# Patient Record
Sex: MALE | Race: WHITE | NOT HISPANIC OR LATINO | Employment: OTHER | URBAN - METROPOLITAN AREA
[De-identification: names, ages, dates, MRNs, and addresses within clinical notes are randomized per-mention and may not be internally consistent; named-entity substitution may affect disease eponyms.]

---

## 2017-04-25 ENCOUNTER — OFFICE VISIT (OUTPATIENT)
Dept: OPHTHALMOLOGY | Facility: CLINIC | Age: 72
End: 2017-04-25
Payer: MEDICARE

## 2017-04-25 DIAGNOSIS — Z13.5 GLAUCOMA SCREENING: ICD-10-CM

## 2017-04-25 DIAGNOSIS — E11.9 DIABETES MELLITUS TYPE 2 WITHOUT RETINOPATHY: Primary | ICD-10-CM

## 2017-04-25 DIAGNOSIS — H52.7 REFRACTIVE ERROR: ICD-10-CM

## 2017-04-25 PROCEDURE — 92014 COMPRE OPH EXAM EST PT 1/>: CPT | Mod: S$GLB,,, | Performed by: OPTOMETRIST

## 2017-04-25 PROCEDURE — 92015 DETERMINE REFRACTIVE STATE: CPT | Mod: S$GLB,,, | Performed by: OPTOMETRIST

## 2017-04-25 PROCEDURE — 99999 PR PBB SHADOW E&M-EST. PATIENT-LVL I: CPT | Mod: PBBFAC,,, | Performed by: OPTOMETRIST

## 2017-04-25 NOTE — PROGRESS NOTES
HPI     Last [facility] exam 04/25/2016  Diabetic/ 04/25/2017  Cataract, nuclear, bilateral  Screening for glaucoma  RE       Last edited by Sven Walls MA on 4/25/2017  1:12 PM.         Assessment /Plan     For exam results, see Encounter Report.    Diabetes mellitus type 2 without retinopathy    Cataract, nuclear, bilateral    Glaucoma screening    Refractive error      No diabetic retinopathy OU.  Mild NS cataracts = will follow.  OH OK OU otherwise.  Spec Rx given.  RTC one year.

## 2018-03-28 ENCOUNTER — HOSPITAL ENCOUNTER (EMERGENCY)
Facility: HOSPITAL | Age: 73
End: 2018-03-29
Attending: EMERGENCY MEDICINE
Payer: MEDICARE

## 2018-03-28 DIAGNOSIS — I63.9 STROKE: Primary | ICD-10-CM

## 2018-03-28 DIAGNOSIS — I61.9 RIGHT-SIDED NONTRAUMATIC INTRACEREBRAL HEMORRHAGE, UNSPECIFIED CEREBRAL LOCATION: ICD-10-CM

## 2018-03-28 DIAGNOSIS — Z01.818 ENCOUNTER FOR INTUBATION: ICD-10-CM

## 2018-03-28 LAB
INR PPP: 1.1
PROTHROMBIN TIME: 12 SEC

## 2018-03-28 PROCEDURE — 85025 COMPLETE CBC W/AUTO DIFF WBC: CPT

## 2018-03-28 PROCEDURE — 84443 ASSAY THYROID STIM HORMONE: CPT

## 2018-03-28 PROCEDURE — 85610 PROTHROMBIN TIME: CPT

## 2018-03-28 PROCEDURE — 85730 THROMBOPLASTIN TIME PARTIAL: CPT

## 2018-03-28 PROCEDURE — 93010 ELECTROCARDIOGRAM REPORT: CPT | Mod: ,,, | Performed by: INTERNAL MEDICINE

## 2018-03-28 PROCEDURE — 93005 ELECTROCARDIOGRAM TRACING: CPT

## 2018-03-28 PROCEDURE — 99291 CRITICAL CARE FIRST HOUR: CPT | Mod: 25

## 2018-03-28 PROCEDURE — 80053 COMPREHEN METABOLIC PANEL: CPT

## 2018-03-28 PROCEDURE — 80061 LIPID PANEL: CPT

## 2018-03-28 PROCEDURE — A4216 STERILE WATER/SALINE, 10 ML: HCPCS

## 2018-03-28 PROCEDURE — 25000003 PHARM REV CODE 250

## 2018-03-28 RX ORDER — TRAMADOL HYDROCHLORIDE 100 MG/1
50 TABLET, EXTENDED RELEASE ORAL DAILY
COMMUNITY
End: 2019-03-15

## 2018-03-28 RX ORDER — RIVAROXABAN 15 MG/1
15 TABLET, FILM COATED ORAL
COMMUNITY
End: 2019-03-15

## 2018-03-28 RX ORDER — SIMVASTATIN 40 MG/1
20 TABLET, FILM COATED ORAL NIGHTLY
COMMUNITY
End: 2019-03-15

## 2018-03-28 RX ORDER — METOPROLOL SUCCINATE 50 MG/1
50 TABLET, EXTENDED RELEASE ORAL DAILY
COMMUNITY
End: 2024-01-23

## 2018-03-28 RX ORDER — GABAPENTIN 100 MG/1
100 CAPSULE ORAL 3 TIMES DAILY
COMMUNITY
End: 2019-03-15

## 2018-03-29 VITALS
BODY MASS INDEX: 36.44 KG/M2 | HEIGHT: 72 IN | HEART RATE: 75 BPM | RESPIRATION RATE: 16 BRPM | OXYGEN SATURATION: 98 % | WEIGHT: 269 LBS | DIASTOLIC BLOOD PRESSURE: 67 MMHG | TEMPERATURE: 98 F | SYSTOLIC BLOOD PRESSURE: 132 MMHG

## 2018-03-29 LAB
ALBUMIN SERPL BCP-MCNC: 2.7 G/DL
ALP SERPL-CCNC: 149 U/L
ALT SERPL W/O P-5'-P-CCNC: 49 U/L
ANION GAP SERPL CALC-SCNC: 8 MMOL/L
APTT BLDCRRT: 32.5 SEC
AST SERPL-CCNC: 45 U/L
BASOPHILS # BLD AUTO: 0.02 K/UL
BASOPHILS NFR BLD: 0.2 %
BILIRUB SERPL-MCNC: 1.2 MG/DL
BUN SERPL-MCNC: 13 MG/DL
CALCIUM SERPL-MCNC: 9.7 MG/DL
CHLORIDE SERPL-SCNC: 108 MMOL/L
CHOLEST SERPL-MCNC: 147 MG/DL
CHOLEST/HDLC SERPL: 3.1 {RATIO}
CO2 SERPL-SCNC: 26 MMOL/L
CREAT SERPL-MCNC: 1 MG/DL
DIFFERENTIAL METHOD: ABNORMAL
EOSINOPHIL # BLD AUTO: 0.2 K/UL
EOSINOPHIL NFR BLD: 2 %
ERYTHROCYTE [DISTWIDTH] IN BLOOD BY AUTOMATED COUNT: 13.2 %
EST. GFR  (AFRICAN AMERICAN): >60 ML/MIN/1.73 M^2
EST. GFR  (NON AFRICAN AMERICAN): >60 ML/MIN/1.73 M^2
GLUCOSE SERPL-MCNC: 199 MG/DL
HCT VFR BLD AUTO: 38.5 %
HDLC SERPL-MCNC: 48 MG/DL
HDLC SERPL: 32.7 %
HGB BLD-MCNC: 13.6 G/DL
LDLC SERPL CALC-MCNC: 87.6 MG/DL
LYMPHOCYTES # BLD AUTO: 2.2 K/UL
LYMPHOCYTES NFR BLD: 27.7 %
MCH RBC QN AUTO: 33.9 PG
MCHC RBC AUTO-ENTMCNC: 35.3 G/DL
MCV RBC AUTO: 96 FL
MONOCYTES # BLD AUTO: 0.9 K/UL
MONOCYTES NFR BLD: 11.5 %
NEUTROPHILS # BLD AUTO: 4.7 K/UL
NEUTROPHILS NFR BLD: 58.6 %
NONHDLC SERPL-MCNC: 99 MG/DL
PLATELET # BLD AUTO: 177 K/UL
PMV BLD AUTO: 9.7 FL
POTASSIUM SERPL-SCNC: 3.7 MMOL/L
PROT SERPL-MCNC: 5.9 G/DL
RBC # BLD AUTO: 4.01 M/UL
SODIUM SERPL-SCNC: 142 MMOL/L
TRIGL SERPL-MCNC: 57 MG/DL
TSH SERPL DL<=0.005 MIU/L-ACNC: 1.43 UIU/ML
WBC # BLD AUTO: 8.09 K/UL

## 2018-03-29 PROCEDURE — 25000003 PHARM REV CODE 250: Performed by: EMERGENCY MEDICINE

## 2018-03-29 PROCEDURE — 96368 THER/DIAG CONCURRENT INF: CPT | Mod: 59

## 2018-03-29 PROCEDURE — 96365 THER/PROPH/DIAG IV INF INIT: CPT

## 2018-03-29 PROCEDURE — 31500 INSERT EMERGENCY AIRWAY: CPT

## 2018-03-29 PROCEDURE — 63600175 PHARM REV CODE 636 W HCPCS: Performed by: EMERGENCY MEDICINE

## 2018-03-29 RX ORDER — NICARDIPINE HYDROCHLORIDE 0.2 MG/ML
5 INJECTION INTRAVENOUS CONTINUOUS
Status: DISCONTINUED | OUTPATIENT
Start: 2018-03-29 | End: 2018-03-29 | Stop reason: HOSPADM

## 2018-03-29 RX ORDER — ETOMIDATE 2 MG/ML
20 INJECTION INTRAVENOUS
Status: COMPLETED | OUTPATIENT
Start: 2018-03-29 | End: 2018-03-29

## 2018-03-29 RX ORDER — PROPOFOL 10 MG/ML
5 INJECTION, EMULSION INTRAVENOUS
Status: COMPLETED | OUTPATIENT
Start: 2018-03-29 | End: 2018-03-29

## 2018-03-29 RX ORDER — PROPOFOL 10 MG/ML
INJECTION, EMULSION INTRAVENOUS
Status: COMPLETED
Start: 2018-03-29 | End: 2018-03-29

## 2018-03-29 RX ORDER — VECURONIUM BROMIDE FOR INJECTION 1 MG/ML
10 INJECTION, POWDER, LYOPHILIZED, FOR SOLUTION INTRAVENOUS ONCE
Status: COMPLETED | OUTPATIENT
Start: 2018-03-29 | End: 2018-03-29

## 2018-03-29 RX ADMIN — PROPOFOL 5 MCG/KG/MIN: 10 INJECTION, EMULSION INTRAVENOUS at 12:03

## 2018-03-29 RX ADMIN — NICARDIPINE HYDROCHLORIDE 5 MG/HR: 0.2 INJECTION, SOLUTION INTRAVENOUS at 12:03

## 2018-03-29 RX ADMIN — ETOMIDATE 20 MG: 2 INJECTION, SOLUTION INTRAVENOUS at 12:03

## 2018-03-29 RX ADMIN — VECURONIUM BROMIDE FOR INJECTION 10 MG: 1 INJECTION, POWDER, LYOPHILIZED, FOR SOLUTION INTRAVENOUS at 12:03

## 2018-03-29 NOTE — ED PROVIDER NOTES
SCRIBE #1 NOTE: I, Corinne Mack, am scribing for, and in the presence of, Reba Layne Do, MD. I have scribed the entire note.      History      Chief Complaint   Patient presents with    Cerebrovascular Accident       Review of patient's allergies indicates:   Allergen Reactions    Sulfa (sulfonamide antibiotics) Rash        HPI   HPI    3/28/2018, 11:21 PM   History obtained from the patient      History of Present Illness: Ramy Romo is a 72 y.o. male patient with PMHx of Afib, CHF, DM, and HTN who presents to the Emergency Department for slurred speech which onset 2.5 hours ago.   Symptoms are constant and moderate in severity. No mitigating or exacerbating factors reported. Associated sxs include L sided facial droop. Patient denies any drooling, CP, SOB, weakness, numbness, HA, dizziness, and all other sxs at this time (however, pt a little confused at this time). No prior Tx reported. Pt is on Xarelto daily. No further complaints or concerns at this time.         Arrival mode: AASI    PCP: Craig Capellan MD       Past Medical History:  Past Medical History:   Diagnosis Date    Atrial fibrillation     CHF (congestive heart failure)     Diabetes mellitus     Hypertension        Past Surgical History:  Past Surgical History:   Procedure Laterality Date    CHOLECYSTECTOMY      NECK SURGERY           Family History:  Family History   Problem Relation Age of Onset    Cataracts Father     Glaucoma Father        Social History:  Social History     Social History Main Topics    Smoking status: Never Smoker    Smokeless tobacco: Not on file    Alcohol use No    Drug use: Unknown    Sexual activity: Not on file       ROS   Review of Systems   Constitutional: Negative for chills and fever.   HENT: Negative for drooling and facial swelling.    Respiratory: Negative for cough and shortness of breath.    Cardiovascular: Negative for chest pain and leg swelling.   Gastrointestinal: Negative for  abdominal pain, diarrhea, nausea and vomiting.   Musculoskeletal: Negative for back pain, neck pain and neck stiffness.   Skin: Negative for rash and wound.   Neurological: Positive for facial asymmetry (L sided) and speech difficulty. Negative for dizziness, weakness, light-headedness, numbness and headaches.   All other systems reviewed and are negative.    Physical Exam      Initial Vitals   BP Pulse Resp Temp SpO2   03/28/18 2324 03/28/18 2324 03/28/18 2324 03/28/18 2324 03/28/18 2343   (!) 181/76 (!) 57 19 98.2 °F (36.8 °C) 97 %      MAP       03/28/18 2324       111         Physical Exam  Nursing Notes and Vital Signs Reviewed.  Constitutional: Patient is in no apparent distress. Well-developed and well-nourished.  Head: Atraumatic. Normocephalic.  Eyes: PERRL. EOM intact. Conjunctivae are not pale. No scleral icterus.  ENT: Mucous membranes are moist. Oropharynx is clear and symmetric.    Neck: Supple. Full ROM. No lymphadenopathy.  Cardiovascular: Regular rate. Regular rhythm. No murmurs, rubs, or gallops. Distal pulses are 2+ and symmetric.  Pulmonary/Chest: No respiratory distress. Clear to auscultation bilaterally. No wheezing or rales.  Abdominal: Soft and non-distended.  There is no tenderness.  No rebound, guarding, or rigidity.   Musculoskeletal: Moves all extremities. No obvious deformities. No edema. No calf tenderness.  Skin: Warm and dry.  Neurological: Patient is lethargic and oriented to person,but very dysarthric. Pupils ERRL and EOM normal. Strength is full bilaterally; it is equal and 5/5 in bilateral upper and lower extremities. There is no pronator drift of outstretched arms. Light touch sense is intact. Speech is slurred. L-sided facial droop. L side of the tongue is depressed. Gait not tested  Psychiatric: Normal affect. Eyes closed. Appropriate in content.    ED Course    Critical Care  Date/Time: 3/29/2018 12:04 AM  Performed by: CHRISTOPHER GARCIA  Authorized by: CHRISTOPHER GARCIA    Direct patient critical care time: 13 minutes  Additional history critical care time: 5 minutes  Ordering / reviewing critical care time: 4 minutes  Documentation critical care time: 7 minutes  Consulting other physicians critical care time: 6 minutes  Consult with family critical care time: 5 minutes  Total critical care time (exclusive of procedural time) : 40 minutes  Critical care time was exclusive of separately billable procedures and treating other patients and teaching time.  Critical care was necessary to treat or prevent imminent or life-threatening deterioration of the following conditions: CNS failure or compromise (intracranial bleed).  Critical care was time spent personally by me on the following activities: blood draw for specimens, development of treatment plan with patient or surrogate, discussions with consultants, interpretation of cardiac output measurements, examination of patient, evaluation of patient's response to treatment, review of old charts, re-evaluation of patient's condition, pulse oximetry, ordering and review of radiographic studies, ordering and review of laboratory studies, ordering and performing treatments and interventions, obtaining history from patient or surrogate and ventilator management.    Intubation  Date/Time: 3/29/2018 12:21 AM  Performed by: CHRISTOPHER GARCIA  Authorized by: CHRISTOPHER GARCIA   Consent Done: Yes  Consent: Verbal consent obtained.  Risks and benefits: risks, benefits and alternatives were discussed  Consent given by: spouse  Patient understanding: patient states understanding of the procedure being performed  Patient consent: the patient's understanding of the procedure matches consent given  Patient identity confirmed: , name, provided demographic data and MRN  Indications: airway protection  Intubation method: direct  Patient status: sedated  Preoxygenation: bag valve mask  Sedatives: etomidate  Paralytic: vecuronium  Tube size: 7.0 mm  Tube  type: cuffed  Number of attempts: 1  Ventilation between attempts: BVM  Cricoid pressure: yes  Cords visualized: yes  Post-procedure assessment: chest rise  Breath sounds: clear  Cuff inflated: yes  ETT to lip: 24 cm  Tube secured with: bite block  Chest x-ray interpreted by radiologist.  Chest x-ray findings: endotracheal tube in appropriate position  Patient tolerance: Patient tolerated the procedure well with no immediate complications  Complications: No  Specimens: No  Implants: No        ED Vital Signs:  Vitals:    03/28/18 2324 03/28/18 2343 03/28/18 2348 03/29/18 0022   BP: (!) 181/76 (!) 151/90  (!) 178/94   Pulse: (!) 57 68 66 69   Resp: 19 19 18 18   Temp: 98.2 °F (36.8 °C)      TempSrc: Oral      SpO2:  97% 98% 100%   Weight: 122 kg (269 lb)      Height: 6' (1.829 m)       03/29/18 0029 03/29/18 0033 03/29/18 0035 03/29/18 0037   BP:  (!) 191/85  (!) 147/70   Pulse: 87 83 78 79   Resp: 19 18 16   Temp:       TempSrc:       SpO2: 100% 99%  100%   Weight:       Height:        03/29/18 0042 03/29/18 0045 03/29/18 0050   BP: 135/65  133/63   Pulse: 75 71 78   Resp: 16 16 16   Temp:      TempSrc:      SpO2: 99% 98% 98%   Weight:      Height:          Abnormal Lab Results:  Labs Reviewed   CBC W/ AUTO DIFFERENTIAL - Abnormal; Notable for the following:        Result Value    RBC 4.01 (*)     Hemoglobin 13.6 (*)     Hematocrit 38.5 (*)     MCH 33.9 (*)     All other components within normal limits   COMPREHENSIVE METABOLIC PANEL - Abnormal; Notable for the following:     Glucose 199 (*)     Total Protein 5.9 (*)     Albumin 2.7 (*)     Total Bilirubin 1.2 (*)     Alkaline Phosphatase 149 (*)     AST 45 (*)     ALT 49 (*)     All other components within normal limits   APTT - Abnormal; Notable for the following:     aPTT 32.5 (*)     All other components within normal limits   PROTIME-INR   TSH   APTT   LIPID PANEL   POCT GLUCOSE        All Lab Results:  Results for orders placed or performed during the hospital  encounter of 03/28/18   CBC W/ AUTO DIFFERENTIAL   Result Value Ref Range    WBC 8.09 3.90 - 12.70 K/uL    RBC 4.01 (L) 4.60 - 6.20 M/uL    Hemoglobin 13.6 (L) 14.0 - 18.0 g/dL    Hematocrit 38.5 (L) 40.0 - 54.0 %    MCV 96 82 - 98 fL    MCH 33.9 (H) 27.0 - 31.0 pg    MCHC 35.3 32.0 - 36.0 g/dL    RDW 13.2 11.5 - 14.5 %    Platelets 177 150 - 350 K/uL    MPV 9.7 9.2 - 12.9 fL    Gran # (ANC) 4.7 1.8 - 7.7 K/uL    Lymph # 2.2 1.0 - 4.8 K/uL    Mono # 0.9 0.3 - 1.0 K/uL    Eos # 0.2 0.0 - 0.5 K/uL    Baso # 0.02 0.00 - 0.20 K/uL    Gran% 58.6 38.0 - 73.0 %    Lymph% 27.7 18.0 - 48.0 %    Mono% 11.5 4.0 - 15.0 %    Eosinophil% 2.0 0.0 - 8.0 %    Basophil% 0.2 0.0 - 1.9 %    Differential Method Automated    Comprehensive metabolic panel   Result Value Ref Range    Sodium 142 136 - 145 mmol/L    Potassium 3.7 3.5 - 5.1 mmol/L    Chloride 108 95 - 110 mmol/L    CO2 26 23 - 29 mmol/L    Glucose 199 (H) 70 - 110 mg/dL    BUN, Bld 13 8 - 23 mg/dL    Creatinine 1.0 0.5 - 1.4 mg/dL    Calcium 9.7 8.7 - 10.5 mg/dL    Total Protein 5.9 (L) 6.0 - 8.4 g/dL    Albumin 2.7 (L) 3.5 - 5.2 g/dL    Total Bilirubin 1.2 (H) 0.1 - 1.0 mg/dL    Alkaline Phosphatase 149 (H) 55 - 135 U/L    AST 45 (H) 10 - 40 U/L    ALT 49 (H) 10 - 44 U/L    Anion Gap 8 8 - 16 mmol/L    eGFR if African American >60 >60 mL/min/1.73 m^2    eGFR if non African American >60 >60 mL/min/1.73 m^2   Protime-INR   Result Value Ref Range    Prothrombin Time 12.0 9.0 - 12.5 sec    INR 1.1 0.8 - 1.2   TSH   Result Value Ref Range    TSH 1.432 0.400 - 4.000 uIU/mL   APTT   Result Value Ref Range    aPTT 32.5 (H) 21.0 - 32.0 sec       Imaging Results:  Imaging Results          X-Ray Chest 1 View (In process)                X-Ray Chest AP Portable (Final result)  Result time 03/28/18 23:51:43    Final result by James Degroot MD (03/28/18 23:51:43)                 Impression:      Mild enlargement of the heart.      Electronically signed by: JAMES DEGROOT MD  Date:      03/28/18  Time:    23:51              Narrative:    Exam: XR CHEST AP PORTABLE,    Date:  03/28/18 23:40:00    History: Stroke     Comparison:  08/20/2016    Findings: There is mild enlargement of the heart.  Lungs are clear.                             CT Head Without Contrast (Final result)  Result time 03/28/18 23:37:57    Final result by James Degroot MD (03/28/18 23:37:57)                 Impression:         There is acute intra-cerebral hemorrhage in the right temporal lobe with extension of blood into the subarachnoid space filling the right sylvian fissure.    I have reported these findings to Dr. Xi Bedolla at 2330 hrs.  03/28/2018.      All CT scans at this facility use dose modulation, iterative reconstruction, and/or weight based dosing when appropriate to reduce radiation dose to as low as reasonable achievable.      Electronically signed by: JAMES DEGROOT MD  Date:     03/28/18  Time:    23:37              Narrative:    CT HEAD WITHOUT CONTRAST    Date: 03/28/18 23:25:00    Clinical indication:  Stroke      Technique:  Standard noncontrast CT scan of the brain. Comparison is made with the previous study of 08/20/2016.     Findings:  There is a new finding of acute cerebral hemorrhage.  A rounded collection of fresh blood attenuation is seen in the right temporal lobe measuring 1.8 x 2.5 cm.  There is extension of hemorrhage into the subarachnoid space with blood outlining the sylvian fissure extending into the right middle cranial fossa.  The ventricular system appears free of any significant midline shift with no hemorrhage extending into the ventricular system.  The left cerebral hemisphere appears intact.  Chronic changes of small vessel disease with patchy decreased attenuation in the periventricular white matter.  There is atherosclerosis of intracranial vessels.  Posterior fossa is normal in appearance.    There is opacification of the maxillary sinuses bilaterally with patchy mucosal  thickening in the ethmoid air cells as well.  The skull and skull base are unremarkable.                               The EKG was ordered, reviewed, and independently interpreted by the ED provider.  Interpretation time: 2344  Rate: 71 BPM  Rhythm: atrial fibrillation  Interpretation: No STEMI.           The Emergency Provider reviewed the vital signs and test results, which are outlined above.    ED Discussion     11:30 PM: Informed by Dr. Flores (Radiology) that the pt has an intracranial bleed and requires transfer to another facility with interventional neurology services.    11:37 PM: Re-evaluated pt. Informed pt and family that there are no neuro surgery services available at this time. I have discussed test results, shared treatment plan, and the need for transfer with patient and family at bedside. All historical, clinical, radiographic, and laboratory findings were reviewed with the patient/family in detail. Patient will be transferred by [facility] services with cardiac monitoring and ventilator management care required en route. Patient understands that there could be unforeseen motor vehicle accidents or loss of vital signs that could result in potential death or permanent disability. Pt and family express understanding at this time and agree with all information. All questions answered. Pt and family have no further questions or concerns at this time. Pt is ready for transfer.     11:54 PM: Consult with Dr. Nolan (Emergency Medicine) at [facility] concerning pt. There are no neuro surgery services, which the patient requires, offered at [facility] at this time. Dr. Nolan expresses understanding and will accept transfer.  Accepting Facility: [facility]  Accepting Physician: Dr. Nolan      ED Medication(s):  Medications   niCARdipine 40 mg/200 mL infusion (5 mg/hr Intravenous Rate/Dose Change 3/29/18 0042)   etomidate injection 20 mg (20 mg Intravenous Given by Other 3/29/18 0022)   vecuronium injection 10 mg  (10 mg Intravenous Given by Other 3/29/18 0022)   propofol (DIPRIVAN) 10 mg/mL infusion (10 mcg/kg/min × 122 kg Intravenous Rate/Dose Change 3/29/18 0035)   propofol (DIPRIVAN) 10 mg/mL infusion (  Override Pull 3/29/18 0045)       New Prescriptions    No medications on file             Medical Decision Making    Medical Decision Making:   Clinical Tests:   Lab Tests: Reviewed and Ordered  Radiological Study: Reviewed and Ordered  Medical Tests: Reviewed and Ordered           Scribe Attestation:   Scribe #1: I performed the above scribed service and the documentation accurately describes the services I performed. I attest to the accuracy of the note.    Attending:   Physician Attestation Statement for Scribe #1: I, Reba Layne Do, MD, personally performed the services described in this documentation, as scribed by Corinne Mack, in my presence, and it is both accurate and complete.          Clinical Impression       ICD-10-CM ICD-9-CM   1. Stroke I63.9 434.91   2. Encounter for intubation Z01.818 V72.83   3. Right-sided nontraumatic intracerebral hemorrhage, unspecified cerebral location I61.9 431       Disposition:   Disposition: Transferred  Condition: Serious           Reba Layne Do, MD  03/29/18 0128       Reba Layne Do, MD  03/29/18 0128

## 2018-03-29 NOTE — ED NOTES
Patient placed on continuous cardiac monitor, automatic blood pressure cuff and continuous pulse oximeter upon arrival.

## 2018-03-29 NOTE — ED NOTES
Before patient was sent to CT. + left sided facial droop noted with slurred speech, drooling noted to left sided. Unable to perform swallow study at this time.

## 2018-06-05 ENCOUNTER — OFFICE VISIT (OUTPATIENT)
Dept: OPHTHALMOLOGY | Facility: CLINIC | Age: 73
End: 2018-06-05
Payer: MEDICARE

## 2018-06-05 DIAGNOSIS — H25.13 CATARACT, NUCLEAR SCLEROTIC SENILE, BILATERAL: ICD-10-CM

## 2018-06-05 DIAGNOSIS — H52.7 REFRACTIVE ERROR: ICD-10-CM

## 2018-06-05 DIAGNOSIS — Z13.5 GLAUCOMA SCREENING: ICD-10-CM

## 2018-06-05 DIAGNOSIS — E11.9 DIABETES MELLITUS TYPE 2 WITHOUT RETINOPATHY: Primary | ICD-10-CM

## 2018-06-05 PROCEDURE — 99999 PR PBB SHADOW E&M-EST. PATIENT-LVL I: CPT | Mod: PBBFAC,,, | Performed by: OPTOMETRIST

## 2018-06-05 PROCEDURE — 92014 COMPRE OPH EXAM EST PT 1/>: CPT | Mod: S$GLB,,, | Performed by: OPTOMETRIST

## 2018-06-05 PROCEDURE — 92015 DETERMINE REFRACTIVE STATE: CPT | Mod: S$GLB,,, | Performed by: OPTOMETRIST

## 2018-06-05 RX ORDER — AMLODIPINE BESYLATE 10 MG/1
10 TABLET ORAL
COMMUNITY
Start: 2018-05-31 | End: 2019-05-31

## 2018-06-05 NOTE — PROGRESS NOTES
HPI     Diabetic Eye Exam    Additional comments: Yearly           Comments   Last seen by [facility] on 4/25/17 for yearly DM exam.  Patient states since last exam he had a stroke April 2018 and had been   hospitalized for bleeding on the brain. Wears PAL glasses full-time last   updated 1 year ago. No other complaints. No drops.  1. DM  2. Stroke 2018  3. NSC OU         Last edited by Barb Swanson, PCT on 6/5/2018  1:33 PM. (History)            Assessment /Plan     For exam results, see Encounter Report.    Diabetes mellitus type 2 without retinopathy    Cataract, nuclear sclerotic senile, bilateral    Glaucoma screening    Refractive error    Other orders  -     Cancel: Posterior Segment OCT Retina-Both eyes; Future      No diabetic retinopathy OU.  NS cataracts OU = will follow.  OH OK OU otherwise.  Spec Rx given.  RTC one year.

## 2019-02-19 ENCOUNTER — OFFICE VISIT (OUTPATIENT)
Dept: OPHTHALMOLOGY | Facility: CLINIC | Age: 74
End: 2019-02-19
Payer: MEDICARE

## 2019-02-19 DIAGNOSIS — Z13.5 GLAUCOMA SCREENING: ICD-10-CM

## 2019-02-19 DIAGNOSIS — H25.13 CATARACT, NUCLEAR SCLEROTIC SENILE, BILATERAL: ICD-10-CM

## 2019-02-19 DIAGNOSIS — E11.9 DIABETES MELLITUS TYPE 2 WITHOUT RETINOPATHY: Primary | ICD-10-CM

## 2019-02-19 DIAGNOSIS — H52.7 REFRACTIVE ERROR: ICD-10-CM

## 2019-02-19 PROCEDURE — 92014 PR EYE EXAM, EST PATIENT,COMPREHESV: ICD-10-PCS | Mod: S$GLB,,, | Performed by: OPTOMETRIST

## 2019-02-19 PROCEDURE — 99999 PR PBB SHADOW E&M-EST. PATIENT-LVL I: ICD-10-PCS | Mod: PBBFAC,,, | Performed by: OPTOMETRIST

## 2019-02-19 PROCEDURE — 99999 PR PBB SHADOW E&M-EST. PATIENT-LVL I: CPT | Mod: PBBFAC,,, | Performed by: OPTOMETRIST

## 2019-02-19 PROCEDURE — 92014 COMPRE OPH EXAM EST PT 1/>: CPT | Mod: S$GLB,,, | Performed by: OPTOMETRIST

## 2019-02-19 NOTE — PROGRESS NOTES
HPI     Last [facility] exam 06/05/2018  Diabetic/NIDDM  Stroke 2018  Cataract, nuclear sclerotic senile, bilateral      Last edited by Sven Walls MA on 2/19/2019  9:12 AM. (History)            Assessment /Plan     For exam results, see Encounter Report.    Diabetes mellitus type 2 without retinopathy    Cataract, nuclear sclerotic senile, bilateral    Glaucoma screening    Refractive error      No diabetic retinopathy OU.  Moderate NS cataracts OU = will follow.  OH OK OU otherwise.  Spec Rx given.  RTC one year or prn.

## 2019-03-14 NOTE — PROGRESS NOTES
Referring Provider:    Emilyreferral Self  No address on file  Subjective:   Patient: Ramy Romo [id number], :1945   Visit date:3/15/2019 12:28 PM    Chief Complaint:  No chief complaint on file.    HPI:  Ramy is a 73 y.o. male who I was asked to see in consultation for evaluation of the following issue(s):    Patient presented to clinic for the first time on 03/15/19 referred by his PCP.  He has history of CVA and CT demonstrated incidental findings of several bilateral maxillary sinusitis.  He has a history of endocopic sinus surgery on the left at [facility].  He has no smoking history.  Denies allergy symptoms.  Uses nasal saline rinses.     Review of Systems:  -     Allergic/Immunologic: is allergic to sulfa (sulfonamide antibiotics)..  -     Constitutional: Current temp: 97.7 °F (36.5 °C) (Tympanic)      His meds, allergies, medical, surgical, social & family histories were reviewed & updated:  -     He has a current medication list which includes the following prescription(s): amlodipine, aspirin, blood sugar diagnostic, blood-glucose meter, bumetanide, calcium-vitamin d, ferrous gluconate, gabapentin, hydrocodone-acetaminophen, lancets, levetiracetam, metoprolol succinate, potassium chloride sa, tamsulosin, glipizide, and pantoprazole.  -     He  has a past medical history of Atrial fibrillation, CHF (congestive heart failure), Diabetes mellitus, and Hypertension.   -     He does not have any pertinent problems on file.   -     He  has a past surgical history that includes Cholecystectomy and Neck surgery.  -     He  reports that  has never smoked. He does not have any smokeless tobacco history on file. He reports that he does not drink alcohol.  -     His family history includes Cataracts in his father; Glaucoma in his father.  -     He is allergic to sulfa (sulfonamide antibiotics).    Objective:     Physical Exam:  Vitals:  /66   Pulse (!) 48   Temp 97.7 °F (36.5 °C) (Tympanic)   Wt 134 kg  (295 lb 6.7 oz)   BMI 40.07 kg/m²   General appearance:  Well developed, well nourished    Eyes:  Extraocular motions intact, PERRL    Communication:  no hoarseness, no dysphonia    Ears:  Otoscopy of external auditory canals and tympanic membranes was normal, clinical speech reception thresholds grossly intact, no mass/lesion of auricle.  Nose:  No masses/lesions of external nose, nasal mucosa, septum, and turbinates were within normal limits.  Mouth:  No mass/lesion of lips, teeth, gums, hard/soft palate, tongue, tonsils, or oropharynx.    Cardiovascular:  No pedal edema; Radial Pulses +2     Neck & Lymphatics:  No cervical lymphadenopathy, no neck mass/crepitus/ asymmetry, trachea is midline, no thyroid enlargement/tenderness/mass.    Psych: Oriented x3,  Alert with normal mood and affect.     Respiration/Chest:  Symmetric expansion during respiration, normal respiratory effort.    Skin:  Warm and intact. No ulcerations of face, scalp, neck.        CT HEAD W WO CONTRAST Feb 2019:    COMPARISON:     Noncontrast CT scans of head April 8, 2018 and May 24, 2018. Pre and postcontrast CTA head June 18, 2018.    FINDINGS:     Automated exposure control was used for dose reduction.    No intracranial hemorrhage, mass or mass effect. Again, diffuse moderate white matter hypodensity in both cerebral hemispheres. Small to small moderate region of encephalomalacia along right sylvian fissure appears somewhat increased in overall volume. No acute loss of gray-white matter differentiation. The ventricles and cisterns appear within normal limits. Again, mild/moderate calcification of intracranial internal carotids. Persistent relative hyperdensity of middle cerebral arteries, more so on the right.     Again, diffuse mucosal thickening essentially completely opacifying right maxillary sinus. There is now diffuse mucosal thickening near completely opacifying left maxillary sinus. Mild mucosal thickening left mid and anterior  ethmoid air cells is now seen.    IMPRESSION:    1. No definite acute finding.    2. Slight interval apparent worsening of encephalomalacia in the right cerebral hemisphere at site of prior hemorrhage. Three. Again, diffuse small vessel ischemic disease change in white matter.    4. Severe right maxillary sinus disease is unimproved. New severe left maxillary sinus disease. New mild left ethmoid sinus disease.    Assessment & Plan:   Diagnoses and all orders for this visit:    Chronic maxillary sinusitis  -     Aerobic culture      SINUSITIS/RHINITIS  Ramy presents today for an evaluation.  They have multiple sinonasal complaints and determination of the underlying etiology is a problem of moderate to high complexity.  Patients may present with sinonasal symptoms such as nasal obstruction as a primary anatomic disorder.  Patients may also present with recurrent or chronic inflammatory sinonasal symptoms.  Generally, patients can be stratified into one of several groups.      The first group represents patients who have frequent or recurrent upper respiratory infections, most frequently viral.  These patients most frequently have normally functioning immune system's but have high levels of exposure.  This is commonly seen in nurses and school teachers as well as other groups who spend large amounts of time around sick patients and children.      Other patients may have isolated rhinitis without evidence of sinusitis.  The majority of these patients have ALLERGIC rhinitis however other groups may have more rare forms of rhinitis such as nonallergic rhinitis with eosinophilia syndrome.  As a screening evaluation, if the patient has had a normal endoscopy, from time to time a simple x-ray of the sinuses may be performed in combination with antigen specific ALLERGY testing.    The third group of patients present with significant evidence of chronic sinusitis.  Nasal endoscopy may reveal polyps or purulent drainage.  CT  scan is an important aspect to determining the extent of disease.  Some patients with chronic sinusitis have an underlying etiology of ALLERGY leading to obstruction of the ostiomeatal units with furthering of the infection.  Others may have an acute infection that leads to stenosis of the sinonasal openings followed by a long, progressive course of infection.  Patients with unilateral disease who do not have inverting papilloma typically fall into this latter group.    CT scan of the sinuses has been performed.      Antigen specific allergy testing  has not been performed.    Allergy treatment with topical steroids and/or antihistamines has been used with good compliance with symptoms unchanged.      By review of all data, history and exam, there does seem to be a clear distinction between allergic rhinitis versus rhinitis with a component of chronic sinusitis.   My impression is that Ramy presents with severe bilateral maxillary sinusitis.      My recommendation is:    Culture taken of left maxillary sinus today in clinic - will contact pt with results and abx tx    We discussed his medical conditions, treatments and plan.  Ramy should return to clinic if any issues arise (symptoms worsen or persist), otherwise we will see him back in the clinic In 3 weeks.    Thank you for allowing me to participate in the care of Ramy.      Patient: Ramy Romo [id number], :1945  Procedure date:3/15/2019  Patient's medications, allergies, past medical, surgical, social and family histories were reviewed and updated as appropriate.  Chief Complaint:  No chief complaint on file.    HPI:  Ramy is a 73 y.o. male with the history of present illness as discussed in the clinic note from today.  Anterior rhinoscopy was insufficient to explain symptoms and findings.     Procedure: Risks, benefits, and alternatives of the procedure were discussed with the patient, and the patient consented to the nasal endoscopy.  The  nasal cavity was sprayed with a topical decongestant and anesthetic (if needed). The endoscope was passed into each nostril and each nasal cavity was visualized.  On each side the nasal cavity, sinuses (if open), turbinates, middle and superior meatus, sphenoethmoidal recess and septum were examined with the findings described below. At the end of the examination, the scope was removed. The patient tolerated the procedure well with no complications.       Endoscopic Sinonasal Exam Findings:  -     The right side has mild edema  -     The left side has mild edema generally but significant edema around the maxillary ostium  -     Nasal secretions: purulent secretions in the left maxillary  -     Nasal septum: no significant deviation or perforation appreciated   -     Inferior turbinate: Normal mucosa without significant hypertrophy bilaterally  -     Middle turbinate: turbinate partially resected on the left  -     Other findings: No mass or obstructive lesion    Assessment & Plan:  - see today's clinic note

## 2019-03-15 ENCOUNTER — OFFICE VISIT (OUTPATIENT)
Dept: OTOLARYNGOLOGY | Facility: CLINIC | Age: 74
End: 2019-03-15
Payer: MEDICARE

## 2019-03-15 VITALS
DIASTOLIC BLOOD PRESSURE: 66 MMHG | SYSTOLIC BLOOD PRESSURE: 130 MMHG | WEIGHT: 295.44 LBS | TEMPERATURE: 98 F | HEART RATE: 48 BPM | BODY MASS INDEX: 40.07 KG/M2

## 2019-03-15 DIAGNOSIS — J32.0 CHRONIC MAXILLARY SINUSITIS: Primary | ICD-10-CM

## 2019-03-15 PROCEDURE — 31231 NASAL ENDOSCOPY DX: CPT | Mod: S$GLB,,, | Performed by: OTOLARYNGOLOGY

## 2019-03-15 PROCEDURE — 99999 PR PBB SHADOW E&M-EST. PATIENT-LVL III: CPT | Mod: PBBFAC,,, | Performed by: OTOLARYNGOLOGY

## 2019-03-15 PROCEDURE — 87186 SC STD MICRODIL/AGAR DIL: CPT

## 2019-03-15 PROCEDURE — 99999 PR PBB SHADOW E&M-EST. PATIENT-LVL III: ICD-10-PCS | Mod: PBBFAC,,, | Performed by: OTOLARYNGOLOGY

## 2019-03-15 PROCEDURE — 99203 OFFICE O/P NEW LOW 30 MIN: CPT | Mod: 25,S$GLB,, | Performed by: OTOLARYNGOLOGY

## 2019-03-15 PROCEDURE — 99203 PR OFFICE/OUTPT VISIT, NEW, LEVL III, 30-44 MIN: ICD-10-PCS | Mod: 25,S$GLB,, | Performed by: OTOLARYNGOLOGY

## 2019-03-15 PROCEDURE — 87070 CULTURE OTHR SPECIMN AEROBIC: CPT

## 2019-03-15 PROCEDURE — 31231 PR NASAL ENDOSCOPY, DX: ICD-10-PCS | Mod: S$GLB,,, | Performed by: OTOLARYNGOLOGY

## 2019-03-15 PROCEDURE — 87077 CULTURE AEROBIC IDENTIFY: CPT

## 2019-03-15 RX ORDER — BUMETANIDE 2 MG/1
2 TABLET ORAL DAILY
COMMUNITY
Start: 2019-03-13 | End: 2024-01-23 | Stop reason: ALTCHOICE

## 2019-03-15 RX ORDER — POTASSIUM CHLORIDE 20 MEQ/1
1 TABLET, EXTENDED RELEASE ORAL DAILY
COMMUNITY
End: 2024-01-23

## 2019-03-15 RX ORDER — TAMSULOSIN HYDROCHLORIDE 0.4 MG/1
0.4 CAPSULE ORAL DAILY
COMMUNITY
Start: 2019-03-05

## 2019-03-15 RX ORDER — LEVETIRACETAM 500 MG/1
500 TABLET ORAL 2 TIMES DAILY
COMMUNITY
Start: 2019-03-13 | End: 2024-01-23

## 2019-03-15 RX ORDER — ASPIRIN 81 MG/1
81 TABLET ORAL DAILY
COMMUNITY
End: 2024-01-23

## 2019-03-18 ENCOUNTER — TELEPHONE (OUTPATIENT)
Dept: OTOLARYNGOLOGY | Facility: CLINIC | Age: 74
End: 2019-03-18

## 2019-03-18 NOTE — TELEPHONE ENCOUNTER
Spoke with wife (Marilyn) informed her that culture wasn't back at this time. She voice understanding and will call back at a later time and date.       ----- Message from Julia Pack sent at 3/18/2019  9:17 AM CDT -----  Contact: Marilyn (pt's wife)   Type:  Test Results    Who Called: Marilyn   Name of Test (Lab/Mammo/Etc):  Lab/Culture   Date of Test: 3/15/19  Ordering Provider:  Jl   Where the test was performed: [facility]    Would the patient rather a call back or a response via [facility]? Call back   Best Call Back Number: [phone] (home)     Additional Information:

## 2019-03-19 ENCOUNTER — TELEPHONE (OUTPATIENT)
Dept: OTOLARYNGOLOGY | Facility: CLINIC | Age: 74
End: 2019-03-19

## 2019-03-19 LAB
BACTERIA SPEC AEROBE CULT: NORMAL
BACTERIA SPEC AEROBE CULT: NORMAL

## 2019-03-19 RX ORDER — LEVOFLOXACIN 500 MG/1
500 TABLET, FILM COATED ORAL DAILY
Qty: 21 TABLET | Refills: 0 | Status: SHIPPED | OUTPATIENT
Start: 2019-03-19 | End: 2019-04-09

## 2019-03-19 NOTE — TELEPHONE ENCOUNTER
Reviewed results with pts wife because he was not feeling well got his 3 week follow up scheduled and she will be picking up the medication today.        ----- Message from Cinthya Pina sent at 3/19/2019  2:16 PM CDT -----  Contact: Pat/wife  Type:  Patient Returning Call    Who Called: Pat  Who Left Message for Patient: the nurse  Does the patient know what this is regarding?: test results  Would the patient rather a call back or a response via [facility]?  Call back   Best Call Back Number: [phone] cell.  Additional Information: n/a    Thanks,  Cinthya

## 2019-03-19 NOTE — TELEPHONE ENCOUNTER
Called pts home phone number and cell phone number and left message to return call to discuss results and inform that there were medications called into the pharmacy that need to be started as soon as possible then come back in for a 3 week follow up after.      ----- Message from Francia Felipe PA-C sent at 3/19/2019  8:29 AM CDT -----  Please let pt know his culture grew two different types of bacteria that are both susceptible to Levaquin (antibiotic). I have sent an Rx for Levaquin x 21 days to his pharmacy, please start this and we should f/u in clinic in 3 weeks.

## 2019-03-20 NOTE — TELEPHONE ENCOUNTER
Returned call to patient, left a voicemail for a return call back with any additional questions and concerns regarding his culture results provided to the patient wife with Linda on yesterday.

## 2019-03-20 NOTE — TELEPHONE ENCOUNTER
Spoke with wife to provide her with the bacteria the culture results grew out with verbal understanding.

## 2019-04-09 ENCOUNTER — OFFICE VISIT (OUTPATIENT)
Dept: OTOLARYNGOLOGY | Facility: CLINIC | Age: 74
End: 2019-04-09
Payer: MEDICARE

## 2019-04-09 VITALS
DIASTOLIC BLOOD PRESSURE: 65 MMHG | SYSTOLIC BLOOD PRESSURE: 126 MMHG | HEART RATE: 69 BPM | TEMPERATURE: 98 F | WEIGHT: 283.06 LBS | HEIGHT: 72 IN | BODY MASS INDEX: 38.34 KG/M2

## 2019-04-09 DIAGNOSIS — J32.0 CHRONIC MAXILLARY SINUSITIS: Primary | ICD-10-CM

## 2019-04-09 PROCEDURE — 99999 PR PBB SHADOW E&M-EST. PATIENT-LVL III: CPT | Mod: PBBFAC,,, | Performed by: PHYSICIAN ASSISTANT

## 2019-04-09 PROCEDURE — 99999 PR PBB SHADOW E&M-EST. PATIENT-LVL III: ICD-10-PCS | Mod: PBBFAC,,, | Performed by: PHYSICIAN ASSISTANT

## 2019-04-09 PROCEDURE — 99213 OFFICE O/P EST LOW 20 MIN: CPT | Mod: S$GLB,,, | Performed by: PHYSICIAN ASSISTANT

## 2019-04-09 PROCEDURE — 1101F PT FALLS ASSESS-DOCD LE1/YR: CPT | Mod: CPTII,S$GLB,, | Performed by: PHYSICIAN ASSISTANT

## 2019-04-09 PROCEDURE — 99213 PR OFFICE/OUTPT VISIT, EST, LEVL III, 20-29 MIN: ICD-10-PCS | Mod: S$GLB,,, | Performed by: PHYSICIAN ASSISTANT

## 2019-04-09 PROCEDURE — 1101F PR PT FALLS ASSESS DOC 0-1 FALLS W/OUT INJ PAST YR: ICD-10-PCS | Mod: CPTII,S$GLB,, | Performed by: PHYSICIAN ASSISTANT

## 2019-04-09 RX ORDER — AZELASTINE 1 MG/ML
1 SPRAY, METERED NASAL 2 TIMES DAILY
Qty: 30 ML | Refills: 11 | Status: SHIPPED | OUTPATIENT
Start: 2019-04-09 | End: 2024-01-23

## 2019-04-09 NOTE — PROGRESS NOTES
Subjective:   Patient: Ramy Romo [id number], :1945   Visit date:2019 10:16 AM    Chief Complaint:  Follow-up    HPI:  Ramy is a 73 y.o. male who is here for follow-up. He was last here on 03/15/19 for chronic maxillary sinusitis. Culture was obtained in clinic from his L maxillary sinus and returned + staph. Patient was tx with Levaquin x 21 days and returned to clinic on 19. Patient and wife reported excellent relief in sinus pressure and complete resolution of drainage. He c/o intermittent post-nasal drip worse at night/in AM which resolves once he clears his throat, this is clear mucus. Denied any further discolored mucus or drainage. He has one capsule of Levaquin left to take. Reported recent R arm fx from tripping over concrete outside in a parking lot 2 weeks ago, pt is currently in splint. No other new ssx.     He has a history of endocopic sinus surgery on the left at [facility]      Review of Systems:  -     Allergic/Immunologic: is allergic to sulfa (sulfonamide antibiotics)..  -     Constitutional: Current temp: 97.7 °F (36.5 °C) (Tympanic)    His meds, allergies, medical, surgical, social & family histories were reviewed & updated:  -     He has a current medication list which includes the following prescription(s): amlodipine, aspirin, blood sugar diagnostic, blood-glucose meter, bumetanide, calcium-vitamin d, ferrous gluconate, gabapentin, hydrocodone-acetaminophen, lancets, levetiracetam, levofloxacin, metoprolol succinate, potassium chloride sa, tamsulosin, azelastine, glipizide, and pantoprazole.  -     He  has a past medical history of Atrial fibrillation, CHF (congestive heart failure), Diabetes mellitus, and Hypertension.   -     He does not have any pertinent problems on file.   -     He  has a past surgical history that includes Cholecystectomy; Neck surgery; and Total elbow arthroplasty (Right, 2019).  -     He  reports that he has never smoked. He does not have any  smokeless tobacco history on file. He reports that he does not drink alcohol.  -     His family history includes Cataracts in his father; Glaucoma in his father.  -     He is allergic to sulfa (sulfonamide antibiotics).    Objective:     Physical Exam:  Vitals:  /65   Pulse 69   Temp 97.7 °F (36.5 °C) (Tympanic)   Ht 6' (1.829 m)   Wt 128.4 kg (283 lb 1.1 oz)   BMI 38.39 kg/m²   Appearance:  Well-developed, well-nourished.  Communication:  Able to communicate, no hoarseness.  Head & Face:  Normocephalic, atraumatic, no sinus tenderness, normal facial strength.  Eyes:  Extraocular motions intact.  Ears:  Otoscopy of external auditory canals and tympanic membranes was normal, clinical speech reception thresholds grossly intact, no mass/lesion of auricle.  Nose:  No masses/lesions of external nose, nasal mucosa, septum, and turbinates were within normal limits. Clear mucus. No erythema, no edema.   Mouth:  No mass/lesion of lips, teeth, gums, hard/soft palate, tongue, tonsils, or oropharynx.  Neck & Lymphatics:  No cervical lymphadenopathy, no neck mass/crepitus/ asymmetry, trachea is midline, no thyroid enlargement/tenderness/mass.  Neuro/Psych: Alert with normal mood and affect.   Abdominal: Normal appearance.   Respiration/Chest:  Symmetric expansion during respiration, normal respiratory effort.  Skin:  Warm and intact  Cardiovascular:  No peripheral vascular edema or varicosities.    Assessment & Plan:   Ramy was seen today for follow-up.    Diagnoses and all orders for this visit:    Chronic maxillary sinusitis    Other orders  -     azelastine (ASTELIN) 137 mcg (0.1 %) nasal spray; 1 spray (137 mcg total) by Nasal route 2 (two) times daily.    Infection resolved.   Continue nasal saline rinses and Flonase, added Astelin.   RTC PRN      Francia Felipe PA-C

## 2020-02-25 ENCOUNTER — OFFICE VISIT (OUTPATIENT)
Dept: OPHTHALMOLOGY | Facility: CLINIC | Age: 75
End: 2020-02-25
Payer: MEDICARE

## 2020-02-25 DIAGNOSIS — Z13.5 GLAUCOMA SCREENING: ICD-10-CM

## 2020-02-25 DIAGNOSIS — H25.13 CATARACT, NUCLEAR SCLEROTIC SENILE, BILATERAL: ICD-10-CM

## 2020-02-25 DIAGNOSIS — E11.9 DIABETES MELLITUS TYPE 2 WITHOUT RETINOPATHY: Primary | ICD-10-CM

## 2020-02-25 DIAGNOSIS — H52.7 REFRACTIVE ERROR: ICD-10-CM

## 2020-02-25 PROCEDURE — 92015 PR REFRACTION: ICD-10-PCS | Mod: S$GLB,,, | Performed by: OPTOMETRIST

## 2020-02-25 PROCEDURE — 92015 DETERMINE REFRACTIVE STATE: CPT | Mod: S$GLB,,, | Performed by: OPTOMETRIST

## 2020-02-25 PROCEDURE — 99999 PR PBB SHADOW E&M-EST. PATIENT-LVL II: ICD-10-PCS | Mod: PBBFAC,,, | Performed by: OPTOMETRIST

## 2020-02-25 PROCEDURE — 92014 PR EYE EXAM, EST PATIENT,COMPREHESV: ICD-10-PCS | Mod: S$GLB,,, | Performed by: OPTOMETRIST

## 2020-02-25 PROCEDURE — 99999 PR PBB SHADOW E&M-EST. PATIENT-LVL II: CPT | Mod: PBBFAC,,, | Performed by: OPTOMETRIST

## 2020-02-25 PROCEDURE — 92014 COMPRE OPH EXAM EST PT 1/>: CPT | Mod: S$GLB,,, | Performed by: OPTOMETRIST

## 2020-02-25 NOTE — PROGRESS NOTES
HPI     Last [facility] exam 02/19/2020  Diabetic/NIDDM  Stroke 2018  Cataract, nuclear sclerotic senile, bilateral  Screening for glaucoma  RE    Last edited by Sven Walls MA on 2/25/2020 10:19 AM. (History)            Assessment /Plan     For exam results, see Encounter Report.    Diabetes mellitus type 2 without retinopathy    Cataract, nuclear sclerotic senile, bilateral    Glaucoma screening    Refractive error      No diabetic retinopathy OU.  Moderate NS cataracts OU = will follow.  OH OK OU otherwise.  Spec Rx given.  RTC one year or prn.

## 2021-04-20 ENCOUNTER — OFFICE VISIT (OUTPATIENT)
Dept: OPHTHALMOLOGY | Facility: CLINIC | Age: 76
End: 2021-04-20
Payer: MEDICARE

## 2021-04-20 DIAGNOSIS — H25.013 CORTICAL AGE-RELATED CATARACT OF BOTH EYES: ICD-10-CM

## 2021-04-20 DIAGNOSIS — H52.203 ASTIGMATISM WITH PRESBYOPIA, BILATERAL: ICD-10-CM

## 2021-04-20 DIAGNOSIS — H52.4 ASTIGMATISM WITH PRESBYOPIA, BILATERAL: ICD-10-CM

## 2021-04-20 DIAGNOSIS — E11.36 DIABETIC CATARACT OF BOTH EYES: ICD-10-CM

## 2021-04-20 DIAGNOSIS — E11.9 TYPE 2 DIABETES MELLITUS WITHOUT RETINOPATHY: Primary | ICD-10-CM

## 2021-04-20 DIAGNOSIS — H25.13 NUCLEAR SCLEROSIS, BILATERAL: ICD-10-CM

## 2021-04-20 PROCEDURE — 92014 COMPRE OPH EXAM EST PT 1/>: CPT | Mod: S$GLB,,, | Performed by: OPTOMETRIST

## 2021-04-20 PROCEDURE — 92014 PR EYE EXAM, EST PATIENT,COMPREHESV: ICD-10-PCS | Mod: S$GLB,,, | Performed by: OPTOMETRIST

## 2021-04-20 PROCEDURE — 99999 PR PBB SHADOW E&M-EST. PATIENT-LVL III: CPT | Mod: PBBFAC,,, | Performed by: OPTOMETRIST

## 2021-04-20 PROCEDURE — 92015 PR REFRACTION: ICD-10-PCS | Mod: S$GLB,,, | Performed by: OPTOMETRIST

## 2021-04-20 PROCEDURE — 92015 DETERMINE REFRACTIVE STATE: CPT | Mod: S$GLB,,, | Performed by: OPTOMETRIST

## 2021-04-20 PROCEDURE — 99999 PR PBB SHADOW E&M-EST. PATIENT-LVL III: ICD-10-PCS | Mod: PBBFAC,,, | Performed by: OPTOMETRIST

## 2021-04-20 RX ORDER — SPIRONOLACTONE 25 MG/1
25 TABLET ORAL DAILY
COMMUNITY
Start: 2021-03-03

## 2022-04-26 ENCOUNTER — OFFICE VISIT (OUTPATIENT)
Dept: OPHTHALMOLOGY | Facility: CLINIC | Age: 77
End: 2022-04-26
Payer: COMMERCIAL

## 2022-04-26 DIAGNOSIS — H25.13 NUCLEAR SCLEROSIS, BILATERAL: ICD-10-CM

## 2022-04-26 DIAGNOSIS — H25.041 POSTERIOR SUBCAPSULAR POLAR AGE-RELATED CATARACT OF RIGHT EYE: ICD-10-CM

## 2022-04-26 DIAGNOSIS — H52.203 ASTIGMATISM WITH PRESBYOPIA, BILATERAL: ICD-10-CM

## 2022-04-26 DIAGNOSIS — E11.9 TYPE 2 DIABETES MELLITUS WITHOUT RETINOPATHY: Primary | ICD-10-CM

## 2022-04-26 DIAGNOSIS — H25.013 CORTICAL AGE-RELATED CATARACT OF BOTH EYES: ICD-10-CM

## 2022-04-26 DIAGNOSIS — E11.36 DIABETIC CATARACT OF BOTH EYES: ICD-10-CM

## 2022-04-26 DIAGNOSIS — H52.4 ASTIGMATISM WITH PRESBYOPIA, BILATERAL: ICD-10-CM

## 2022-04-26 PROCEDURE — 1160F PR REVIEW ALL MEDS BY PRESCRIBER/CLIN PHARMACIST DOCUMENTED: ICD-10-PCS | Mod: CPTII,S$GLB,, | Performed by: OPTOMETRIST

## 2022-04-26 PROCEDURE — 1160F RVW MEDS BY RX/DR IN RCRD: CPT | Mod: CPTII,S$GLB,, | Performed by: OPTOMETRIST

## 2022-04-26 PROCEDURE — 99999 PR PBB SHADOW E&M-EST. PATIENT-LVL I: CPT | Mod: PBBFAC,,, | Performed by: OPTOMETRIST

## 2022-04-26 PROCEDURE — 92014 PR EYE EXAM, EST PATIENT,COMPREHESV: ICD-10-PCS | Mod: S$GLB,,, | Performed by: OPTOMETRIST

## 2022-04-26 PROCEDURE — 92015 PR REFRACTION: ICD-10-PCS | Mod: S$GLB,,, | Performed by: OPTOMETRIST

## 2022-04-26 PROCEDURE — 92014 COMPRE OPH EXAM EST PT 1/>: CPT | Mod: S$GLB,,, | Performed by: OPTOMETRIST

## 2022-04-26 PROCEDURE — 2023F DILAT RTA XM W/O RTNOPTHY: CPT | Mod: CPTII,S$GLB,, | Performed by: OPTOMETRIST

## 2022-04-26 PROCEDURE — 92015 DETERMINE REFRACTIVE STATE: CPT | Mod: S$GLB,,, | Performed by: OPTOMETRIST

## 2022-04-26 PROCEDURE — 1159F MED LIST DOCD IN RCRD: CPT | Mod: CPTII,S$GLB,, | Performed by: OPTOMETRIST

## 2022-04-26 PROCEDURE — 2023F PR DILATED RETINAL EXAM W/O EVID OF RETINOPATHY: ICD-10-PCS | Mod: CPTII,S$GLB,, | Performed by: OPTOMETRIST

## 2022-04-26 PROCEDURE — 99999 PR PBB SHADOW E&M-EST. PATIENT-LVL I: ICD-10-PCS | Mod: PBBFAC,,, | Performed by: OPTOMETRIST

## 2022-04-26 PROCEDURE — 1159F PR MEDICATION LIST DOCUMENTED IN MEDICAL RECORD: ICD-10-PCS | Mod: CPTII,S$GLB,, | Performed by: OPTOMETRIST

## 2022-04-26 NOTE — PROGRESS NOTES
"HPI     Diabetic Eye Exam     Comments: Diagnosed with diabetes in 1980s  Lab Results       Component                Value               Date                       HGBA1C                   7.9 (H)             11/19/2021                  Pt co diminished near va. Pt curious about a drop "spelled V U N I T" for   his vision to improve reading .    No pain  No contacts.   No ocular disturbances,             Last edited by Phan Pratt on 4/26/2022 10:12 AM. (History)            Assessment /Plan     For exam results, see Encounter Report.    Type 2 diabetes mellitus without retinopathy  There was no diabetic retinopathy present in either eye today.   Recommended that pt continue care with PCP and/or specialists regarding diabetes.  Follow-up dilated eye exam recommended in 12 months, sooner with any vision changes or new concerns.    Nuclear sclerosis, bilateral  Cortical age-related cataract of both eyes  Diabetic cataract of both eyes  Cataract accounts for vision change. New Rx for glasses/contacts will not improve vision.   Refer to ophthalmologist for cataract evaluation.     Astigmatism with presbyopia, bilateral  Posterior subcapsular polar age-related cataract of right eye  Hold spec Rx      RTC next available with [facility] for cataract evaluation or PRN if any problems.   Discussed above and answered questions.                   "

## 2022-06-14 ENCOUNTER — OFFICE VISIT (OUTPATIENT)
Dept: OPHTHALMOLOGY | Facility: CLINIC | Age: 77
End: 2022-06-14
Payer: MEDICARE

## 2022-06-14 DIAGNOSIS — H25.11 NUCLEAR SENILE CATARACT OF RIGHT EYE: ICD-10-CM

## 2022-06-14 DIAGNOSIS — E11.36 DIABETIC CATARACT OF RIGHT EYE: Primary | ICD-10-CM

## 2022-06-14 DIAGNOSIS — H25.12 NUCLEAR SENILE CATARACT OF LEFT EYE: ICD-10-CM

## 2022-06-14 DIAGNOSIS — E11.36 DIABETIC CATARACT OF LEFT EYE: ICD-10-CM

## 2022-06-14 DIAGNOSIS — E11.9 TYPE 2 DIABETES MELLITUS WITHOUT RETINOPATHY: ICD-10-CM

## 2022-06-14 PROCEDURE — 99999 PR PBB SHADOW E&M-EST. PATIENT-LVL III: ICD-10-PCS | Mod: PBBFAC,,, | Performed by: OPHTHALMOLOGY

## 2022-06-14 PROCEDURE — 99999 PR PBB SHADOW E&M-EST. PATIENT-LVL III: CPT | Mod: PBBFAC,,, | Performed by: OPHTHALMOLOGY

## 2022-06-14 PROCEDURE — 1159F MED LIST DOCD IN RCRD: CPT | Mod: CPTII,S$GLB,, | Performed by: OPHTHALMOLOGY

## 2022-06-14 PROCEDURE — 1159F PR MEDICATION LIST DOCUMENTED IN MEDICAL RECORD: ICD-10-PCS | Mod: CPTII,S$GLB,, | Performed by: OPHTHALMOLOGY

## 2022-06-14 PROCEDURE — 3051F PR MOST RECENT HEMOGLOBIN A1C LEVEL 7.0 - < 8.0%: ICD-10-PCS | Mod: CPTII,S$GLB,, | Performed by: OPHTHALMOLOGY

## 2022-06-14 PROCEDURE — 92136 IOL MASTER - OD - RIGHT EYE: ICD-10-PCS | Mod: RT,S$GLB,, | Performed by: OPHTHALMOLOGY

## 2022-06-14 PROCEDURE — 3051F HG A1C>EQUAL 7.0%<8.0%: CPT | Mod: CPTII,S$GLB,, | Performed by: OPHTHALMOLOGY

## 2022-06-14 PROCEDURE — 99203 OFFICE O/P NEW LOW 30 MIN: CPT | Mod: S$GLB,,, | Performed by: OPHTHALMOLOGY

## 2022-06-14 PROCEDURE — 92136 OPHTHALMIC BIOMETRY: CPT | Mod: RT,S$GLB,, | Performed by: OPHTHALMOLOGY

## 2022-06-14 PROCEDURE — 99203 PR OFFICE/OUTPT VISIT, NEW, LEVL III, 30-44 MIN: ICD-10-PCS | Mod: S$GLB,,, | Performed by: OPHTHALMOLOGY

## 2022-06-14 RX ORDER — PREDNISOLONE ACETATE 10 MG/ML
1 SUSPENSION/ DROPS OPHTHALMIC 4 TIMES DAILY
Qty: 5 ML | Refills: 1 | Status: SHIPPED | OUTPATIENT
Start: 2022-06-14 | End: 2024-01-23

## 2022-06-14 RX ORDER — KETOROLAC TROMETHAMINE 5 MG/ML
1 SOLUTION OPHTHALMIC 4 TIMES DAILY
Qty: 5 ML | Refills: 0 | Status: SHIPPED | OUTPATIENT
Start: 2022-06-14 | End: 2023-06-14

## 2022-06-14 NOTE — PROGRESS NOTES
HPI     Cataract     Comments: Referred by [facility] for a cataract eval    Patient states VA has decreased at all ranges OU over the last several   months, images appear dull and cloudy along with glare sensitivity when   out in the bright sunglare.              Comments     [facility] REFERRAL      1. DM since the late 1980's  2. Stroke 2018  3. NSC OU          Last edited by Christina Torre, Patient Care Assistant on 6/14/2022  1:32   PM. (History)            Assessment /Plan     For exam results, see Encounter Report.      ICD-10-CM ICD-9-CM    1. Diabetic cataract of right eye  E11.36 250.50 Visually Significant Cataract OD > OS  Patient reports decreased vision consistent with the clinical amount of lenticular opacity, which reaches the level of visual significance and affects activities of daily living including reading and glare. Risks, benefits, and alternatives to cataract surgery were discussed.   The pt expressed a desire to proceed with surgery with the potential for some reasonable degree of visual improvement.    Discussed IOL options and refractive outcomes for this patient.    Topography reviewed - unable due to positioning  History of Refractive surgery negative    Phaco right eye, +17.5 WF  Topical  monofocal IOL.  Will aim for distance  Prescriptions sent for preoperative medications  Pred Forte and ketorolac QID OD  Anticoagulant status: on eliquis due to CVA and AFIB    Explained that patient may need glasses after surgery.  Discussed that vision may be limited by astig - declines toric IOL.    Referral to [facility] for Ophthalmic [facility]    Schedule post op visit #2 with [facility]     366.41    2. Nuclear senile cataract of right eye  H25.11 366.16 As above.   3. Diabetic cataract of left eye  E11.36 250.50 Will need surgery also     366.41    4. Nuclear senile cataract of left eye  H25.12 366.16 As above   5. Type 2 diabetes mellitus without retinopathy  E11.9 250.00 Diabetes with no diabetic  retinopathy on dilated exam.   Reviewed diabetic eye precautions including excellent blood sugar control, and importance of regular follow up.

## 2022-06-27 ENCOUNTER — DOCUMENTATION ONLY (OUTPATIENT)
Dept: OPHTHALMOLOGY | Facility: CLINIC | Age: 77
End: 2022-06-27
Payer: MEDICARE

## 2022-06-27 NOTE — PROGRESS NOTES
Short Stay Record    CC: Blurry Vision     Impression: Visually significant cataract with reasonable expectation for visual improvement Right Eye    Visual Acuity (Snellen - Linear)     Right Left   Dist cc 20/40 +1 20/30 -1   Correction: Glasses     Tonometry (icare, 1:53 PM)     Right Left   Pressure 12 11      Pupils     Pupils Dark Light Shape React APD   Right PERRL 3.5 3 Round Minimal None   Left PERRL 3.5 3 Round Minimal None     Visual Fields     Right Left    Full Full     Extraocular Movement     Right Left    Full Full             Glare    Medium   Right 20/200   Left 20/200        Right Left   External Normal Normal      Right Left   Lids/Lashes Dermatochalasis - upper lid Dermatochalasis - upper lid   Conjunctiva/Sclera White and quiet White and quiet   Cornea 1+ pigment on endo Clear   Anterior Chamber Deep and quiet Deep and quiet   Iris Round and reactive Round and reactive   Lens 2+ Nuclear sclerosis, 1+ Cortical spokes, Vacuoles, central Posterior subcapsular cataract 2+ Nuclear sclerosis, Vacuoles        Right Left   Posterior Vitreous Normal PVD   Disc Normal Normal   C/D Ratio 0.3 0.3   Macula No Diabetic Retinopathy No Diabetic Retinopathy   Vessels Normal Normal   Periphery Normal Normal      Sphere Cylinder Axis Dist VA   Right -0.75 +2.50 178 20/40   Left +0.50 +1.00 175 20/40           Planned Procedure:     Topography reviewed - unable due to positioning  History of Refractive surgery negative     Phaco right eye, +17.5 WF  Topical  monofocal IOL.  Will aim for distance  Prescriptions sent for preoperative medications  Pred Forte and ketorolac QID OD  Anticoagulant status: on eliquis due to CVA and AFIB        Lens Selection OD verified _____           Previous IOL OS _____    Patient cleared for ophthalmic surgery.     Discharge Summary:    Admitting Diagnosis: Nuclear sclerotic cataract /  diabetic / OD    Discharge Diagnosis: Pseudophakia OD    Procedure: Phacoemulsification of cataract  with intraocular lens implant OD    Complications: None  Discharge Condition: Stable    Discharge instructions: Follow post-operative discharge instruction sheet given by provider.    Follow-up: Return to clinic next day or as scheduled.       HPI:  Ramy Romo is a 76 y.o. male who presents for evaluation prior to ophthalmic surgery, left eye. Patient reports of blurred vision at distance and near with and without correction. Patient reports of glare at night reducing function and safety, and complains of needed additional light to read.     Past Medical History:   Diagnosis Date    Atrial fibrillation     CHF (congestive heart failure)     Diabetes mellitus     Hypertension      Past Surgical History:   Procedure Laterality Date    CHOLECYSTECTOMY      NECK SURGERY      TOTAL ELBOW ARTHROPLASTY Right 03/2019     Social History     Tobacco Use    Smoking status: Never Smoker    Smokeless tobacco: Never Used   Substance Use Topics    Alcohol use: No     Family History   Problem Relation Age of Onset    Cataracts Father     Glaucoma Father      Review of patient's allergies indicates:   Allergen Reactions    Sulfa (sulfonamide antibiotics) Rash         Current Outpatient Medications:     apixaban (ELIQUIS) 5 mg Tab, Take 5 mg by mouth 2 (two) times daily., Disp: , Rfl:     aspirin (ECOTRIN) 81 MG EC tablet, Take 81 mg by mouth once daily., Disp: , Rfl:     azelastine (ASTELIN) 137 mcg (0.1 %) nasal spray, 1 spray (137 mcg total) by Nasal route 2 (two) times daily., Disp: 30 mL, Rfl: 11    blood sugar diagnostic Strp, 1 boxes by Misc.(Non-Drug; Combo Route) route 2 (two) times daily., Disp: , Rfl:     blood-glucose meter Misc, Use to check blood glucose, Disp: , Rfl:     bumetanide (BUMEX) 2 MG tablet, Take 2 mg by mouth once daily., Disp: , Rfl:     calcium-vitamin D 250-100 mg-unit per tablet, Take 1 tablet by mouth once daily., Disp: , Rfl:     ferrous gluconate (FERGON) 324 MG tablet, Take  324 mg by mouth., Disp: , Rfl:     gabapentin (NEURONTIN) 300 MG capsule, Take 1 capsule (300 mg total) by mouth every evening., Disp: 7 capsule, Rfl: 0    glipiZIDE (GLUCOTROL) 5 MG tablet, Take 1 tablet (5 mg total) by mouth 2 (two) times daily before meals., Disp: 60 tablet, Rfl: 1    hydrocodone-acetaminophen 7.5-325mg (NORCO) 7.5-325 mg per tablet, Take 1 tablet by mouth., Disp: , Rfl:     ketorolac 0.5% (ACULAR) 0.5 % Drop, Place 1 drop into the right eye 4 (four) times daily., Disp: 5 mL, Rfl: 0    lancets Misc, bib, Disp: , Rfl:     levETIRAcetam (KEPPRA) 500 MG Tab, Take 500 mg by mouth 2 (two) times daily., Disp: , Rfl:     metoprolol succinate (TOPROL-XL) 50 MG 24 hr tablet, Take 50 mg by mouth once daily., Disp: , Rfl:     pantoprazole (PROTONIX) 40 MG tablet, Take 1 tablet (40 mg total) by mouth once daily., Disp: 30 tablet, Rfl: 1    potassium chloride SA (K-DUR,KLOR-CON) 20 MEQ tablet, Take 1 tablet by mouth once daily., Disp: , Rfl:     prednisoLONE acetate (PRED FORTE) 1 % DrpS, Place 1 drop into the right eye 4 (four) times daily., Disp: 5 mL, Rfl: 1    spironolactone (ALDACTONE) 25 MG tablet, Take 25 mg by mouth., Disp: , Rfl:     tamsulosin (FLOMAX) 0.4 mg Cap, Take 0.4 mg by mouth once daily., Disp: , Rfl:     Review of Systems:  A comprehensive review of systems was negative.    Physical Exam:  General Appearance:    A&Ox3, no distress, appears stated age   Head:    Normocephalic, without obvious abnormality, atraumatic   Eyes:    PERRL, EOM's intact   Back:     Symmetric, no curvature   Lungs:     Respirations unlabored   Chest Wall:    No tenderness or deformity    Heart:  Abdomen:  Extremities:  Skin:    S1 and S2 present    Soft, non-tender    Extremities normal, atraumatic    Skin color, texture, turgor normal

## 2022-07-07 ENCOUNTER — OUTSIDE PLACE OF SERVICE (OUTPATIENT)
Dept: OPHTHALMOLOGY | Facility: CLINIC | Age: 77
End: 2022-07-07

## 2022-07-07 ENCOUNTER — OUTSIDE PLACE OF SERVICE (OUTPATIENT)
Dept: OPHTHALMOLOGY | Facility: CLINIC | Age: 77
End: 2022-07-07
Payer: MEDICARE

## 2022-07-07 PROCEDURE — 66984 XCAPSL CTRC RMVL W/O ECP: CPT | Mod: RT,,, | Performed by: OPHTHALMOLOGY

## 2022-07-07 PROCEDURE — 66984 PR REMOVAL, CATARACT, W/INSRT INTRAOC LENS, W/O ENDO CYCLO: ICD-10-PCS | Mod: RT,,, | Performed by: OPHTHALMOLOGY

## 2022-07-08 ENCOUNTER — OFFICE VISIT (OUTPATIENT)
Dept: OPHTHALMOLOGY | Facility: CLINIC | Age: 77
End: 2022-07-08
Payer: MEDICARE

## 2022-07-08 DIAGNOSIS — H25.12 SENILE NUCLEAR CATARACT, LEFT: ICD-10-CM

## 2022-07-08 DIAGNOSIS — Z98.41 CATARACT EXTRACTION STATUS OF EYE, RIGHT: ICD-10-CM

## 2022-07-08 DIAGNOSIS — Z98.890 POST-OPERATIVE STATE: Primary | ICD-10-CM

## 2022-07-08 PROCEDURE — 99024 POSTOP FOLLOW-UP VISIT: CPT | Mod: S$GLB,,, | Performed by: OPHTHALMOLOGY

## 2022-07-08 PROCEDURE — 1159F MED LIST DOCD IN RCRD: CPT | Mod: CPTII,S$GLB,, | Performed by: OPHTHALMOLOGY

## 2022-07-08 PROCEDURE — 99024 PR POST-OP FOLLOW-UP VISIT: ICD-10-PCS | Mod: S$GLB,,, | Performed by: OPHTHALMOLOGY

## 2022-07-08 PROCEDURE — 99999 PR PBB SHADOW E&M-EST. PATIENT-LVL III: ICD-10-PCS | Mod: PBBFAC,,, | Performed by: OPHTHALMOLOGY

## 2022-07-08 PROCEDURE — 99999 PR PBB SHADOW E&M-EST. PATIENT-LVL III: CPT | Mod: PBBFAC,,, | Performed by: OPHTHALMOLOGY

## 2022-07-08 PROCEDURE — 1160F RVW MEDS BY RX/DR IN RCRD: CPT | Mod: CPTII,S$GLB,, | Performed by: OPHTHALMOLOGY

## 2022-07-08 PROCEDURE — 1159F PR MEDICATION LIST DOCUMENTED IN MEDICAL RECORD: ICD-10-PCS | Mod: CPTII,S$GLB,, | Performed by: OPHTHALMOLOGY

## 2022-07-08 PROCEDURE — 92136 IOL MASTER - OS - LEFT EYE: ICD-10-PCS | Mod: 26,LT,S$GLB, | Performed by: OPHTHALMOLOGY

## 2022-07-08 PROCEDURE — 1160F PR REVIEW ALL MEDS BY PRESCRIBER/CLIN PHARMACIST DOCUMENTED: ICD-10-PCS | Mod: CPTII,S$GLB,, | Performed by: OPHTHALMOLOGY

## 2022-07-08 PROCEDURE — 92136 OPHTHALMIC BIOMETRY: CPT | Mod: 26,LT,S$GLB, | Performed by: OPHTHALMOLOGY

## 2022-07-08 RX ORDER — KETOROLAC TROMETHAMINE 5 MG/ML
1 SOLUTION OPHTHALMIC 4 TIMES DAILY
Qty: 5 ML | Refills: 0 | Status: SHIPPED | OUTPATIENT
Start: 2022-07-08 | End: 2023-07-08

## 2022-07-08 RX ORDER — PREDNISOLONE ACETATE 10 MG/ML
1 SUSPENSION/ DROPS OPHTHALMIC 4 TIMES DAILY
Qty: 5 ML | Refills: 1 | Status: SHIPPED | OUTPATIENT
Start: 2022-07-08 | End: 2024-01-23

## 2022-07-08 NOTE — PROGRESS NOTES
HPI     Post-op Evaluation     Comments: Pt reports for 1 day post op PCIOL OD. Denies any pain or   irritation. Va stable. 100% compliant with gtts.               Comments     1. DM since the late 1980's  2. Stroke 2018  3. PCIOL OD 7/7/22  NS OS            Last edited by Harsha Toney on 7/8/2022  9:25 AM. (History)            Assessment /Plan     For exam results, see Encounter Report.      ICD-10-CM ICD-9-CM    1. Post-operative state  Z98.890 V45.89 PO Day 1 S/P Phaco/IOL  right eye  Doing well.    Use Prednisolone Acetate & Keto QID    Reinstructed in importance of absolute compliance with Post-OP instructions including medications, shield at bedtime, and limitation of activities. Follow up appointments in approximately one and six weeks or call immediately for increased pain, redness or vision loss.    2. Cataract extraction status of eye, right  Z98.41 V45.61 As above.   3. Senile nuclear cataract, left  H25.12 366.16 Patient reports decreased vision in the fellow eye consistent with the clinical amount of lenticular opacity, which reaches the level of visual significance and affects activities of daily living including reading and glare. Risks, benefits, and alternatives to cataract surgery were discussed and pt desired to schedule cataract surgery. Pt was consented and the biometry and lens options were reviewed.    Topography reviewed - unable due to positioning  History of Refractive surgery negative    Phaco left   [facility] TO PICK LENS BASED ON DNL 1 WK REFRACTION   Topical   Requests a monofocal  IOL.  Will aim for distance  Patient given prescriptions for Pred and Keto QID  Anticoagulant status on eliquis due to CVA and AFIB    Explained that patient may need glasses after surgery.  Discussed that vision may be limited by astig - declines toric IOL.      Schedule post op visit #2 with Dr Levi         Return to clinic 1 week with DNL  Can d/c Keto at next visit  Begin taper on Pred if no inflammation  present

## 2022-07-15 ENCOUNTER — OFFICE VISIT (OUTPATIENT)
Dept: OPHTHALMOLOGY | Facility: CLINIC | Age: 77
End: 2022-07-15
Payer: MEDICARE

## 2022-07-15 DIAGNOSIS — Z98.890 POST-OPERATIVE STATE: Primary | ICD-10-CM

## 2022-07-15 DIAGNOSIS — Z96.1 PSEUDOPHAKIA OF RIGHT EYE: ICD-10-CM

## 2022-07-15 PROCEDURE — 99999 PR PBB SHADOW E&M-EST. PATIENT-LVL I: ICD-10-PCS | Mod: PBBFAC,,, | Performed by: OPTOMETRIST

## 2022-07-15 PROCEDURE — 1159F MED LIST DOCD IN RCRD: CPT | Mod: CPTII,S$GLB,, | Performed by: OPTOMETRIST

## 2022-07-15 PROCEDURE — 99024 PR POST-OP FOLLOW-UP VISIT: ICD-10-PCS | Mod: S$GLB,,, | Performed by: OPTOMETRIST

## 2022-07-15 PROCEDURE — 99999 PR PBB SHADOW E&M-EST. PATIENT-LVL I: CPT | Mod: PBBFAC,,, | Performed by: OPTOMETRIST

## 2022-07-15 PROCEDURE — 99024 POSTOP FOLLOW-UP VISIT: CPT | Mod: S$GLB,,, | Performed by: OPTOMETRIST

## 2022-07-15 PROCEDURE — 1160F PR REVIEW ALL MEDS BY PRESCRIBER/CLIN PHARMACIST DOCUMENTED: ICD-10-PCS | Mod: CPTII,S$GLB,, | Performed by: OPTOMETRIST

## 2022-07-15 PROCEDURE — 1160F RVW MEDS BY RX/DR IN RCRD: CPT | Mod: CPTII,S$GLB,, | Performed by: OPTOMETRIST

## 2022-07-15 PROCEDURE — 1159F PR MEDICATION LIST DOCUMENTED IN MEDICAL RECORD: ICD-10-PCS | Mod: CPTII,S$GLB,, | Performed by: OPTOMETRIST

## 2022-07-15 NOTE — PROGRESS NOTES
HPI     Pt using Pred QID OD and Ket QID OD  Pt denies pain  Pt states va OD seems unexceptional     Last edited by Phan Pratt on 7/15/2022  9:32 AM. (History)            Assessment /Plan     For exam results, see Encounter Report.    Post-operative state    Pseudophakia of right eye      Impression: 1 week post-op phaco with PCIOL OD : Doing well    Begin to taper Pred to tid OD x 1 week, then bid OD x 1 week, then qd OD x 1 week then stop  Ok to resume normal activities and discontinue eye shield   RTC as scheduled for next post-op visit or PRN if any pain, redness, vision changes or any other concerns.  Discussed above and answered questions.

## 2022-07-27 ENCOUNTER — DOCUMENTATION ONLY (OUTPATIENT)
Dept: OPHTHALMOLOGY | Facility: CLINIC | Age: 77
End: 2022-07-27
Payer: MEDICARE

## 2022-07-27 NOTE — PROGRESS NOTES
Short Stay Record    CC: Blurry Vision     Impression: Visually significant cataract with reasonable expectation for visual improvement Left Eye    Right Left    External Normal Normal      Right Left   Lids/Lashes Dermatochalasis - upper lid Dermatochalasis - upper lid   Conjunctiva/Sclera White and quiet White and quiet   Cornea Trace Striae Clear   Anterior Chamber 2+ Cell, 2+ Flare Deep and quiet   Iris Round and reactive Round and reactive   Lens Posterior chamber intraocular lens 2+ Nuclear sclerosis, Vacuoles     Normal PVD    Disc Normal Normal   C/D Ratio 0.3 0.3   Macula No Diabetic Retinopathy No Diabetic Retinopathy   Vessels Normal Normal   Periphery Normal Normal     MANIFEST REFRACTION    Sphere Cylinder Axis Dist VA   Right -0.75 +0.75 150 20/20-2   Left +0.50 +1.00 175 20/40         Planned Procedure:     Topography reviewed - unable due to positioning  History of Refractive surgery negative     Phaco left   [facility] TO PICK LENS BASED ON DNL 1 WK REFRACTION   Topical   Requests a monofocal  IOL.  Will aim for distance  Patient given prescriptions for Pred and Keto QID  Anticoagulant status on eliquis due to CVA and AFIB        Lens Selection OS verified _____             Previous IOL OD +17.5 SN60WF     Patient cleared for ophthalmic surgery.     Discharge Summary:    Admitting Diagnosis: DIABETIC   /  OS    Discharge Diagnosis: Pseudophakia OS    Procedure: Phacoemulsification of cataract with intraocular lens implant OS    Complications: None  Discharge Condition: Stable    Discharge instructions: Follow post-operative discharge instruction sheet given by provider.    Follow-up: Return to clinic next day or as scheduled.       HPI:  Ramy Romo is a 77 y.o. male who presents for evaluation prior to ophthalmic surgery, left eye. Patient reports of blurred vision at distance and near with and without correction. Patient reports of glare at night reducing function and safety, and complains of needed  additional light to read.     Past Medical History:   Diagnosis Date    Atrial fibrillation     CHF (congestive heart failure)     Diabetes mellitus     Hypertension      Past Surgical History:   Procedure Laterality Date    CHOLECYSTECTOMY      NECK SURGERY      TOTAL ELBOW ARTHROPLASTY Right 03/2019   -   CATARACT SX OD  7/7/2022 AT [facility]       Review of patient's allergies indicates:   Allergen Reactions    Sulfa (sulfonamide antibiotics) Rash         Current Outpatient Medications:     apixaban (ELIQUIS) 5 mg Tab, Take 5 mg by mouth 2 (two) times daily., Disp: , Rfl:     aspirin (ECOTRIN) 81 MG EC tablet, Take 81 mg by mouth once daily., Disp: , Rfl:     azelastine (ASTELIN) 137 mcg (0.1 %) nasal spray, 1 spray (137 mcg total) by Nasal route 2 (two) times daily., Disp: 30 mL, Rfl: 11    blood sugar diagnostic Strp, 1 boxes by Misc.(Non-Drug; Combo Route) route 2 (two) times daily., Disp: , Rfl:     blood-glucose meter Misc, Use to check blood glucose, Disp: , Rfl:     bumetanide (BUMEX) 2 MG tablet, Take 2 mg by mouth once daily., Disp: , Rfl:     calcium-vitamin D 250-100 mg-unit per tablet, Take 1 tablet by mouth once daily., Disp: , Rfl:     ferrous gluconate (FERGON) 324 MG tablet, Take 324 mg by mouth., Disp: , Rfl:     gabapentin (NEURONTIN) 300 MG capsule, Take 1 capsule (300 mg total) by mouth every evening., Disp: 7 capsule, Rfl: 0    glipiZIDE (GLUCOTROL) 5 MG tablet, Take 1 tablet (5 mg total) by mouth 2 (two) times daily before meals., Disp: 60 tablet, Rfl: 1    hydrocodone-acetaminophen 7.5-325mg (NORCO) 7.5-325 mg per tablet, Take 1 tablet by mouth., Disp: , Rfl:     ketorolac 0.5% (ACULAR) 0.5 % Drop, Place 1 drop into the right eye 4 (four) times daily., Disp: 5 mL, Rfl: 0    ketorolac 0.5% (ACULAR) 0.5 % Drop, Place 1 drop into the left eye 4 (four) times daily., Disp: 5 mL, Rfl: 0    lancets Misc, bib, Disp: , Rfl:     levETIRAcetam (KEPPRA) 500 MG Tab, Take 500 mg by  mouth 2 (two) times daily., Disp: , Rfl:     metoprolol succinate (TOPROL-XL) 50 MG 24 hr tablet, Take 50 mg by mouth once daily., Disp: , Rfl:     pantoprazole (PROTONIX) 40 MG tablet, Take 1 tablet (40 mg total) by mouth once daily., Disp: 30 tablet, Rfl: 1    potassium chloride SA (K-DUR,KLOR-CON) 20 MEQ tablet, Take 1 tablet by mouth once daily., Disp: , Rfl:     prednisoLONE acetate (PRED FORTE) 1 % DrpS, Place 1 drop into the right eye 4 (four) times daily., Disp: 5 mL, Rfl: 1    prednisoLONE acetate (PRED FORTE) 1 % DrpS, Place 1 drop into the left eye 4 (four) times daily., Disp: 5 mL, Rfl: 1    spironolactone (ALDACTONE) 25 MG tablet, Take 25 mg by mouth., Disp: , Rfl:     tamsulosin (FLOMAX) 0.4 mg Cap, Take 0.4 mg by mouth once daily., Disp: , Rfl:     Review of Systems:  A comprehensive review of systems was negative.    Physical Exam:  General Appearance:    A&Ox3, no distress, appears stated age   Head:    Normocephalic, without obvious abnormality, atraumatic   Eyes:    PERRL, EOM's intact   Back:     Symmetric, no curvature   Lungs:     Respirations unlabored   Chest Wall:    No tenderness or deformity    Heart:  Abdomen:  Extremities:  Skin:    S1 and S2 present    Soft, non-tender    Extremities normal, atraumatic    Skin color, texture, turgor normal

## 2022-08-04 ENCOUNTER — OUTSIDE PLACE OF SERVICE (OUTPATIENT)
Dept: OPHTHALMOLOGY | Facility: CLINIC | Age: 77
End: 2022-08-04
Payer: MEDICARE

## 2022-08-04 PROCEDURE — 66984 XCAPSL CTRC RMVL W/O ECP: CPT | Mod: 79,LT,, | Performed by: OPHTHALMOLOGY

## 2022-08-04 PROCEDURE — 66984 PR REMOVAL, CATARACT, W/INSRT INTRAOC LENS, W/O ENDO CYCLO: ICD-10-PCS | Mod: 79,LT,, | Performed by: OPHTHALMOLOGY

## 2022-08-05 ENCOUNTER — OFFICE VISIT (OUTPATIENT)
Dept: OPHTHALMOLOGY | Facility: CLINIC | Age: 77
End: 2022-08-05
Payer: MEDICARE

## 2022-08-05 DIAGNOSIS — Z98.42 CATARACT EXTRACTION STATUS, LEFT: Primary | ICD-10-CM

## 2022-08-05 PROCEDURE — 99024 POSTOP FOLLOW-UP VISIT: CPT | Mod: S$GLB,,, | Performed by: OPHTHALMOLOGY

## 2022-08-05 PROCEDURE — 99024 PR POST-OP FOLLOW-UP VISIT: ICD-10-PCS | Mod: S$GLB,,, | Performed by: OPHTHALMOLOGY

## 2022-08-05 NOTE — PROGRESS NOTES
HPI     DNL REFERRAL      1. DM since the late 1980's  2. Stroke 2018  3. PCIOL OD 7/7/22/ CDE: / SN60WF 17.5  Phaco with IOL OS 08/04/22    OD Pred QID   OD Keto QID    Allergies/Adverse       Last edited by Christina Torre, Patient Care Assistant on 8/5/2022  8:03   AM. (History)            Assessment /Plan     For exam results, see Encounter Report.      ICD-10-CM ICD-9-CM    1. Cataract extraction status, left  Z98.42 V45.61      PO Day 1 S/P Phaco/IOL  left eye  Doing well.    Use Prednisolone Acetate & Keto QID    Reinstructed in importance of absolute compliance with Post-OP instructions including medications, shield at bedtime, and limitation of activities. Follow up appointments in approximately one and six weeks or call immediately for increased pain, redness or vision loss.

## 2022-08-12 ENCOUNTER — OFFICE VISIT (OUTPATIENT)
Dept: OPHTHALMOLOGY | Facility: CLINIC | Age: 77
End: 2022-08-12
Payer: MEDICARE

## 2022-08-12 DIAGNOSIS — Z98.890 POST-OPERATIVE STATE: Primary | ICD-10-CM

## 2022-08-12 DIAGNOSIS — Z98.41 CATARACT EXTRACTION STATUS, RIGHT: ICD-10-CM

## 2022-08-12 DIAGNOSIS — Z98.42 CATARACT EXTRACTION STATUS, LEFT: ICD-10-CM

## 2022-08-12 PROCEDURE — 99024 POSTOP FOLLOW-UP VISIT: CPT | Mod: S$GLB,,, | Performed by: OPHTHALMOLOGY

## 2022-08-12 PROCEDURE — 99999 PR PBB SHADOW E&M-EST. PATIENT-LVL I: CPT | Mod: PBBFAC,,, | Performed by: OPHTHALMOLOGY

## 2022-08-12 PROCEDURE — 99999 PR PBB SHADOW E&M-EST. PATIENT-LVL I: ICD-10-PCS | Mod: PBBFAC,,, | Performed by: OPHTHALMOLOGY

## 2022-08-12 PROCEDURE — 1160F PR REVIEW ALL MEDS BY PRESCRIBER/CLIN PHARMACIST DOCUMENTED: ICD-10-PCS | Mod: CPTII,S$GLB,, | Performed by: OPHTHALMOLOGY

## 2022-08-12 PROCEDURE — 1160F RVW MEDS BY RX/DR IN RCRD: CPT | Mod: CPTII,S$GLB,, | Performed by: OPHTHALMOLOGY

## 2022-08-12 PROCEDURE — 1159F PR MEDICATION LIST DOCUMENTED IN MEDICAL RECORD: ICD-10-PCS | Mod: CPTII,S$GLB,, | Performed by: OPHTHALMOLOGY

## 2022-08-12 PROCEDURE — 1159F MED LIST DOCD IN RCRD: CPT | Mod: CPTII,S$GLB,, | Performed by: OPHTHALMOLOGY

## 2022-08-12 PROCEDURE — 99024 PR POST-OP FOLLOW-UP VISIT: ICD-10-PCS | Mod: S$GLB,,, | Performed by: OPHTHALMOLOGY

## 2022-08-12 NOTE — PROGRESS NOTES
HPI     Post-op Evaluation     Comments: Phaco with IOL OS 08/04/22/ CDE: 12.49/ SY60WF 17.0    Patient states OU               Comments       1. DM since the late 1980's  2. Stroke 2018  3. PCIOL OD 7/7/22/ CDE: / SN60WF 17.5  Phaco with IOL OS 08/04/22/ CDE: 12.49/ SY60WF 17.0    OU: Pred & Keto QID          Last edited by Christina Torre Patient Care Assistant on 8/12/2022 11:42   AM. (History)            Assessment /Plan     For exam results, see Encounter Report.      ICD-10-CM ICD-9-CM    1. Post-operative state  Z98.890 V45.89    2. Cataract extraction status, left  Z98.42 V45.61    3. Cataract extraction status, right  Z98.41 V45.61        PO Week 1 S/P Phaco/ IOL left eye:   Doing well with no evidence of infection or abnormal inflammation.       Taper Prednisolone Acetate weekly per instruction sheet.  Resume normal activitites and d/c eye shield.  OTC reading glasses can be used until evaluated for final MR.  D/c Shield at night    RTC 4-5  Weeks With Dr Erickson at  or [place]   or PRN pain, redness, vision loss, or other concerns.  Pt unable to book appt now as he is having knee surgery

## 2022-10-03 ENCOUNTER — OFFICE VISIT (OUTPATIENT)
Dept: OPHTHALMOLOGY | Facility: CLINIC | Age: 77
End: 2022-10-03
Payer: MEDICARE

## 2022-10-03 DIAGNOSIS — Z98.890 POST-OPERATIVE STATE: Primary | ICD-10-CM

## 2022-10-03 DIAGNOSIS — Z96.1 PSEUDOPHAKIA OF BOTH EYES: ICD-10-CM

## 2022-10-03 PROCEDURE — 99999 PR PBB SHADOW E&M-EST. PATIENT-LVL III: ICD-10-PCS | Mod: PBBFAC,,, | Performed by: OPTOMETRIST

## 2022-10-03 PROCEDURE — 1159F MED LIST DOCD IN RCRD: CPT | Mod: CPTII,S$GLB,, | Performed by: OPTOMETRIST

## 2022-10-03 PROCEDURE — 99999 PR PBB SHADOW E&M-EST. PATIENT-LVL III: CPT | Mod: PBBFAC,,, | Performed by: OPTOMETRIST

## 2022-10-03 PROCEDURE — 92015 DETERMINE REFRACTIVE STATE: CPT | Mod: S$GLB,,, | Performed by: OPTOMETRIST

## 2022-10-03 PROCEDURE — 1159F PR MEDICATION LIST DOCUMENTED IN MEDICAL RECORD: ICD-10-PCS | Mod: CPTII,S$GLB,, | Performed by: OPTOMETRIST

## 2022-10-03 PROCEDURE — 99024 PR POST-OP FOLLOW-UP VISIT: ICD-10-PCS | Mod: S$GLB,,, | Performed by: OPTOMETRIST

## 2022-10-03 PROCEDURE — 92015 PR REFRACTION: ICD-10-PCS | Mod: S$GLB,,, | Performed by: OPTOMETRIST

## 2022-10-03 PROCEDURE — 99024 POSTOP FOLLOW-UP VISIT: CPT | Mod: S$GLB,,, | Performed by: OPTOMETRIST

## 2022-10-03 NOTE — PROGRESS NOTES
HPI    Decrease near visual acuity since SX  1 month P/O  OS per [facility]  Last eye visit 08/12/2022 [facility]  New patient to [facility]  Patient using Pred & Keto QID  Last edited by Taylor Murray MA on 10/3/2022 10:01 AM.            Assessment /Plan     For exam results, see Encounter Report.    Post-operative state    Pseudophakia of both eyes      No cell or flare OU    Dispense Final Rx for glasses or may use OTC glasses.  RTC 1 year  Discussed above and answered questions.

## 2023-03-01 ENCOUNTER — OFFICE VISIT (OUTPATIENT)
Dept: PODIATRY | Facility: CLINIC | Age: 78
End: 2023-03-01
Payer: MEDICARE

## 2023-03-01 DIAGNOSIS — E11.8 DIABETES MELLITUS WITH COMPLICATION, WITH LONG-TERM CURRENT USE OF INSULIN: Primary | ICD-10-CM

## 2023-03-01 DIAGNOSIS — K74.60 CIRRHOSIS OF LIVER WITHOUT ASCITES, UNSPECIFIED HEPATIC CIRRHOSIS TYPE: ICD-10-CM

## 2023-03-01 DIAGNOSIS — Z79.4 DIABETES MELLITUS WITH COMPLICATION, WITH LONG-TERM CURRENT USE OF INSULIN: Primary | ICD-10-CM

## 2023-03-01 DIAGNOSIS — Z79.01 CURRENT USE OF LONG TERM ANTICOAGULATION: ICD-10-CM

## 2023-03-01 DIAGNOSIS — L60.3 ONYCHODYSTROPHY: ICD-10-CM

## 2023-03-01 DIAGNOSIS — I50.22 CHRONIC SYSTOLIC HEART FAILURE: Chronic | ICD-10-CM

## 2023-03-01 DIAGNOSIS — I48.20 CHRONIC ATRIAL FIBRILLATION: Chronic | ICD-10-CM

## 2023-03-01 PROCEDURE — 99203 OFFICE O/P NEW LOW 30 MIN: CPT | Mod: 25,S$GLB,, | Performed by: PODIATRIST

## 2023-03-01 PROCEDURE — 99999 PR PBB SHADOW E&M-EST. PATIENT-LVL II: ICD-10-PCS | Mod: PBBFAC,,, | Performed by: PODIATRIST

## 2023-03-01 PROCEDURE — 1159F PR MEDICATION LIST DOCUMENTED IN MEDICAL RECORD: ICD-10-PCS | Mod: CPTII,S$GLB,, | Performed by: PODIATRIST

## 2023-03-01 PROCEDURE — 1126F AMNT PAIN NOTED NONE PRSNT: CPT | Mod: CPTII,S$GLB,, | Performed by: PODIATRIST

## 2023-03-01 PROCEDURE — 99999 PR PBB SHADOW E&M-EST. PATIENT-LVL II: CPT | Mod: PBBFAC,,, | Performed by: PODIATRIST

## 2023-03-01 PROCEDURE — 3288F FALL RISK ASSESSMENT DOCD: CPT | Mod: CPTII,S$GLB,, | Performed by: PODIATRIST

## 2023-03-01 PROCEDURE — 3288F PR FALLS RISK ASSESSMENT DOCUMENTED: ICD-10-PCS | Mod: CPTII,S$GLB,, | Performed by: PODIATRIST

## 2023-03-01 PROCEDURE — 1159F MED LIST DOCD IN RCRD: CPT | Mod: CPTII,S$GLB,, | Performed by: PODIATRIST

## 2023-03-01 PROCEDURE — 11721 PR DEBRIDEMENT OF NAILS, 6 OR MORE: ICD-10-PCS | Mod: Q9,S$GLB,, | Performed by: PODIATRIST

## 2023-03-01 PROCEDURE — 1160F PR REVIEW ALL MEDS BY PRESCRIBER/CLIN PHARMACIST DOCUMENTED: ICD-10-PCS | Mod: CPTII,S$GLB,, | Performed by: PODIATRIST

## 2023-03-01 PROCEDURE — 1160F RVW MEDS BY RX/DR IN RCRD: CPT | Mod: CPTII,S$GLB,, | Performed by: PODIATRIST

## 2023-03-01 PROCEDURE — 1126F PR PAIN SEVERITY QUANTIFIED, NO PAIN PRESENT: ICD-10-PCS | Mod: CPTII,S$GLB,, | Performed by: PODIATRIST

## 2023-03-01 PROCEDURE — 11721 DEBRIDE NAIL 6 OR MORE: CPT | Mod: Q9,S$GLB,, | Performed by: PODIATRIST

## 2023-03-01 PROCEDURE — 1101F PT FALLS ASSESS-DOCD LE1/YR: CPT | Mod: CPTII,S$GLB,, | Performed by: PODIATRIST

## 2023-03-01 PROCEDURE — 1101F PR PT FALLS ASSESS DOC 0-1 FALLS W/OUT INJ PAST YR: ICD-10-PCS | Mod: CPTII,S$GLB,, | Performed by: PODIATRIST

## 2023-03-01 PROCEDURE — 99203 PR OFFICE/OUTPT VISIT, NEW, LEVL III, 30-44 MIN: ICD-10-PCS | Mod: 25,S$GLB,, | Performed by: PODIATRIST

## 2023-03-01 PROCEDURE — 3072F PR LOW RISK FOR RETINOPATHY: ICD-10-PCS | Mod: CPTII,S$GLB,, | Performed by: PODIATRIST

## 2023-03-01 PROCEDURE — 3072F LOW RISK FOR RETINOPATHY: CPT | Mod: CPTII,S$GLB,, | Performed by: PODIATRIST

## 2023-03-01 NOTE — PROGRESS NOTES
Subjective:       Patient ID: Ramy Romo is a 77 y.o. male.    Chief Complaint: Nail Care (Here today for toenail care. PCP Dr. Rollins 2/27/2023/)      HPI: Patient presents to the office today with the chief complaint of elongated, thickened and dystrophic nail plates to the B/L foot.  This patient is a Diabetic Type II, complicated with Peripheral Angiopathy and Peripheral Neuropathy. Patient does follow with Primary Care and/or Endocrinology for management of Diabetes Mellitus. This patient's PMD is Félix Rollins MD. This patient last saw his/her primary care provider on 2/27/2023.     Hemoglobin A1C   Date Value Ref Range Status   12/07/2022 5.8 (H) 4.8 - 5.6 % Final     Comment:              Prediabetes: 5.7 - 6.4           Diabetes: >6.4           Glycemic control for adults with diabetes: <7.0   09/01/2022 6.3 <=6.5 % Final   11/19/2021 7.9 (H) 4.8 - 5.6 % Final     Comment:              Prediabetes: 5.7 - 6.4           Diabetes: >6.4           Glycemic control for adults with diabetes: <7.0   08/20/2016 9.7 (H) 4.5 - 6.2 % Final     Comment:     According to ADA guidelines, hemoglobin A1C <7.0% represents  optimal control in non-pregnant diabetic patients.  Different  metrics may apply to specific populations.   Standards of Medical Care in Diabetes - 2016.  For the purpose of screening for the presence of diabetes:  <5.7%     Consistent with the absence of diabetes  5.7-6.4%  Consistent with increasing risk for diabetes   (prediabetes)  >or=6.5%  Consistent with diabetes  Currently no consensus exists for use of hemoglobin A1C  for diagnosis of diabetes for children.     08/12/2016 9.9 (H) 4.5 - 6.2 % Final     Comment:     According to ADA guidelines, hemoglobin A1C <7.0% represents  optimal control in non-pregnant diabetic patients.  Different  metrics may apply to specific populations.   Standards of Medical Care in Diabetes - 2016.  For the purpose of screening for the presence of  diabetes:  <5.7%     Consistent with the absence of diabetes  5.7-6.4%  Consistent with increasing risk for diabetes   (prediabetes)  >or=6.5%  Consistent with diabetes  Currently no consensus exists for use of hemoglobin A1C  for diagnosis of diabetes for children.     08/22/2006 7.3 (H) 4.5 - 6.2 % Final   .     Review of patient's allergies indicates:   Allergen Reactions    Sulfa (sulfonamide antibiotics) Rash       Past Medical History:   Diagnosis Date    Atrial fibrillation     CHF (congestive heart failure)     Diabetes mellitus     Hypertension        Family History   Problem Relation Age of Onset    Cataracts Father     Glaucoma Father        Social History     Socioeconomic History    Marital status:    Tobacco Use    Smoking status: Never    Smokeless tobacco: Never   Substance and Sexual Activity    Alcohol use: No       Past Surgical History:   Procedure Laterality Date    CHOLECYSTECTOMY      NECK SURGERY      TOTAL ELBOW ARTHROPLASTY Right 03/2019       Review of Systems       Objective:   There were no vitals taken for this visit.    Physical Exam  LOWER EXTREMITY PHYSICAL EXAMINATION    VASCULAR:  The right dorsalis pedis pulse 2/4 and the right posterior tibial pulse 2/4.  The left dorsalis pedis pulse 2/4 and posterior tibial pulse on the left is 2/4.  Capillary refill is intact.  Pedal hair growth intact     NEUROLOGY:  Protective sensation is intact with [place] monofilament. Proprioception is intact. Intact sensation to light touch.  Vibratory sensation is diminished to the 1st metatarsal phalangeal joint.     DERMATOLOGY:  Skin is supple, moist, intact.  There is no signs of callusing, ulcerations, other lesions identified to the dorsal or plantar aspect of the right or left foot.  The right hallux and left hallux nails are thickened, discolored dystrophic.  There is subungual debris.  Nail plates have area of dark discoloration.  The remaining nails 2-5 on the right foot and  the left foot are elongated but of normal color, thickness, and texture.   There is no signs of ingrowing into the medial or lateral borders.  There is no evidence of wounds or skin breakdown.  No edema or erythema.  No obvious lacerations or fissuring.  Interdigital spaces are clean, dry, intact.  No rashes or scars appreciated.    ORTHOPEDIC: Manual Muscle Testing is 5/5 in all planes on the left and right, without pains, with and without resistance. Gait pattern is non-antalgic.    Assessment:     1. Diabetes mellitus with complication, with long-term current use of insulin    2. Chronic atrial fibrillation    3. Current use of long term anticoagulation    4. Chronic systolic heart failure    5. Cirrhosis of liver without ascites, unspecified hepatic cirrhosis type    6. Onychodystrophy        Plan:     Diabetes mellitus with complication, with long-term current use of insulin    Chronic atrial fibrillation    Current use of long term anticoagulation    Chronic systolic heart failure    Cirrhosis of liver without ascites, unspecified hepatic cirrhosis type    Onychodystrophy        Thorough discussion is had with the patient this afternoon, concerning the diagnosis, its etiology, and the treatment algorithm at present.  Greater than 50% of this visit spent on counseling and coordination of care. Greater than 15 minutes of a 20 minute appointment spent on education about the diabetic foot, neuropathy, and prevention of limb loss.  Shoe inspection. Diabetic Foot Education. Patient reminded of the importance of good nutrition and blood sugar control to help prevent podiatric complications of diabetes. Patient instructed on proper foot hygeine. We discussed wearing proper and supportive shoe gear, daily foot inspections, never walking barefooted or sock footed, never putting sharp instruments to feet which can cause major complications associated with infection, ulcers, lacerations.      Dystrophic nail plates, as  outlined above (R#1  ; L#1 ), are sharply debrided with double action nail nipper, and/or with the assistance of a mechanical rotary silvino, with removal of all offending nail and nail border(s), for reduction of pains. Nails are reduced in terms of length, width and girth with removal of subungual debris to facilitate pain free weight bearing and ambulation. The elongated nails as outlined in the objective portion of this note, were trimmed to appropriate length, with a double action nail nipper, for alleviation/reduction of pains as well. Follow up in approx. 3-4 months.    Future Appointments   Date Time Provider Department Center   5/19/2023 10:10 AM César Levi OD [facility]   6/14/2023 10:15 AM Lianet Yu DPM [facility]

## 2023-05-19 ENCOUNTER — OFFICE VISIT (OUTPATIENT)
Dept: OPHTHALMOLOGY | Facility: CLINIC | Age: 78
End: 2023-05-19
Payer: MEDICARE

## 2023-05-19 DIAGNOSIS — Z96.1 PSEUDOPHAKIA OF BOTH EYES: ICD-10-CM

## 2023-05-19 DIAGNOSIS — H04.129 DRY EYE: ICD-10-CM

## 2023-05-19 DIAGNOSIS — E11.9 TYPE 2 DIABETES MELLITUS WITHOUT RETINOPATHY: Primary | ICD-10-CM

## 2023-05-19 DIAGNOSIS — H26.492 PCO (POSTERIOR CAPSULAR OPACIFICATION), LEFT: ICD-10-CM

## 2023-05-19 PROCEDURE — 2023F PR DILATED RETINAL EXAM W/O EVID OF RETINOPATHY: ICD-10-PCS | Mod: CPTII,,, | Performed by: OPTOMETRIST

## 2023-05-19 PROCEDURE — 1160F PR REVIEW ALL MEDS BY PRESCRIBER/CLIN PHARMACIST DOCUMENTED: ICD-10-PCS | Mod: CPTII,,, | Performed by: OPTOMETRIST

## 2023-05-19 PROCEDURE — 1159F PR MEDICATION LIST DOCUMENTED IN MEDICAL RECORD: ICD-10-PCS | Mod: CPTII,,, | Performed by: OPTOMETRIST

## 2023-05-19 PROCEDURE — 1160F RVW MEDS BY RX/DR IN RCRD: CPT | Mod: CPTII,,, | Performed by: OPTOMETRIST

## 2023-05-19 PROCEDURE — 1159F MED LIST DOCD IN RCRD: CPT | Mod: CPTII,,, | Performed by: OPTOMETRIST

## 2023-05-19 PROCEDURE — 99999 PR PBB SHADOW E&M-EST. PATIENT-LVL III: CPT | Mod: PBBFAC,,, | Performed by: OPTOMETRIST

## 2023-05-19 PROCEDURE — 92014 PR EYE EXAM, EST PATIENT,COMPREHESV: ICD-10-PCS | Mod: ,,, | Performed by: OPTOMETRIST

## 2023-05-19 PROCEDURE — 99213 OFFICE O/P EST LOW 20 MIN: CPT | Performed by: OPTOMETRIST

## 2023-05-19 PROCEDURE — 99999 PR PBB SHADOW E&M-EST. PATIENT-LVL III: ICD-10-PCS | Mod: PBBFAC,,, | Performed by: OPTOMETRIST

## 2023-05-19 PROCEDURE — 2023F DILAT RTA XM W/O RTNOPTHY: CPT | Mod: CPTII,,, | Performed by: OPTOMETRIST

## 2023-05-19 PROCEDURE — 92014 COMPRE OPH EXAM EST PT 1/>: CPT | Mod: ,,, | Performed by: OPTOMETRIST

## 2023-05-19 NOTE — PROGRESS NOTES
HPI     Annual Exam            Comments:   Vision changes since last eye exam?: no  Any eye pain today: no  Other ocular symptoms: no  Interested in contact lens fitting today? no  Pt would like recommendation on gtts allergy/dry         Last edited by Lily Davalos MA on 5/19/2023 10:11 AM.            Assessment /Plan     For exam results, see Encounter Report.    Type 2 diabetes mellitus without retinopathy  There was no diabetic retinopathy present in either eye today.   Recommended that pt continue care with PCP and/or specialists regarding diabetes.  Follow-up dilated eye exam recommended in 12 months, sooner with any vision changes or new concerns.    Pseudophakia of both eyes  PCO (posterior capsular opacification), left  Stable OU  Mild PCO OS, YAG not yet indicated  Monitor 12 months    Dry eye  Recommended iVizia bid OU      RTC 1 yr for dilated eye exam or PRN if any problems.   Discussed above and answered questions.

## 2023-06-14 ENCOUNTER — OFFICE VISIT (OUTPATIENT)
Dept: PODIATRY | Facility: CLINIC | Age: 78
End: 2023-06-14
Payer: MEDICARE

## 2023-06-14 VITALS — BODY MASS INDEX: 38.34 KG/M2 | WEIGHT: 283.06 LBS | HEIGHT: 72 IN

## 2023-06-14 DIAGNOSIS — Z79.01 CURRENT USE OF LONG TERM ANTICOAGULATION: ICD-10-CM

## 2023-06-14 DIAGNOSIS — Z79.4 DIABETES MELLITUS WITH COMPLICATION, WITH LONG-TERM CURRENT USE OF INSULIN: Primary | ICD-10-CM

## 2023-06-14 DIAGNOSIS — E11.8 DIABETES MELLITUS WITH COMPLICATION, WITH LONG-TERM CURRENT USE OF INSULIN: Primary | ICD-10-CM

## 2023-06-14 DIAGNOSIS — I50.22 CHRONIC SYSTOLIC HEART FAILURE: ICD-10-CM

## 2023-06-14 DIAGNOSIS — I48.20 CHRONIC ATRIAL FIBRILLATION: ICD-10-CM

## 2023-06-14 DIAGNOSIS — R60.0 BILATERAL LOWER EXTREMITY EDEMA: ICD-10-CM

## 2023-06-14 DIAGNOSIS — L60.3 ONYCHODYSTROPHY: ICD-10-CM

## 2023-06-14 PROCEDURE — 1101F PR PT FALLS ASSESS DOC 0-1 FALLS W/OUT INJ PAST YR: ICD-10-PCS | Mod: CPTII,S$GLB,, | Performed by: PODIATRIST

## 2023-06-14 PROCEDURE — 1101F PT FALLS ASSESS-DOCD LE1/YR: CPT | Mod: CPTII,S$GLB,, | Performed by: PODIATRIST

## 2023-06-14 PROCEDURE — 99999 PR PBB SHADOW E&M-EST. PATIENT-LVL IV: ICD-10-PCS | Mod: PBBFAC,,, | Performed by: PODIATRIST

## 2023-06-14 PROCEDURE — 1160F RVW MEDS BY RX/DR IN RCRD: CPT | Mod: CPTII,S$GLB,, | Performed by: PODIATRIST

## 2023-06-14 PROCEDURE — 11720 PR DEBRIDEMENT OF NAIL(S), 1-5: ICD-10-PCS | Mod: Q9,S$GLB,, | Performed by: PODIATRIST

## 2023-06-14 PROCEDURE — 99213 OFFICE O/P EST LOW 20 MIN: CPT | Mod: 25,S$GLB,, | Performed by: PODIATRIST

## 2023-06-14 PROCEDURE — 3288F FALL RISK ASSESSMENT DOCD: CPT | Mod: CPTII,S$GLB,, | Performed by: PODIATRIST

## 2023-06-14 PROCEDURE — 1159F PR MEDICATION LIST DOCUMENTED IN MEDICAL RECORD: ICD-10-PCS | Mod: CPTII,S$GLB,, | Performed by: PODIATRIST

## 2023-06-14 PROCEDURE — 3288F PR FALLS RISK ASSESSMENT DOCUMENTED: ICD-10-PCS | Mod: CPTII,S$GLB,, | Performed by: PODIATRIST

## 2023-06-14 PROCEDURE — 11720 DEBRIDE NAIL 1-5: CPT | Mod: Q9,S$GLB,, | Performed by: PODIATRIST

## 2023-06-14 PROCEDURE — 1159F MED LIST DOCD IN RCRD: CPT | Mod: CPTII,S$GLB,, | Performed by: PODIATRIST

## 2023-06-14 PROCEDURE — 3072F LOW RISK FOR RETINOPATHY: CPT | Mod: CPTII,S$GLB,, | Performed by: PODIATRIST

## 2023-06-14 PROCEDURE — 11719 PR TRIM NAIL(S): ICD-10-PCS | Mod: 59,Q9,S$GLB, | Performed by: PODIATRIST

## 2023-06-14 PROCEDURE — 3072F PR LOW RISK FOR RETINOPATHY: ICD-10-PCS | Mod: CPTII,S$GLB,, | Performed by: PODIATRIST

## 2023-06-14 PROCEDURE — 99999 PR PBB SHADOW E&M-EST. PATIENT-LVL IV: CPT | Mod: PBBFAC,,, | Performed by: PODIATRIST

## 2023-06-14 PROCEDURE — 99213 PR OFFICE/OUTPT VISIT, EST, LEVL III, 20-29 MIN: ICD-10-PCS | Mod: 25,S$GLB,, | Performed by: PODIATRIST

## 2023-06-14 PROCEDURE — 11719 TRIM NAIL(S) ANY NUMBER: CPT | Mod: 59,Q9,S$GLB, | Performed by: PODIATRIST

## 2023-06-14 PROCEDURE — 1160F PR REVIEW ALL MEDS BY PRESCRIBER/CLIN PHARMACIST DOCUMENTED: ICD-10-PCS | Mod: CPTII,S$GLB,, | Performed by: PODIATRIST

## 2023-06-14 NOTE — PROGRESS NOTES
Patient, Ramy Romo (MRN #[id number]), presented with a recorded BMI of 38.39 kg/m^2 and a documented comorbidity(s):  - Atrial Fibrillation  - Atrial Fibrillation  to which the severe obesity is a contributing factor. This is consistent with the definition of severe obesity (BMI 35.0-39.9) with comorbidity (ICD-10 E66.01, Z68.35). The patient's severe obesity was monitored, evaluated, addressed and/or treated. This addendum to the medical record is made on 06/14/2023.

## 2023-06-14 NOTE — PROGRESS NOTES
Subjective:       Patient ID: Ramy Romo is a 77 y.o. male.    Chief Complaint: Nail Care (Pt c/o BL ankle / foot swelling, Diabetic pt wears casual shoes, PCP Dr. Rollins last seen 6-1-23) and Foot Problem    HPI: Patient presents to the office today with the chief complaint of elongated, thickened and dystrophic nail plates to the B/L foot.  This patient is a Diabetic Type II, complicated with Peripheral Angiopathy and Peripheral Neuropathy. Patient does follow with Primary Care and/or Endocrinology for management of Diabetes Mellitus. This patient's PMD is Félix Rollins MD. This patient last saw his/her primary care provider on 6/1/2023.     Hemoglobin A1C   Date Value Ref Range Status   06/01/2023 6.8 (H) 4.8 - 5.6 % Final     Comment:              Prediabetes: 5.7 - 6.4           Diabetes: >6.4           Glycemic control for adults with diabetes: <7.0   12/07/2022 5.8 (H) 4.8 - 5.6 % Final     Comment:              Prediabetes: 5.7 - 6.4           Diabetes: >6.4           Glycemic control for adults with diabetes: <7.0   09/01/2022 6.3 <=6.5 % Final   08/20/2016 9.7 (H) 4.5 - 6.2 % Final     Comment:     According to ADA guidelines, hemoglobin A1C <7.0% represents  optimal control in non-pregnant diabetic patients.  Different  metrics may apply to specific populations.   Standards of Medical Care in Diabetes - 2016.  For the purpose of screening for the presence of diabetes:  <5.7%     Consistent with the absence of diabetes  5.7-6.4%  Consistent with increasing risk for diabetes   (prediabetes)  >or=6.5%  Consistent with diabetes  Currently no consensus exists for use of hemoglobin A1C  for diagnosis of diabetes for children.     08/12/2016 9.9 (H) 4.5 - 6.2 % Final     Comment:     According to ADA guidelines, hemoglobin A1C <7.0% represents  optimal control in non-pregnant diabetic patients.  Different  metrics may apply to specific populations.   Standards of Medical Care in Diabetes - 2016.  For  the purpose of screening for the presence of diabetes:  <5.7%     Consistent with the absence of diabetes  5.7-6.4%  Consistent with increasing risk for diabetes   (prediabetes)  >or=6.5%  Consistent with diabetes  Currently no consensus exists for use of hemoglobin A1C  for diagnosis of diabetes for children.     08/22/2006 7.3 (H) 4.5 - 6.2 % Final   .     Review of patient's allergies indicates:   Allergen Reactions    Sulfa (sulfonamide antibiotics) Rash       Past Medical History:   Diagnosis Date    Atrial fibrillation     CHF (congestive heart failure)     Diabetes mellitus     Hypertension        Family History   Problem Relation Age of Onset    Cataracts Father     Glaucoma Father        Social History     Socioeconomic History    Marital status:    Tobacco Use    Smoking status: Never    Smokeless tobacco: Never   Substance and Sexual Activity    Alcohol use: No       Past Surgical History:   Procedure Laterality Date    CHOLECYSTECTOMY      NECK SURGERY      TOTAL ELBOW ARTHROPLASTY Right 03/2019       Review of Systems       Objective:   Ht 6' (1.829 m)   Wt 128.4 kg (283 lb 1.1 oz)   BMI 38.39 kg/m²     Physical Exam  LOWER EXTREMITY PHYSICAL EXAMINATION    VASCULAR:  The right dorsalis pedis pulse 2/4 and the right posterior tibial pulse 2/4.  The left dorsalis pedis pulse 2/4 and posterior tibial pulse on the left is 2/4.  Capillary refill is intact.  Pedal hair growth intact     NEUROLOGY:  Protective sensation is intact with [place] Shawn monofilament. Proprioception is intact. Intact sensation to light touch.  Vibratory sensation is diminished to the 1st metatarsal phalangeal joint.     DERMATOLOGY:  Skin is supple, moist, intact.  There is no signs of callusing, ulcerations, other lesions identified to the dorsal or plantar aspect of the right or left foot.  The right hallux and left hallux nails are thickened, discolored dystrophic.  There is subungual debris.  Nail plates have area  of dark discoloration.  The remaining nails 2-5 on the right foot and the left foot are elongated but of normal color, thickness, and texture.   There is no signs of ingrowing into the medial or lateral borders.  There is no evidence of wounds or skin breakdown.  No edema or erythema.  No obvious lacerations or fissuring.  Interdigital spaces are clean, dry, intact.  No rashes or scars appreciated.    ORTHOPEDIC: Manual Muscle Testing is 5/5 in all planes on the left and right, without pains, with and without resistance. Gait pattern is non-antalgic.    Assessment:     1. Diabetes mellitus with complication, with long-term current use of insulin    2. Chronic atrial fibrillation    3. Current use of long term anticoagulation    4. Chronic systolic heart failure    5. Bilateral lower extremity edema    6. Onychodystrophy        Plan:     Diabetes mellitus with complication, with long-term current use of insulin    Chronic atrial fibrillation    Current use of long term anticoagulation    Chronic systolic heart failure    Bilateral lower extremity edema    Onychodystrophy      Thorough discussion is had with the patient this afternoon, concerning the diagnosis, its etiology, and the treatment algorithm at present.  Greater than 50% of this visit spent on counseling and coordination of care. Greater than 15 minutes of a 20 minute appointment spent on education about the diabetic foot, neuropathy, and prevention of limb loss.  Shoe inspection. Diabetic Foot Education. Patient reminded of the importance of good nutrition and blood sugar control to help prevent podiatric complications of diabetes. Patient instructed on proper foot hygeine. We discussed wearing proper and supportive shoe gear, daily foot inspections, never walking barefooted or sock footed, never putting sharp instruments to feet which can cause major complications associated with infection, ulcers, lacerations.      Dystrophic nail plates, as outlined  above (R#1  ; L#1 ), are sharply debrided with double action nail nipper, and/or with the assistance of a mechanical rotary silvino, with removal of all offending nail and nail border(s), for reduction of pains. Nails are reduced in terms of length, width and girth with removal of subungual debris to facilitate pain free weight bearing and ambulation. The elongated nails as outlined in the objective portion of this note, were trimmed to appropriate length, with a double action nail nipper, for alleviation/reduction of pains as well. Follow up in approx. 3-4 months.    Recommend bilateral lower extremity compression pumps and be consistent with usage. Did discuss possible initiation and/or continuation of lower extremity compression therapy (stockings).  Recommend continue w/ limb elevation.  Consider initiation and/or increase in current dosage of oral diuretic if no overt contraindication.  I encouraged the patient to follow-up and discuss with his/her PCP.      Future Appointments   Date Time Provider Department Center   10/16/2023 10:15 AM Lianet Yu DPM [facility]

## 2023-10-16 ENCOUNTER — OFFICE VISIT (OUTPATIENT)
Dept: PODIATRY | Facility: CLINIC | Age: 78
End: 2023-10-16
Payer: MEDICARE

## 2023-10-16 VITALS — HEIGHT: 72 IN | WEIGHT: 283 LBS | BODY MASS INDEX: 38.33 KG/M2

## 2023-10-16 DIAGNOSIS — Z79.01 CURRENT USE OF LONG TERM ANTICOAGULATION: ICD-10-CM

## 2023-10-16 DIAGNOSIS — E66.01 SEVERE OBESITY: ICD-10-CM

## 2023-10-16 DIAGNOSIS — L60.3 ONYCHODYSTROPHY: ICD-10-CM

## 2023-10-16 DIAGNOSIS — I48.20 CHRONIC ATRIAL FIBRILLATION: ICD-10-CM

## 2023-10-16 DIAGNOSIS — E11.8 DIABETES MELLITUS WITH COMPLICATION, WITH LONG-TERM CURRENT USE OF INSULIN: Primary | ICD-10-CM

## 2023-10-16 DIAGNOSIS — R60.0 BILATERAL LOWER EXTREMITY EDEMA: ICD-10-CM

## 2023-10-16 DIAGNOSIS — I50.22 CHRONIC SYSTOLIC HEART FAILURE: ICD-10-CM

## 2023-10-16 DIAGNOSIS — Z79.4 DIABETES MELLITUS WITH COMPLICATION, WITH LONG-TERM CURRENT USE OF INSULIN: Primary | ICD-10-CM

## 2023-10-16 PROCEDURE — 99999 PR PBB SHADOW E&M-EST. PATIENT-LVL III: CPT | Mod: PBBFAC,,, | Performed by: PODIATRIST

## 2023-10-16 PROCEDURE — 11721 PR DEBRIDEMENT OF NAILS, 6 OR MORE: ICD-10-PCS | Mod: Q9,S$GLB,, | Performed by: PODIATRIST

## 2023-10-16 PROCEDURE — 99499 UNLISTED E&M SERVICE: CPT | Mod: S$GLB,,, | Performed by: PODIATRIST

## 2023-10-16 PROCEDURE — 11721 DEBRIDE NAIL 6 OR MORE: CPT | Mod: Q9,S$GLB,, | Performed by: PODIATRIST

## 2023-10-16 PROCEDURE — 99499 NO LOS: ICD-10-PCS | Mod: S$GLB,,, | Performed by: PODIATRIST

## 2023-10-16 PROCEDURE — 99999 PR PBB SHADOW E&M-EST. PATIENT-LVL III: ICD-10-PCS | Mod: PBBFAC,,, | Performed by: PODIATRIST

## 2023-10-16 NOTE — PROGRESS NOTES
Subjective:       Patient ID: Ramy Romo is a 78 y.o. male.    Chief Complaint: Nail Care (Pt presents for nail care. Pain 0/10, Diabetic wearing closed toe shoes with socks. Félix Rollins MD  saw PCP 10/03/23)    HPI: Patient presents to the office today with the chief complaint of elongated, thickened and dystrophic nail plates to the B/L foot.  This patient is a Diabetic Type II, complicated with Peripheral Angiopathy and Peripheral Neuropathy. Patient does follow with Primary Care and/or Endocrinology for management of Diabetes Mellitus. This patient's PMD is Félix Rollins MD. This patient last saw his/her primary care provider on 10/03/2023     Hemoglobin A1C   Date Value Ref Range Status   10/03/2023 6.5 (H) 4.8 - 5.6 % Final     Comment:              Prediabetes: 5.7 - 6.4           Diabetes: >6.4           Glycemic control for adults with diabetes: <7.0   06/01/2023 6.8 (H) 4.8 - 5.6 % Final     Comment:              Prediabetes: 5.7 - 6.4           Diabetes: >6.4           Glycemic control for adults with diabetes: <7.0   12/07/2022 5.8 (H) 4.8 - 5.6 % Final     Comment:              Prediabetes: 5.7 - 6.4           Diabetes: >6.4           Glycemic control for adults with diabetes: <7.0   08/20/2016 9.7 (H) 4.5 - 6.2 % Final     Comment:     According to ADA guidelines, hemoglobin A1C <7.0% represents  optimal control in non-pregnant diabetic patients.  Different  metrics may apply to specific populations.   Standards of Medical Care in Diabetes - 2016.  For the purpose of screening for the presence of diabetes:  <5.7%     Consistent with the absence of diabetes  5.7-6.4%  Consistent with increasing risk for diabetes   (prediabetes)  >or=6.5%  Consistent with diabetes  Currently no consensus exists for use of hemoglobin A1C  for diagnosis of diabetes for children.     08/12/2016 9.9 (H) 4.5 - 6.2 % Final     Comment:     According to ADA guidelines, hemoglobin A1C <7.0% represents  optimal  control in non-pregnant diabetic patients.  Different  metrics may apply to specific populations.   Standards of Medical Care in Diabetes - 2016.  For the purpose of screening for the presence of diabetes:  <5.7%     Consistent with the absence of diabetes  5.7-6.4%  Consistent with increasing risk for diabetes   (prediabetes)  >or=6.5%  Consistent with diabetes  Currently no consensus exists for use of hemoglobin A1C  for diagnosis of diabetes for children.     08/22/2006 7.3 (H) 4.5 - 6.2 % Final   .     Review of patient's allergies indicates:   Allergen Reactions    Sulfa (sulfonamide antibiotics) Rash       Past Medical History:   Diagnosis Date    Atrial fibrillation     CHF (congestive heart failure)     Diabetes mellitus     Hypertension        Family History   Problem Relation Age of Onset    Cataracts Father     Glaucoma Father        Social History     Socioeconomic History    Marital status:    Tobacco Use    Smoking status: Never    Smokeless tobacco: Never   Substance and Sexual Activity    Alcohol use: No       Past Surgical History:   Procedure Laterality Date    CHOLECYSTECTOMY      NECK SURGERY      TOTAL ELBOW ARTHROPLASTY Right 03/2019       Review of Systems       Objective:   Ht 6' (1.829 m)   Wt 128.4 kg (283 lb)   BMI 38.38 kg/m²     Physical Exam  LOWER EXTREMITY PHYSICAL EXAMINATION    VASCULAR:  The right dorsalis pedis pulse 2/4 and the right posterior tibial pulse 2/4.  The left dorsalis pedis pulse 2/4 and posterior tibial pulse on the left is 2/4.  Capillary refill is intact.  Pedal hair growth intact     NEUROLOGY:  Protective sensation is intact with [place] Shawn monofilament. Proprioception is intact. Intact sensation to light touch.  Vibratory sensation is diminished to the 1st metatarsal phalangeal joint.     DERMATOLOGY:  Skin is supple, moist, intact.  There is no signs of callusing, ulcerations, other lesions identified to the dorsal or plantar aspect of the right  or left foot.  The right hallux and left hallux nails are thickened, discolored dystrophic.  There is subungual debris.  Nail plates have area of dark discoloration.  The remaining nails 2-5 on the right foot and the left foot are elongated but of normal color, thickness, and texture.   There is no signs of ingrowing into the medial or lateral borders.  There is no evidence of wounds or skin breakdown.  No edema or erythema.  No obvious lacerations or fissuring.  Interdigital spaces are clean, dry, intact.  No rashes or scars appreciated.    ORTHOPEDIC: Manual Muscle Testing is 5/5 in all planes on the left and right, without pains, with and without resistance. Gait pattern is non-antalgic.    Assessment:     1. Diabetes mellitus with complication, with long-term current use of insulin    2. Chronic atrial fibrillation    3. Current use of long term anticoagulation    4. Chronic systolic heart failure    5. Bilateral lower extremity edema    6. Onychodystrophy    7. Severe obesity        Plan:     Diabetes mellitus with complication, with long-term current use of insulin    Chronic atrial fibrillation    Current use of long term anticoagulation    Chronic systolic heart failure    Bilateral lower extremity edema    Onychodystrophy    Severe obesity      Thorough discussion is had with the patient this afternoon, concerning the diagnosis, its etiology, and the treatment algorithm at present.  Greater than 50% of this visit spent on counseling and coordination of care. Greater than 15 minutes of a 20 minute appointment spent on education about the diabetic foot, neuropathy, and prevention of limb loss.  Shoe inspection. Diabetic Foot Education. Patient reminded of the importance of good nutrition and blood sugar control to help prevent podiatric complications of diabetes. Patient instructed on proper foot hygeine. We discussed wearing proper and supportive shoe gear, daily foot inspections, never walking barefooted or  sock footed, never putting sharp instruments to feet which can cause major complications associated with infection, ulcers, lacerations.      Dystrophic nail plates, as outlined above (R#1  ; L#1 ), are sharply debrided with double action nail nipper, and/or with the assistance of a mechanical rotary silvino, with removal of all offending nail and nail border(s), for reduction of pains. Nails are reduced in terms of length, width and girth with removal of subungual debris to facilitate pain free weight bearing and ambulation. The elongated nails as outlined in the objective portion of this note, were trimmed to appropriate length, with a double action nail nipper, for alleviation/reduction of pains as well. Follow up in approx. 3-4 months.    Continue with bilateral lower leg compression therapy (stockings).  Recommend continue w/ limb elevation.  Consider initiation and/or increase in current dosage of oral diuretic if no overt contraindication.  I encouraged the patient to follow-up and discuss with his/her PCP.      No future appointments.

## 2024-01-23 ENCOUNTER — HOSPITAL ENCOUNTER (INPATIENT)
Facility: HOSPITAL | Age: 79
LOS: 6 days | Discharge: HOME HEALTH | DRG: 242 | End: 2024-01-30
Attending: EMERGENCY MEDICINE | Admitting: FAMILY MEDICINE
Payer: MEDICARE

## 2024-01-23 DIAGNOSIS — I50.23 ACUTE ON CHRONIC SYSTOLIC CONGESTIVE HEART FAILURE: ICD-10-CM

## 2024-01-23 DIAGNOSIS — R07.9 CHEST PAIN: ICD-10-CM

## 2024-01-23 DIAGNOSIS — I49.9 ARRHYTHMIA: ICD-10-CM

## 2024-01-23 DIAGNOSIS — R00.1 SYMPTOMATIC BRADYCARDIA: ICD-10-CM

## 2024-01-23 DIAGNOSIS — R00.1 BRADYCARDIA: ICD-10-CM

## 2024-01-23 DIAGNOSIS — I50.43 ACUTE ON CHRONIC COMBINED SYSTOLIC (CONGESTIVE) AND DIASTOLIC (CONGESTIVE) HEART FAILURE: ICD-10-CM

## 2024-01-23 DIAGNOSIS — T17.908D CHRONIC PULMONARY ASPIRATION, SUBSEQUENT ENCOUNTER: Primary | ICD-10-CM

## 2024-01-23 DIAGNOSIS — I50.32 CHRONIC DIASTOLIC HEART FAILURE: ICD-10-CM

## 2024-01-23 DIAGNOSIS — I49.5 SINOATRIAL NODE DYSFUNCTION: ICD-10-CM

## 2024-01-23 DIAGNOSIS — R94.31 QT PROLONGATION: ICD-10-CM

## 2024-01-23 DIAGNOSIS — I48.20 CHRONIC ATRIAL FIBRILLATION: ICD-10-CM

## 2024-01-23 PROBLEM — N17.9 AKI (ACUTE KIDNEY INJURY): Status: ACTIVE | Noted: 2024-01-23

## 2024-01-23 LAB
ALBUMIN SERPL BCP-MCNC: 2.7 G/DL (ref 3.5–5.2)
ALP SERPL-CCNC: 105 U/L (ref 55–135)
ALT SERPL W/O P-5'-P-CCNC: 18 U/L (ref 10–44)
ANION GAP SERPL CALC-SCNC: 10 MMOL/L (ref 8–16)
AST SERPL-CCNC: 24 U/L (ref 10–40)
BASOPHILS # BLD AUTO: 0.03 K/UL (ref 0–0.2)
BASOPHILS NFR BLD: 0.5 % (ref 0–1.9)
BILIRUB SERPL-MCNC: 0.6 MG/DL (ref 0.1–1)
BNP SERPL-MCNC: 297 PG/ML (ref 0–99)
BUN SERPL-MCNC: 23 MG/DL (ref 8–23)
CALCIUM SERPL-MCNC: 10.1 MG/DL (ref 8.7–10.5)
CHLORIDE SERPL-SCNC: 110 MMOL/L (ref 95–110)
CK SERPL-CCNC: 34 U/L (ref 20–200)
CO2 SERPL-SCNC: 21 MMOL/L (ref 23–29)
CREAT SERPL-MCNC: 1.5 MG/DL (ref 0.5–1.4)
DIFFERENTIAL METHOD BLD: ABNORMAL
EOSINOPHIL # BLD AUTO: 0.3 K/UL (ref 0–0.5)
EOSINOPHIL NFR BLD: 4.4 % (ref 0–8)
ERYTHROCYTE [DISTWIDTH] IN BLOOD BY AUTOMATED COUNT: 14.2 % (ref 11.5–14.5)
EST. GFR  (NO RACE VARIABLE): 48 ML/MIN/1.73 M^2
GLUCOSE SERPL-MCNC: 90 MG/DL (ref 70–110)
HCT VFR BLD AUTO: 34.4 % (ref 40–54)
HGB BLD-MCNC: 11.5 G/DL (ref 14–18)
IMM GRANULOCYTES # BLD AUTO: 0.02 K/UL (ref 0–0.04)
IMM GRANULOCYTES NFR BLD AUTO: 0.3 % (ref 0–0.5)
INFLUENZA A, MOLECULAR: NEGATIVE
INFLUENZA B, MOLECULAR: NEGATIVE
LYMPHOCYTES # BLD AUTO: 1.5 K/UL (ref 1–4.8)
LYMPHOCYTES NFR BLD: 25.1 % (ref 18–48)
MCH RBC QN AUTO: 34.7 PG (ref 27–31)
MCHC RBC AUTO-ENTMCNC: 33.4 G/DL (ref 32–36)
MCV RBC AUTO: 104 FL (ref 82–98)
MONOCYTES # BLD AUTO: 0.6 K/UL (ref 0.3–1)
MONOCYTES NFR BLD: 10.4 % (ref 4–15)
NEUTROPHILS # BLD AUTO: 3.5 K/UL (ref 1.8–7.7)
NEUTROPHILS NFR BLD: 59.3 % (ref 38–73)
NRBC BLD-RTO: 0 /100 WBC
PLATELET # BLD AUTO: 156 K/UL (ref 150–450)
PMV BLD AUTO: 9.8 FL (ref 9.2–12.9)
POCT GLUCOSE: 101 MG/DL (ref 70–110)
POCT GLUCOSE: 135 MG/DL (ref 70–110)
POTASSIUM SERPL-SCNC: 4 MMOL/L (ref 3.5–5.1)
PROT SERPL-MCNC: 5.7 G/DL (ref 6–8.4)
RBC # BLD AUTO: 3.31 M/UL (ref 4.6–6.2)
SARS-COV-2 RDRP RESP QL NAA+PROBE: NEGATIVE
SODIUM SERPL-SCNC: 141 MMOL/L (ref 136–145)
SPECIMEN SOURCE: NORMAL
TROPONIN I SERPL DL<=0.01 NG/ML-MCNC: 0.01 NG/ML (ref 0–0.03)
WBC # BLD AUTO: 5.86 K/UL (ref 3.9–12.7)

## 2024-01-23 PROCEDURE — 82550 ASSAY OF CK (CPK): CPT | Performed by: EMERGENCY MEDICINE

## 2024-01-23 PROCEDURE — 85025 COMPLETE CBC W/AUTO DIFF WBC: CPT | Performed by: EMERGENCY MEDICINE

## 2024-01-23 PROCEDURE — G0378 HOSPITAL OBSERVATION PER HR: HCPCS

## 2024-01-23 PROCEDURE — 63600175 PHARM REV CODE 636 W HCPCS

## 2024-01-23 PROCEDURE — 96375 TX/PRO/DX INJ NEW DRUG ADDON: CPT

## 2024-01-23 PROCEDURE — 83880 ASSAY OF NATRIURETIC PEPTIDE: CPT | Performed by: EMERGENCY MEDICINE

## 2024-01-23 PROCEDURE — 63600175 PHARM REV CODE 636 W HCPCS: Mod: JZ,JG | Performed by: EMERGENCY MEDICINE

## 2024-01-23 PROCEDURE — 96374 THER/PROPH/DIAG INJ IV PUSH: CPT

## 2024-01-23 PROCEDURE — 93005 ELECTROCARDIOGRAM TRACING: CPT

## 2024-01-23 PROCEDURE — 82962 GLUCOSE BLOOD TEST: CPT

## 2024-01-23 PROCEDURE — 87502 INFLUENZA DNA AMP PROBE: CPT | Performed by: STUDENT IN AN ORGANIZED HEALTH CARE EDUCATION/TRAINING PROGRAM

## 2024-01-23 PROCEDURE — U0002 COVID-19 LAB TEST NON-CDC: HCPCS | Performed by: STUDENT IN AN ORGANIZED HEALTH CARE EDUCATION/TRAINING PROGRAM

## 2024-01-23 PROCEDURE — 84484 ASSAY OF TROPONIN QUANT: CPT | Performed by: EMERGENCY MEDICINE

## 2024-01-23 PROCEDURE — 99291 CRITICAL CARE FIRST HOUR: CPT

## 2024-01-23 PROCEDURE — 25000003 PHARM REV CODE 250: Performed by: STUDENT IN AN ORGANIZED HEALTH CARE EDUCATION/TRAINING PROGRAM

## 2024-01-23 PROCEDURE — 93010 ELECTROCARDIOGRAM REPORT: CPT | Mod: ,,, | Performed by: INTERNAL MEDICINE

## 2024-01-23 PROCEDURE — 80053 COMPREHEN METABOLIC PANEL: CPT | Performed by: EMERGENCY MEDICINE

## 2024-01-23 RX ORDER — FERROUS GLUCONATE 324(37.5)
324 TABLET ORAL
Status: DISCONTINUED | OUTPATIENT
Start: 2024-01-24 | End: 2024-01-26

## 2024-01-23 RX ORDER — GLUCAGON 1 MG
5 KIT INJECTION
Status: COMPLETED | OUTPATIENT
Start: 2024-01-23 | End: 2024-01-23

## 2024-01-23 RX ORDER — TAMSULOSIN HYDROCHLORIDE 0.4 MG/1
0.4 CAPSULE ORAL DAILY
Status: DISCONTINUED | OUTPATIENT
Start: 2024-01-24 | End: 2024-01-26

## 2024-01-23 RX ORDER — METOPROLOL SUCCINATE 25 MG/1
25 TABLET, EXTENDED RELEASE ORAL DAILY
Status: ON HOLD | COMMUNITY
End: 2024-01-29

## 2024-01-23 RX ORDER — SODIUM CHLORIDE 0.9 % (FLUSH) 0.9 %
10 SYRINGE (ML) INJECTION EVERY 12 HOURS PRN
Status: DISCONTINUED | OUTPATIENT
Start: 2024-01-23 | End: 2024-01-30 | Stop reason: HOSPADM

## 2024-01-23 RX ORDER — IBUPROFEN 200 MG
24 TABLET ORAL
Status: DISCONTINUED | OUTPATIENT
Start: 2024-01-23 | End: 2024-01-26

## 2024-01-23 RX ORDER — GLUCAGON 1 MG
1 KIT INJECTION
Status: DISCONTINUED | OUTPATIENT
Start: 2024-01-23 | End: 2024-01-30 | Stop reason: HOSPADM

## 2024-01-23 RX ORDER — FUROSEMIDE 20 MG/1
20 TABLET ORAL DAILY
COMMUNITY
Start: 2023-07-03

## 2024-01-23 RX ORDER — INSULIN ASPART 100 [IU]/ML
INJECTION, SOLUTION INTRAVENOUS; SUBCUTANEOUS
COMMUNITY
Start: 2023-12-07

## 2024-01-23 RX ORDER — ONDANSETRON HYDROCHLORIDE 2 MG/ML
4 INJECTION, SOLUTION INTRAVENOUS EVERY 8 HOURS PRN
Status: DISCONTINUED | OUTPATIENT
Start: 2024-01-23 | End: 2024-01-30 | Stop reason: HOSPADM

## 2024-01-23 RX ORDER — IBUPROFEN 200 MG
16 TABLET ORAL
Status: DISCONTINUED | OUTPATIENT
Start: 2024-01-23 | End: 2024-01-26

## 2024-01-23 RX ORDER — ATROPINE SULFATE 0.1 MG/ML
1 INJECTION INTRAVENOUS
Status: COMPLETED | OUTPATIENT
Start: 2024-01-23 | End: 2024-01-23

## 2024-01-23 RX ORDER — LACTULOSE 10 G/15ML
20 SOLUTION ORAL 2 TIMES DAILY
Status: DISCONTINUED | OUTPATIENT
Start: 2024-01-24 | End: 2024-01-24

## 2024-01-23 RX ORDER — ACETAMINOPHEN 500 MG
500 TABLET ORAL EVERY 6 HOURS PRN
Status: DISCONTINUED | OUTPATIENT
Start: 2024-01-23 | End: 2024-01-26

## 2024-01-23 RX ORDER — INSULIN ASPART 100 [IU]/ML
0-5 INJECTION, SOLUTION INTRAVENOUS; SUBCUTANEOUS
Status: DISCONTINUED | OUTPATIENT
Start: 2024-01-23 | End: 2024-01-26

## 2024-01-23 RX ORDER — AMOXICILLIN 250 MG
1 CAPSULE ORAL DAILY PRN
Status: DISCONTINUED | OUTPATIENT
Start: 2024-01-23 | End: 2024-01-26

## 2024-01-23 RX ORDER — ATROPINE SULFATE 0.1 MG/ML
INJECTION INTRAVENOUS
Status: COMPLETED
Start: 2024-01-23 | End: 2024-01-23

## 2024-01-23 RX ORDER — LACTULOSE 10 G/15ML
30 SOLUTION ORAL; RECTAL 2 TIMES DAILY
COMMUNITY
Start: 2023-12-18

## 2024-01-23 RX ORDER — SODIUM CHLORIDE 9 MG/ML
INJECTION, SOLUTION INTRAVENOUS CONTINUOUS
Status: DISCONTINUED | OUTPATIENT
Start: 2024-01-23 | End: 2024-01-25

## 2024-01-23 RX ORDER — NALOXONE HCL 0.4 MG/ML
0.02 VIAL (ML) INJECTION
Status: DISCONTINUED | OUTPATIENT
Start: 2024-01-23 | End: 2024-01-30 | Stop reason: HOSPADM

## 2024-01-23 RX ORDER — FLUTICASONE PROPIONATE 50 MCG
1 SPRAY, SUSPENSION (ML) NASAL EVERY MORNING
COMMUNITY

## 2024-01-23 RX ADMIN — APIXABAN 5 MG: 2.5 TABLET, FILM COATED ORAL at 09:01

## 2024-01-23 RX ADMIN — ATROPINE SULFATE 1 MG: 0.1 INJECTION INTRAVENOUS at 02:01

## 2024-01-23 RX ADMIN — SODIUM CHLORIDE: 9 INJECTION, SOLUTION INTRAVENOUS at 09:01

## 2024-01-23 RX ADMIN — GLUCAGON 5 MG: KIT at 02:01

## 2024-01-23 NOTE — Clinical Note
2 cm skin tear noted on Right wrist upon entering the cath lab. Site clean with NS, 4x4, and gauze placed. Also redness noted on the Left Groin site; no odor noted. Cleaned with Saline

## 2024-01-23 NOTE — Clinical Note
The DP pulses were 2+ bilaterally. The PT pulses were 2+ bilaterally. The brachial pulses were 2+ bilaterally.

## 2024-01-23 NOTE — ED PROVIDER NOTES
SCRIBE #1 NOTE: I, Coty Zazueta, am scribing for, and in the presence of, Riaz Ferrell Jr., MD. I have scribed the entire note.       History     Chief Complaint   Patient presents with    Bradycardia     Pt has had diarrhea and weakness x 2 days, had an episode of dizziness, HR in 30's, denies any sx now      Review of patient's allergies indicates:   Allergen Reactions    Sulfa (sulfonamide antibiotics) Rash         History of Present Illness     HPI    1/23/2024, 2:46 PM  History obtained from the patient and caregiver      History of Present Illness: Ramy Romo is a 78 y.o. male patient with a PMHx of CHF, HTN, A-fib, DM who presents to the Emergency Department for evaluation of a bradycardic status which onset today. The pt cargeiver states that the pt went to  this morning and was told to come to the ED because his HR was very low. She states that the pt was very dizzy when he was getting out of the car this morning, and that the neighbor had to help them, so that he would not fall. She also states that the pt hs been experiencing diarrhea and generalized weakness for the past 2 days. Symptoms are constant and moderate in severity. No mitigating or exacerbating factors reported. Patient denies any fever, chills, CP, SOB, headaches, n/v/d, numbness , and all other sxs at this time. Pt states that he is compliant with his home medication regiment. No further complaints or concerns at this time.       Arrival mode: EMS    PCP: Félix Rollins MD        Past Medical History:  Past Medical History:   Diagnosis Date    Atrial fibrillation     CHF (congestive heart failure)     Diabetes mellitus     Hypertension        Past Surgical History:  Past Surgical History:   Procedure Laterality Date    CHOLECYSTECTOMY      NECK SURGERY      TOTAL ELBOW ARTHROPLASTY Right 03/2019         Family History:  Family History   Problem Relation Age of Onset    Cataracts Father     Glaucoma Father        Social  History:  Social History     Tobacco Use    Smoking status: Never    Smokeless tobacco: Never   Substance and Sexual Activity    Alcohol use: No    Drug use: Not on file    Sexual activity: Not on file        Review of Systems     Review of Systems   Constitutional:  Negative for chills and fever.   HENT:  Negative for sore throat.    Respiratory:  Negative for cough and shortness of breath.    Cardiovascular:  Negative for chest pain.   Gastrointestinal:  Positive for diarrhea. Negative for abdominal pain, nausea and vomiting.   Genitourinary:  Negative for dysuria.   Musculoskeletal:  Negative for back pain.   Skin:  Negative for rash.   Neurological:  Positive for dizziness and weakness (generalized). Negative for numbness and headaches.   Hematological:  Does not bruise/bleed easily.   All other systems reviewed and are negative.     Physical Exam     Initial Vitals [01/23/24 1424]   BP Pulse Resp Temp SpO2   (!) 143/60 (!) 38 18 98.3 °F (36.8 °C) 98 %      MAP       --          Physical Exam  Nursing Notes and Vital Signs Reviewed.  Constitutional: Patient is in no acute distress. Well-developed and well-nourished.  Head: Atraumatic. Normocephalic.  Eyes: PERRL. EOM intact. Conjunctivae are not pale. No scleral icterus.  ENT: Mucous membranes are moist. Oropharynx is clear and symmetric.    Neck: Supple. Full ROM. No lymphadenopathy.  Cardiovascular: Bradycardic. Regular rhythm. No murmurs, rubs, or gallops. Distal pulses are 2+ and symmetric.  Pulmonary/Chest: No respiratory distress. Clear to auscultation bilaterally. No wheezing or rales.  Abdominal: Soft and non-distended.  There is no tenderness.  No rebound, guarding, or rigidity. Good bowel sounds.  Genitourinary: No CVA tenderness  Musculoskeletal: Moves all extremities. No obvious deformities. No edema. No calf tenderness.  Skin: Warm and dry.  Neurological:  Alert, awake, and appropriate.  Normal speech.  No acute focal neurological deficits are  appreciated.  Psychiatric: Normal affect. Good eye contact. Appropriate in content.     ED Course   Critical Care    Date/Time: 1/23/2024 4:23 PM    Performed by: Riaz Ferrell Jr., MD  Authorized by: Riaz Ferrell Jr., MD  Direct patient critical care time: 20 minutes  Additional history critical care time: 15 minutes  Ordering / reviewing critical care time: 15 minutes  Documentation critical care time: 15 minutes  Consulting other physicians critical care time: 10 minutes  Total critical care time (exclusive of procedural time) : 75 minutes  Critical care time was exclusive of separately billable procedures and treating other patients and teaching time.  Critical care was necessary to treat or prevent imminent or life-threatening deterioration of the following conditions: symptomatic bradycardia.  Critical care was time spent personally by me on the following activities: blood draw for specimens, development of treatment plan with patient or surrogate, discussions with consultants, interpretation of cardiac output measurements, evaluation of patient's response to treatment, examination of patient, obtaining history from patient or surrogate, ordering and performing treatments and interventions, ordering and review of laboratory studies, ordering and review of radiographic studies, pulse oximetry, re-evaluation of patient's condition and review of old charts.        ED Vital Signs:  Vitals:    01/23/24 1424   BP: (!) 143/60   Pulse: (!) 38   Resp: 18   Temp: 98.3 °F (36.8 °C)   TempSrc: Oral   SpO2: 98%       Abnormal Lab Results:  Labs Reviewed   CBC W/ AUTO DIFFERENTIAL - Abnormal; Notable for the following components:       Result Value    RBC 3.31 (*)     Hemoglobin 11.5 (*)     Hematocrit 34.4 (*)      (*)     MCH 34.7 (*)     All other components within normal limits   COMPREHENSIVE METABOLIC PANEL - Abnormal; Notable for the following components:    CO2 21 (*)     Creatinine 1.5 (*)     Total  Protein 5.7 (*)     Albumin 2.7 (*)     eGFR 48 (*)     All other components within normal limits   B-TYPE NATRIURETIC PEPTIDE - Abnormal; Notable for the following components:     (*)     All other components within normal limits   CK   TROPONIN I   URINALYSIS, REFLEX TO URINE CULTURE        All Lab Results:  Results for orders placed or performed during the hospital encounter of 01/23/24   CBC Auto Differential   Result Value Ref Range    WBC 5.86 3.90 - 12.70 K/uL    RBC 3.31 (L) 4.60 - 6.20 M/uL    Hemoglobin 11.5 (L) 14.0 - 18.0 g/dL    Hematocrit 34.4 (L) 40.0 - 54.0 %     (H) 82 - 98 fL    MCH 34.7 (H) 27.0 - 31.0 pg    MCHC 33.4 32.0 - 36.0 g/dL    RDW 14.2 11.5 - 14.5 %    Platelets 156 150 - 450 K/uL    MPV 9.8 9.2 - 12.9 fL    Immature Granulocytes 0.3 0.0 - 0.5 %    Gran # (ANC) 3.5 1.8 - 7.7 K/uL    Immature Grans (Abs) 0.02 0.00 - 0.04 K/uL    Lymph # 1.5 1.0 - 4.8 K/uL    Mono # 0.6 0.3 - 1.0 K/uL    Eos # 0.3 0.0 - 0.5 K/uL    Baso # 0.03 0.00 - 0.20 K/uL    nRBC 0 0 /100 WBC    Gran % 59.3 38.0 - 73.0 %    Lymph % 25.1 18.0 - 48.0 %    Mono % 10.4 4.0 - 15.0 %    Eosinophil % 4.4 0.0 - 8.0 %    Basophil % 0.5 0.0 - 1.9 %    Differential Method Automated    Comprehensive Metabolic Panel   Result Value Ref Range    Sodium 141 136 - 145 mmol/L    Potassium 4.0 3.5 - 5.1 mmol/L    Chloride 110 95 - 110 mmol/L    CO2 21 (L) 23 - 29 mmol/L    Glucose 90 70 - 110 mg/dL    BUN 23 8 - 23 mg/dL    Creatinine 1.5 (H) 0.5 - 1.4 mg/dL    Calcium 10.1 8.7 - 10.5 mg/dL    Total Protein 5.7 (L) 6.0 - 8.4 g/dL    Albumin 2.7 (L) 3.5 - 5.2 g/dL    Total Bilirubin 0.6 0.1 - 1.0 mg/dL    Alkaline Phosphatase 105 55 - 135 U/L    AST 24 10 - 40 U/L    ALT 18 10 - 44 U/L    eGFR 48 (A) >60 mL/min/1.73 m^2    Anion Gap 10 8 - 16 mmol/L   BNP   Result Value Ref Range     (H) 0 - 99 pg/mL   CK   Result Value Ref Range    CPK 34 20 - 200 U/L   Troponin I   Result Value Ref Range    Troponin I 0.010 0.000  - 0.026 ng/mL        Imaging Results:  Imaging Results              X-Ray Chest AP Portable (Final result)  Result time 01/23/24 16:15:15      Final result by Raina Green MD (01/23/24 16:15:15)                   Impression:      No acute cardiopulmonary abnormality.    Sclerotic focus within the proximal right humerus, partially visualized.      Electronically signed by: Raina Green  Date:    01/23/2024  Time:    16:15               Narrative:    EXAMINATION:  XR CHEST AP PORTABLE    CLINICAL HISTORY:  bradycardia;    TECHNIQUE:  Single frontal view of the chest was performed.    COMPARISON:  Chest radiograph 03/29/2018    FINDINGS:  ECG monitoring leads overlie the chest.  A loop recorder is present over the lower medial left hemithorax.  Surgical clips overlie the right axilla and upper right lung.There is bilateral interstitial coarsening, suspected chronic.  Degenerative change at the 1st costochondral junction somewhat obscures evaluation at the lung apices, particularly on the left.  Underlying pulmonary nodule in this region is difficult to exclude.  No significant pleural fluid.  No pneumothorax.  No large consolidation.    The cardiac silhouette is normal in size. There is aortic calcification.    There is degenerative change of the spine and right shoulder.  No acute osseous abnormality.  Sclerotic focus is partially visualized over the proximal right humerus.  Cholecystectomy clips are noted.                                       The EKG was ordered, reviewed, and independently interpreted by the ED provider.  Interpretation time: 14:53  Rate: 36 BPM  Rhythm:  Marked sinus bradycardia with marked sinus arrhythmia  Interpretation: RBBB. No STEMI.             The Emergency Provider reviewed the vital signs and test results, which are outlined above.     ED Discussion     3:40 PM: Discussed pt's case with Dr. Santacruz (Cardiology) who recommends stopping Metoprolol and placing patient in  observation.     4:36 PM: Discussed case with Dr. Devine ([facility]). Dr. Valladares agrees with current care and management of pt and accepts admission.   Admitting Service: [facility]  Admitting Physician: Dr. Valladarse  Admit to: Obs     4:43 PM: Re-evaluated pt. I have discussed test results, shared treatment plan, and the need for admission with patient and family at bedside. Pt and family express understanding at this time and agree with all information. All questions answered. Pt and family have no further questions or concerns at this time. Pt is ready for admit.     ED Course as of 01/23/24 1645   Tue Jan 23, 2024   1449 Rhythm strip reviewed. Sinus bradycardia, no stemi. 36bpm [LV]      ED Course User Index  [LV] Riaz Ferrell Jr., MD     Medical Decision Making  Amount and/or Complexity of Data Reviewed  Labs: ordered. Decision-making details documented in ED Course.  Radiology: ordered. Decision-making details documented in ED Course.  ECG/medicine tests: ordered and independent interpretation performed. Decision-making details documented in ED Course.    Risk  Prescription drug management.                ED Medication(s):  Medications   glucagon (human recombinant) injection 5 mg (5 mg Intravenous Given 1/23/24 1454)   atropine injection 1 mg (1 mg Intravenous Given 1/23/24 1454)       New Prescriptions    No medications on file               Scribe Attestation:   Scribe #1: I performed the above scribed service and the documentation accurately describes the services I performed. I attest to the accuracy of the note.     Attending:   Physician Attestation Statement for Scribe #1: I, Riaz Ferrell Jr., MD, personally performed the services described in this documentation, as scribed by Coty Zazueta, in my presence, and it is both accurate and complete.           Clinical Impression       ICD-10-CM ICD-9-CM   1. Bradycardia  R00.1 427.89       Disposition:   Disposition: Placed in  Observation  Condition: Fair

## 2024-01-23 NOTE — SUBJECTIVE & OBJECTIVE
Past Medical History:   Diagnosis Date    Atrial fibrillation     CHF (congestive heart failure)     Diabetes mellitus     Hypertension        Past Surgical History:   Procedure Laterality Date    CHOLECYSTECTOMY      NECK SURGERY      TOTAL ELBOW ARTHROPLASTY Right 03/2019       Review of patient's allergies indicates:   Allergen Reactions    Sulfa (sulfonamide antibiotics) Rash       No current facility-administered medications on file prior to encounter.     Current Outpatient Medications on File Prior to Encounter   Medication Sig    apixaban (ELIQUIS) 5 mg Tab Take 5 mg by mouth 2 (two) times daily.    ferrous gluconate (FERGON) 324 MG tablet Take 324 mg by mouth daily with breakfast.    fluticasone propionate (FLONASE) 50 mcg/actuation nasal spray 1 spray by Each Nostril route every morning.    furosemide (LASIX) 20 MG tablet Take 20 mg by mouth once daily.    lactulose (CHRONULAC) 10 gram/15 mL solution Take 30 g by mouth 2 (two) times daily.    metoprolol succinate (TOPROL-XL) 25 MG 24 hr tablet Take 25 mg by mouth once daily.    NOVOLOG FLEXPEN U-100 INSULIN 100 unit/mL (3 mL) InPn pen Inject into the skin as needed.    spironolactone (ALDACTONE) 25 MG tablet Take 25 mg by mouth once daily.    tamsulosin (FLOMAX) 0.4 mg Cap Take 0.4 mg by mouth once daily.    calcium-vitamin D 250-100 mg-unit per tablet Take 1 tablet by mouth once daily.    [DISCONTINUED] aspirin (ECOTRIN) 81 MG EC tablet Take 81 mg by mouth once daily.    [DISCONTINUED] azelastine (ASTELIN) 137 mcg (0.1 %) nasal spray 1 spray (137 mcg total) by Nasal route 2 (two) times daily.    [DISCONTINUED] blood sugar diagnostic Strp 1 boxes by Misc.(Non-Drug; Combo Route) route 2 (two) times daily.    [DISCONTINUED] blood-glucose meter Misc Use to check blood glucose    [DISCONTINUED] bumetanide (BUMEX) 2 MG tablet Take 2 mg by mouth once daily.    [DISCONTINUED] gabapentin (NEURONTIN) 300 MG capsule Take 1 capsule (300 mg total) by mouth every  evening.    [DISCONTINUED] glipiZIDE (GLUCOTROL) 5 MG tablet Take 1 tablet (5 mg total) by mouth 2 (two) times daily before meals.    [DISCONTINUED] hydrocodone-acetaminophen 7.5-325mg (NORCO) 7.5-325 mg per tablet Take 1 tablet by mouth.    [DISCONTINUED] lancets Misc bib    [DISCONTINUED] levETIRAcetam (KEPPRA) 500 MG Tab Take 500 mg by mouth 2 (two) times daily.    [DISCONTINUED] metoprolol succinate (TOPROL-XL) 50 MG 24 hr tablet Take 50 mg by mouth once daily.    [DISCONTINUED] pantoprazole (PROTONIX) 40 MG tablet Take 1 tablet (40 mg total) by mouth once daily.    [DISCONTINUED] potassium chloride SA (K-DUR,KLOR-CON) 20 MEQ tablet Take 1 tablet by mouth once daily.    [DISCONTINUED] prednisoLONE acetate (PRED FORTE) 1 % DrpS Place 1 drop into the right eye 4 (four) times daily.    [DISCONTINUED] prednisoLONE acetate (PRED FORTE) 1 % DrpS Place 1 drop into the left eye 4 (four) times daily.     Family History       Problem Relation (Age of Onset)    Cataracts Father    Glaucoma Father          Tobacco Use    Smoking status: Never    Smokeless tobacco: Never   Substance and Sexual Activity    Alcohol use: No    Drug use: Not on file    Sexual activity: Not on file     Review of Systems      Constitutional:  Negative for chills and fever.   HENT:  Negative for sore throat.    Respiratory:  Negative for cough and shortness of breath.    Cardiovascular:  Negative for chest pain.   Gastrointestinal:  Positive for diarrhea. Negative for abdominal pain, nausea and vomiting.   Genitourinary:  Negative for dysuria.   Musculoskeletal:  Negative for back pain.   Skin:  Negative for rash.   Neurological:  Positive for dizziness and weakness (generalized). Negative for numbness and headaches.   Hematological:  Does not bruise/bleed easily.     Objective:     Vital Signs (Most Recent):  Temp: 98.3 °F (36.8 °C) (01/23/24 1424)  Pulse: (!) 39 (01/23/24 1700)  Resp: (!) 27 (01/23/24 1700)  BP: (!) 168/72 (01/23/24 1700)  SpO2:  100 % (01/23/24 1700) Vital Signs (24h Range):  Temp:  [98.3 °F (36.8 °C)] 98.3 °F (36.8 °C)  Pulse:  [36-39] 39  Resp:  [18-27] 27  SpO2:  [98 %-100 %] 100 %  BP: (138-168)/(60-72) 168/72        There is no height or weight on file to calculate BMI.     Physical Exam      Constitutional: Patient is in no acute distress. Well-developed and well-nourished.  Head: Atraumatic. Normocephalic.  Eyes: PERRL. EOM intact. Conjunctivae are not pale. No scleral icterus.  ENT: Mucous membranes are moist. Oropharynx is clear and symmetric.    Neck: Supple. Full ROM. No lymphadenopathy.  Cardiovascular: Bradycardic. Regular rhythm. No murmurs, rubs, or gallops. Distal pulses are 2+ and symmetric.  Pulmonary/Chest: No respiratory distress. Clear to auscultation bilaterally. No wheezing or rales.  Abdominal: Soft and non-distended.  There is no tenderness.  No rebound, guarding, or rigidity. Good bowel sounds.  Genitourinary: No CVA tenderness  Musculoskeletal: Moves all extremities. No obvious deformities. No edema. No calf tenderness.  Skin: Warm and dry.  Neurological:  Alert, awake, and appropriate.  Normal speech.  No acute focal neurological deficits are appreciated.  Psychiatric: Normal affect. Good eye contact. Appropriate in content.     Significant Labs: All pertinent labs within the past 24 hours have been reviewed.  CBC:   Recent Labs   Lab 01/23/24  1452   WBC 5.86   HGB 11.5*   HCT 34.4*        CMP:   Recent Labs   Lab 01/23/24  1452      K 4.0      CO2 21*   GLU 90   BUN 23   CREATININE 1.5*   CALCIUM 10.1   PROT 5.7*   ALBUMIN 2.7*   BILITOT 0.6   ALKPHOS 105   AST 24   ALT 18   ANIONGAP 10       Significant Imaging:     Imaging Results              X-Ray Chest AP Portable (Final result)  Result time 01/23/24 16:15:15      Final result by Raina Green MD (01/23/24 16:15:15)                   Impression:      No acute cardiopulmonary abnormality.    Sclerotic focus within the proximal  right humerus, partially visualized.      Electronically signed by: Raina Green  Date:    01/23/2024  Time:    16:15               Narrative:    EXAMINATION:  XR CHEST AP PORTABLE    CLINICAL HISTORY:  bradycardia;    TECHNIQUE:  Single frontal view of the chest was performed.    COMPARISON:  Chest radiograph 03/29/2018    FINDINGS:  ECG monitoring leads overlie the chest.  A loop recorder is present over the lower medial left hemithorax.  Surgical clips overlie the right axilla and upper right lung.There is bilateral interstitial coarsening, suspected chronic.  Degenerative change at the 1st costochondral junction somewhat obscures evaluation at the lung apices, particularly on the left.  Underlying pulmonary nodule in this region is difficult to exclude.  No significant pleural fluid.  No pneumothorax.  No large consolidation.    The cardiac silhouette is normal in size. There is aortic calcification.    There is degenerative change of the spine and right shoulder.  No acute osseous abnormality.  Sclerotic focus is partially visualized over the proximal right humerus.  Cholecystectomy clips are noted.

## 2024-01-23 NOTE — Clinical Note
The lead was inserted under fluorscopic guidance and thresholds were tested. The lead was inserted in the other location.  left bundle

## 2024-01-23 NOTE — Clinical Note
The ECG showed paced rhythm. The patient has a temporary pacemaker. The pacemaker rate was 60 bpm. The pacemaker output was 0 mA. The pacemaker output was 5 mV.LBBB

## 2024-01-23 NOTE — Clinical Note
25 ml of contrast were injected throughout the case. 5 mL of contrast was the total wasted during the case. 30 mL was the total amount used during the case.

## 2024-01-23 NOTE — Clinical Note
The transvenous pacing lead was secured in place in the RV and was inserted into the RV. The pacer was paced at 60 BPM 5 mA 0.1 mV.

## 2024-01-23 NOTE — HPI
Ramy Romo is a 78 y.o. male patient with a PMHx of CHF, HTN, A-fib, DM, cirrhosis, history of stroke who presents to the Emergency Department for evaluation of a bradycardic status which onset today. The pt ashwini states that the pt went to  this morning and was told to come to the ED because his HR was very low. She states that the pt was very dizzy when he was getting out of the car this morning, and that the neighbor had to help them, so that he would not fall. She also states that the pt hs been experiencing diarrhea and generalized weakness for the past 2 days. Symptoms are constant and moderate in severity. No mitigating or exacerbating factors reported. Patient denies any fever, chills, CP, SOB, headaches, n/v/d, numbness , and all other sxs at this time. Pt states that he is compliant with his home medication regiment. No further complaints or concerns at this time.   Stated having diarrhea for past 2 days, stated improvement in diarrhea today.      In ED, patient glucagon 5mg and atropine 1mg x 2. hr still in the 30's. pt is aao x 3 answering questions appropriately. trop: 0.01, wbc: 5.9, hb: 11.5, cr: 1.5, cxr: naf.  ED provided discussed case with dr agudelo and he recommends hold metoprolol and close monitoring.  Hospital medicine consulted for admission for symptomatic bradycardia.    At baseline, patient ambulates with cane/ wheelchair in home   Code status- alert and oriented x3 during the discussion, opted for full code

## 2024-01-23 NOTE — Clinical Note
The ECG showed paced rhythm. The patient has a temporary pacemaker. The pacemaker rate was 60 bpm. The pacemaker output was 0 mA. The pacemaker output was 5 mV.VVI

## 2024-01-23 NOTE — ED PROVIDER NOTES
SCRIBE #1 NOTE: IShaun am scribing for, and in the presence of, Scott Ramirez MD. I have scribed the HPI, ROS, and PEx.     SCRIBE #2 NOTE: I, Coty Zazueta, nataliia scribing for, and in the presence of,  Riaz Ferrell Jr., MD. I have scribed the remaining portions of the note not scribed by Scribe #1.     History      Chief Complaint   Patient presents with    Bradycardia     Pt has had diarrhea and weakness x 2 days, had an episode of dizziness, HR in 30's, denies any sx now        Review of patient's allergies indicates:   Allergen Reactions    Seroquel [quetiapine] Hallucinations     Per son patient had adverse reaction, became agitated and combating     Sulfa (sulfonamide antibiotics) Rash        HPI   HPI    1/23/2024, 2:26 PM   History obtained from the patient and EMS      History of Present Illness: Ramy Romo is a 78 y.o. male patient who presents to the Emergency Department for generalized weakness, onset 2 days PTA. EMS reports that the pt was bradycardic at the scene, with a HR in the 30s. Symptoms are constant and moderate in severity. No mitigating or exacerbating factors reported. Associated sxs include diarrhea and dizziness. Patient denies any fever, chills, n/v, SOB, CP, numbness, and all other sxs at this time. No prior Tx reported. No further complaints or concerns at this time.     Arrival mode: EMS     PCP: Félix Rollins MD       Past Medical History:  Past Medical History:   Diagnosis Date    Atrial fibrillation     CHF (congestive heart failure)     Diabetes mellitus     Hypertension        Past Surgical History:  Past Surgical History:   Procedure Laterality Date    A-V CARDIAC PACEMAKER INSERTION Left 1/25/2024    Procedure: INSERTION, CARDIAC PACEMAKER, DUAL CHAMBER/poss single lead vs His lead;  Surgeon: Anthony Coronado MD;  Location: [facility] CATH LAB;  Service: Cardiology;  Laterality: Left;  Bio/LBBB pacing    CHOLECYSTECTOMY      INSERTION, PACEMAKER,  TEMPORARY TRANSVENOUS N/A 1/24/2024    Procedure: Insertion, Pacemaker, Temporary Transvenous;  Surgeon: Renaldo Santacruz MD;  Location: [facility] CATH LAB;  Service: Cardiology;  Laterality: N/A;    NECK SURGERY      TOTAL ELBOW ARTHROPLASTY Right 03/2019         Family History:  Family History   Problem Relation Age of Onset    Cataracts Father     Glaucoma Father        Social History:  Social History     Tobacco Use    Smoking status: Never    Smokeless tobacco: Never   Substance and Sexual Activity    Alcohol use: No    Drug use: Not on file    Sexual activity: Not on file       ROS   Review of Systems   Constitutional:  Negative for chills and fever.   HENT:  Negative for sore throat.    Respiratory:  Negative for shortness of breath.    Cardiovascular:  Negative for chest pain.   Gastrointestinal:  Positive for diarrhea. Negative for nausea and vomiting.   Genitourinary:  Negative for dysuria.   Musculoskeletal:  Negative for back pain.   Skin:  Negative for rash.   Neurological:  Positive for dizziness and weakness (generalized). Negative for numbness and headaches.   Hematological:  Does not bruise/bleed easily.   All other systems reviewed and are negative.    Physical Exam      Initial Vitals [01/23/24 1424]   BP Pulse Resp Temp SpO2   (!) 143/60 (!) 38 18 98.3 °F (36.8 °C) 98 %      MAP       --          Physical Exam  Nursing Notes and Vital Signs Reviewed.  Constitutional: Patient is in no acute distress. Well-developed and well-nourished.  Head: Atraumatic. Normocephalic.  Eyes: PERRL. EOM intact. Conjunctivae are not pale. No scleral icterus.  ENT: Mucous membranes are moist. Oropharynx is clear and symmetric.    Neck: Supple. Full ROM.   Cardiovascular: Bradycardic. Regular rhythm. No murmurs, rubs, or gallops. Distal pulses are 2+ and symmetric.  Pulmonary/Chest: No respiratory distress. Clear to auscultation bilaterally. No wheezing or rales.  Abdominal: Soft and non-distended.  There is no  tenderness.  No rebound, guarding, or rigidity.   Musculoskeletal: Moves all extremities. No obvious deformities. No edema.  Skin: Warm and dry.  Neurological:  Alert, awake, and appropriate.  Normal speech.  No acute focal neurological deficits are appreciated.  Psychiatric: Normal affect. Good eye contact. Appropriate in content.    ED Course    Procedures  ED Vital Signs:  Vitals:    01/28/24 2005 01/28/24 2334 01/28/24 2338 01/29/24 0400   BP: (!) 180/74 (!) 167/72     Pulse: 60 60  (!) 58   Resp: 18 18 18    Temp: 98.5 °F (36.9 °C) 98.2 °F (36.8 °C)     TempSrc: Oral Oral     SpO2: 98% 97%     Weight:       Height:        01/29/24 0448 01/29/24 0716 01/29/24 0800 01/29/24 0903   BP: (!) 179/66 (!) 170/73     Pulse: 60 60 (!) 58 60   Resp: 19 18     Temp: 98 °F (36.7 °C) 97.7 °F (36.5 °C)     TempSrc: Oral Oral     SpO2: 97% 97%     Weight:       Height:        01/29/24 1119 01/29/24 1334 01/29/24 1500 01/29/24 1526   BP: (!) 145/65   (!) 195/83   Pulse: 60 62 (!) 58 61   Resp: 17   17   Temp: 98 °F (36.7 °C)   98.2 °F (36.8 °C)   TempSrc:    Oral   SpO2: 97%   98%   Weight:       Height:        01/29/24 1722 01/29/24 1948 01/29/24 2024   BP:  (!) 179/79    Pulse: (!) 59 60 (!) 59   Resp:  18    Temp:  98.5 °F (36.9 °C)    TempSrc:  Oral    SpO2:  98%    Weight:      Height:          Abnormal Lab Results:  Labs Reviewed   CBC W/ AUTO DIFFERENTIAL - Abnormal; Notable for the following components:       Result Value    RBC 3.31 (*)     Hemoglobin 11.5 (*)     Hematocrit 34.4 (*)      (*)     MCH 34.7 (*)     All other components within normal limits   COMPREHENSIVE METABOLIC PANEL - Abnormal; Notable for the following components:    CO2 21 (*)     Creatinine 1.5 (*)     Total Protein 5.7 (*)     Albumin 2.7 (*)     eGFR 48 (*)     All other components within normal limits   B-TYPE NATRIURETIC PEPTIDE - Abnormal; Notable for the following components:     (*)     All other components within normal  limits   CK   TROPONIN I   POCT GLUCOSE   POCT GLUCOSE MONITORING CONTINUOUS        All Lab Results:  Results for orders placed or performed during the hospital encounter of 01/23/24   Influenza A & B by Molecular    Specimen: Nasopharyngeal Swab   Result Value Ref Range    Influenza A, Molecular Negative Negative    Influenza B, Molecular Negative Negative    Flu A & B Source Nasal swab    Urine culture    Specimen: Clean Catch; Urine   Result Value Ref Range    Urine Culture, Routine KLEBSIELLA AEROGENES  >100,000 cfu/ml   (A)        Susceptibility    KLEBSIELLA AEROGENES - CULTURE, URINE     Amikacin <=16 Sensitive mcg/mL     Ceftriaxone 32 Resistant mcg/mL     Ciprofloxacin 2 Intermediate mcg/mL     Cefepime 4 Sensitive mcg/mL     Ertapenem* 1 Intermediate mcg/mL      * Not Detected, IMP, KPC,VIM,NDM or OXA-48 genes     Nitrofurantoin >64 Resistant mcg/mL     Gentamicin >8 Resistant mcg/mL     Levofloxacin <=2 Sensitive mcg/mL     Piperacillin/Tazo 64 Intermediate mcg/mL     Trimeth/Sulfa <=2/38 Sensitive mcg/mL     Tobramycin >8 Resistant mcg/mL   CBC Auto Differential   Result Value Ref Range    WBC 5.86 3.90 - 12.70 K/uL    RBC 3.31 (L) 4.60 - 6.20 M/uL    Hemoglobin 11.5 (L) 14.0 - 18.0 g/dL    Hematocrit 34.4 (L) 40.0 - 54.0 %     (H) 82 - 98 fL    MCH 34.7 (H) 27.0 - 31.0 pg    MCHC 33.4 32.0 - 36.0 g/dL    RDW 14.2 11.5 - 14.5 %    Platelets 156 150 - 450 K/uL    MPV 9.8 9.2 - 12.9 fL    Immature Granulocytes 0.3 0.0 - 0.5 %    Gran # (ANC) 3.5 1.8 - 7.7 K/uL    Immature Grans (Abs) 0.02 0.00 - 0.04 K/uL    Lymph # 1.5 1.0 - 4.8 K/uL    Mono # 0.6 0.3 - 1.0 K/uL    Eos # 0.3 0.0 - 0.5 K/uL    Baso # 0.03 0.00 - 0.20 K/uL    nRBC 0 0 /100 WBC    Gran % 59.3 38.0 - 73.0 %    Lymph % 25.1 18.0 - 48.0 %    Mono % 10.4 4.0 - 15.0 %    Eosinophil % 4.4 0.0 - 8.0 %    Basophil % 0.5 0.0 - 1.9 %    Differential Method Automated    Comprehensive Metabolic Panel   Result Value Ref Range    Sodium 141 136 -  145 mmol/L    Potassium 4.0 3.5 - 5.1 mmol/L    Chloride 110 95 - 110 mmol/L    CO2 21 (L) 23 - 29 mmol/L    Glucose 90 70 - 110 mg/dL    BUN 23 8 - 23 mg/dL    Creatinine 1.5 (H) 0.5 - 1.4 mg/dL    Calcium 10.1 8.7 - 10.5 mg/dL    Total Protein 5.7 (L) 6.0 - 8.4 g/dL    Albumin 2.7 (L) 3.5 - 5.2 g/dL    Total Bilirubin 0.6 0.1 - 1.0 mg/dL    Alkaline Phosphatase 105 55 - 135 U/L    AST 24 10 - 40 U/L    ALT 18 10 - 44 U/L    eGFR 48 (A) >60 mL/min/1.73 m^2    Anion Gap 10 8 - 16 mmol/L   BNP   Result Value Ref Range     (H) 0 - 99 pg/mL   CK   Result Value Ref Range    CPK 34 20 - 200 U/L   Troponin I   Result Value Ref Range    Troponin I 0.010 0.000 - 0.026 ng/mL   Urinalysis, Reflex to Urine Culture Urine, Clean Catch    Specimen: Urine   Result Value Ref Range    Specimen UA Urine, Clean Catch     Color, UA Yellow Yellow, Straw, Naila    Appearance, UA Hazy (A) Clear    pH, UA 6.0 5.0 - 8.0    Specific Gravity, UA 1.015 1.005 - 1.030    Protein, UA Negative Negative    Glucose, UA Negative Negative    Ketones, UA 1+ (A) Negative    Bilirubin (UA) Negative Negative    Occult Blood UA Negative Negative    Nitrite, UA Negative Negative    Urobilinogen, UA 2.0-3.0 (A) <2.0 EU/dL    Leukocytes, UA 3+ (A) Negative   COVID-19 Rapid Screening   Result Value Ref Range    SARS-CoV-2 RNA, Amplification, Qual Negative Negative   Urinalysis Microscopic   Result Value Ref Range    RBC, UA 6 (H) 0 - 4 /hpf    WBC, UA 83 (H) 0 - 5 /hpf    WBC Clumps, UA Many (A) None-Rare    Bacteria Occasional None-Occ /hpf    Hyaline Casts, UA 24 (A) 0-1/lpf /lpf    Microscopic Comment SEE COMMENT    CBC Auto Differential   Result Value Ref Range    WBC 8.16 3.90 - 12.70 K/uL    RBC 3.32 (L) 4.60 - 6.20 M/uL    Hemoglobin 11.3 (L) 14.0 - 18.0 g/dL    Hematocrit 34.2 (L) 40.0 - 54.0 %     (H) 82 - 98 fL    MCH 34.0 (H) 27.0 - 31.0 pg    MCHC 33.0 32.0 - 36.0 g/dL    RDW 14.1 11.5 - 14.5 %    Platelets 173 150 - 450 K/uL    MPV  9.8 9.2 - 12.9 fL    Immature Granulocytes 0.2 0.0 - 0.5 %    Gran # (ANC) 5.1 1.8 - 7.7 K/uL    Immature Grans (Abs) 0.02 0.00 - 0.04 K/uL    Lymph # 2.0 1.0 - 4.8 K/uL    Mono # 0.8 0.3 - 1.0 K/uL    Eos # 0.3 0.0 - 0.5 K/uL    Baso # 0.03 0.00 - 0.20 K/uL    nRBC 0 0 /100 WBC    Gran % 62.5 38.0 - 73.0 %    Lymph % 24.5 18.0 - 48.0 %    Mono % 9.2 4.0 - 15.0 %    Eosinophil % 3.2 0.0 - 8.0 %    Basophil % 0.4 0.0 - 1.9 %    Differential Method Automated    Basic Metabolic Panel   Result Value Ref Range    Sodium 140 136 - 145 mmol/L    Potassium 4.1 3.5 - 5.1 mmol/L    Chloride 110 95 - 110 mmol/L    CO2 23 23 - 29 mmol/L    Glucose 129 (H) 70 - 110 mg/dL    BUN 27 (H) 8 - 23 mg/dL    Creatinine 1.4 0.5 - 1.4 mg/dL    Calcium 10.1 8.7 - 10.5 mg/dL    Anion Gap 7 (L) 8 - 16 mmol/L    eGFR 52 (A) >60 mL/min/1.73 m^2   Magnesium   Result Value Ref Range    Magnesium 1.5 (L) 1.6 - 2.6 mg/dL   Phosphorus   Result Value Ref Range    Phosphorus 3.3 2.7 - 4.5 mg/dL   TSH   Result Value Ref Range    TSH 1.888 0.400 - 4.000 uIU/mL   T4, Free   Result Value Ref Range    Free T4 1.07 0.71 - 1.51 ng/dL   Lactic Acid, Plasma   Result Value Ref Range    Lactate (Lactic Acid) 2.0 0.5 - 2.2 mmol/L   Troponin I   Result Value Ref Range    Troponin I 0.030 (H) 0.000 - 0.026 ng/mL   Ammonia   Result Value Ref Range    Ammonia 50 10 - 50 umol/L   Protime-INR   Result Value Ref Range    Prothrombin Time 13.8 (H) 9.0 - 12.5 sec    INR 1.3 (H) 0.8 - 1.2   Folate   Result Value Ref Range    Folate 12.2 4.0 - 24.0 ng/mL   Vitamin B12   Result Value Ref Range    Vitamin B-12 >2000 (H) 210 - 950 pg/mL   CBC Auto Differential   Result Value Ref Range    WBC 6.45 3.90 - 12.70 K/uL    RBC 3.15 (L) 4.60 - 6.20 M/uL    Hemoglobin 11.1 (L) 14.0 - 18.0 g/dL    Hematocrit 32.2 (L) 40.0 - 54.0 %     (H) 82 - 98 fL    MCH 35.2 (H) 27.0 - 31.0 pg    MCHC 34.5 32.0 - 36.0 g/dL    RDW 14.2 11.5 - 14.5 %    Platelets 138 (L) 150 - 450 K/uL    MPV  9.6 9.2 - 12.9 fL    Immature Granulocytes 0.3 0.0 - 0.5 %    Gran # (ANC) 4.3 1.8 - 7.7 K/uL    Immature Grans (Abs) 0.02 0.00 - 0.04 K/uL    Lymph # 1.2 1.0 - 4.8 K/uL    Mono # 0.6 0.3 - 1.0 K/uL    Eos # 0.3 0.0 - 0.5 K/uL    Baso # 0.02 0.00 - 0.20 K/uL    nRBC 0 0 /100 WBC    Gran % 67.0 38.0 - 73.0 %    Lymph % 18.9 18.0 - 48.0 %    Mono % 9.6 4.0 - 15.0 %    Eosinophil % 3.9 0.0 - 8.0 %    Basophil % 0.3 0.0 - 1.9 %    Differential Method Automated    Basic Metabolic Panel   Result Value Ref Range    Sodium 139 136 - 145 mmol/L    Potassium 5.0 3.5 - 5.1 mmol/L    Chloride 112 (H) 95 - 110 mmol/L    CO2 20 (L) 23 - 29 mmol/L    Glucose 144 (H) 70 - 110 mg/dL    BUN 32 (H) 8 - 23 mg/dL    Creatinine 1.5 (H) 0.5 - 1.4 mg/dL    Calcium 9.0 8.7 - 10.5 mg/dL    Anion Gap 7 (L) 8 - 16 mmol/L    eGFR 48 (A) >60 mL/min/1.73 m^2   Magnesium   Result Value Ref Range    Magnesium 1.7 1.6 - 2.6 mg/dL   Phosphorus   Result Value Ref Range    Phosphorus 3.1 2.7 - 4.5 mg/dL   CBC Auto Differential   Result Value Ref Range    WBC 6.45 3.90 - 12.70 K/uL    RBC 3.15 (L) 4.60 - 6.20 M/uL    Hemoglobin 11.1 (L) 14.0 - 18.0 g/dL    Hematocrit 32.2 (L) 40.0 - 54.0 %     (H) 82 - 98 fL    MCH 35.2 (H) 27.0 - 31.0 pg    MCHC 34.5 32.0 - 36.0 g/dL    RDW 14.2 11.5 - 14.5 %    Platelets 138 (L) 150 - 450 K/uL    MPV 9.6 9.2 - 12.9 fL    Immature Granulocytes 0.3 0.0 - 0.5 %    Gran # (ANC) 4.3 1.8 - 7.7 K/uL    Immature Grans (Abs) 0.02 0.00 - 0.04 K/uL    Lymph # 1.2 1.0 - 4.8 K/uL    Mono # 0.6 0.3 - 1.0 K/uL    Eos # 0.3 0.0 - 0.5 K/uL    Baso # 0.02 0.00 - 0.20 K/uL    nRBC 0 0 /100 WBC    Gran % 67.0 38.0 - 73.0 %    Lymph % 18.9 18.0 - 48.0 %    Mono % 9.6 4.0 - 15.0 %    Eosinophil % 3.9 0.0 - 8.0 %    Basophil % 0.3 0.0 - 1.9 %    Differential Method Automated    Comprehensive metabolic panel   Result Value Ref Range    Sodium 139 136 - 145 mmol/L    Potassium 5.0 3.5 - 5.1 mmol/L    Chloride 112 (H) 95 - 110 mmol/L     CO2 20 (L) 23 - 29 mmol/L    Glucose 144 (H) 70 - 110 mg/dL    BUN 32 (H) 8 - 23 mg/dL    Creatinine 1.5 (H) 0.5 - 1.4 mg/dL    Calcium 9.0 8.7 - 10.5 mg/dL    Total Protein 5.3 (L) 6.0 - 8.4 g/dL    Albumin 2.5 (L) 3.5 - 5.2 g/dL    Total Bilirubin 0.8 0.1 - 1.0 mg/dL    Alkaline Phosphatase 92 55 - 135 U/L    AST 20 10 - 40 U/L    ALT 13 10 - 44 U/L    eGFR 48 (A) >60 mL/min/1.73 m^2    Anion Gap 7 (L) 8 - 16 mmol/L   Magnesium   Result Value Ref Range    Magnesium 1.7 1.6 - 2.6 mg/dL   Phosphorus   Result Value Ref Range    Phosphorus 3.1 2.7 - 4.5 mg/dL   Ammonia   Result Value Ref Range    Ammonia 101 (H) 10 - 50 umol/L   Protime-INR   Result Value Ref Range    Prothrombin Time 14.2 (H) 9.0 - 12.5 sec    INR 1.4 (H) 0.8 - 1.2   CBC Auto Differential   Result Value Ref Range    WBC 7.07 3.90 - 12.70 K/uL    RBC 3.42 (L) 4.60 - 6.20 M/uL    Hemoglobin 11.7 (L) 14.0 - 18.0 g/dL    Hematocrit 35.0 (L) 40.0 - 54.0 %     (H) 82 - 98 fL    MCH 34.2 (H) 27.0 - 31.0 pg    MCHC 33.4 32.0 - 36.0 g/dL    RDW 13.8 11.5 - 14.5 %    Platelets 148 (L) 150 - 450 K/uL    MPV 9.7 9.2 - 12.9 fL    Immature Granulocytes 0.4 0.0 - 0.5 %    Gran # (ANC) 6.0 1.8 - 7.7 K/uL    Immature Grans (Abs) 0.03 0.00 - 0.04 K/uL    Lymph # 0.6 (L) 1.0 - 4.8 K/uL    Mono # 0.5 0.3 - 1.0 K/uL    Eos # 0.0 0.0 - 0.5 K/uL    Baso # 0.01 0.00 - 0.20 K/uL    nRBC 0 0 /100 WBC    Gran % 84.6 (H) 38.0 - 73.0 %    Lymph % 7.9 (L) 18.0 - 48.0 %    Mono % 6.4 4.0 - 15.0 %    Eosinophil % 0.6 0.0 - 8.0 %    Basophil % 0.1 0.0 - 1.9 %    Differential Method Automated    Basic Metabolic Panel   Result Value Ref Range    Sodium 138 136 - 145 mmol/L    Potassium 4.6 3.5 - 5.1 mmol/L    Chloride 112 (H) 95 - 110 mmol/L    CO2 20 (L) 23 - 29 mmol/L    Glucose 245 (H) 70 - 110 mg/dL    BUN 35 (H) 8 - 23 mg/dL    Creatinine 1.3 0.5 - 1.4 mg/dL    Calcium 9.0 8.7 - 10.5 mg/dL    Anion Gap 6 (L) 8 - 16 mmol/L    eGFR 57 (A) >60 mL/min/1.73 m^2   Magnesium    Result Value Ref Range    Magnesium 1.7 1.6 - 2.6 mg/dL   Phosphorus   Result Value Ref Range    Phosphorus 3.7 2.7 - 4.5 mg/dL   Ammonia   Result Value Ref Range    Ammonia 65 (H) 10 - 50 umol/L   Hemoglobin A1c if not done in past 3 months   Result Value Ref Range    Hemoglobin A1C 5.2 4.0 - 5.6 %    Estimated Avg Glucose 103 68 - 131 mg/dL   CBC Auto Differential   Result Value Ref Range    WBC 7.04 3.90 - 12.70 K/uL    RBC 3.37 (L) 4.60 - 6.20 M/uL    Hemoglobin 11.5 (L) 14.0 - 18.0 g/dL    Hematocrit 34.0 (L) 40.0 - 54.0 %     (H) 82 - 98 fL    MCH 34.1 (H) 27.0 - 31.0 pg    MCHC 33.8 32.0 - 36.0 g/dL    RDW 13.7 11.5 - 14.5 %    Platelets 143 (L) 150 - 450 K/uL    MPV 10.0 9.2 - 12.9 fL    Immature Granulocytes 0.3 0.0 - 0.5 %    Gran # (ANC) 5.0 1.8 - 7.7 K/uL    Immature Grans (Abs) 0.02 0.00 - 0.04 K/uL    Lymph # 0.9 (L) 1.0 - 4.8 K/uL    Mono # 0.7 0.3 - 1.0 K/uL    Eos # 0.3 0.0 - 0.5 K/uL    Baso # 0.02 0.00 - 0.20 K/uL    nRBC 0 0 /100 WBC    Gran % 71.3 38.0 - 73.0 %    Lymph % 13.4 (L) 18.0 - 48.0 %    Mono % 10.4 4.0 - 15.0 %    Eosinophil % 4.3 0.0 - 8.0 %    Basophil % 0.3 0.0 - 1.9 %    Differential Method Automated    Basic Metabolic Panel   Result Value Ref Range    Sodium 141 136 - 145 mmol/L    Potassium 4.1 3.5 - 5.1 mmol/L    Chloride 114 (H) 95 - 110 mmol/L    CO2 16 (L) 23 - 29 mmol/L    Glucose 161 (H) 70 - 110 mg/dL    BUN 30 (H) 8 - 23 mg/dL    Creatinine 1.3 0.5 - 1.4 mg/dL    Calcium 8.9 8.7 - 10.5 mg/dL    Anion Gap 11 8 - 16 mmol/L    eGFR 57 (A) >60 mL/min/1.73 m^2   Magnesium   Result Value Ref Range    Magnesium 1.6 1.6 - 2.6 mg/dL   Phosphorus   Result Value Ref Range    Phosphorus 2.8 2.7 - 4.5 mg/dL   CBC Auto Differential   Result Value Ref Range    WBC 8.79 3.90 - 12.70 K/uL    RBC 3.27 (L) 4.60 - 6.20 M/uL    Hemoglobin 11.2 (L) 14.0 - 18.0 g/dL    Hematocrit 33.7 (L) 40.0 - 54.0 %     (H) 82 - 98 fL    MCH 34.3 (H) 27.0 - 31.0 pg    MCHC 33.2 32.0 - 36.0  g/dL    RDW 14.3 11.5 - 14.5 %    Platelets 161 150 - 450 K/uL    MPV 9.3 9.2 - 12.9 fL    Immature Granulocytes 0.5 0.0 - 0.5 %    Gran # (ANC) 5.8 1.8 - 7.7 K/uL    Immature Grans (Abs) 0.04 0.00 - 0.04 K/uL    Lymph # 1.6 1.0 - 4.8 K/uL    Mono # 1.1 (H) 0.3 - 1.0 K/uL    Eos # 0.2 0.0 - 0.5 K/uL    Baso # 0.02 0.00 - 0.20 K/uL    nRBC 0 0 /100 WBC    Gran % 66.1 38.0 - 73.0 %    Lymph % 18.4 18.0 - 48.0 %    Mono % 12.1 4.0 - 15.0 %    Eosinophil % 2.7 0.0 - 8.0 %    Basophil % 0.2 0.0 - 1.9 %    Differential Method Automated    Basic Metabolic Panel   Result Value Ref Range    Sodium 141 136 - 145 mmol/L    Potassium 3.6 3.5 - 5.1 mmol/L    Chloride 113 (H) 95 - 110 mmol/L    CO2 15 (L) 23 - 29 mmol/L    Glucose 135 (H) 70 - 110 mg/dL    BUN 28 (H) 8 - 23 mg/dL    Creatinine 1.3 0.5 - 1.4 mg/dL    Calcium 8.8 8.7 - 10.5 mg/dL    Anion Gap 13 8 - 16 mmol/L    eGFR 57 (A) >60 mL/min/1.73 m^2   Magnesium   Result Value Ref Range    Magnesium 1.6 1.6 - 2.6 mg/dL   Phosphorus   Result Value Ref Range    Phosphorus 2.6 (L) 2.7 - 4.5 mg/dL   CBC auto differential   Result Value Ref Range    WBC 7.51 3.90 - 12.70 K/uL    RBC 3.38 (L) 4.60 - 6.20 M/uL    Hemoglobin 11.6 (L) 14.0 - 18.0 g/dL    Hematocrit 35.0 (L) 40.0 - 54.0 %     (H) 82 - 98 fL    MCH 34.3 (H) 27.0 - 31.0 pg    MCHC 33.1 32.0 - 36.0 g/dL    RDW 14.3 11.5 - 14.5 %    Platelets 116 (L) 150 - 450 K/uL    MPV 10.3 9.2 - 12.9 fL    Immature Granulocytes 0.5 0.0 - 0.5 %    Gran # (ANC) 4.8 1.8 - 7.7 K/uL    Immature Grans (Abs) 0.04 0.00 - 0.04 K/uL    Lymph # 1.3 1.0 - 4.8 K/uL    Mono # 1.1 (H) 0.3 - 1.0 K/uL    Eos # 0.2 0.0 - 0.5 K/uL    Baso # 0.03 0.00 - 0.20 K/uL    nRBC 0 0 /100 WBC    Gran % 63.6 38.0 - 73.0 %    Lymph % 17.3 (L) 18.0 - 48.0 %    Mono % 15.0 4.0 - 15.0 %    Eosinophil % 3.2 0.0 - 8.0 %    Basophil % 0.4 0.0 - 1.9 %    Differential Method Automated    Basic metabolic panel   Result Value Ref Range    Sodium 139 136 - 145  mmol/L    Potassium 4.3 3.5 - 5.1 mmol/L    Chloride 114 (H) 95 - 110 mmol/L    CO2 16 (L) 23 - 29 mmol/L    Glucose 161 (H) 70 - 110 mg/dL    BUN 31 (H) 8 - 23 mg/dL    Creatinine 1.3 0.5 - 1.4 mg/dL    Calcium 9.0 8.7 - 10.5 mg/dL    Anion Gap 9 8 - 16 mmol/L    eGFR 57 (A) >60 mL/min/1.73 m^2   Echo   Result Value Ref Range    BSA 2.22 m2    LVOT stroke volume 122.33 cm3    LVIDd 4.90 3.5 - 6.0 cm    LV Systolic Volume 37.73 mL    LV Systolic Volume Index 17.2 mL/m2    LVIDs 3.09 2.1 - 4.0 cm    LV Diastolic Volume 112.62 mL    LV Diastolic Volume Index 51.42 mL/m2    IVS 1.29 (A) 0.6 - 1.1 cm    LVOT diameter 2.16 cm    LVOT area 3.7 cm2    FS 37 28 - 44 %    Left Ventricle Relative Wall Thickness 0.54 cm    Posterior Wall 1.33 (A) 0.6 - 1.1 cm    LV mass 256.53 g    LV Mass Index 117 g/m2    MV Peak E Ken 1.19 m/s    TDI LATERAL 0.08 m/s    TDI SEPTAL 0.10 m/s    E/E' ratio 13.22 m/s    MV Peak A Ken 0.52 m/s    TR Max Ken 2.30 m/s    E/A ratio 2.29     IVRT 51.38 msec    E wave deceleration time 210.51 msec    LV SEPTAL E/E' RATIO 11.90 m/s    LV LATERAL E/E' RATIO 14.88 m/s    LVOT peak ken 1.55 m/s    Left Ventricular Outflow Tract Mean Velocity 1.25 cm/s    Left Ventricular Outflow Tract Mean Gradient 6.71 mmHg    RVOT peak VTI 43.8 cm    TAPSE 1.90 cm    LA size 4.04 cm    Left Atrium Minor Axis 6.47 cm    Left Atrium Major Axis 6.42 cm    RA Major Axis 4.80 cm    AV mean gradient 14 mmHg    AV peak gradient 21 mmHg    Ao peak ken 2.30 m/s    Ao VTI 34.90 cm    LVOT peak VTI 33.40 cm    AV valve area 3.51 cm²    AV Velocity Ratio 0.67     AV index (prosthetic) 0.96     THONG by Velocity Ratio 2.47 cm²    Mr max ken 2.91 m/s    MV stenosis pressure 1/2 time 61.05 ms    MV valve area p 1/2 method 3.60 cm2    TV mean gradient 22 mmHg    Triscuspid Valve Regurgitation Peak Gradient 21 mmHg    PV mean gradient 9 mmHg    RVOT peak ken 1.72 m/s    Ao root annulus 3.51 cm    STJ 3.16 cm    Ascending aorta 3.01 cm     Mean e' 0.09 m/s    ZLVIDS -2.97     ZLVIDD -4.12     LA Volume Index 42.4 mL/m2    LA volume 92.95 cm3    LA WIDTH 4.2 cm    RA Width 3.8 cm    TV resting pulmonary artery pressure 24 mmHg    RV TB RVSP 5 mmHg    Est. RA pres 3 mmHg   POCT glucose   Result Value Ref Range    POCT Glucose 101 70 - 110 mg/dL   POCT glucose   Result Value Ref Range    POCT Glucose 135 (H) 70 - 110 mg/dL   POCT glucose   Result Value Ref Range    POCT Glucose 129 (H) 70 - 110 mg/dL   POCT glucose   Result Value Ref Range    POCT Glucose 111 (H) 70 - 110 mg/dL   POCT glucose   Result Value Ref Range    POCT Glucose 162 (H) 70 - 110 mg/dL   POCT glucose   Result Value Ref Range    POCT Glucose 190 (H) 70 - 110 mg/dL   POCT glucose   Result Value Ref Range    POCT Glucose 182 (H) 70 - 110 mg/dL   POCT glucose   Result Value Ref Range    POCT Glucose 192 (H) 70 - 110 mg/dL   POCT glucose   Result Value Ref Range    POCT Glucose 204 (H) 70 - 110 mg/dL   POCT glucose   Result Value Ref Range    POCT Glucose 196 (H) 70 - 110 mg/dL   POCT glucose   Result Value Ref Range    POCT Glucose 175 (H) 70 - 110 mg/dL   POCT glucose   Result Value Ref Range    POCT Glucose 160 (H) 70 - 110 mg/dL   POCT glucose   Result Value Ref Range    POCT Glucose 169 (H) 70 - 110 mg/dL   POCT glucose   Result Value Ref Range    POCT Glucose 128 (H) 70 - 110 mg/dL   POCT glucose   Result Value Ref Range    POCT Glucose 111 (H) 70 - 110 mg/dL   POCT glucose   Result Value Ref Range    POCT Glucose 133 (H) 70 - 110 mg/dL   POCT glucose   Result Value Ref Range    POCT Glucose 148 (H) 70 - 110 mg/dL   POCT glucose   Result Value Ref Range    POCT Glucose 168 (H) 70 - 110 mg/dL   POCT glucose   Result Value Ref Range    POCT Glucose 147 (H) 70 - 110 mg/dL   POCT glucose   Result Value Ref Range    POCT Glucose 153 (H) 70 - 110 mg/dL   POCT glucose   Result Value Ref Range    POCT Glucose 129 (H) 70 - 110 mg/dL   POCT glucose   Result Value Ref Range    POCT Glucose 155  (H) 70 - 110 mg/dL         Imaging Results:  Imaging Results              X-Ray Chest AP Portable (Final result)  Result time 01/23/24 16:15:15      Final result by Raina Green MD (01/23/24 16:15:15)                   Impression:      No acute cardiopulmonary abnormality.    Sclerotic focus within the proximal right humerus, partially visualized.      Electronically signed by: Raina Green  Date:    01/23/2024  Time:    16:15               Narrative:    EXAMINATION:  XR CHEST AP PORTABLE    CLINICAL HISTORY:  bradycardia;    TECHNIQUE:  Single frontal view of the chest was performed.    COMPARISON:  Chest radiograph 03/29/2018    FINDINGS:  ECG monitoring leads overlie the chest.  A loop recorder is present over the lower medial left hemithorax.  Surgical clips overlie the right axilla and upper right lung.There is bilateral interstitial coarsening, suspected chronic.  Degenerative change at the 1st costochondral junction somewhat obscures evaluation at the lung apices, particularly on the left.  Underlying pulmonary nodule in this region is difficult to exclude.  No significant pleural fluid.  No pneumothorax.  No large consolidation.    The cardiac silhouette is normal in size. There is aortic calcification.    There is degenerative change of the spine and right shoulder.  No acute osseous abnormality.  Sclerotic focus is partially visualized over the proximal right humerus.  Cholecystectomy clips are noted.                                     The EKG was ordered, reviewed, and independently interpreted by the ED provider.  Interpretation time: 1600  Rate: 36 BPM  Rhythm: sinus bradycardia  Interpretation: No STEMI.  When compared to EKG performed on 3/28/18, afib no longer present.           The Emergency Provider reviewed the vital signs and test results, which are outlined above.    ED Discussion     2:40 PM: Dr. Ramirez transfers care of patient to Dr. Ferrell pending lab and imaging  results.    6:50 PM Consult: Dr. Ferrell discussed the case with Dr. Santacruz (cardiology). Agrees with current management. Recommends hold metoprolol and will see pt in consult.    6:52 PM - CONSULT: Dr. Ferrell discussed the case with . Agrees with current management. Recommends admission and will see pt in hospital room.  Admitting Service:    Admitting Physician: Dr. Brown   Admit to obs tele    6:54 PM - Re-evaluation:  Discussed test results, shared treatment plan, and the need for admission with patient and family. They understand and agree to the plan as discussed. Answered questions at this time.         ED Course as of 01/29/24 2058 Tue Jan 23, 2024   1449 Rhythm strip reviewed. Sinus bradycardia, no stemi. 36bpm [LV]      ED Course User Index  [LV] Riaz Ferrell Jr., MD       ED Medication(s):  Medications   sodium chloride 0.9% flush 10 mL (has no administration in time range)   naloxone 0.4 mg/mL injection 0.02 mg (has no administration in time range)   glucagon (human recombinant) injection 1 mg (has no administration in time range)   ondansetron injection 4 mg (has no administration in time range)   dextrose 10% bolus 125 mL 125 mL (has no administration in time range)   dextrose 10% bolus 250 mL 250 mL (has no administration in time range)   miconazole NITRATE 2 % top powder ( Topical (Top) Given 1/29/24 2041)   mupirocin 2 % ointment ( Nasal Given 1/28/24 2346)   pneumoc 20-daxa conj-dip cr(PF) (PREVNAR-20 (PF)) injection Syrg 0.5 mL (has no administration in time range)   hydrALAZINE injection 10 mg (10 mg Intravenous Given 1/29/24 2014)   insulin aspart U-100 pen 0-10 Units (1 Units Subcutaneous Given 1/29/24 0010)   haloperidol lactate injection 5 mg (5 mg Intravenous Given 1/26/24 2058)   bisacodyL suppository 10 mg (has no administration in time range)   ceFEPIme (MAXIPIME) 1 g in dextrose 5 % in water (D5W) 100 mL IVPB (MB+) ( Intravenous Verify Only 1/29/24 1844)   fluticasone  propionate 50 mcg/actuation nasal spray 100 mcg (100 mcg Each Nostril Not Given 1/29/24 0900)   famotidine tablet 20 mg (20 mg Oral Given 1/29/24 2041)   tamsulosin 24 hr capsule 0.4 mg (0.4 mg Oral Given 1/29/24 2041)   apixaban tablet 5 mg (5 mg Oral Given 1/29/24 2040)   ALPRAZolam tablet 0.25 mg (has no administration in time range)   senna-docusate 8.6-50 mg per tablet 1 tablet (has no administration in time range)   glucose chewable tablet 24 g (has no administration in time range)   glucose chewable tablet 16 g (has no administration in time range)   ferrous gluconate tablet 324 mg (has no administration in time range)   acetaminophen tablet 500 mg (has no administration in time range)   rifAXIMin tablet 550 mg (550 mg Oral Given 1/29/24 2040)   doxazosin tablet 1 mg (has no administration in time range)   lactulose 20 gram/30 mL solution Soln 30 g (30 g Oral Given 1/29/24 2041)   metoprolol tartrate (LOPRESSOR) split tablet 12.5 mg (12.5 mg Oral Given 1/29/24 2040)   glucagon (human recombinant) injection 5 mg (5 mg Intravenous Given 1/23/24 1454)   atropine injection 1 mg (1 mg Intravenous Given 1/23/24 1454)   atropine injection 0.5 mg (0.5 mg Intravenous Given 1/24/24 0044)   glucagon (human recombinant) injection 5 mg (5 mg Intravenous Given 1/24/24 0045)   magnesium sulfate 2g in water 50mL IVPB (premix) (0 g Intravenous Stopped 1/24/24 0258)   atropine injection 0.5 mg (0.5 mg Intravenous Given 1/24/24 0120)   magnesium sulfate 2g in water 50mL IVPB (premix) (0 g Intravenous Paused 1/24/24 0749)   cefazolin (ANCEF) 2 gram in dextrose 5% 50 mL IVPB (premix) (2 g Intravenous New Bag 1/25/24 1405)   magnesium sulfate 2g in water 50mL IVPB (premix) (0 g Intravenous Stopped 1/25/24 0733)   magnesium sulfate 2g in water 50mL IVPB (premix) (0 g Intravenous Stopped 1/26/24 1205)     Current Discharge Medication List        START taking these medications    Details   levoFLOXacin (LEVAQUIN) 750 MG tablet Take 1  tablet (750 mg total) by mouth once daily. for 3 days  Qty: 3 tablet, Refills: 0      rifAXIMin (XIFAXAN) 550 mg Tab Take 1 tablet (550 mg total) by mouth every evening.  Qty: 30 tablet, Refills: 0                 Medical Decision Making    Medical Decision Making  Amount and/or Complexity of Data Reviewed  Independent Historian: EMS  Labs: ordered. Decision-making details documented in ED Course.  Radiology: ordered and independent interpretation performed. Decision-making details documented in ED Course.  ECG/medicine tests: ordered and independent interpretation performed. Decision-making details documented in ED Course.    Risk  OTC drugs.  Prescription drug management.  Parenteral controlled substances.  Decision regarding hospitalization.  Risk Details: OTC drugs, prescription drugs and controlled substances considered.  Due to patient's symptoms improving and pain controlled pain medications ordered appropriately.  Ddx: medication reaction, infection, heart arrhythmia, MI, infection                Scribe Attestation:   Scribe #1: I performed the above scribed service and the documentation accurately describes the services I performed. I attest to the accuracy of the note.    Attending:   Physician Attestation Statement for Scribe #1: I, Scott Ramirez MD, personally performed the services described in this documentation, as scribed by Shaun Fraire, in my presence, and it is both accurate and complete.       Scribe Attestation:   Scribe #2: I performed the above scribed service and the documentation accurately describes the services I performed. I attest to the accuracy of the note.    Attending Attestation:           Physician Attestation for Scribe:    Physician Attestation Statement for Scribe #2: I, Riaz Ferrell Jr., MD, reviewed documentation, as scribed by Coty Zazueta in my presence, and it is both accurate and complete. I also acknowledge and confirm the content of the note done by Dakotah  #1.          Clinical Impression       ICD-10-CM ICD-9-CM   1. Chronic pulmonary aspiration, subsequent encounter  T17.908D V58.89     934.9   2. Bradycardia  R00.1 427.89   3. Chest pain  R07.9 786.50   4. Symptomatic bradycardia  R00.1 427.89   5. Arrhythmia  I49.9 427.9   6. Sinoatrial node dysfunction  I49.5 427.81   7. Chronic atrial fibrillation  I48.20 427.31   8. Acute on chronic combined systolic (congestive) and diastolic (congestive) heart failure  I50.43 428.43     428.0   9. Chronic diastolic heart failure  I50.32 428.32   10. Acute on chronic systolic congestive heart failure  I50.23 428.23     428.0   11. QT prolongation  R94.31 794.31               Riaz Ferrell Jr., MD  01/29/24 2050

## 2024-01-23 NOTE — PHARMACY MED REC
"Admission Medication History     The home medication history was taken by Gabriel Murray.    You may go to "Admission" then "Reconcile Home Medications" tabs to review and/or act upon these items.     The home medication list has been updated by the Pharmacy department.   Please read ALL comments highlighted in yellow.   Please address this information as you see fit.    Feel free to contact us if you have any questions or require assistance.      The medications listed below were removed from the home medication list. Please reorder if appropriate:  Patient reports no longer taking the following medication(s):  ASPIRIN 81MG  ASTELIN 137MCG  GABAPENTIN 300MG  GLIPIZIDE 5MG  KEPPRA 500MG  PROTONIX 40MG  POTASSIUM 20MEQ  BUMEX 2MG      Medications listed below were obtained from: Patient/family and Analytic software- [employer]  (Not in a hospital admission)      Gabriel Murray  [id number]    Current Outpatient Medications on File Prior to Encounter   Medication Sig Dispense Refill Last Dose    apixaban (ELIQUIS) 5 mg Tab Take 5 mg by mouth 2 (two) times daily.   1/23/2024    ferrous gluconate (FERGON) 324 MG tablet Take 324 mg by mouth daily with breakfast.   1/23/2024    fluticasone propionate (FLONASE) 50 mcg/actuation nasal spray 1 spray by Each Nostril route every morning.   1/23/2024    furosemide (LASIX) 20 MG tablet Take 20 mg by mouth once daily.   1/23/2024    lactulose (CHRONULAC) 10 gram/15 mL solution Take 30 g by mouth 2 (two) times daily.   1/23/2024    metoprolol succinate (TOPROL-XL) 25 MG 24 hr tablet Take 25 mg by mouth once daily.   1/23/2024    NOVOLOG FLEXPEN U-100 INSULIN 100 unit/mL (3 mL) InPn pen Inject into the skin as needed.   1/22/2024    spironolactone (ALDACTONE) 25 MG tablet Take 25 mg by mouth once daily.   1/23/2024    tamsulosin (FLOMAX) 0.4 mg Cap Take 0.4 mg by mouth once daily.   1/23/2024    calcium-vitamin D 250-100 mg-unit per tablet Take 1 tablet by mouth once daily.    "                       .

## 2024-01-23 NOTE — ED NOTES
Patient roomed and placed on cardiac monitor. MD called to bedside. HR 30-40, afib. Administered 1mg atropine and 5mg Glucagon. HR still in 30's, patient asymptomatic. All other VSS.

## 2024-01-24 DIAGNOSIS — I50.23 ACUTE ON CHRONIC SYSTOLIC CONGESTIVE HEART FAILURE: ICD-10-CM

## 2024-01-24 DIAGNOSIS — I50.32 CHRONIC DIASTOLIC HEART FAILURE: ICD-10-CM

## 2024-01-24 DIAGNOSIS — I48.20 CHRONIC ATRIAL FIBRILLATION: Chronic | ICD-10-CM

## 2024-01-24 DIAGNOSIS — R00.1 SYMPTOMATIC BRADYCARDIA: Primary | ICD-10-CM

## 2024-01-24 PROBLEM — N18.31 ACUTE RENAL FAILURE SUPERIMPOSED ON STAGE 3A CHRONIC KIDNEY DISEASE: Status: ACTIVE | Noted: 2024-01-23

## 2024-01-24 LAB
AMMONIA PLAS-SCNC: 50 UMOL/L (ref 10–50)
ANION GAP SERPL CALC-SCNC: 7 MMOL/L (ref 8–16)
AORTIC ROOT ANNULUS: 3.51 CM
ASCENDING AORTA: 3.01 CM
AV INDEX (PROSTH): 0.96
AV MEAN GRADIENT: 14 MMHG
AV PEAK GRADIENT: 21 MMHG
AV VALVE AREA BY VELOCITY RATIO: 2.47 CM²
AV VALVE AREA: 3.51 CM²
AV VELOCITY RATIO: 0.67
BACTERIA #/AREA URNS HPF: ABNORMAL /HPF
BASOPHILS # BLD AUTO: 0.03 K/UL (ref 0–0.2)
BASOPHILS NFR BLD: 0.4 % (ref 0–1.9)
BILIRUB UR QL STRIP: NEGATIVE
BSA FOR ECHO PROCEDURE: 2.22 M2
BUN SERPL-MCNC: 27 MG/DL (ref 8–23)
CALCIUM SERPL-MCNC: 10.1 MG/DL (ref 8.7–10.5)
CHLORIDE SERPL-SCNC: 110 MMOL/L (ref 95–110)
CLARITY UR: ABNORMAL
CO2 SERPL-SCNC: 23 MMOL/L (ref 23–29)
COLOR UR: YELLOW
CREAT SERPL-MCNC: 1.4 MG/DL (ref 0.5–1.4)
CV ECHO LV RWT: 0.54 CM
DIFFERENTIAL METHOD BLD: ABNORMAL
DOP CALC AO PEAK VEL: 2.3 M/S
DOP CALC AO VTI: 34.9 CM
DOP CALC LVOT AREA: 3.7 CM2
DOP CALC LVOT DIAMETER: 2.16 CM
DOP CALC LVOT PEAK VEL: 1.55 M/S
DOP CALC LVOT STROKE VOLUME: 122.33 CM3
DOP CALC RVOT PEAK VEL: 1.72 M/S
DOP CALC RVOT VTI: 43.8 CM
DOP CALCLVOT PEAK VEL VTI: 33.4 CM
E WAVE DECELERATION TIME: 210.51 MSEC
E/A RATIO: 2.29
E/E' RATIO: 13.22 M/S
ECHO LV POSTERIOR WALL: 1.33 CM (ref 0.6–1.1)
EOSINOPHIL # BLD AUTO: 0.3 K/UL (ref 0–0.5)
EOSINOPHIL NFR BLD: 3.2 % (ref 0–8)
ERYTHROCYTE [DISTWIDTH] IN BLOOD BY AUTOMATED COUNT: 14.1 % (ref 11.5–14.5)
EST. GFR  (NO RACE VARIABLE): 52 ML/MIN/1.73 M^2
FRACTIONAL SHORTENING: 37 % (ref 28–44)
GLUCOSE SERPL-MCNC: 129 MG/DL (ref 70–110)
GLUCOSE UR QL STRIP: NEGATIVE
HCT VFR BLD AUTO: 34.2 % (ref 40–54)
HGB BLD-MCNC: 11.3 G/DL (ref 14–18)
HGB UR QL STRIP: NEGATIVE
HYALINE CASTS #/AREA URNS LPF: 24 /LPF
IMM GRANULOCYTES # BLD AUTO: 0.02 K/UL (ref 0–0.04)
IMM GRANULOCYTES NFR BLD AUTO: 0.2 % (ref 0–0.5)
INR PPP: 1.3 (ref 0.8–1.2)
INTERVENTRICULAR SEPTUM: 1.29 CM (ref 0.6–1.1)
IVRT: 51.38 MSEC
KETONES UR QL STRIP: ABNORMAL
LA MAJOR: 6.42 CM
LA MINOR: 6.47 CM
LA WIDTH: 4.2 CM
LACTATE SERPL-SCNC: 2 MMOL/L (ref 0.5–2.2)
LEFT ATRIUM SIZE: 4.04 CM
LEFT ATRIUM VOLUME INDEX: 42.4 ML/M2
LEFT ATRIUM VOLUME: 92.95 CM3
LEFT INTERNAL DIMENSION IN SYSTOLE: 3.09 CM (ref 2.1–4)
LEFT VENTRICLE DIASTOLIC VOLUME INDEX: 51.42 ML/M2
LEFT VENTRICLE DIASTOLIC VOLUME: 112.62 ML
LEFT VENTRICLE MASS INDEX: 117 G/M2
LEFT VENTRICLE SYSTOLIC VOLUME INDEX: 17.2 ML/M2
LEFT VENTRICLE SYSTOLIC VOLUME: 37.73 ML
LEFT VENTRICULAR INTERNAL DIMENSION IN DIASTOLE: 4.9 CM (ref 3.5–6)
LEFT VENTRICULAR MASS: 256.53 G
LEUKOCYTE ESTERASE UR QL STRIP: ABNORMAL
LV LATERAL E/E' RATIO: 14.88 M/S
LV SEPTAL E/E' RATIO: 11.9 M/S
LVOT MG: 6.71 MMHG
LVOT MV: 1.25 CM/S
LYMPHOCYTES # BLD AUTO: 2 K/UL (ref 1–4.8)
LYMPHOCYTES NFR BLD: 24.5 % (ref 18–48)
MAGNESIUM SERPL-MCNC: 1.5 MG/DL (ref 1.6–2.6)
MCH RBC QN AUTO: 34 PG (ref 27–31)
MCHC RBC AUTO-ENTMCNC: 33 G/DL (ref 32–36)
MCV RBC AUTO: 103 FL (ref 82–98)
MICROSCOPIC COMMENT: ABNORMAL
MONOCYTES # BLD AUTO: 0.8 K/UL (ref 0.3–1)
MONOCYTES NFR BLD: 9.2 % (ref 4–15)
MV PEAK A VEL: 0.52 M/S
MV PEAK E VEL: 1.19 M/S
MV STENOSIS PRESSURE HALF TIME: 61.05 MS
MV VALVE AREA P 1/2 METHOD: 3.6 CM2
NEUTROPHILS # BLD AUTO: 5.1 K/UL (ref 1.8–7.7)
NEUTROPHILS NFR BLD: 62.5 % (ref 38–73)
NITRITE UR QL STRIP: NEGATIVE
NRBC BLD-RTO: 0 /100 WBC
PH UR STRIP: 6 [PH] (ref 5–8)
PHOSPHATE SERPL-MCNC: 3.3 MG/DL (ref 2.7–4.5)
PISA MRMAX VEL: 2.91 M/S
PISA TR MAX VEL: 2.3 M/S
PLATELET # BLD AUTO: 173 K/UL (ref 150–450)
PMV BLD AUTO: 9.8 FL (ref 9.2–12.9)
POCT GLUCOSE: 129 MG/DL (ref 70–110)
POTASSIUM SERPL-SCNC: 4.1 MMOL/L (ref 3.5–5.1)
PROT UR QL STRIP: NEGATIVE
PROTHROMBIN TIME: 13.8 SEC (ref 9–12.5)
PV MEAN GRADIENT: 9 MMHG
RA MAJOR: 4.8 CM
RA PRESSURE ESTIMATED: 3 MMHG
RA WIDTH: 3.8 CM
RBC # BLD AUTO: 3.32 M/UL (ref 4.6–6.2)
RBC #/AREA URNS HPF: 6 /HPF (ref 0–4)
RV TB RVSP: 5 MMHG
SODIUM SERPL-SCNC: 140 MMOL/L (ref 136–145)
SP GR UR STRIP: 1.01 (ref 1–1.03)
STJ: 3.16 CM
T4 FREE SERPL-MCNC: 1.07 NG/DL (ref 0.71–1.51)
TDI LATERAL: 0.08 M/S
TDI SEPTAL: 0.1 M/S
TDI: 0.09 M/S
TR MAX PG: 21 MMHG
TR MEAN GRADIENT: 22 MMHG
TRICUSPID ANNULAR PLANE SYSTOLIC EXCURSION: 1.9 CM
TROPONIN I SERPL DL<=0.01 NG/ML-MCNC: 0.03 NG/ML (ref 0–0.03)
TSH SERPL DL<=0.005 MIU/L-ACNC: 1.89 UIU/ML (ref 0.4–4)
TV REST PULMONARY ARTERY PRESSURE: 24 MMHG
URN SPEC COLLECT METH UR: ABNORMAL
UROBILINOGEN UR STRIP-ACNC: ABNORMAL EU/DL
WBC # BLD AUTO: 8.16 K/UL (ref 3.9–12.7)
WBC #/AREA URNS HPF: 83 /HPF (ref 0–5)
WBC CLUMPS URNS QL MICRO: ABNORMAL
Z-SCORE OF LEFT VENTRICULAR DIMENSION IN END DIASTOLE: -4.12
Z-SCORE OF LEFT VENTRICULAR DIMENSION IN END SYSTOLE: -2.97

## 2024-01-24 PROCEDURE — 25000003 PHARM REV CODE 250: Performed by: NURSE PRACTITIONER

## 2024-01-24 PROCEDURE — 93005 ELECTROCARDIOGRAM TRACING: CPT

## 2024-01-24 PROCEDURE — 82746 ASSAY OF FOLIC ACID SERUM: CPT | Performed by: NURSE PRACTITIONER

## 2024-01-24 PROCEDURE — 82140 ASSAY OF AMMONIA: CPT | Performed by: NURSE PRACTITIONER

## 2024-01-24 PROCEDURE — 83735 ASSAY OF MAGNESIUM: CPT | Performed by: STUDENT IN AN ORGANIZED HEALTH CARE EDUCATION/TRAINING PROGRAM

## 2024-01-24 PROCEDURE — 5A1223Z PERFORMANCE OF CARDIAC PACING, CONTINUOUS: ICD-10-PCS | Performed by: INTERNAL MEDICINE

## 2024-01-24 PROCEDURE — 85610 PROTHROMBIN TIME: CPT | Performed by: NURSE PRACTITIONER

## 2024-01-24 PROCEDURE — 84439 ASSAY OF FREE THYROXINE: CPT | Performed by: NURSE PRACTITIONER

## 2024-01-24 PROCEDURE — 63600175 PHARM REV CODE 636 W HCPCS: Performed by: NURSE PRACTITIONER

## 2024-01-24 PROCEDURE — 36415 COLL VENOUS BLD VENIPUNCTURE: CPT | Performed by: NURSE PRACTITIONER

## 2024-01-24 PROCEDURE — 81000 URINALYSIS NONAUTO W/SCOPE: CPT | Performed by: STUDENT IN AN ORGANIZED HEALTH CARE EDUCATION/TRAINING PROGRAM

## 2024-01-24 PROCEDURE — 87077 CULTURE AEROBIC IDENTIFY: CPT | Performed by: STUDENT IN AN ORGANIZED HEALTH CARE EDUCATION/TRAINING PROGRAM

## 2024-01-24 PROCEDURE — 87088 URINE BACTERIA CULTURE: CPT | Performed by: STUDENT IN AN ORGANIZED HEALTH CARE EDUCATION/TRAINING PROGRAM

## 2024-01-24 PROCEDURE — C1894 INTRO/SHEATH, NON-LASER: HCPCS | Performed by: INTERNAL MEDICINE

## 2024-01-24 PROCEDURE — 25000003 PHARM REV CODE 250: Performed by: INTERNAL MEDICINE

## 2024-01-24 PROCEDURE — 25000003 PHARM REV CODE 250: Performed by: STUDENT IN AN ORGANIZED HEALTH CARE EDUCATION/TRAINING PROGRAM

## 2024-01-24 PROCEDURE — 84484 ASSAY OF TROPONIN QUANT: CPT | Performed by: NURSE PRACTITIONER

## 2024-01-24 PROCEDURE — C9399 UNCLASSIFIED DRUGS OR BIOLOG: HCPCS | Performed by: NURSE PRACTITIONER

## 2024-01-24 PROCEDURE — 99232 SBSQ HOSP IP/OBS MODERATE 35: CPT | Mod: FS,25,, | Performed by: INTERNAL MEDICINE

## 2024-01-24 PROCEDURE — 63600175 PHARM REV CODE 636 W HCPCS: Performed by: INTERNAL MEDICINE

## 2024-01-24 PROCEDURE — 87150 DNA/RNA AMPLIFIED PROBE: CPT | Mod: 59 | Performed by: STUDENT IN AN ORGANIZED HEALTH CARE EDUCATION/TRAINING PROGRAM

## 2024-01-24 PROCEDURE — 87086 URINE CULTURE/COLONY COUNT: CPT | Performed by: STUDENT IN AN ORGANIZED HEALTH CARE EDUCATION/TRAINING PROGRAM

## 2024-01-24 PROCEDURE — 84443 ASSAY THYROID STIM HORMONE: CPT | Performed by: NURSE PRACTITIONER

## 2024-01-24 PROCEDURE — 85025 COMPLETE CBC W/AUTO DIFF WBC: CPT | Performed by: STUDENT IN AN ORGANIZED HEALTH CARE EDUCATION/TRAINING PROGRAM

## 2024-01-24 PROCEDURE — 20000000 HC ICU ROOM

## 2024-01-24 PROCEDURE — 87186 SC STD MICRODIL/AGAR DIL: CPT | Performed by: STUDENT IN AN ORGANIZED HEALTH CARE EDUCATION/TRAINING PROGRAM

## 2024-01-24 PROCEDURE — 80048 BASIC METABOLIC PNL TOTAL CA: CPT | Performed by: STUDENT IN AN ORGANIZED HEALTH CARE EDUCATION/TRAINING PROGRAM

## 2024-01-24 PROCEDURE — 83605 ASSAY OF LACTIC ACID: CPT | Performed by: NURSE PRACTITIONER

## 2024-01-24 PROCEDURE — 33210 INSERT ELECTRD/PM CATH SNGL: CPT | Mod: ,,, | Performed by: INTERNAL MEDICINE

## 2024-01-24 PROCEDURE — C1779 LEAD, PMKR, TRANSVENOUS VDD: HCPCS | Performed by: INTERNAL MEDICINE

## 2024-01-24 PROCEDURE — 93010 ELECTROCARDIOGRAM REPORT: CPT | Mod: ,,, | Performed by: INTERNAL MEDICINE

## 2024-01-24 PROCEDURE — 82607 VITAMIN B-12: CPT | Performed by: NURSE PRACTITIONER

## 2024-01-24 PROCEDURE — 99223 1ST HOSP IP/OBS HIGH 75: CPT | Mod: FS,,, | Performed by: NURSE PRACTITIONER

## 2024-01-24 PROCEDURE — 93010 ELECTROCARDIOGRAM REPORT: CPT | Mod: 76,,, | Performed by: INTERNAL MEDICINE

## 2024-01-24 PROCEDURE — 84100 ASSAY OF PHOSPHORUS: CPT | Performed by: STUDENT IN AN ORGANIZED HEALTH CARE EDUCATION/TRAINING PROGRAM

## 2024-01-24 PROCEDURE — 33210 INSERT ELECTRD/PM CATH SNGL: CPT | Performed by: INTERNAL MEDICINE

## 2024-01-24 RX ORDER — ATROPINE SULFATE 0.1 MG/ML
0.5 INJECTION INTRAVENOUS ONCE
Status: COMPLETED | OUTPATIENT
Start: 2024-01-24 | End: 2024-01-24

## 2024-01-24 RX ORDER — DEXMEDETOMIDINE HYDROCHLORIDE 4 UG/ML
0-1 INJECTION INTRAVENOUS CONTINUOUS
Status: DISCONTINUED | OUTPATIENT
Start: 2024-01-24 | End: 2024-01-25

## 2024-01-24 RX ORDER — MAGNESIUM SULFATE HEPTAHYDRATE 40 MG/ML
2 INJECTION, SOLUTION INTRAVENOUS ONCE
Status: COMPLETED | OUTPATIENT
Start: 2024-01-24 | End: 2024-01-24

## 2024-01-24 RX ORDER — LIDOCAINE HYDROCHLORIDE 20 MG/ML
INJECTION, SOLUTION INFILTRATION; PERINEURAL
Status: DISCONTINUED | OUTPATIENT
Start: 2024-01-24 | End: 2024-01-24 | Stop reason: HOSPADM

## 2024-01-24 RX ORDER — DOPAMINE HYDROCHLORIDE 160 MG/100ML
0-10 INJECTION, SOLUTION INTRAVENOUS CONTINUOUS
Status: DISCONTINUED | OUTPATIENT
Start: 2024-01-24 | End: 2024-01-24

## 2024-01-24 RX ORDER — MICONAZOLE NITRATE 2 %
POWDER (GRAM) TOPICAL 2 TIMES DAILY
Status: DISCONTINUED | OUTPATIENT
Start: 2024-01-24 | End: 2024-01-30 | Stop reason: HOSPADM

## 2024-01-24 RX ORDER — CEFAZOLIN SODIUM 2 G/50ML
2 SOLUTION INTRAVENOUS
Status: CANCELLED | OUTPATIENT
Start: 2024-01-25

## 2024-01-24 RX ORDER — MUPIROCIN 20 MG/G
OINTMENT TOPICAL 2 TIMES DAILY
Status: DISPENSED | OUTPATIENT
Start: 2024-01-24 | End: 2024-01-29

## 2024-01-24 RX ORDER — FAMOTIDINE 20 MG/1
20 TABLET, FILM COATED ORAL 2 TIMES DAILY
Status: DISCONTINUED | OUTPATIENT
Start: 2024-01-24 | End: 2024-01-25

## 2024-01-24 RX ORDER — GLUCAGON 1 MG
5 KIT INJECTION ONCE
Status: COMPLETED | OUTPATIENT
Start: 2024-01-24 | End: 2024-01-24

## 2024-01-24 RX ORDER — CEFAZOLIN SODIUM 2 G/50ML
2 SOLUTION INTRAVENOUS
Status: COMPLETED | OUTPATIENT
Start: 2024-01-25 | End: 2024-01-25

## 2024-01-24 RX ORDER — FAMOTIDINE 20 MG/1
20 TABLET, FILM COATED ORAL DAILY
Status: DISCONTINUED | OUTPATIENT
Start: 2024-01-24 | End: 2024-01-24

## 2024-01-24 RX ADMIN — ISOPROTERENOL HYDROCHLORIDE 5 MCG/MIN: 0.2 INJECTION, SOLUTION INTRACARDIAC; INTRAMUSCULAR; INTRAVENOUS; SUBCUTANEOUS at 07:01

## 2024-01-24 RX ADMIN — ATROPINE SULFATE 0.5 MG: 0.1 INJECTION INTRAVENOUS at 01:01

## 2024-01-24 RX ADMIN — MUPIROCIN: 20 OINTMENT TOPICAL at 09:01

## 2024-01-24 RX ADMIN — MAGNESIUM SULFATE HEPTAHYDRATE 2 G: 40 INJECTION, SOLUTION INTRAVENOUS at 05:01

## 2024-01-24 RX ADMIN — SODIUM CHLORIDE: 9 INJECTION, SOLUTION INTRAVENOUS at 03:01

## 2024-01-24 RX ADMIN — DEXMEDETOMIDINE HYDROCHLORIDE 0.2 MCG/KG/HR: 4 INJECTION INTRAVENOUS at 09:01

## 2024-01-24 RX ADMIN — GLUCAGON 5 MG: 1 INJECTION, POWDER, LYOPHILIZED, FOR SOLUTION INTRAMUSCULAR; INTRAVENOUS at 12:01

## 2024-01-24 RX ADMIN — ATROPINE SULFATE 0.5 MG: 0.1 INJECTION INTRAVENOUS at 12:01

## 2024-01-24 RX ADMIN — MICONAZOLE NITRATE: 20 POWDER TOPICAL at 06:01

## 2024-01-24 RX ADMIN — DOPAMINE HYDROCHLORIDE 4 MCG/KG/MIN: 160 INJECTION, SOLUTION INTRAVENOUS at 03:01

## 2024-01-24 RX ADMIN — MICONAZOLE NITRATE: 20 POWDER TOPICAL at 09:01

## 2024-01-24 RX ADMIN — MAGNESIUM SULFATE 2 G: 2 INJECTION INTRAVENOUS at 12:01

## 2024-01-24 RX ADMIN — CEFTRIAXONE 1 G: 1 INJECTION, POWDER, FOR SOLUTION INTRAMUSCULAR; INTRAVENOUS at 09:01

## 2024-01-24 NOTE — NURSING TRANSFER
Nursing Transfer Note      1/23/2024   6:41 PM    Nurse giving handoff:MANDEEP Gallagher  Nurse receiving handoff:MANDEEP Helm    Reason patient is being transferred: Bradycardia    Transfer To: Telemetry    Transfer via stretcher    Transfer with cardiac monitoring    Transported by MANDEEP Gallagher    Transfer Vital Signs:  Blood Pressure:153/64  Heart Rate:39  O2:RA  Temperature:97.7  Respirations:19    Telemetry: Box Number [id number]  Order for Tele Monitor? Yes    Additional Lines: N/A    4eyes on Skin: yes    Medicines sent: N/A    Any special needs or follow-up needed: None    Patient belongings transferred with patient: Yes    Chart send with patient: Yes    Notified: Pt notified family    Patient reassessed at: 01/23/24   Upon arrival to floor: cardiac monitor applied, patient oriented to room, call bell in reach, and bed in lowest position.

## 2024-01-24 NOTE — CONSULTS
[facility] - Intensive Care (Hospital)  Critical Care Medicine  Consult Note    Patient Name: Ramy Romo  MRN: [id number]  Admission Date: 1/23/2024  Hospital Length of Stay: 0 days  Code Status: Full Code  Attending Physician: Digna Gonzales MD   Primary Care Provider: Félix Rollins MD   Principal Problem: Symptomatic bradycardia    Inpatient consult to Critical Care Medicine  Consult performed by: Ashley Roman ACNP-BC  Consult ordered by: Angelina Gordillo NP        Subjective:     HPI:  78 year old male with known medical issues including atrial fib on eliquis and metoprolol; HTN; HFpEF; DM; and cirrhosis on daily lactulose    Presented to ED on 1/23 from  where eval for weakness/dizziness/diarrhea x 2 days noted bradycardia in 30s  Remained with bradycardia in 30s despite glucagon and atropine trials  Lab eval showed wbc 5.8; creatinine 1.5 (baseline 1.2)  VSS and AAOx3 despite bradycardia  HM admitted to telemetry for close monitoring per cardiology recs    Hospital/ICU Course:  Around midnight 1/24 bradycardia worsening to mid 20s. BP remained stable.  Failed to improve with additional atropine and glucagon  Care team noted increased confusion   Transferred to ICU for more aggressive management of bradycardia    Past Medical History:   Diagnosis Date    Atrial fibrillation     CHF (congestive heart failure)     Diabetes mellitus     Hypertension        Past Surgical History:   Procedure Laterality Date    CHOLECYSTECTOMY      NECK SURGERY      TOTAL ELBOW ARTHROPLASTY Right 03/2019       Review of patient's allergies indicates:   Allergen Reactions    Sulfa (sulfonamide antibiotics) Rash       Family History       Problem Relation (Age of Onset)    Cataracts Father    Glaucoma Father          Tobacco Use    Smoking status: Never    Smokeless tobacco: Never   Substance and Sexual Activity    Alcohol use: No    Drug use: Not on file    Sexual activity: Not on file         Review of Systems    Constitutional:  Positive for fatigue.   Endocrine: Positive for cold intolerance.   Neurological:  Positive for weakness.     Objective:     Vital Signs (Most Recent):  Temp: 97.7 °F (36.5 °C) (24 2316)  Pulse: (!) 31 (24 0300)  Resp: 20 (24)  BP: 135/64 (24 0300)  SpO2: 98 % (24) Vital Signs (24h Range):  Temp:  [97.4 °F (36.3 °C)-98.3 °F (36.8 °C)] 97.7 °F (36.5 °C)  Pulse:  [28-42] 31  Resp:  [16-27] 20  SpO2:  [95 %-100 %] 98 %  BP: (130-168)/(58-77) 135/64     Weight: 97.5 kg (214 lb 15.2 oz)  Body mass index is 29.15 kg/m².    No intake or output data in the 24 hours ending 24 0347      sodium chloride 0.9% 50 mL/hr at 24 0355    DOPamine 4 mcg/kg/min (24 0354)       Physical Exam  Vitals and nursing note reviewed.   Constitutional:       General: He is awake. He is not in acute distress.     Appearance: He is overweight. He is ill-appearing.      Interventions: Nasal cannula in place.   HENT:      Head: Atraumatic.   Eyes:      Conjunctiva/sclera: Conjunctivae normal.      Pupils: Pupils are equal, round, and reactive to light.   Neck:      Vascular: No JVD.   Cardiovascular:      Rate and Rhythm: Bradycardia present. Rhythm irregular.      Pulses:           Radial pulses are 1+ on the right side and 1+ on the left side.        Dorsalis pedis pulses are 1+ on the right side and 1+ on the left side.   Pulmonary:      Effort: Pulmonary effort is normal.      Breath sounds: Normal breath sounds. No wheezing or rales.   Abdominal:      General: There is no distension.      Palpations: Abdomen is soft.      Tenderness: There is no abdominal tenderness.   Musculoskeletal:      Right lower le+ Edema present.      Left lower le+ Edema present.   Skin:     General: Skin is cool and dry.      Capillary Refill: Capillary refill takes 2 to 3 seconds.      Findings: Bruising and ecchymosis present.             Comments: Thin frail appearing skin  "  Neurological:      GCS: GCS eye subscore is 3. GCS verbal subscore is 4. GCS motor subscore is 6.      Comments: Oriented to person and time  Confused to situation and appears to visually hallucinating   Psychiatric:         Attention and Perception: He is inattentive. He perceives visual hallucinations.         Speech: Speech is delayed.         Behavior: Behavior is cooperative.         Judgment: Judgment is impulsive.          Vents:       Lines/Drains/Airways       Peripheral Intravenous Line  Duration                  Peripheral IV - Single Lumen 01/23/24 2100 20 G Anterior;Distal;Left Forearm <1 day         Peripheral IV - Single Lumen 01/24/24 0321 20 G Posterior;Right Hand <1 day         Peripheral IV - Single Lumen 01/24/24 0321 20 G Posterior;Right Wrist <1 day                    Significant Labs:    CBC/Anemia Profile:  Recent Labs   Lab 01/23/24  1452   WBC 5.86   HGB 11.5*   HCT 34.4*      *   RDW 14.2        Chemistries:  Recent Labs   Lab 01/23/24  1452      K 4.0      CO2 21*   BUN 23   CREATININE 1.5*   CALCIUM 10.1   ALBUMIN 2.7*   PROT 5.7*   BILITOT 0.6   ALKPHOS 105   ALT 18   AST 24       Troponin:   Recent Labs   Lab 01/23/24  1452   TROPONINI 0.010     All pertinent labs within the past 24 hours have been reviewed.    Significant Imaging:   I have reviewed all pertinent imaging results/findings within the past 24 hours.    ABG  No results for input(s): "PH", "PO2", "PCO2", "HCO3", "BE" in the last 168 hours.  Assessment/Plan:     Neuro  Encephalopathy  Etiology unclear - severe bradycardia vs hepatic encephalopathy vs combination  Check ammonia  Optimize HR  Monitor     Cardiac/Vascular  * Symptomatic bradycardia  Suspect s/t IVVD and acute on chronic renal failure with beta blocker use  Hold BB  Gentle hydration  Continuous cardiac monitoring  Add dopamine for goal HR 45 or greater  Cardiology is consulted  Will need transvenous temporary pacemaker if no response " to dopamine infusion    Chronic atrial fibrillation  Continue eliquis anticoagulation  Hold BB/all av shivani blockers  Continuous cardiac monitoring    Renal/  Acute renal failure superimposed on stage 3a chronic kidney disease  Suspect related to IVVD  Gentle IV hydration  Strict I/O  Renal dose medications  Monitor creatinine trend    Endocrine  Diabetes  Not on long term insulin  SSI prn with monitoring for glucose control and prevention of insulin toxicity    GI  Cirrhosis of liver without ascites  Check INR  Check ammonia  Resume home aldactone and lactulose once HR and hydration improved        Critical Care Daily Checklist:    A: Awake: RASS Goal/Actual Goal:    Actual:     B: Spontaneous Breathing Trial Performed?     C: SAT & SBT Coordinated?  N/a                      D: Delirium: CAM-ICU     E: Early Mobility Performed? Yes   F: Feeding Goal:    Status:     Current Diet Order   Procedures    Diet NPO Except for: Sips with Medication     Order Specific Question:   Except for     Answer:   Sips with Medication      AS: Analgesia/Sedation prn   T: Thromboembolic Prophylaxis eliquis   H: HOB > 300 Yes   U: Stress Ulcer Prophylaxis (if needed) pepcid   G: Glucose Control SSI   B: Bowel Function     I: Indwelling Catheter (Lines & Lopez) Necessity reviewed   D: De-escalation of Antimicrobials/Pharmacotherapies reviewed    Plan for the day/ETD As above    Code Status:  Family/Goals of Care: Full Code  Goal home on discharge     Critical Care Time: 50 minutes  Critical secondary to severe symptomatic bradycardia   Critical care was time spent personally by me on the following activities: development of treatment plan with patient or surrogate and bedside caregivers, discussions with consultants, evaluation of patient's response to treatment, examination of patient, ordering and performing treatments and interventions, ordering and review of laboratory studies, ordering and review of radiographic studies, pulse  oximetry, re-evaluation of patient's condition. This critical care time did not overlap with that of any other provider or involve time for any procedures.    Thank you for your consult. Critical Care team will assume attending responsibilities while requiring ICU level care.     CARLYLE Woodard-BC  Critical Care Medicine  [facility] - Intensive Care ([facility])

## 2024-01-24 NOTE — ASSESSMENT & PLAN NOTE
Likely medication induced   In ED, patient glucagon 5mg and atropine 1mg x 2. hr still in the 30's. pt is aao x 3 answering questions appropriately. trop: 0.01, wbc: 5.9, hb: 11.5, cr: 1.5, cxr: naf.  ED provided discussed case with dr agudelo and he recommends hold metoprolol and close monitoring.   Hold AV blocking agents, closely monitor, at this time patient denied dizziness, chest pain, shortness OO breath,; heart rate 39 at the time of examination  NPO after midnight for any possible cardiac interventions in a.m.   Echo   Cardiology follow up

## 2024-01-24 NOTE — BRIEF OP NOTE
<[facility] - Cath Lab ([facility])  Cardiology Department  Operative Note    SUMMARY     Date of Procedure: 1/24/2024     Procedure: Procedure(s) (LRB):  Insertion, Pacemaker, Temporary Transvenous (N/A)     Surgeon(s) and Role:     * Renaldo Santacruz MD - Primary    Assisting Surgeon: None    Pre-Operative Diagnosis: * No pre-op diagnosis entered *    Post-Operative Diagnosis: Post-Op Diagnosis Codes:     * Symptomatic bradycardia [R00.1]    Anesthesia: Local    Technical Procedures Used: Temporary PPM, slow afib    Description of the Findings of the Procedure: temoparray PPm via R femoral V    Significant Surgical Tasks Conducted by the Assistant(s), if Applicable: none    Complications: No    Estimated Blood Loss (EBL): < 50 cc           Implants: * No implants in log *    Specimens:   Specimen (24h ago, onward)      None                    Condition: Good    Disposition: PACU - hemodynamically stable.    Attestation: I was present and scrubbed for the entire procedure.

## 2024-01-24 NOTE — ASSESSMENT & PLAN NOTE
78 y.o. male patient with a PMHx of CHF, HTN, A-fib, DM, cirrhosis, history of stroke  presents with slow afib. Initially pt was hemodynamically stable in telemetery HR 30s , asymptomatic,  Pt with HR 20s then and symptomatic and transferred to ICU. Pt cardiologist is Dr. La /ANITA.Patient denies CP, angina or anginal equivalent.Pt started on dopamine but HR still 27-30s.      Isopril started with improvement, temporary PPM this morning. If still bradycardic tomorrow, permanent PPM  Not pt took eliquis yesterday, needs to be > 48 hrs for procedure

## 2024-01-24 NOTE — ASSESSMENT & PLAN NOTE
Likely secondary to intravascular volume depletion   Avoid nephrotoxins, renal dose medications   Follow up on urinalysis

## 2024-01-24 NOTE — PLAN OF CARE
Received PT from tele for bradycardia.  Dopamine gtt infusing, goal HR >45.  Afib/bradycadria on monitor.  Normotensive and afebrile.  PT oriented to self only.     Pt son updated on POC by charge nurse.  All alarms audible and appropriate

## 2024-01-24 NOTE — ASSESSMENT & PLAN NOTE
Etiology unclear - severe bradycardia vs hepatic encephalopathy vs combination  Check ammonia  Optimize HR  Monitor

## 2024-01-24 NOTE — CONSULTS
Subjective:   Patient ID:  Ramy Romo is a 78 y.o. male who presents for evaluation of Bradycardia (Pt has had diarrhea and weakness x 2 days, had an episode of dizziness, HR in 30's, denies any sx now )      HPI    Ramy Romo is a 78 year old male who presented to [facility] due to 48 hour history of diarrhea and bradycardia from urgent care office.     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 Patient son at bedside to assist with HPI in ICU this AM. Patient has 24 hour caregivers at home which started about a few weeks ago. Patient has limited mobility- uses wheelchair and walker at home. He is able to complete most ADLs with limited assistance.     He follows with Dr La at [facility] regularly. Previous ECHO in October 2023 with normal LVEF. Repeat TTE pending today.     Upon ED evaluation- patient was very dizzy with HR in 30s. Received Glucagon 5 mg and Atropine without any significant improvement noted. Overnight, patient was transferred from Telemetry to ICU due to continued bradycardia with mental status changes. Treated with Isuprel and Dopamine infusion with limited improvement. Temp PPM placed this AM per Dr Santacruz. Post Temp pacer insertion- intermittent capture issues, increased mAs to 10 with capture rate of near 100%, VVI 60 bpm.     Patient seen and examined with son at bedside. Patient awake and alert, oriented to self, pleasant at time of exam today.         Past Medical History:   Diagnosis Date    Atrial fibrillation     CHF (congestive heart failure)     Diabetes mellitus     Hypertension        Past Surgical History:   Procedure Laterality Date    CHOLECYSTECTOMY      NECK SURGERY      TOTAL ELBOW ARTHROPLASTY Right 03/2019       Social History     Tobacco Use    Smoking status: Never    Smokeless tobacco: Never   Substance Use Topics    Alcohol use: No        Family History   Problem Relation Age of Onset    Cataracts Father     Glaucoma Father        Wt Readings from Last 3 Encounters:   01/24/24 97.1 kg (214 lb)   10/16/23 128.4 kg (283 lb)   06/14/23 128.4 kg (283 lb 1.1 oz)     Temp Readings from Last 3 Encounters:   01/24/24 97.9 °F (36.6 °C) (Oral)   04/09/19 97.7 °F (36.5 °C) (Tympanic)   03/15/19 97.7 °F (36.5 °C) (Tympanic)     BP Readings from Last 3 Encounters:   01/24/24 (!) 114/54   04/09/19 126/65   03/15/19 130/66     Pulse Readings from Last 3 Encounters:   01/24/24 (!) 43   04/09/19 69   03/15/19 (!) 48       No current facility-administered medications on file prior to encounter.     Current Outpatient Medications on File Prior to Encounter   Medication Sig Dispense Refill    apixaban (ELIQUIS) 5 mg Tab Take 5 mg by mouth 2 (two) times daily.      ferrous gluconate (FERGON) 324 MG tablet Take 324 mg by mouth daily with breakfast.      fluticasone propionate (FLONASE) 50 mcg/actuation nasal spray 1 spray by Each Nostril route every morning.      furosemide (LASIX) 20 MG tablet Take 20 mg by mouth once daily.      lactulose (CHRONULAC) 10 gram/15 mL solution Take 30 g by mouth 2 (two) times daily.      metoprolol succinate (TOPROL-XL) 25 MG 24 hr tablet Take 25 mg by mouth once daily.      NOVOLOG FLEXPEN U-100 INSULIN 100 unit/mL (3 mL) InPn pen Inject into the skin as needed.      spironolactone (ALDACTONE) 25 MG tablet Take 25 mg by mouth once daily.      tamsulosin (FLOMAX) 0.4 mg Cap Take 0.4 mg by mouth once daily.      calcium-vitamin D 250-100 mg-unit per tablet Take 1 tablet by mouth once daily.         No cardiac monitor results found for the past 12 months    No results found for this or any previous visit.    No results found for this or any previous visit.        Results for orders placed or performed during the hospital encounter of 01/23/24   EKG 12-lead    Collection Time: 01/24/24  6:12 AM    Narrative    Test Reason :  I49.9,    Vent. Rate : 027 BPM     Atrial Rate : 394 BPM     P-R Int : 000 ms          QRS Dur : 150 ms      QT Int : 488 ms       P-R-T Axes : 000 110 069 degrees     QTc Int : 326 ms    Atrial fibrillation with slow ventricular response with ventricular escape  complexes  Right bundle branch block  Left posterior fascicular block   Bifascicular block   Abnormal ECG  When compared with ECG of 24-JAN-2024 06:12,  Previous ECG has undetermined rhythm, needs review  (RBBB and left posterior fascicular block) has replaced Left bundle branch  block    Referred By: [email or web address]   SELF           Confirmed By:          Review of Systems   Constitutional: Positive for malaise/fatigue.   HENT:  Negative for hearing loss and hoarse voice.    Eyes:  Negative for blurred vision and visual disturbance.   Cardiovascular:  Negative for chest pain, claudication, dyspnea on exertion, irregular heartbeat, leg swelling, near-syncope, orthopnea, palpitations, paroxysmal nocturnal dyspnea and syncope.   Respiratory:  Negative for cough, hemoptysis, shortness of breath, sleep disturbances due to breathing, snoring and wheezing.    Endocrine: Negative for cold intolerance and heat intolerance.   Hematologic/Lymphatic: Does not bruise/bleed easily.   Skin:  Negative for color change, dry skin and nail changes.   Musculoskeletal:  Positive for arthritis, back pain and joint pain. Negative for myalgias.   Gastrointestinal:  Negative for bloating, abdominal pain, constipation, nausea and vomiting.   Genitourinary:  Negative for dysuria, flank pain, hematuria and hesitancy.   Neurological:  Positive for dizziness. Negative for headaches, light-headedness, loss of balance, numbness, paresthesias and weakness.   Psychiatric/Behavioral:  Negative for altered mental status.    Allergic/Immunologic: Negative for environmental allergies.         Objective:BP (!) 114/54   Pulse (!) 43   Temp 97.9 °F (36.6 °C) (Oral)   Resp (!) 30   Ht 6' (1.829 m)    Wt 97.1 kg (214 lb)   SpO2 99%   BMI 29.02 kg/m²      Physical Exam  Vitals and nursing note reviewed.   Constitutional:       General: He is not in acute distress.     Appearance: Normal appearance. He is well-developed. He is not ill-appearing.   HENT:      Head: Normocephalic and atraumatic.      Nose: Nose normal.      Mouth/Throat:      Mouth: Mucous membranes are moist.   Eyes:      Pupils: Pupils are equal, round, and reactive to light.   Neck:      Thyroid: No thyromegaly.      Vascular: No JVD.      Trachea: No tracheal deviation.   Cardiovascular:      Rate and Rhythm: Regular rhythm. Bradycardia present.      Chest Wall: PMI is not displaced.      Pulses: Intact distal pulses.           Radial pulses are 2+ on the right side and 2+ on the left side.        Dorsalis pedis pulses are 2+ on the right side and 2+ on the left side.      Heart sounds: S1 normal and S2 normal. Heart sounds not distant. No murmur heard.     Comments: S/P Right femoral Temp Pacer   VVI 60 bpm  10 mA  Pulmonary:      Effort: Pulmonary effort is normal. No respiratory distress.      Breath sounds: Normal breath sounds. No decreased breath sounds, wheezing or rhonchi.   Abdominal:      General: Bowel sounds are normal. There is no distension.      Palpations: Abdomen is soft.      Tenderness: There is no abdominal tenderness.   Musculoskeletal:         General: No swelling. Normal range of motion.      Cervical back: Full passive range of motion without pain, normal range of motion and neck supple.      Right lower leg: No edema.      Left lower leg: No edema.      Right ankle: No swelling.      Left ankle: No swelling.   Skin:     General: Skin is warm and dry.      Capillary Refill: Capillary refill takes less than 2 seconds.      Nails: There is no clubbing.   Neurological:      General: No focal deficit present.      Mental Status: He is alert and oriented to person, place, and time.      Motor: Weakness present.    Psychiatric:         Speech: Speech normal.         Behavior: Behavior normal.         Thought Content: Thought content normal.         Judgment: Judgment normal.         Lab Results   Component Value Date    CHOL 141 06/01/2023    CHOL 141 12/07/2022    CHOL 151 11/19/2021     Lab Results   Component Value Date    HDL 58 06/01/2023    HDL 41 12/07/2022    HDL 45 11/19/2021     Lab Results   Component Value Date    LDLCALC 74 06/01/2023    LDLCALC 85 12/07/2022    LDLCALC 91 11/19/2021     Lab Results   Component Value Date    TRIG 35 06/01/2023    TRIG 76 12/07/2022    TRIG 75 11/19/2021     Lab Results   Component Value Date    CHOLHDL 32.7 03/28/2018    CHOLHDL 25.4 08/22/2006    CHOLHDL 24.6 05/01/2006     [unfilled]  Lab Results   Component Value Date    TSH 1.888 01/24/2024     Lab Results   Component Value Date    INR 1.3 (H) 01/24/2024    INR 1.3 (H) 11/20/2023    INR 1.6 10/13/2022     Lab Results   Component Value Date    WBC 8.16 01/24/2024    HGB 11.3 (L) 01/24/2024    HCT 34.2 (L) 01/24/2024     (H) 01/24/2024     01/24/2024     BNP  Recent Labs   Lab 01/23/24  1452   *     Estimated Creatinine Clearance: 53.4 mL/min (based on SCr of 1.4 mg/dL).     Assessment:      1. Symptomatic bradycardia    2. Bradycardia    3. Chest pain    4. Arrhythmia        Plan:     Symptomatic Bradycardia/Intermittent high grade block  -BB on hold  -s/p Temp PPM implant this AM  -Dopamine and Isuprel infusions stopped post Temp PPM insertion  -Hold Eliquis for planned PPM implant.  -Keep NPO after mN  -Plan for single V lead PPM tomorrow with Dr Brown-Samra Nicole May, FNP-C [facility]      Asked to see patient for bradycardia   I reviewed the chart in detail including all relevant clinical notes, cardiac study reports, lab data and Xrays.  I have reviewed the actual images of all relevant ECG, rhythm, and monitoring tracings obtained during current hospitalization and in past records.   I  have reviewed the actual images of all relevant CXRays.     In short, permanent AF with high grade AV filtering and a pre-existing RBBB with resultant highly Sxc bradycardia in a pat with HTN, DMII, past CVAs and sig neuro and cognitive impairment but still living at home with round the clock assistance availability    Post TPM and with PPM being turned off, conduction was worse and transient CHB with a LAFasc escape bradycardic rhythm noted.     Pat seen by Ms Wilson and recs made by her above. I agree with her  summary as listed. I have also made edits where appropriate.   He will need a Single chamber PPM for V rate support.

## 2024-01-24 NOTE — SUBJECTIVE & OBJECTIVE
Past Medical History:   Diagnosis Date    Atrial fibrillation     CHF (congestive heart failure)     Diabetes mellitus     Hypertension        Past Surgical History:   Procedure Laterality Date    CHOLECYSTECTOMY      NECK SURGERY      TOTAL ELBOW ARTHROPLASTY Right 03/2019       Review of patient's allergies indicates:   Allergen Reactions    Sulfa (sulfonamide antibiotics) Rash       Family History       Problem Relation (Age of Onset)    Cataracts Father    Glaucoma Father          Tobacco Use    Smoking status: Never    Smokeless tobacco: Never   Substance and Sexual Activity    Alcohol use: No    Drug use: Not on file    Sexual activity: Not on file         Review of Systems   Constitutional:  Positive for fatigue.   Endocrine: Positive for cold intolerance.   Neurological:  Positive for weakness.     Objective:     Vital Signs (Most Recent):  Temp: 97.7 °F (36.5 °C) (01/23/24 2316)  Pulse: (!) 31 (01/24/24 0300)  Resp: 20 (01/24/24 0300)  BP: 135/64 (01/24/24 0300)  SpO2: 98 % (01/24/24 0300) Vital Signs (24h Range):  Temp:  [97.4 °F (36.3 °C)-98.3 °F (36.8 °C)] 97.7 °F (36.5 °C)  Pulse:  [28-42] 31  Resp:  [16-27] 20  SpO2:  [95 %-100 %] 98 %  BP: (130-168)/(58-77) 135/64     Weight: 97.5 kg (214 lb 15.2 oz)  Body mass index is 29.15 kg/m².    No intake or output data in the 24 hours ending 01/24/24 0347      sodium chloride 0.9% 50 mL/hr at 01/24/24 0355    DOPamine 4 mcg/kg/min (01/24/24 0354)       Physical Exam  Vitals and nursing note reviewed.   Constitutional:       General: He is awake. He is not in acute distress.     Appearance: He is overweight. He is ill-appearing.      Interventions: Nasal cannula in place.   HENT:      Head: Atraumatic.   Eyes:      Conjunctiva/sclera: Conjunctivae normal.      Pupils: Pupils are equal, round, and reactive to light.   Neck:      Vascular: No JVD.   Cardiovascular:      Rate and Rhythm: Bradycardia present. Rhythm irregular.      Pulses:           Radial pulses  are 1+ on the right side and 1+ on the left side.        Dorsalis pedis pulses are 1+ on the right side and 1+ on the left side.   Pulmonary:      Effort: Pulmonary effort is normal.      Breath sounds: Normal breath sounds. No wheezing or rales.   Abdominal:      General: There is no distension.      Palpations: Abdomen is soft.      Tenderness: There is no abdominal tenderness.   Musculoskeletal:      Right lower le+ Edema present.      Left lower le+ Edema present.   Skin:     General: Skin is cool and dry.      Capillary Refill: Capillary refill takes 2 to 3 seconds.      Findings: Bruising and ecchymosis present.             Comments: Thin frail appearing skin   Neurological:      GCS: GCS eye subscore is 3. GCS verbal subscore is 4. GCS motor subscore is 6.      Comments: Oriented to person and time  Confused to situation and appears to visually hallucinating   Psychiatric:         Attention and Perception: He is inattentive. He perceives visual hallucinations.         Speech: Speech is delayed.         Behavior: Behavior is cooperative.         Judgment: Judgment is impulsive.          Vents:       Lines/Drains/Airways       Peripheral Intravenous Line  Duration                  Peripheral IV - Single Lumen 24 2100 20 G Anterior;Distal;Left Forearm <1 day         Peripheral IV - Single Lumen 24 0321 20 G Posterior;Right Hand <1 day         Peripheral IV - Single Lumen 24 0321 20 G Posterior;Right Wrist <1 day                    Significant Labs:    CBC/Anemia Profile:  Recent Labs   Lab 24  1452   WBC 5.86   HGB 11.5*   HCT 34.4*      *   RDW 14.2        Chemistries:  Recent Labs   Lab 24  1452      K 4.0      CO2 21*   BUN 23   CREATININE 1.5*   CALCIUM 10.1   ALBUMIN 2.7*   PROT 5.7*   BILITOT 0.6   ALKPHOS 105   ALT 18   AST 24       Troponin:   Recent Labs   Lab 24  1452   TROPONINI 0.010     All pertinent labs within the past 24 hours  have been reviewed.    Significant Imaging:   I have reviewed all pertinent imaging results/findings within the past 24 hours.

## 2024-01-24 NOTE — NURSING
Entered room, pt agitated in bed, swinging arms around, approached and asked if he needed assistance. Pt states he needs help up out of his chair. Pt believes he is at home in his chair, gives me the street address for his home. Attempts at reorientation failed, continues to believe he is at home. Doesn't remember coming to the hospital. Breathing seems more labored. 2L O2 per nasal cannula applied. Notified on call provider of mental status change.

## 2024-01-24 NOTE — NURSING
Pt transferred to ICU at this time, bed A16. Report given to MANDEEP Osborn, Belongings taken with patient. Called son, Sven, and left message at this time to return call for update.

## 2024-01-24 NOTE — EICU
EICU BRIEF INITIAL EVALUATION:       HISTORY:      78-year-old gentleman admitted to ICU on dopamine drip for severe bradycardia which did not respond to atropine and glucagon.    History of hypertension, combined CHF, atrial fibrillation, CKD 3, type 2 diabetes, cirrhosis    DVT prophylaxis on apixaban  GI prophylaxis famotidine    /65, P 46, R 27, O2 sat 99 ,   Resting comfortably on nasal cannula    EKG rate 34-atrial flutter with variable AV block, right bundle-branch block, flattened T-waves inferiorly and laterally,  echocardiogram done in October 2020    Echocardiogram done October 2023 with normal LV function, EF 55-65%.  Diastolic function could not be assessed due to atrial fibrillation.  No significant valvular abnormalities      CAMERA ASSESSMENT: Yes      TELEMETRY: Reviewed      NOTES: Reviewed     LABS: Reviewed      IMAGING: Personally reviewed.      DISCUSSED with bedside nurse        ASSESSMENT AND PLAN:       Symptomatic bradycardia improved with dopamine-continue dopamine-titrate down as tolerated, ICU monitoring.  May need home medications adjusted     Acute on chronic kidney disease - avoid nephrotoxins, renally dose medications.  Monitor renal function and urine output.  Gentle hydration      I have reviewed the plan of care for the patient and agree with the plan of care unless noted otherwise above.      LESLEY Henderson MD  eICU Attending  [phone]    This report has been created through the use of M-Modal dictation software. Typographical and content errors may occur with this process. While efforts are made to detect and correct such errors, in some cases errors will persist. For this reason, wording in this document should be considered in the proper context and not strictly verbatim              Start

## 2024-01-24 NOTE — HPI
78 year old male with known medical issues including atrial fib on eliquis and metoprolol; HTN; HFpEF; DM; and cirrhosis on daily lactulose    Presented to ED on 1/23 from UC where eval for weakness/dizziness/diarrhea x 2 days noted bradycardia in 30s  Remained with bradycardia in 30s despite glucagon and atropine trials  Lab eval showed wbc 5.8; creatinine 1.5 (baseline 1.2)  VSS and AAOx3 despite bradycardia  HM admitted to telemetry for close monitoring per cardiology recs

## 2024-01-24 NOTE — H&P
Formerly [facility] - Emergency Dept.  [facility] Medicine  History & Physical    Patient Name: Ramy Romo  MRN: [id number]  Patient Class: OP- Observation  Admission Date: 1/23/2024  Attending Physician: Rebecca Valladares,*   Primary Care Provider: Félix Rollins MD         Patient information was obtained from patient and ER records.     Subjective:     Principal Problem:Symptomatic bradycardia    Chief Complaint:   Chief Complaint   Patient presents with    Bradycardia     Pt has had diarrhea and weakness x 2 days, had an episode of dizziness, HR in 30's, denies any sx now         HPI: Ramy Romo is a 78 y.o. male patient with a PMHx of CHF, HTN, A-fib, DM, cirrhosis, history of stroke who presents to the Emergency Department for evaluation of a bradycardic status which onset today. The pt cargeiver states that the pt went to  this morning and was told to come to the ED because his HR was very low. She states that the pt was very dizzy when he was getting out of the car this morning, and that the neighbor had to help them, so that he would not fall. She also states that the pt hs been experiencing diarrhea and generalized weakness for the past 2 days. Symptoms are constant and moderate in severity. No mitigating or exacerbating factors reported. Patient denies any fever, chills, CP, SOB, headaches, n/v/d, numbness , and all other sxs at this time. Pt states that he is compliant with his home medication regiment. No further complaints or concerns at this time.   Stated having diarrhea for past 2 days, stated improvement in diarrhea today.      In ED, patient glucagon 5mg and atropine 1mg x 2. hr still in the 30's. pt is aao x 3 answering questions appropriately. trop: 0.01, wbc: 5.9, hb: 11.5, cr: 1.5, cxr: naf.  ED provided discussed case with dr agudelo and he recommends hold metoprolol and close monitoring.  Hospital medicine consulted for admission for symptomatic bradycardia.    At baseline, patient  ambulates with cane/ wheelchair in home   Code status- alert and oriented x3 during the discussion, opted for full code         Past Medical History:   Diagnosis Date    Atrial fibrillation     CHF (congestive heart failure)     Diabetes mellitus     Hypertension        Past Surgical History:   Procedure Laterality Date    CHOLECYSTECTOMY      NECK SURGERY      TOTAL ELBOW ARTHROPLASTY Right 03/2019       Review of patient's allergies indicates:   Allergen Reactions    Sulfa (sulfonamide antibiotics) Rash       No current facility-administered medications on file prior to encounter.     Current Outpatient Medications on File Prior to Encounter   Medication Sig    apixaban (ELIQUIS) 5 mg Tab Take 5 mg by mouth 2 (two) times daily.    ferrous gluconate (FERGON) 324 MG tablet Take 324 mg by mouth daily with breakfast.    fluticasone propionate (FLONASE) 50 mcg/actuation nasal spray 1 spray by Each Nostril route every morning.    furosemide (LASIX) 20 MG tablet Take 20 mg by mouth once daily.    lactulose (CHRONULAC) 10 gram/15 mL solution Take 30 g by mouth 2 (two) times daily.    metoprolol succinate (TOPROL-XL) 25 MG 24 hr tablet Take 25 mg by mouth once daily.    NOVOLOG FLEXPEN U-100 INSULIN 100 unit/mL (3 mL) InPn pen Inject into the skin as needed.    spironolactone (ALDACTONE) 25 MG tablet Take 25 mg by mouth once daily.    tamsulosin (FLOMAX) 0.4 mg Cap Take 0.4 mg by mouth once daily.    calcium-vitamin D 250-100 mg-unit per tablet Take 1 tablet by mouth once daily.    [DISCONTINUED] aspirin (ECOTRIN) 81 MG EC tablet Take 81 mg by mouth once daily.    [DISCONTINUED] azelastine (ASTELIN) 137 mcg (0.1 %) nasal spray 1 spray (137 mcg total) by Nasal route 2 (two) times daily.    [DISCONTINUED] blood sugar diagnostic Strp 1 boxes by Misc.(Non-Drug; Combo Route) route 2 (two) times daily.    [DISCONTINUED] blood-glucose meter Misc Use to check blood glucose    [DISCONTINUED] bumetanide (BUMEX) 2 MG tablet Take 2 mg  by mouth once daily.    [DISCONTINUED] gabapentin (NEURONTIN) 300 MG capsule Take 1 capsule (300 mg total) by mouth every evening.    [DISCONTINUED] glipiZIDE (GLUCOTROL) 5 MG tablet Take 1 tablet (5 mg total) by mouth 2 (two) times daily before meals.    [DISCONTINUED] hydrocodone-acetaminophen 7.5-325mg (NORCO) 7.5-325 mg per tablet Take 1 tablet by mouth.    [DISCONTINUED] lancets Misc bib    [DISCONTINUED] levETIRAcetam (KEPPRA) 500 MG Tab Take 500 mg by mouth 2 (two) times daily.    [DISCONTINUED] metoprolol succinate (TOPROL-XL) 50 MG 24 hr tablet Take 50 mg by mouth once daily.    [DISCONTINUED] pantoprazole (PROTONIX) 40 MG tablet Take 1 tablet (40 mg total) by mouth once daily.    [DISCONTINUED] potassium chloride SA (K-DUR,KLOR-CON) 20 MEQ tablet Take 1 tablet by mouth once daily.    [DISCONTINUED] prednisoLONE acetate (PRED FORTE) 1 % DrpS Place 1 drop into the right eye 4 (four) times daily.    [DISCONTINUED] prednisoLONE acetate (PRED FORTE) 1 % DrpS Place 1 drop into the left eye 4 (four) times daily.     Family History       Problem Relation (Age of Onset)    Cataracts Father    Glaucoma Father          Tobacco Use    Smoking status: Never    Smokeless tobacco: Never   Substance and Sexual Activity    Alcohol use: No    Drug use: Not on file    Sexual activity: Not on file     Review of Systems      Constitutional:  Negative for chills and fever.   HENT:  Negative for sore throat.    Respiratory:  Negative for cough and shortness of breath.    Cardiovascular:  Negative for chest pain.   Gastrointestinal:  Positive for diarrhea. Negative for abdominal pain, nausea and vomiting.   Genitourinary:  Negative for dysuria.   Musculoskeletal:  Negative for back pain.   Skin:  Negative for rash.   Neurological:  Positive for dizziness and weakness (generalized). Negative for numbness and headaches.   Hematological:  Does not bruise/bleed easily.     Objective:     Vital Signs (Most Recent):  Temp: 98.3 °F  (36.8 °C) (01/23/24 1424)  Pulse: (!) 39 (01/23/24 1700)  Resp: (!) 27 (01/23/24 1700)  BP: (!) 168/72 (01/23/24 1700)  SpO2: 100 % (01/23/24 1700) Vital Signs (24h Range):  Temp:  [98.3 °F (36.8 °C)] 98.3 °F (36.8 °C)  Pulse:  [36-39] 39  Resp:  [18-27] 27  SpO2:  [98 %-100 %] 100 %  BP: (138-168)/(60-72) 168/72        There is no height or weight on file to calculate BMI.     Physical Exam      Constitutional: Patient is in no acute distress. Well-developed and well-nourished.  Head: Atraumatic. Normocephalic.  Eyes: PERRL. EOM intact. Conjunctivae are not pale. No scleral icterus.  ENT: Mucous membranes are moist. Oropharynx is clear and symmetric.    Neck: Supple. Full ROM. No lymphadenopathy.  Cardiovascular: Bradycardic. Regular rhythm. No murmurs, rubs, or gallops. Distal pulses are 2+ and symmetric.  Pulmonary/Chest: No respiratory distress. Clear to auscultation bilaterally. No wheezing or rales.  Abdominal: Soft and non-distended.  There is no tenderness.  No rebound, guarding, or rigidity. Good bowel sounds.  Genitourinary: No CVA tenderness  Musculoskeletal: Moves all extremities. No obvious deformities. No edema. No calf tenderness.  Skin: Warm and dry.  Neurological:  Alert, awake, and appropriate.  Normal speech.  No acute focal neurological deficits are appreciated.  Psychiatric: Normal affect. Good eye contact. Appropriate in content.     Significant Labs: All pertinent labs within the past 24 hours have been reviewed.  CBC:   Recent Labs   Lab 01/23/24  1452   WBC 5.86   HGB 11.5*   HCT 34.4*        CMP:   Recent Labs   Lab 01/23/24  1452      K 4.0      CO2 21*   GLU 90   BUN 23   CREATININE 1.5*   CALCIUM 10.1   PROT 5.7*   ALBUMIN 2.7*   BILITOT 0.6   ALKPHOS 105   AST 24   ALT 18   ANIONGAP 10       Significant Imaging:     Imaging Results              X-Ray Chest AP Portable (Final result)  Result time 01/23/24 16:15:15      Final result by Raina Green MD  (01/23/24 16:15:15)                   Impression:      No acute cardiopulmonary abnormality.    Sclerotic focus within the proximal right humerus, partially visualized.      Electronically signed by: Raina Green  Date:    01/23/2024  Time:    16:15               Narrative:    EXAMINATION:  XR CHEST AP PORTABLE    CLINICAL HISTORY:  bradycardia;    TECHNIQUE:  Single frontal view of the chest was performed.    COMPARISON:  Chest radiograph 03/29/2018    FINDINGS:  ECG monitoring leads overlie the chest.  A loop recorder is present over the lower medial left hemithorax.  Surgical clips overlie the right axilla and upper right lung.There is bilateral interstitial coarsening, suspected chronic.  Degenerative change at the 1st costochondral junction somewhat obscures evaluation at the lung apices, particularly on the left.  Underlying pulmonary nodule in this region is difficult to exclude.  No significant pleural fluid.  No pneumothorax.  No large consolidation.    The cardiac silhouette is normal in size. There is aortic calcification.    There is degenerative change of the spine and right shoulder.  No acute osseous abnormality.  Sclerotic focus is partially visualized over the proximal right humerus.  Cholecystectomy clips are noted.                                     Assessment/Plan:     * Symptomatic bradycardia  Likely medication induced   In ED, patient glucagon 5mg and atropine 1mg x 2. hr still in the 30's. pt is aao x 3 answering questions appropriately. trop: 0.01, wbc: 5.9, hb: 11.5, cr: 1.5, cxr: naf.  ED provided discussed case with dr agudelo and he recommends hold metoprolol and close monitoring.   Hold AV blocking agents, closely monitor, at this time patient denied dizziness, chest pain, shortness OO breath,; heart rate 39 at the time of examination  NPO after midnight for any possible cardiac interventions in a.m.   Echo   Cardiology follow up             AMEENA (acute kidney injury)  Likely  secondary to intravascular volume depletion   Avoid nephrotoxins, renal dose medications   Follow up on urinalysis    Cirrhosis of liver without ascites  Resume home lactulose   Follow up with /family to obtain complete medication list       Chronic atrial fibrillation  Hold AV blocking drugs given symptomatic bradycardia   Continue Eliquis       Diabetes    Last A1c reviewed-   Lab Results   Component Value Date    HGBA1C 6.5 (H) 10/03/2023     Most recent fingerstick glucose reviewed-   Recent Labs   Lab 01/23/24  1709   POCTGLUCOSE 101     Antihyperglycemics (From admission, onward)      Start     Stop Route Frequency Ordered    01/23/24 1758  insulin aspart U-100 pen 0-5 Units         -- SubQ Before meals & nightly PRN 01/23/24 1659          Hold Oral hypoglycemics while patient is in the hospital.  Hold hypoglycemic agents, sliding scale insulin, glucose POC,      VTE Risk Mitigation (From admission, onward)           Ordered     apixaban tablet 5 mg  2 times daily         01/23/24 1758     IP VTE HIGH RISK PATIENT  Once         01/23/24 1659     Place sequential compression device  Until discontinued         01/23/24 1659                       On 01/23/2024, patient should be placed in hospital observation services under my care.             Rebecca Valladares MD  Department of Hospital Medicine  [facility] - Emergency Dept.

## 2024-01-24 NOTE — NURSING
Notified per monitor room of 10 beat run of Vtach. At pt's bedside at that time, pt denies any chest pain, shortness of breath, or other symptoms. HR remains in the 30s, BP stable at 130/58. On call provider notified at that time.

## 2024-01-24 NOTE — ASSESSMENT & PLAN NOTE
Not on long term insulin  SSI prn with monitoring for glucose control and prevention of insulin toxicity

## 2024-01-24 NOTE — NURSING
Notified per monitor room of patient sustaining HR 28. Pt denies any feelings of dizziness, shortness of breath, or pains. A&O x3, however stated that he is starting to see birds in the room along the wall. Notified on call provider at this time.

## 2024-01-24 NOTE — NURSING
No changes with HR. Pt resting quietly at this time. On call provider notified, will continue to monitor.

## 2024-01-24 NOTE — ASSESSMENT & PLAN NOTE
Last A1c reviewed-   Lab Results   Component Value Date    HGBA1C 6.5 (H) 10/03/2023     Most recent fingerstick glucose reviewed-   Recent Labs   Lab 01/23/24  1709   POCTGLUCOSE 101     Antihyperglycemics (From admission, onward)      Start     Stop Route Frequency Ordered    01/23/24 1758  insulin aspart U-100 pen 0-5 Units         -- SubQ Before meals & nightly PRN 01/23/24 1659          Hold Oral hypoglycemics while patient is in the hospital.  Hold hypoglycemic agents, sliding scale insulin, glucose POC,

## 2024-01-24 NOTE — PROGRESS NOTES
Pharmacist Renal Dose Adjustment Note    Ramy Romo is a 78 y.o. male being treated with the medication famotidine.     Patient Data:    Vital Signs (Most Recent):  Temp: 97.9 °F (36.6 °C) (01/24/24 0700)  Pulse: (!) 43 (01/24/24 0800)  Resp: (!) 30 (01/24/24 0800)  BP: (!) 114/54 (01/24/24 0800)  SpO2: 99 % (01/24/24 0800) Vital Signs (72h Range):  Temp:  [97.4 °F (36.3 °C)-98.8 °F (37.1 °C)]   Pulse:  [28-75]   Resp:  [16-39]   BP: (111-168)/(54-77)   SpO2:  [95 %-100 %]      Recent Labs   Lab 01/23/24  1452 01/24/24  0439   CREATININE 1.5* 1.4     Serum creatinine: 1.4 mg/dL 01/24/24 0439  Estimated creatinine clearance: 53.4 mL/min    Per protocol for CrCl > 50 ml/min, dose will be increased from 20 mg PO once daily to 20 mg PO twice daily.     Pharmacist's Name: Katherine E Mcardle  Pharmacist's Extension: [phone]

## 2024-01-24 NOTE — NURSING
Patient received from cath lab. Dopamine stopped in procedure. Pacer set at VVI 60, ourput of 5 .Pacemaker only capturing intermittently, Cath lab team called Dr. Santacruz and advised to turn off Isoproterenol. KVO set to venous sheath in R groin at 10cc/hr. Site soft, no discoloration or signs of hematoma. Pt resting comfortably.    Pacemaker still not capturing at 100%, Dr. Santacruz at bedside. Verbal order to turn pacemaker to 10mA. Capturing now at 90% with <6 PVC/ min.

## 2024-01-24 NOTE — ASSESSMENT & PLAN NOTE
Suspect related to IVVD  Gentle IV hydration  Strict I/O  Renal dose medications  Monitor creatinine trend

## 2024-01-24 NOTE — H&P (VIEW-ONLY)
Subjective:   Patient ID:  Ramy Romo is a 78 y.o. male who presents for evaluation of Bradycardia (Pt has had diarrhea and weakness x 2 days, had an episode of dizziness, HR in 30's, denies any sx now )      HPI    Ramy Romo is a 78 year old male who presented to [facility] due to 48 hour history of diarrhea and bradycardia from urgent care office.     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 Patient son at bedside to assist with HPI in ICU this AM. Patient has 24 hour caregivers at home which started about a few weeks ago. Patient has limited mobility- uses wheelchair and walker at home. He is able to complete most ADLs with limited assistance.     He follows with Dr La at [facility] regularly. Previous ECHO in October 2023 with normal LVEF. Repeat TTE pending today.     Upon ED evaluation- patient was very dizzy with HR in 30s. Received Glucagon 5 mg and Atropine without any significant improvement noted. Overnight, patient was transferred from Telemetry to ICU due to continued bradycardia with mental status changes. Treated with Isuprel and Dopamine infusion with limited improvement. Temp PPM placed this AM per Dr Santacruz. Post Temp pacer insertion- intermittent capture issues, increased mAs to 10 with capture rate of near 100%, VVI 60 bpm.     Patient seen and examined with son at bedside. Patient awake and alert, oriented to self, pleasant at time of exam today.         Past Medical History:   Diagnosis Date    Atrial fibrillation     CHF (congestive heart failure)     Diabetes mellitus     Hypertension        Past Surgical History:   Procedure Laterality Date    CHOLECYSTECTOMY      NECK SURGERY      TOTAL ELBOW ARTHROPLASTY Right 03/2019       Social History     Tobacco Use    Smoking status: Never    Smokeless tobacco: Never   Substance Use Topics    Alcohol use: No        Family History   Problem Relation Age of Onset    Cataracts Father     Glaucoma Father        Wt Readings from Last 3 Encounters:   01/24/24 97.1 kg (214 lb)   10/16/23 128.4 kg (283 lb)   06/14/23 128.4 kg (283 lb 1.1 oz)     Temp Readings from Last 3 Encounters:   01/24/24 97.9 °F (36.6 °C) (Oral)   04/09/19 97.7 °F (36.5 °C) (Tympanic)   03/15/19 97.7 °F (36.5 °C) (Tympanic)     BP Readings from Last 3 Encounters:   01/24/24 (!) 114/54   04/09/19 126/65   03/15/19 130/66     Pulse Readings from Last 3 Encounters:   01/24/24 (!) 43   04/09/19 69   03/15/19 (!) 48       No current facility-administered medications on file prior to encounter.     Current Outpatient Medications on File Prior to Encounter   Medication Sig Dispense Refill    apixaban (ELIQUIS) 5 mg Tab Take 5 mg by mouth 2 (two) times daily.      ferrous gluconate (FERGON) 324 MG tablet Take 324 mg by mouth daily with breakfast.      fluticasone propionate (FLONASE) 50 mcg/actuation nasal spray 1 spray by Each Nostril route every morning.      furosemide (LASIX) 20 MG tablet Take 20 mg by mouth once daily.      lactulose (CHRONULAC) 10 gram/15 mL solution Take 30 g by mouth 2 (two) times daily.      metoprolol succinate (TOPROL-XL) 25 MG 24 hr tablet Take 25 mg by mouth once daily.      NOVOLOG FLEXPEN U-100 INSULIN 100 unit/mL (3 mL) InPn pen Inject into the skin as needed.      spironolactone (ALDACTONE) 25 MG tablet Take 25 mg by mouth once daily.      tamsulosin (FLOMAX) 0.4 mg Cap Take 0.4 mg by mouth once daily.      calcium-vitamin D 250-100 mg-unit per tablet Take 1 tablet by mouth once daily.         No cardiac monitor results found for the past 12 months    No results found for this or any previous visit.    No results found for this or any previous visit.        Results for orders placed or performed during the hospital encounter of 01/23/24   EKG 12-lead    Collection Time: 01/24/24  6:12 AM    Narrative    Test Reason :  I49.9,    Vent. Rate : 027 BPM     Atrial Rate : 394 BPM     P-R Int : 000 ms          QRS Dur : 150 ms      QT Int : 488 ms       P-R-T Axes : 000 110 069 degrees     QTc Int : 326 ms    Atrial fibrillation with slow ventricular response with ventricular escape  complexes  Right bundle branch block  Left posterior fascicular block   Bifascicular block   Abnormal ECG  When compared with ECG of 24-JAN-2024 06:12,  Previous ECG has undetermined rhythm, needs review  (RBBB and left posterior fascicular block) has replaced Left bundle branch  block    Referred By: [email or web address]   SELF           Confirmed By:          Review of Systems   Constitutional: Positive for malaise/fatigue.   HENT:  Negative for hearing loss and hoarse voice.    Eyes:  Negative for blurred vision and visual disturbance.   Cardiovascular:  Negative for chest pain, claudication, dyspnea on exertion, irregular heartbeat, leg swelling, near-syncope, orthopnea, palpitations, paroxysmal nocturnal dyspnea and syncope.   Respiratory:  Negative for cough, hemoptysis, shortness of breath, sleep disturbances due to breathing, snoring and wheezing.    Endocrine: Negative for cold intolerance and heat intolerance.   Hematologic/Lymphatic: Does not bruise/bleed easily.   Skin:  Negative for color change, dry skin and nail changes.   Musculoskeletal:  Positive for arthritis, back pain and joint pain. Negative for myalgias.   Gastrointestinal:  Negative for bloating, abdominal pain, constipation, nausea and vomiting.   Genitourinary:  Negative for dysuria, flank pain, hematuria and hesitancy.   Neurological:  Positive for dizziness. Negative for headaches, light-headedness, loss of balance, numbness, paresthesias and weakness.   Psychiatric/Behavioral:  Negative for altered mental status.    Allergic/Immunologic: Negative for environmental allergies.         Objective:BP (!) 114/54   Pulse (!) 43   Temp 97.9 °F (36.6 °C) (Oral)   Resp (!) 30   Ht 6' (1.829 m)    Wt 97.1 kg (214 lb)   SpO2 99%   BMI 29.02 kg/m²      Physical Exam  Vitals and nursing note reviewed.   Constitutional:       General: He is not in acute distress.     Appearance: Normal appearance. He is well-developed. He is not ill-appearing.   HENT:      Head: Normocephalic and atraumatic.      Nose: Nose normal.      Mouth/Throat:      Mouth: Mucous membranes are moist.   Eyes:      Pupils: Pupils are equal, round, and reactive to light.   Neck:      Thyroid: No thyromegaly.      Vascular: No JVD.      Trachea: No tracheal deviation.   Cardiovascular:      Rate and Rhythm: Regular rhythm. Bradycardia present.      Chest Wall: PMI is not displaced.      Pulses: Intact distal pulses.           Radial pulses are 2+ on the right side and 2+ on the left side.        Dorsalis pedis pulses are 2+ on the right side and 2+ on the left side.      Heart sounds: S1 normal and S2 normal. Heart sounds not distant. No murmur heard.     Comments: S/P Right femoral Temp Pacer   VVI 60 bpm  10 mA  Pulmonary:      Effort: Pulmonary effort is normal. No respiratory distress.      Breath sounds: Normal breath sounds. No decreased breath sounds, wheezing or rhonchi.   Abdominal:      General: Bowel sounds are normal. There is no distension.      Palpations: Abdomen is soft.      Tenderness: There is no abdominal tenderness.   Musculoskeletal:         General: No swelling. Normal range of motion.      Cervical back: Full passive range of motion without pain, normal range of motion and neck supple.      Right lower leg: No edema.      Left lower leg: No edema.      Right ankle: No swelling.      Left ankle: No swelling.   Skin:     General: Skin is warm and dry.      Capillary Refill: Capillary refill takes less than 2 seconds.      Nails: There is no clubbing.   Neurological:      General: No focal deficit present.      Mental Status: He is alert and oriented to person, place, and time.      Motor: Weakness present.    Psychiatric:         Speech: Speech normal.         Behavior: Behavior normal.         Thought Content: Thought content normal.         Judgment: Judgment normal.         Lab Results   Component Value Date    CHOL 141 06/01/2023    CHOL 141 12/07/2022    CHOL 151 11/19/2021     Lab Results   Component Value Date    HDL 58 06/01/2023    HDL 41 12/07/2022    HDL 45 11/19/2021     Lab Results   Component Value Date    LDLCALC 74 06/01/2023    LDLCALC 85 12/07/2022    LDLCALC 91 11/19/2021     Lab Results   Component Value Date    TRIG 35 06/01/2023    TRIG 76 12/07/2022    TRIG 75 11/19/2021     Lab Results   Component Value Date    CHOLHDL 32.7 03/28/2018    CHOLHDL 25.4 08/22/2006    CHOLHDL 24.6 05/01/2006     [unfilled]  Lab Results   Component Value Date    TSH 1.888 01/24/2024     Lab Results   Component Value Date    INR 1.3 (H) 01/24/2024    INR 1.3 (H) 11/20/2023    INR 1.6 10/13/2022     Lab Results   Component Value Date    WBC 8.16 01/24/2024    HGB 11.3 (L) 01/24/2024    HCT 34.2 (L) 01/24/2024     (H) 01/24/2024     01/24/2024     BNP  Recent Labs   Lab 01/23/24  1452   *     Estimated Creatinine Clearance: 53.4 mL/min (based on SCr of 1.4 mg/dL).     Assessment:      1. Symptomatic bradycardia    2. Bradycardia    3. Chest pain    4. Arrhythmia        Plan:     Symptomatic Bradycardia/Intermittent high grade block  -BB on hold  -s/p Temp PPM implant this AM  -Dopamine and Isuprel infusions stopped post Temp PPM insertion  -Hold Eliquis for planned PPM implant.  -Keep NPO after mN  -Plan for single V lead PPM tomorrow with Dr Brown-Samra Nicole May, FNP-C [facility]      Asked to see patient for bradycardia   I reviewed the chart in detail including all relevant clinical notes, cardiac study reports, lab data and Xrays.  I have reviewed the actual images of all relevant ECG, rhythm, and monitoring tracings obtained during current hospitalization and in past records.   I  have reviewed the actual images of all relevant CXRays.     In short, permanent AF with high grade AV filtering and a pre-existing RBBB with resultant highly Sxc bradycardia in a pat with HTN, DMII, past CVAs and sig neuro and cognitive impairment but still living at home with round the clock assistance availability    Post TPM and with PPM being turned off, conduction was worse and transient CHB with a LAFasc escape bradycardic rhythm noted.     Pat seen by Ms Wilson and recs made by her above. I agree with her  summary as listed. I have also made edits where appropriate.   He will need a Single chamber PPM for V rate support.

## 2024-01-24 NOTE — ASSESSMENT & PLAN NOTE
Suspect s/t IVVD and acute on chronic renal failure with beta blocker use  Hold BB  Gentle hydration  Continuous cardiac monitoring  Add dopamine for goal HR 45 or greater  Cardiology is consulted  Will need transvenous temporary pacemaker if no response to dopamine infusion

## 2024-01-24 NOTE — HOSPITAL COURSE
1/24: bradycardia worsening to mid 20s. BP remained stable. Failed to improve with additional atropine and glucagon. Also has increased confusion . Transferred to ICU for more aggressive management of bradycardia  1/25: PPM placed; needed precedex started last night with increased confusion; not redirectable. Ammonia elevated. May need NGT for lactulose and other meds  1/26: PPM placed yesterday. Ongoing non-redirectable confusion and agitation. Oriented to self and year- difficult to understand. Did not sleep at all last night per nurse report. NGT placed yesterday and lactulose started- ammonia on labs down trending but has not had BM  1/27: precedex restarted last night and given dose  of  haldol. Actually slept a few hours. Much more coherent today and meaningfully interactive with nursing and visitors. Large BM this afternoon

## 2024-01-24 NOTE — H&P
H & P / Consult/ progress note reviewed , pt examined and no changes noted    Renaldo Santacruz MD

## 2024-01-24 NOTE — SUBJECTIVE & OBJECTIVE
Past Medical History:   Diagnosis Date    Atrial fibrillation     CHF (congestive heart failure)     Diabetes mellitus     Hypertension        Past Surgical History:   Procedure Laterality Date    CHOLECYSTECTOMY      NECK SURGERY      TOTAL ELBOW ARTHROPLASTY Right 03/2019       Review of patient's allergies indicates:   Allergen Reactions    Sulfa (sulfonamide antibiotics) Rash       No current facility-administered medications on file prior to encounter.     Current Outpatient Medications on File Prior to Encounter   Medication Sig    apixaban (ELIQUIS) 5 mg Tab Take 5 mg by mouth 2 (two) times daily.    ferrous gluconate (FERGON) 324 MG tablet Take 324 mg by mouth daily with breakfast.    fluticasone propionate (FLONASE) 50 mcg/actuation nasal spray 1 spray by Each Nostril route every morning.    furosemide (LASIX) 20 MG tablet Take 20 mg by mouth once daily.    lactulose (CHRONULAC) 10 gram/15 mL solution Take 30 g by mouth 2 (two) times daily.    metoprolol succinate (TOPROL-XL) 25 MG 24 hr tablet Take 25 mg by mouth once daily.    NOVOLOG FLEXPEN U-100 INSULIN 100 unit/mL (3 mL) InPn pen Inject into the skin as needed.    spironolactone (ALDACTONE) 25 MG tablet Take 25 mg by mouth once daily.    tamsulosin (FLOMAX) 0.4 mg Cap Take 0.4 mg by mouth once daily.    calcium-vitamin D 250-100 mg-unit per tablet Take 1 tablet by mouth once daily.     Family History       Problem Relation (Age of Onset)    Cataracts Father    Glaucoma Father          Tobacco Use    Smoking status: Never    Smokeless tobacco: Never   Substance and Sexual Activity    Alcohol use: No    Drug use: Not on file    Sexual activity: Not on file     Review of Systems   Constitutional: Negative. Negative for weight gain.   HENT: Negative.     Eyes: Negative.    Cardiovascular: Negative.  Negative for chest pain, leg swelling and palpitations.   Respiratory: Negative.  Negative for shortness of breath.    Endocrine: Negative.     Hematologic/Lymphatic: Negative.    Skin: Negative.    Musculoskeletal:  Negative for muscle weakness.   Gastrointestinal: Negative.    Genitourinary: Negative.    Neurological: Negative.  Negative for dizziness.   Psychiatric/Behavioral: Negative.     Allergic/Immunologic: Negative.    All other systems reviewed and are negative.    Objective:     Vital Signs (Most Recent):  Temp: 98.8 °F (37.1 °C) (01/24/24 0345)  Pulse: (Abnormal) 43 (01/24/24 0800)  Resp: (Abnormal) 30 (01/24/24 0800)  BP: (Abnormal) 114/54 (01/24/24 0800)  SpO2: 99 % (01/24/24 0800) Vital Signs (24h Range):  Temp:  [97.4 °F (36.3 °C)-98.8 °F (37.1 °C)] 98.8 °F (37.1 °C)  Pulse:  [28-75] 43  Resp:  [16-39] 30  SpO2:  [95 %-100 %] 99 %  BP: (111-168)/(54-77) 114/54     Weight: 97.1 kg (214 lb)  Body mass index is 29.02 kg/m².    SpO2: 99 %         Intake/Output Summary (Last 24 hours) at 1/24/2024 0837  Last data filed at 1/24/2024 0600  Gross per 24 hour   Intake 500.17 ml   Output no documentation   Net 500.17 ml       Lines/Drains/Airways       Peripheral Intravenous Line       Name Duration         Peripheral IV - Single Lumen 01/24/24 0321 20 G Posterior;Right Hand <1 day         Peripheral IV - Single Lumen 01/24/24 0321 20 G Posterior;Right Wrist <1 day                     Physical Exam  Vitals and nursing note reviewed.   Constitutional:       Appearance: He is well-developed.   HENT:      Head: Normocephalic and atraumatic.   Eyes:      Conjunctiva/sclera: Conjunctivae normal.      Pupils: Pupils are equal, round, and reactive to light.   Cardiovascular:      Rate and Rhythm: Normal rate and regular rhythm.      Pulses: Intact distal pulses.      Heart sounds: Normal heart sounds.   Pulmonary:      Effort: Pulmonary effort is normal.      Breath sounds: Normal breath sounds.   Abdominal:      General: Bowel sounds are normal.      Palpations: Abdomen is soft.   Musculoskeletal:      Cervical back: Normal range of motion and neck  supple.   Skin:     General: Skin is warm and dry.   Neurological:      Mental Status: He is alert and oriented to person, place, and time.          Significant Labs: All pertinent lab results from the last 24 hours have been reviewed.    Significant Imaging: X-Ray: CXR: X-Ray Chest 1 View (CXR): No results found for this visit on 01/23/24.

## 2024-01-24 NOTE — SIGNIFICANT EVENT
No eason in bradycardia- HR 25-28-afib/flutter. No response to a total of Atropine 1 mg. Pt more drowsy and confused on exam. BP stable. Discussed with Critical care- will transfer to ICU.   Critical care time spent on the evaluation and treatment of severe organ dysfunction, review of pertinent labs and imaging studies, discussions with consulting providers and discussions with patient/family: 60 minutes.

## 2024-01-24 NOTE — SIGNIFICANT EVENT
Notified of bradycardia 25-28 b/min with Afib/flutter underlying rhythm. Pt AAOx3, BP stable. Pt having intermittent visual hallucinations. Atropine 0.5mg IV and Glucagon 5mg IV ordered. STAT EKG to check for heart block

## 2024-01-24 NOTE — HPI
78 y.o. male patient with a PMHx of CHF, HTN, A-fib, DM, cirrhosis, history of stroke  presents with slow afib. Initially pt was hemodynamically stable in telemetery HR 30s , asymptomatic,  Pt with HR 20s then and symptomatic and transferred to ICU. Pt cardiologist is Dr. La /ANITA.Patient denies CP, angina or anginal equivalent.Pt started on dopamine but HR still 27-30s.

## 2024-01-24 NOTE — NURSING
Pt intermittently confused, responsive to voice and oriented to self only. Dr. Santacruz notified that pt having intermittent pauses and switching in and out of a wide qrs bradycardia, sustaining 20's. Dopamine infusing at max dose of 10 mg/kg/min. Called pharmacy for Isoproterenol, syringe ordered and sent. Syringe returned to pharmacy and Infusion started at 745.

## 2024-01-24 NOTE — CONSULTS
[facility] - Intensive Care ([facility])  Wound Care    Patient Name:  Ramy Romo   MRN:  [id number]  Date: 1/24/2024  Diagnosis: Symptomatic bradycardia    History:     Past Medical History:   Diagnosis Date    Atrial fibrillation     CHF (congestive heart failure)     Diabetes mellitus     Hypertension        Social History     Socioeconomic History    Marital status:    Tobacco Use    Smoking status: Never    Smokeless tobacco: Never   Substance and Sexual Activity    Alcohol use: No       Precautions:     Allergies as of 01/23/2024 - Reviewed 01/23/2024   Allergen Reaction Noted    Sulfa (sulfonamide antibiotics) Rash 05/05/2012       WOC Assessment Details/Treatment     Consulted on Mr. Romo for multiple present on admission wounds. He is seen in ICU.   Intertrigo noted to left lower abdomen/scrotum with moist redness. Cleansed with bath wipes. Antifungal powder in use per MAR, interdry cut to size and applied to left lower abdominal site, taped at corners to secure.  Skin tear to left wrist with Steri strips in place, scant serous drainage. Cleansed with saline and patted dry, foam dressing applied.   Multiple small Skin tears and ulcerations noted to left arm/elbow region. Intact well healed scar from elbow surgery also noted, right arm/elbow edema. Cleansed all with saline and patted dry. Painted intact dell wound skin with cavilon, foam dressings applied.  Heels intact with no redness, floated with pillow.  Sacrum stage 2 pressure injury noted with nonblanchable redness noted in linear shape in center of sacrum. Painted with cavilon and foam dressing maintained.  Recommend turn q2 hours.       01/24/24 0945   WOCN Assessment   WOCN Total Time (mins) 60   Visit Date 01/24/24   Visit Time 0945   Consult Type New   WOCN Speciality Wound   Wound pressure;skin tear;moisture;At risk for pressure Injury;other        Altered Skin Integrity 01/23/24 1900 Right posterior Elbow Skin Tear   Date First  Assessed/Time First Assessed: 01/23/24 1900   Altered Skin Integrity Present on Admission - Did Patient arrive to the hospital with altered skin?: yes  Side: Right  Orientation: posterior  Location: Elbow  Primary Wound Type: (c) Skin Tear   Wound Image     Dressing Appearance Intact   Drainage Amount Small   Drainage Characteristics/Odor Serosanguineous   Appearance Pink;Yellow;Slough;Moist   Tissue loss description Full thickness   Periwound Area Swelling   Wound Edges Open   Care Cleansed with:;Wound cleanser;Applied:;Skin Barrier   Dressing Foam;Changed   Dressing Change Due 01/31/24        Altered Skin Integrity 01/24/24 medial Sacral spine Intact skin with non-blanchable redness of localized area   Date First Assessed: 01/24/24   Altered Skin Integrity Present on Admission - Did Patient arrive to the hospital with altered skin?: yes  Orientation: medial  Location: Sacral spine  Description of Altered Skin Integrity: Intact skin with non-blanchab...   Wound Image    Description of Altered Skin Integrity Intact skin with non-blanchable redness of localized area   Dressing Appearance Intact   Drainage Amount None   Appearance Red   Tissue loss description Not applicable   Wound Edges Defined   Wound Length (cm) 5 cm   Wound Width (cm) 0.2 cm   Wound Surface Area (cm^2) 1 cm^2   Care Cleansed with:;Wound cleanser;Applied:;Skin Barrier   Dressing Foam   Dressing Change Due 01/26/24        Altered Skin Integrity 01/24/24 Left posterior Wrist Skin Tear   Date First Assessed: 01/24/24   Altered Skin Integrity Present on Admission - Did Patient arrive to the hospital with altered skin?: yes  Side: Left  Orientation: posterior  Location: Wrist  Primary Wound Type: Skin Tear   Wound Image    Drainage Amount Small   Drainage Characteristics/Odor Serosanguineous   Appearance Red;Moist;Steri-strips intact   Tissue loss description Partial thickness   Periwound Area Ecchymotic   Wound Edges Approximated   Care Cleansed  with:;Sterile normal saline;Applied:;Skin Barrier   Dressing Foam;Changed   Dressing Change Due 01/31/24        Altered Skin Integrity 01/24/24 Left anterior Pelvis Intertrigo   Date First Assessed: 01/24/24   Altered Skin Integrity Present on Admission - Did Patient arrive to the hospital with altered skin?: yes  Side: Left  Orientation: anterior  Location: Pelvis  Primary Wound Type: Intertrigo   Wound Image    Dressing Appearance Open to air   Drainage Amount None   Appearance Red   Care Cleansed with:;Soap and water;Applied:  (antifungal powder in use per MAR)   Dressing Applied  (INTERDRY)       Recommendations made to primary team for wound care, pressure injury prevention interventions. Orders placed.     01/24/2024

## 2024-01-24 NOTE — CONSULTS
[facility] ([facility])  Cardiology  Consult Note    Patient Name: Ramy Romo  MRN: [id number]  Admission Date: 1/23/2024  Hospital Length of Stay: 0 days  Code Status: Full Code   Attending Provider: Digna Gonzales MD   Consulting Provider: Renaldo Santacruz MD  Primary Care Physician: Félix Rollins MD  Principal Problem:Symptomatic bradycardia    Patient information was obtained from patient and ER records.     Inpatient consult to Cardiology  Consult performed by: Renaldo Santacruz MD  Consult ordered by: Rebecca Valladares MD        Subjective:     Chief Complaint:  bradycardia     HPI:   78 y.o. male patient with a PMHx of CHF, HTN, A-fib, DM, cirrhosis, history of stroke  presents with slow afib. Initially pt was hemodynamically stable in telemetery HR 30s , asymptomatic,  Pt with HR 20s then and symptomatic and transferred to ICU. Pt cardiologist is Dr. La /ANITA.Patient denies CP, angina or anginal equivalent.Pt started on dopamine but HR still 27-30s.    Past Medical History:   Diagnosis Date    Atrial fibrillation     CHF (congestive heart failure)     Diabetes mellitus     Hypertension        Past Surgical History:   Procedure Laterality Date    CHOLECYSTECTOMY      NECK SURGERY      TOTAL ELBOW ARTHROPLASTY Right 03/2019       Review of patient's allergies indicates:   Allergen Reactions    Sulfa (sulfonamide antibiotics) Rash       No current facility-administered medications on file prior to encounter.     Current Outpatient Medications on File Prior to Encounter   Medication Sig    apixaban (ELIQUIS) 5 mg Tab Take 5 mg by mouth 2 (two) times daily.    ferrous gluconate (FERGON) 324 MG tablet Take 324 mg by mouth daily with breakfast.    fluticasone propionate (FLONASE) 50 mcg/actuation nasal spray 1 spray by Each Nostril route every morning.    furosemide (LASIX) 20 MG tablet Take 20 mg by mouth once daily.    lactulose (CHRONULAC) 10 gram/15 mL solution Take 30 g by  mouth 2 (two) times daily.    metoprolol succinate (TOPROL-XL) 25 MG 24 hr tablet Take 25 mg by mouth once daily.    NOVOLOG FLEXPEN U-100 INSULIN 100 unit/mL (3 mL) InPn pen Inject into the skin as needed.    spironolactone (ALDACTONE) 25 MG tablet Take 25 mg by mouth once daily.    tamsulosin (FLOMAX) 0.4 mg Cap Take 0.4 mg by mouth once daily.    calcium-vitamin D 250-100 mg-unit per tablet Take 1 tablet by mouth once daily.     Family History       Problem Relation (Age of Onset)    Cataracts Father    Glaucoma Father          Tobacco Use    Smoking status: Never    Smokeless tobacco: Never   Substance and Sexual Activity    Alcohol use: No    Drug use: Not on file    Sexual activity: Not on file     Review of Systems   Constitutional: Negative. Negative for weight gain.   HENT: Negative.     Eyes: Negative.    Cardiovascular: Negative.  Negative for chest pain, leg swelling and palpitations.   Respiratory: Negative.  Negative for shortness of breath.    Endocrine: Negative.    Hematologic/Lymphatic: Negative.    Skin: Negative.    Musculoskeletal:  Negative for muscle weakness.   Gastrointestinal: Negative.    Genitourinary: Negative.    Neurological: Negative.  Negative for dizziness.   Psychiatric/Behavioral: Negative.     Allergic/Immunologic: Negative.    All other systems reviewed and are negative.    Objective:     Vital Signs (Most Recent):  Temp: 98.8 °F (37.1 °C) (01/24/24 0345)  Pulse: (Abnormal) 43 (01/24/24 0800)  Resp: (Abnormal) 30 (01/24/24 0800)  BP: (Abnormal) 114/54 (01/24/24 0800)  SpO2: 99 % (01/24/24 0800) Vital Signs (24h Range):  Temp:  [97.4 °F (36.3 °C)-98.8 °F (37.1 °C)] 98.8 °F (37.1 °C)  Pulse:  [28-75] 43  Resp:  [16-39] 30  SpO2:  [95 %-100 %] 99 %  BP: (111-168)/(54-77) 114/54     Weight: 97.1 kg (214 lb)  Body mass index is 29.02 kg/m².    SpO2: 99 %         Intake/Output Summary (Last 24 hours) at 1/24/2024 0837  Last data filed at 1/24/2024 0600  Gross per 24 hour   Intake  500.17 ml   Output no documentation   Net 500.17 ml       Lines/Drains/Airways       Peripheral Intravenous Line       Name Duration         Peripheral IV - Single Lumen 01/24/24 0321 20 G Posterior;Right Hand <1 day         Peripheral IV - Single Lumen 01/24/24 0321 20 G Posterior;Right Wrist <1 day                     Physical Exam  Vitals and nursing note reviewed.   Constitutional:       Appearance: He is well-developed.   HENT:      Head: Normocephalic and atraumatic.   Eyes:      Conjunctiva/sclera: Conjunctivae normal.      Pupils: Pupils are equal, round, and reactive to light.   Cardiovascular:      Rate and Rhythm: Normal rate and regular rhythm.      Pulses: Intact distal pulses.      Heart sounds: Normal heart sounds.   Pulmonary:      Effort: Pulmonary effort is normal.      Breath sounds: Normal breath sounds.   Abdominal:      General: Bowel sounds are normal.      Palpations: Abdomen is soft.   Musculoskeletal:      Cervical back: Normal range of motion and neck supple.   Skin:     General: Skin is warm and dry.   Neurological:      Mental Status: He is alert and oriented to person, place, and time.          Significant Labs: All pertinent lab results from the last 24 hours have been reviewed.    Significant Imaging: X-Ray: CXR: X-Ray Chest 1 View (CXR): No results found for this visit on 01/23/24.  Assessment and Plan:     * Symptomatic bradycardia  78 y.o. male patient with a PMHx of CHF, HTN, A-fib, DM, cirrhosis, history of stroke  presents with slow afib. Initially pt was hemodynamically stable in telemetery HR 30s , asymptomatic,  Pt with HR 20s then and symptomatic and transferred to ICU. Pt cardiologist is Dr. La /ANITA.Patient denies CP, angina or anginal equivalent.Pt started on dopamine but HR still 27-30s.      Isopril started with improvement, temporary PPM this morning. If still bradycardic tomorrow, permanent PPM  Not pt took eliquis yesterday, needs to be > 48 hrs for  procedure        VTE Risk Mitigation (From admission, onward)           Ordered     apixaban tablet 5 mg  2 times daily         01/23/24 1758     IP VTE HIGH RISK PATIENT  Once         01/23/24 1659     Place sequential compression device  Until discontinued         01/23/24 1659                    Thank you for your consult. I will follow-up with patient. Please contact us if you have any additional questions.    Renaldo Santacruz MD  Cardiology   [facility] ([facility])

## 2024-01-25 ENCOUNTER — ANESTHESIA EVENT (OUTPATIENT)
Dept: CARDIOLOGY | Facility: HOSPITAL | Age: 79
DRG: 242 | End: 2024-01-25
Payer: MEDICARE

## 2024-01-25 ENCOUNTER — ANESTHESIA (OUTPATIENT)
Dept: CARDIOLOGY | Facility: HOSPITAL | Age: 79
DRG: 242 | End: 2024-01-25
Payer: MEDICARE

## 2024-01-25 DIAGNOSIS — R00.1 SYMPTOMATIC BRADYCARDIA: ICD-10-CM

## 2024-01-25 DIAGNOSIS — Z95.0 CARDIAC PACEMAKER IN SITU: Primary | ICD-10-CM

## 2024-01-25 LAB
ALBUMIN SERPL BCP-MCNC: 2.5 G/DL (ref 3.5–5.2)
ALP SERPL-CCNC: 92 U/L (ref 55–135)
ALT SERPL W/O P-5'-P-CCNC: 13 U/L (ref 10–44)
AMMONIA PLAS-SCNC: 101 UMOL/L (ref 10–50)
ANION GAP SERPL CALC-SCNC: 7 MMOL/L (ref 8–16)
ANION GAP SERPL CALC-SCNC: 7 MMOL/L (ref 8–16)
AST SERPL-CCNC: 20 U/L (ref 10–40)
BASOPHILS # BLD AUTO: 0.02 K/UL (ref 0–0.2)
BASOPHILS # BLD AUTO: 0.02 K/UL (ref 0–0.2)
BASOPHILS NFR BLD: 0.3 % (ref 0–1.9)
BASOPHILS NFR BLD: 0.3 % (ref 0–1.9)
BILIRUB SERPL-MCNC: 0.8 MG/DL (ref 0.1–1)
BUN SERPL-MCNC: 32 MG/DL (ref 8–23)
BUN SERPL-MCNC: 32 MG/DL (ref 8–23)
CALCIUM SERPL-MCNC: 9 MG/DL (ref 8.7–10.5)
CALCIUM SERPL-MCNC: 9 MG/DL (ref 8.7–10.5)
CHLORIDE SERPL-SCNC: 112 MMOL/L (ref 95–110)
CHLORIDE SERPL-SCNC: 112 MMOL/L (ref 95–110)
CO2 SERPL-SCNC: 20 MMOL/L (ref 23–29)
CO2 SERPL-SCNC: 20 MMOL/L (ref 23–29)
CREAT SERPL-MCNC: 1.5 MG/DL (ref 0.5–1.4)
CREAT SERPL-MCNC: 1.5 MG/DL (ref 0.5–1.4)
DIFFERENTIAL METHOD BLD: ABNORMAL
DIFFERENTIAL METHOD BLD: ABNORMAL
EOSINOPHIL # BLD AUTO: 0.3 K/UL (ref 0–0.5)
EOSINOPHIL # BLD AUTO: 0.3 K/UL (ref 0–0.5)
EOSINOPHIL NFR BLD: 3.9 % (ref 0–8)
EOSINOPHIL NFR BLD: 3.9 % (ref 0–8)
ERYTHROCYTE [DISTWIDTH] IN BLOOD BY AUTOMATED COUNT: 14.2 % (ref 11.5–14.5)
ERYTHROCYTE [DISTWIDTH] IN BLOOD BY AUTOMATED COUNT: 14.2 % (ref 11.5–14.5)
EST. GFR  (NO RACE VARIABLE): 48 ML/MIN/1.73 M^2
EST. GFR  (NO RACE VARIABLE): 48 ML/MIN/1.73 M^2
FOLATE SERPL-MCNC: 12.2 NG/ML (ref 4–24)
GLUCOSE SERPL-MCNC: 144 MG/DL (ref 70–110)
GLUCOSE SERPL-MCNC: 144 MG/DL (ref 70–110)
HCT VFR BLD AUTO: 32.2 % (ref 40–54)
HCT VFR BLD AUTO: 32.2 % (ref 40–54)
HGB BLD-MCNC: 11.1 G/DL (ref 14–18)
HGB BLD-MCNC: 11.1 G/DL (ref 14–18)
IMM GRANULOCYTES # BLD AUTO: 0.02 K/UL (ref 0–0.04)
IMM GRANULOCYTES # BLD AUTO: 0.02 K/UL (ref 0–0.04)
IMM GRANULOCYTES NFR BLD AUTO: 0.3 % (ref 0–0.5)
IMM GRANULOCYTES NFR BLD AUTO: 0.3 % (ref 0–0.5)
INR PPP: 1.4 (ref 0.8–1.2)
LYMPHOCYTES # BLD AUTO: 1.2 K/UL (ref 1–4.8)
LYMPHOCYTES # BLD AUTO: 1.2 K/UL (ref 1–4.8)
LYMPHOCYTES NFR BLD: 18.9 % (ref 18–48)
LYMPHOCYTES NFR BLD: 18.9 % (ref 18–48)
MAGNESIUM SERPL-MCNC: 1.7 MG/DL (ref 1.6–2.6)
MAGNESIUM SERPL-MCNC: 1.7 MG/DL (ref 1.6–2.6)
MCH RBC QN AUTO: 35.2 PG (ref 27–31)
MCH RBC QN AUTO: 35.2 PG (ref 27–31)
MCHC RBC AUTO-ENTMCNC: 34.5 G/DL (ref 32–36)
MCHC RBC AUTO-ENTMCNC: 34.5 G/DL (ref 32–36)
MCV RBC AUTO: 102 FL (ref 82–98)
MCV RBC AUTO: 102 FL (ref 82–98)
MONOCYTES # BLD AUTO: 0.6 K/UL (ref 0.3–1)
MONOCYTES # BLD AUTO: 0.6 K/UL (ref 0.3–1)
MONOCYTES NFR BLD: 9.6 % (ref 4–15)
MONOCYTES NFR BLD: 9.6 % (ref 4–15)
NEUTROPHILS # BLD AUTO: 4.3 K/UL (ref 1.8–7.7)
NEUTROPHILS # BLD AUTO: 4.3 K/UL (ref 1.8–7.7)
NEUTROPHILS NFR BLD: 67 % (ref 38–73)
NEUTROPHILS NFR BLD: 67 % (ref 38–73)
NRBC BLD-RTO: 0 /100 WBC
NRBC BLD-RTO: 0 /100 WBC
PHOSPHATE SERPL-MCNC: 3.1 MG/DL (ref 2.7–4.5)
PHOSPHATE SERPL-MCNC: 3.1 MG/DL (ref 2.7–4.5)
PLATELET # BLD AUTO: 138 K/UL (ref 150–450)
PLATELET # BLD AUTO: 138 K/UL (ref 150–450)
PMV BLD AUTO: 9.6 FL (ref 9.2–12.9)
PMV BLD AUTO: 9.6 FL (ref 9.2–12.9)
POCT GLUCOSE: 111 MG/DL (ref 70–110)
POCT GLUCOSE: 162 MG/DL (ref 70–110)
POCT GLUCOSE: 182 MG/DL (ref 70–110)
POCT GLUCOSE: 190 MG/DL (ref 70–110)
POTASSIUM SERPL-SCNC: 5 MMOL/L (ref 3.5–5.1)
POTASSIUM SERPL-SCNC: 5 MMOL/L (ref 3.5–5.1)
PROT SERPL-MCNC: 5.3 G/DL (ref 6–8.4)
PROTHROMBIN TIME: 14.2 SEC (ref 9–12.5)
RBC # BLD AUTO: 3.15 M/UL (ref 4.6–6.2)
RBC # BLD AUTO: 3.15 M/UL (ref 4.6–6.2)
SODIUM SERPL-SCNC: 139 MMOL/L (ref 136–145)
SODIUM SERPL-SCNC: 139 MMOL/L (ref 136–145)
VIT B12 SERPL-MCNC: >2000 PG/ML (ref 210–950)
WBC # BLD AUTO: 6.45 K/UL (ref 3.9–12.7)
WBC # BLD AUTO: 6.45 K/UL (ref 3.9–12.7)

## 2024-01-25 PROCEDURE — 36415 COLL VENOUS BLD VENIPUNCTURE: CPT | Performed by: INTERNAL MEDICINE

## 2024-01-25 PROCEDURE — C1785 PMKR, DUAL, RATE-RESP: HCPCS | Performed by: INTERNAL MEDICINE

## 2024-01-25 PROCEDURE — 25000003 PHARM REV CODE 250: Performed by: INTERNAL MEDICINE

## 2024-01-25 PROCEDURE — C9399 UNCLASSIFIED DRUGS OR BIOLOG: HCPCS | Performed by: INTERNAL MEDICINE

## 2024-01-25 PROCEDURE — 84100 ASSAY OF PHOSPHORUS: CPT | Performed by: NURSE PRACTITIONER

## 2024-01-25 PROCEDURE — 63600175 PHARM REV CODE 636 W HCPCS: Performed by: INTERNAL MEDICINE

## 2024-01-25 PROCEDURE — 82140 ASSAY OF AMMONIA: CPT | Performed by: INTERNAL MEDICINE

## 2024-01-25 PROCEDURE — 63600175 PHARM REV CODE 636 W HCPCS: Performed by: NURSE ANESTHETIST, CERTIFIED REGISTERED

## 2024-01-25 PROCEDURE — 99499 UNLISTED E&M SERVICE: CPT | Mod: ,,, | Performed by: INTERNAL MEDICINE

## 2024-01-25 PROCEDURE — 25500020 PHARM REV CODE 255: Performed by: INTERNAL MEDICINE

## 2024-01-25 PROCEDURE — C1769 GUIDE WIRE: HCPCS | Performed by: INTERNAL MEDICINE

## 2024-01-25 PROCEDURE — 99900035 HC TECH TIME PER 15 MIN (STAT)

## 2024-01-25 PROCEDURE — 0JH604Z INSERTION OF PACEMAKER, SINGLE CHAMBER INTO CHEST SUBCUTANEOUS TISSUE AND FASCIA, OPEN APPROACH: ICD-10-PCS | Performed by: INTERNAL MEDICINE

## 2024-01-25 PROCEDURE — 93010 ELECTROCARDIOGRAM REPORT: CPT | Mod: ,,, | Performed by: INTERNAL MEDICINE

## 2024-01-25 PROCEDURE — 83735 ASSAY OF MAGNESIUM: CPT | Performed by: NURSE PRACTITIONER

## 2024-01-25 PROCEDURE — 80053 COMPREHEN METABOLIC PANEL: CPT | Performed by: INTERNAL MEDICINE

## 2024-01-25 PROCEDURE — 63600175 PHARM REV CODE 636 W HCPCS: Performed by: NURSE PRACTITIONER

## 2024-01-25 PROCEDURE — 85610 PROTHROMBIN TIME: CPT | Performed by: INTERNAL MEDICINE

## 2024-01-25 PROCEDURE — C1898 LEAD, PMKR, OTHER THAN TRANS: HCPCS | Performed by: INTERNAL MEDICINE

## 2024-01-25 PROCEDURE — 25000003 PHARM REV CODE 250: Performed by: NURSE PRACTITIONER

## 2024-01-25 PROCEDURE — 33207 INSERT HEART PM VENTRICULAR: CPT | Mod: KX,,, | Performed by: INTERNAL MEDICINE

## 2024-01-25 PROCEDURE — 37000008 HC ANESTHESIA 1ST 15 MINUTES: Performed by: INTERNAL MEDICINE

## 2024-01-25 PROCEDURE — 33207 INSERT HEART PM VENTRICULAR: CPT | Mod: KX | Performed by: INTERNAL MEDICINE

## 2024-01-25 PROCEDURE — 27000221 HC OXYGEN, UP TO 24 HOURS

## 2024-01-25 PROCEDURE — C1894 INTRO/SHEATH, NON-LASER: HCPCS | Performed by: INTERNAL MEDICINE

## 2024-01-25 PROCEDURE — 99232 SBSQ HOSP IP/OBS MODERATE 35: CPT | Mod: ,,, | Performed by: INTERNAL MEDICINE

## 2024-01-25 PROCEDURE — 37000009 HC ANESTHESIA EA ADD 15 MINS: Performed by: INTERNAL MEDICINE

## 2024-01-25 PROCEDURE — 20000000 HC ICU ROOM

## 2024-01-25 PROCEDURE — C9399 UNCLASSIFIED DRUGS OR BIOLOG: HCPCS | Performed by: NURSE PRACTITIONER

## 2024-01-25 PROCEDURE — 93005 ELECTROCARDIOGRAM TRACING: CPT

## 2024-01-25 PROCEDURE — 94761 N-INVAS EAR/PLS OXIMETRY MLT: CPT

## 2024-01-25 PROCEDURE — 93010 ELECTROCARDIOGRAM REPORT: CPT | Mod: 76,,, | Performed by: INTERNAL MEDICINE

## 2024-01-25 PROCEDURE — 02HK3JZ INSERTION OF PACEMAKER LEAD INTO RIGHT VENTRICLE, PERCUTANEOUS APPROACH: ICD-10-PCS | Performed by: INTERNAL MEDICINE

## 2024-01-25 PROCEDURE — 85025 COMPLETE CBC W/AUTO DIFF WBC: CPT | Performed by: NURSE PRACTITIONER

## 2024-01-25 DEVICE — IMPLANTABLE DEVICE
Type: IMPLANTABLE DEVICE | Site: HEART | Status: FUNCTIONAL
Brand: SOLIA

## 2024-01-25 DEVICE — IMPLANTABLE DEVICE
Type: IMPLANTABLE DEVICE | Site: CHEST | Status: FUNCTIONAL
Brand: EDORA 8 SR-T

## 2024-01-25 RX ORDER — LACTULOSE 10 G/15ML
30 SOLUTION ORAL 3 TIMES DAILY
Status: DISCONTINUED | OUTPATIENT
Start: 2024-01-25 | End: 2024-01-26

## 2024-01-25 RX ORDER — LIDOCAINE HYDROCHLORIDE 20 MG/ML
INJECTION, SOLUTION EPIDURAL; INFILTRATION; INTRACAUDAL; PERINEURAL
Status: DISCONTINUED | OUTPATIENT
Start: 2024-01-25 | End: 2024-01-25

## 2024-01-25 RX ORDER — MAGNESIUM SULFATE HEPTAHYDRATE 40 MG/ML
2 INJECTION, SOLUTION INTRAVENOUS ONCE
Status: COMPLETED | OUTPATIENT
Start: 2024-01-25 | End: 2024-01-25

## 2024-01-25 RX ORDER — ONDANSETRON HYDROCHLORIDE 2 MG/ML
INJECTION, SOLUTION INTRAVENOUS
Status: DISCONTINUED | OUTPATIENT
Start: 2024-01-25 | End: 2024-01-25

## 2024-01-25 RX ORDER — HYDRALAZINE HYDROCHLORIDE 20 MG/ML
10 INJECTION INTRAMUSCULAR; INTRAVENOUS EVERY 6 HOURS PRN
Status: DISCONTINUED | OUTPATIENT
Start: 2024-01-25 | End: 2024-01-30 | Stop reason: HOSPADM

## 2024-01-25 RX ORDER — DEXMEDETOMIDINE HYDROCHLORIDE 4 UG/ML
0-1.4 INJECTION INTRAVENOUS CONTINUOUS
Status: DISCONTINUED | OUTPATIENT
Start: 2024-01-25 | End: 2024-01-27

## 2024-01-25 RX ORDER — FAMOTIDINE 10 MG/ML
20 INJECTION INTRAVENOUS DAILY
Status: DISCONTINUED | OUTPATIENT
Start: 2024-01-25 | End: 2024-01-26

## 2024-01-25 RX ORDER — PHENYLEPHRINE HYDROCHLORIDE 10 MG/ML
INJECTION INTRAVENOUS
Status: DISCONTINUED | OUTPATIENT
Start: 2024-01-25 | End: 2024-01-25

## 2024-01-25 RX ORDER — VANCOMYCIN HYDROCHLORIDE 1 G/20ML
INJECTION, POWDER, LYOPHILIZED, FOR SOLUTION INTRAVENOUS
Status: DISCONTINUED | OUTPATIENT
Start: 2024-01-25 | End: 2024-01-25

## 2024-01-25 RX ORDER — PROPOFOL 10 MG/ML
VIAL (ML) INTRAVENOUS CONTINUOUS PRN
Status: DISCONTINUED | OUTPATIENT
Start: 2024-01-25 | End: 2024-01-25

## 2024-01-25 RX ADMIN — MICONAZOLE NITRATE: 20 POWDER TOPICAL at 09:01

## 2024-01-25 RX ADMIN — DEXMEDETOMIDINE HYDROCHLORIDE 1 MCG/KG/HR: 4 INJECTION INTRAVENOUS at 10:01

## 2024-01-25 RX ADMIN — DEXMEDETOMIDINE HYDROCHLORIDE 1 MCG/KG/HR: 4 INJECTION INTRAVENOUS at 06:01

## 2024-01-25 RX ADMIN — MUPIROCIN: 20 OINTMENT TOPICAL at 09:01

## 2024-01-25 RX ADMIN — HYDRALAZINE HYDROCHLORIDE 10 MG: 20 INJECTION, SOLUTION INTRAMUSCULAR; INTRAVENOUS at 10:01

## 2024-01-25 RX ADMIN — DEXMEDETOMIDINE HYDROCHLORIDE 0.7 MCG/KG/HR: 4 INJECTION INTRAVENOUS at 01:01

## 2024-01-25 RX ADMIN — PROPOFOL 50 MCG/KG/MIN: 10 INJECTION, EMULSION INTRAVENOUS at 01:01

## 2024-01-25 RX ADMIN — MAGNESIUM SULFATE HEPTAHYDRATE 2 G: 40 INJECTION, SOLUTION INTRAVENOUS at 05:01

## 2024-01-25 RX ADMIN — PHENYLEPHRINE HYDROCHLORIDE 200 MCG: 10 INJECTION INTRAVENOUS at 03:01

## 2024-01-25 RX ADMIN — LACTULOSE 30 G: 20 SOLUTION ORAL at 09:01

## 2024-01-25 RX ADMIN — DEXMEDETOMIDINE HYDROCHLORIDE 1 MCG/KG/HR: 4 INJECTION INTRAVENOUS at 05:01

## 2024-01-25 RX ADMIN — MICONAZOLE NITRATE: 20 POWDER TOPICAL at 08:01

## 2024-01-25 RX ADMIN — SODIUM CHLORIDE, POTASSIUM CHLORIDE, SODIUM LACTATE AND CALCIUM CHLORIDE: 600; 310; 30; 20 INJECTION, SOLUTION INTRAVENOUS at 01:01

## 2024-01-25 RX ADMIN — MUPIROCIN: 20 OINTMENT TOPICAL at 08:01

## 2024-01-25 RX ADMIN — DEXMEDETOMIDINE HYDROCHLORIDE 1.1 MCG/KG/HR: 4 INJECTION INTRAVENOUS at 09:01

## 2024-01-25 RX ADMIN — CEFTRIAXONE 1 G: 1 INJECTION, POWDER, FOR SOLUTION INTRAMUSCULAR; INTRAVENOUS at 09:01

## 2024-01-25 RX ADMIN — LACTULOSE 30 G: 20 SOLUTION ORAL at 06:01

## 2024-01-25 RX ADMIN — CEFAZOLIN SODIUM 2 G: 2 SOLUTION INTRAVENOUS at 02:01

## 2024-01-25 RX ADMIN — FAMOTIDINE 20 MG: 10 INJECTION, SOLUTION INTRAVENOUS at 10:01

## 2024-01-25 NOTE — HOSPITAL COURSE
1-25-24 Permanent PPM today, ammonia elevated, no lactulose for 2 days, on precedex    1-26-24 s/p PPM- single lead, site looks good , tele shows V paving at 60 bpm    1-27-24 PPM site looks good, telemetry shows V pacing at 60 bpm, Pt can restart home beta blockers and eliquis, if no contradicting indications. Labs seen and reviewed.     1/29/24-Patient seen and examined today, sitting up in bed. Removed NGT overnight. Seems to be mentating at baseline. No CV complaints. Labs pending.

## 2024-01-25 NOTE — TRANSFER OF CARE
Anesthesia Transfer of Care Note    Patient: Ramy Romo    Procedure(s) Performed: Procedure(s) (LRB):  INSERTION, CARDIAC PACEMAKER, DUAL CHAMBER/poss single lead vs His lead (Left)    Patient location: Cath Lab    Anesthesia Type: MAC    Transport from OR: Transported from OR on room air with adequate spontaneous ventilation    Post pain: adequate analgesia    Post assessment: no apparent anesthetic complications and tolerated procedure well    Post vital signs: stable    Level of consciousness: responds to stimulation    Nausea/Vomiting: no nausea/vomiting    Complications: none    Transfer of care protocol was followedComments: Transported to ICU with transport monitors and O2      Last vitals: Visit Vitals  BP (!) 161/85   Pulse 60   Temp 36.2 °C (97.2 °F) (Temporal)   Resp 15   Ht 6' (1.829 m)   Wt 97.1 kg (214 lb)   SpO2 100%   BMI 29.02 kg/m²

## 2024-01-25 NOTE — ASSESSMENT & PLAN NOTE
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

## 2024-01-25 NOTE — ASSESSMENT & PLAN NOTE
Likely multifactorial- cystitis, elevated ammonia and ?delirium  Will resume lactulose following PPM placement- currently has to lie flat with temp pacer in groin  Rocephin in place with urine culture pending  Reorient as able  Currently requiring precedex to prevent removal of pacer and other medical devices/treatments  Evaluate and treat additional reversible causes as needed

## 2024-01-25 NOTE — SUBJECTIVE & OBJECTIVE
Interval History:     Review of Systems   Constitutional: Negative. Negative for weight gain.   HENT: Negative.     Eyes: Negative.    Cardiovascular: Negative.  Negative for chest pain, leg swelling and palpitations.   Respiratory: Negative.  Negative for shortness of breath.    Endocrine: Negative.    Hematologic/Lymphatic: Negative.    Skin: Negative.    Musculoskeletal:  Negative for muscle weakness.   Gastrointestinal: Negative.    Genitourinary: Negative.    Neurological: Negative.  Negative for dizziness.   Psychiatric/Behavioral: Negative.     Allergic/Immunologic: Negative.    All other systems reviewed and are negative.    Objective:     Vital Signs (Most Recent):  Temp: 97.6 °F (36.4 °C) (01/25/24 0800)  Pulse: 60 (01/25/24 0900)  Resp: (Abnormal) 25 (01/25/24 0900)  BP: (Abnormal) 155/82 (01/25/24 0900)  SpO2: 100 % (01/25/24 0900) Vital Signs (24h Range):  Temp:  [97.6 °F (36.4 °C)-98.8 °F (37.1 °C)] 97.6 °F (36.4 °C)  Pulse:  [59-63] 60  Resp:  [15-42] 25  SpO2:  [90 %-100 %] 100 %  BP: (130-178)/(54-94) 155/82     Weight: 97.1 kg (214 lb)  Body mass index is 29.02 kg/m².     SpO2: 100 %         Intake/Output Summary (Last 24 hours) at 1/25/2024 1004  Last data filed at 1/25/2024 0939  Gross per 24 hour   Intake 622.41 ml   Output 850 ml   Net -227.59 ml       Lines/Drains/Airways       Drain       Name Duration    Male External Urinary Catheter 01/24/24 1756 <1 day              Peripheral Intravenous Line       Name Duration         Peripheral IV - Single Lumen 01/24/24 0321 20 G Posterior;Right Hand 1 day         Peripheral IV - Single Lumen 01/24/24 0321 20 G Posterior;Right Wrist 1 day         Sheath 01/24/24 0830 Right anterior;proximal 1 day                       Physical Exam  Vitals and nursing note reviewed.   Constitutional:       Appearance: He is well-developed.   HENT:      Head: Normocephalic and atraumatic.   Eyes:      Conjunctiva/sclera: Conjunctivae normal.      Pupils: Pupils are  equal, round, and reactive to light.   Cardiovascular:      Rate and Rhythm: Normal rate and regular rhythm.      Pulses: Intact distal pulses.      Heart sounds: Normal heart sounds.   Pulmonary:      Effort: Pulmonary effort is normal.      Breath sounds: Normal breath sounds.   Abdominal:      General: Bowel sounds are normal.      Palpations: Abdomen is soft.   Musculoskeletal:      Cervical back: Normal range of motion and neck supple.   Skin:     General: Skin is warm and dry.   Neurological:      Mental Status: He is alert and oriented to person, place, and time.            Significant Labs: All pertinent lab results from the last 24 hours have been reviewed.    Significant Imaging: X-Ray: CXR: X-Ray Chest 1 View (CXR): No results found for this visit on 01/23/24.

## 2024-01-25 NOTE — ASSESSMENT & PLAN NOTE
Suspect s/t IVVD and acute on chronic renal failure with beta blocker use  BB held  Transvenous pacer placed  Off all infusions  Plan for PPM placement today per Cardiology

## 2024-01-25 NOTE — PLAN OF CARE
Problem: Diabetes Comorbidity  Goal: Blood Glucose Level Within Targeted Range  Outcome: Ongoing, Progressing  Intervention: Monitor and Manage Glycemia  Flowsheets (Taken 1/25/2024 0631)  Glycemic Management: blood glucose monitored     Problem: Infection  Goal: Absence of Infection Signs and Symptoms  Outcome: Ongoing, Progressing  Intervention: Prevent or Manage Infection  Flowsheets (Taken 1/25/2024 0631)  Fever Reduction/Comfort Measures:   lightweight bedding   lightweight clothing  Infection Management: aseptic technique maintained  Isolation Precautions:   protective   precautions maintained     Problem: Fall Injury Risk  Goal: Absence of Fall and Fall-Related Injury  Outcome: Ongoing, Progressing  Intervention: Identify and Manage Contributors  Flowsheets (Taken 1/25/2024 0631)  Medication Review/Management: medications reviewed  Intervention: Promote Injury-Free Environment  Flowsheets (Taken 1/25/2024 0631)  Safety Promotion/Fall Prevention:   assistive device/personal item within reach   bed alarm set   side rails raised x 3   room near unit station   medications reviewed     Problem: Skin Injury Risk Increased  Goal: Skin Health and Integrity  Outcome: Ongoing, Progressing  Intervention: Optimize Skin Protection  Flowsheets (Taken 1/25/2024 0631)  Pressure Reduction Techniques: weight shift assistance provided  Pressure Reduction Devices:   heel offloading device utilized   positioning supports utilized   pressure-redistributing mattress utilized  Head of Bed (HOB) Positioning:   HOB flat   HOB not elevated due to instrumentation present

## 2024-01-25 NOTE — SUBJECTIVE & OBJECTIVE
Objective:     Vital Signs (Most Recent):  Temp: 97.6 °F (36.4 °C) (01/25/24 0800)  Pulse: 60 (01/25/24 0800)  Resp: 15 (01/25/24 0800)  BP: (!) 168/77 (01/25/24 0800)  SpO2: 100 % (01/25/24 0800) Vital Signs (24h Range):  Temp:  [97.6 °F (36.4 °C)-98.8 °F (37.1 °C)] 97.6 °F (36.4 °C)  Pulse:  [59-63] 60  Resp:  [15-42] 15  SpO2:  [90 %-100 %] 100 %  BP: (130-178)/(54-94) 168/77     Weight: 97.1 kg (214 lb)  Body mass index is 29.02 kg/m².      Intake/Output Summary (Last 24 hours) at 1/25/2024 0831  Last data filed at 1/25/2024 0600  Gross per 24 hour   Intake 597.9 ml   Output 850 ml   Net -252.1 ml        Physical Exam  Vitals reviewed.   Constitutional:       General: He is sleeping.      Appearance: He is well-developed.   HENT:      Head: Normocephalic and atraumatic.      Mouth/Throat:      Mouth: Mucous membranes are moist.      Pharynx: Oropharynx is clear.   Eyes:      General: No scleral icterus.     Conjunctiva/sclera: Conjunctivae normal.   Cardiovascular:      Pulses: Normal pulses.      Comments: Paced rhythm. No cyanosis   Abdominal:      General: Bowel sounds are normal.      Palpations: Abdomen is soft.   Musculoskeletal:         General: No deformity.      Cervical back: Normal range of motion and neck supple.      Right lower leg: No edema.      Left lower leg: No edema.   Skin:     General: Skin is warm and dry.   Neurological:      Mental Status: He is easily aroused.      Comments: Sedated, limiting exam. Confused, difficult to redirect. Moving all extremities            Review of Systems  No subjective complaints but unreliable due to confusion        Lines/Drains/Airways       Drain  Duration             Male External Urinary Catheter 01/24/24 1756 <1 day              Peripheral Intravenous Line  Duration                  Peripheral IV - Single Lumen 01/24/24 0321 20 G Posterior;Right Hand 1 day         Peripheral IV - Single Lumen 01/24/24 0321 20 G Posterior;Right Wrist 1 day          Sheath 01/24/24 0830 Right anterior;proximal 1 day                    Significant Labs:    CBC/Anemia Profile:  Recent Labs   Lab 01/23/24  1452 01/24/24  0434 01/24/24 0439 01/25/24  0327   WBC 5.86  --  8.16 6.45  6.45   HGB 11.5*  --  11.3* 11.1*  11.1*   HCT 34.4*  --  34.2* 32.2*  32.2*     --  173 138*  138*   *  --  103* 102*  102*   RDW 14.2  --  14.1 14.2  14.2   FOLATE  --  12.2  --   --    [id number]  --  >2000*  --   --         Chemistries:  Recent Labs   Lab 01/23/24  1452 01/24/24 0439 01/25/24 0327    140 139  139   K 4.0 4.1 5.0  5.0    110 112*  112*   CO2 21* 23 20*  20*   BUN 23 27* 32*  32*   CREATININE 1.5* 1.4 1.5*  1.5*   CALCIUM 10.1 10.1 9.0  9.0   ALBUMIN 2.7*  --  2.5*   PROT 5.7*  --  5.3*   BILITOT 0.6  --  0.8   ALKPHOS 105  --  92   ALT 18  --  13   AST 24  --  20   MG  --  1.5* 1.7  1.7   PHOS  --  3.3 3.1  3.1       All pertinent labs within the past 24 hours have been reviewed.    Significant Imaging:  I have reviewed all pertinent imaging results/findings within the past 24 hours.

## 2024-01-25 NOTE — ANESTHESIA PREPROCEDURE EVALUATION
01/25/2024  Ramy Romo is a 78 y.o., male.      Pre-op Assessment    I have reviewed the Patient Summary Reports.     I have reviewed the Nursing Notes. I have reviewed the NPO Status.   I have reviewed the Medications.     Review of Systems  Anesthesia Hx:  No problems with previous Anesthesia                Social:  Non-Smoker       Hematology/Oncology:  Hematology Normal   Oncology Normal                                   EENT/Dental:  EENT/Dental Normal           Cardiovascular:     Hypertension, well controlled    Dysrhythmias atrial fibrillation  CHF    hyperlipidemia   ECG has been reviewed.       Congestive Heart Failure (CHF)   , LV Systolic HF, LV Diastolic HF                   Pulmonary:  Pulmonary Normal                       Renal/:  Chronic Renal Disease (Stage III), CKD                Hepatic/GI:      Liver Disease,         Liver Disease  , Cirrhosis  Hepatic Eneephalopathy    Musculoskeletal:  Musculoskeletal Normal                Neurological:           Acute encephalopathy                            Endocrine:  Diabetes, well controlled, type 2           Dermatological:  Skin Normal    Psych:  Psychiatric Normal                    Physical Exam  General: Confusion    Airway:  Mallampati: II   Mouth Opening: Normal  TM Distance: Normal  Tongue: Normal  Neck ROM: Normal ROM    Dental:  Intact    Chest/Lungs:  Clear to auscultation    Heart:  Rate: Normal        Anesthesia Plan  Type of Anesthesia, risks & benefits discussed:    Anesthesia Type: MAC  Intra-op Monitoring Plan: Standard ASA Monitors  Induction:  IV  Informed Consent: Patient consented to blood products? Yes  ASA Score: 3  Day of Surgery Review of History & Physical: I have interviewed and examined the patient. I have reviewed the patient's H&P dated:   Anesthesia Plan Notes: I called the patient's contacts, his spouse and  son.  However they do not answer.  Spoke with Dr STONE about this.  He says he spoke to the son and agrees that this procedure must be done.  Will proceed with double physician consent.    Ready For Surgery From Anesthesia Perspective.     .

## 2024-01-25 NOTE — INTERVAL H&P NOTE
The patient has been examined and the H&P has been reviewed:    Patient has moderate hyperammonemia due to missing his regular lactulose dosing. He was agitated and accordingly was placed on a Precedex drip - he is now fully sedated and stable hemodynamically     I talked with his son on the phone and we agreed to proceed - as long as the anesthesia team agrees             Active Hospital Problems    Diagnosis  POA    *Symptomatic bradycardia [R00.1]  Yes    Acute renal failure superimposed on stage 3a chronic kidney disease [N17.9, N18.31]  Unknown    Cirrhosis of liver without ascites [K74.60]  Yes    Encephalopathy [G93.40]  Yes    Chronic atrial fibrillation [I48.20]  Yes     Chronic    Diabetes [E11.9]  Yes      Resolved Hospital Problems   No resolved problems to display.

## 2024-01-25 NOTE — PROGRESS NOTES
[facility] ([facility])  Critical Care Medicine  Progress Note    Patient Name: Ramy Romo  MRN: [id number]  Admission Date: 1/23/2024  Hospital Length of Stay: 1 days  Code Status: Full Code  Attending Provider: Digna Gonzales MD  Primary Care Provider: Félix Rollins MD   Principal Problem: Symptomatic bradycardia    Subjective:     HPI:  78 year old male with known medical issues including atrial fib on eliquis and metoprolol; HTN; HFpEF; DM; and cirrhosis on daily lactulose    Presented to ED on 1/23 from  where eval for weakness/dizziness/diarrhea x 2 days noted bradycardia in 30s  Remained with bradycardia in 30s despite glucagon and atropine trials  Lab eval showed wbc 5.8; creatinine 1.5 (baseline 1.2)  VSS and AAOx3 despite bradycardia  HM admitted to telemetry for close monitoring per cardiology recs    Hospital/ICU Course:  Around midnight 1/24 bradycardia worsening to mid 20s. BP remained stable.  Failed to improve with additional atropine and glucagon  Care team noted increased confusion   Transferred to ICU for more aggressive management of bradycardia      Objective:     Vital Signs (Most Recent):  Temp: 97.6 °F (36.4 °C) (01/25/24 0800)  Pulse: 60 (01/25/24 0800)  Resp: 15 (01/25/24 0800)  BP: (!) 168/77 (01/25/24 0800)  SpO2: 100 % (01/25/24 0800) Vital Signs (24h Range):  Temp:  [97.6 °F (36.4 °C)-98.8 °F (37.1 °C)] 97.6 °F (36.4 °C)  Pulse:  [59-63] 60  Resp:  [15-42] 15  SpO2:  [90 %-100 %] 100 %  BP: (130-178)/(54-94) 168/77     Weight: 97.1 kg (214 lb)  Body mass index is 29.02 kg/m².      Intake/Output Summary (Last 24 hours) at 1/25/2024 0831  Last data filed at 1/25/2024 0600  Gross per 24 hour   Intake 597.9 ml   Output 850 ml   Net -252.1 ml        Physical Exam  Vitals reviewed.   Constitutional:       General: He is sleeping.      Appearance: He is well-developed.   HENT:      Head: Normocephalic and atraumatic.      Mouth/Throat:      Mouth: Mucous membranes  are moist.      Pharynx: Oropharynx is clear.   Eyes:      General: No scleral icterus.     Conjunctiva/sclera: Conjunctivae normal.   Cardiovascular:      Pulses: Normal pulses.      Comments: Paced rhythm. No cyanosis   Abdominal:      General: Bowel sounds are normal.      Palpations: Abdomen is soft.   Musculoskeletal:         General: No deformity.      Cervical back: Normal range of motion and neck supple.      Right lower leg: No edema.      Left lower leg: No edema.   Skin:     General: Skin is warm and dry.   Neurological:      Mental Status: He is easily aroused.      Comments: Sedated, limiting exam. Confused, difficult to redirect. Moving all extremities            Review of Systems  No subjective complaints but unreliable due to confusion        Lines/Drains/Airways       Drain  Duration             Male External Urinary Catheter 01/24/24 1756 <1 day              Peripheral Intravenous Line  Duration                  Peripheral IV - Single Lumen 01/24/24 0321 20 G Posterior;Right Hand 1 day         Peripheral IV - Single Lumen 01/24/24 0321 20 G Posterior;Right Wrist 1 day         Sheath 01/24/24 0830 Right anterior;proximal 1 day                    Significant Labs:    CBC/Anemia Profile:  Recent Labs   Lab 01/23/24  1452 01/24/24  0434 01/24/24  0439 01/25/24  0327   WBC 5.86  --  8.16 6.45  6.45   HGB 11.5*  --  11.3* 11.1*  11.1*   HCT 34.4*  --  34.2* 32.2*  32.2*     --  173 138*  138*   *  --  103* 102*  102*   RDW 14.2  --  14.1 14.2  14.2   FOLATE  --  12.2  --   --    [id number]  --  >2000*  --   --         Chemistries:  Recent Labs   Lab 01/23/24  1452 01/24/24  0439 01/25/24  0327    140 139  139   K 4.0 4.1 5.0  5.0    110 112*  112*   CO2 21* 23 20*  20*   BUN 23 27* 32*  32*   CREATININE 1.5* 1.4 1.5*  1.5*   CALCIUM 10.1 10.1 9.0  9.0   ALBUMIN 2.7*  --  2.5*   PROT 5.7*  --  5.3*   BILITOT 0.6  --  0.8   ALKPHOS 105  --  92   ALT 18  --  13   AST  "24  --  20   MG  --  1.5* 1.7  1.7   PHOS  --  3.3 3.1  3.1       All pertinent labs within the past 24 hours have been reviewed.    Significant Imaging:  I have reviewed all pertinent imaging results/findings within the past 24 hours.    ABG  No results for input(s): "PH", "PO2", "PCO2", "HCO3", "BE" in the last 168 hours.  Assessment/Plan:     Neuro  Encephalopathy  Likely multifactorial- cystitis, elevated ammonia and ?delirium  Will resume lactulose following PPM placement- currently has to lie flat with temp pacer in groin  Rocephin in place with urine culture pending  Reorient as able  Currently requiring precedex to prevent removal of pacer and other medical devices/treatments  Evaluate and treat additional reversible causes as needed    Cardiac/Vascular  * Symptomatic bradycardia  Suspect s/t IVVD and acute on chronic renal failure with beta blocker use  BB held  Transvenous pacer placed  Off all infusions  Plan for PPM placement today per Cardiology     Chronic atrial fibrillation  Holding BB  Eliquis held for procedure, resume following PPM placement when able   Monitor     Renal/  Acute renal failure superimposed on stage 3a chronic kidney disease  Suspect related to IVVD  Appears baseline ~1.3-1.4  Has received some gentle hydration, will stop fluids given hyperchloremia  Renal dose medications as appropriate  Monitor labs    Endocrine  Diabetes  Diabetic diet once able to eat  Accu checks AcHS with SSI    GI  Cirrhosis of liver without ascites  Elevated ammonia   Resume lactulose and aldactone following procedure      DVT prophylaxis: held for procedure  GI prophylaxis: pepcid  Code status: Full   Disposition: continue ICU care with temporary pacemaker in place; will consider step down once PPM in place and off preceded. Will likely need sitter.       Tootie Gonzalez NP  Critical Care Medicine  [facility] (Hospital)  "

## 2024-01-25 NOTE — PROGRESS NOTES
[facility] ([facility])  Cardiology  Progress Note    Patient Name: Ramy Romo  MRN: [id number]  Admission Date: 1/23/2024  Hospital Length of Stay: 1 days  Code Status: Full Code   Attending Physician: Digna Gonzales MD   Primary Care Physician: Félix Rollins MD  Expected Discharge Date:   Principal Problem:Symptomatic bradycardia    Subjective:     Hospital Course:   1-25-24 Permanent PPM today, ammonia elevated, no lactulose for 2 days, on precedex    Interval History:     Review of Systems   Constitutional: Negative. Negative for weight gain.   HENT: Negative.     Eyes: Negative.    Cardiovascular: Negative.  Negative for chest pain, leg swelling and palpitations.   Respiratory: Negative.  Negative for shortness of breath.    Endocrine: Negative.    Hematologic/Lymphatic: Negative.    Skin: Negative.    Musculoskeletal:  Negative for muscle weakness.   Gastrointestinal: Negative.    Genitourinary: Negative.    Neurological: Negative.  Negative for dizziness.   Psychiatric/Behavioral: Negative.     Allergic/Immunologic: Negative.    All other systems reviewed and are negative.    Objective:     Vital Signs (Most Recent):  Temp: 97.6 °F (36.4 °C) (01/25/24 0800)  Pulse: 60 (01/25/24 0900)  Resp: (Abnormal) 25 (01/25/24 0900)  BP: (Abnormal) 155/82 (01/25/24 0900)  SpO2: 100 % (01/25/24 0900) Vital Signs (24h Range):  Temp:  [97.6 °F (36.4 °C)-98.8 °F (37.1 °C)] 97.6 °F (36.4 °C)  Pulse:  [59-63] 60  Resp:  [15-42] 25  SpO2:  [90 %-100 %] 100 %  BP: (130-178)/(54-94) 155/82     Weight: 97.1 kg (214 lb)  Body mass index is 29.02 kg/m².     SpO2: 100 %         Intake/Output Summary (Last 24 hours) at 1/25/2024 1004  Last data filed at 1/25/2024 0939  Gross per 24 hour   Intake 622.41 ml   Output 850 ml   Net -227.59 ml       Lines/Drains/Airways       Drain       Name Duration    Male External Urinary Catheter 01/24/24 1756 <1 day              Peripheral Intravenous Line       Name Duration          Peripheral IV - Single Lumen 01/24/24 0321 20 G Posterior;Right Hand 1 day         Peripheral IV - Single Lumen 01/24/24 0321 20 G Posterior;Right Wrist 1 day         Sheath 01/24/24 0830 Right anterior;proximal 1 day                       Physical Exam  Vitals and nursing note reviewed.   Constitutional:       Appearance: He is well-developed.   HENT:      Head: Normocephalic and atraumatic.   Eyes:      Conjunctiva/sclera: Conjunctivae normal.      Pupils: Pupils are equal, round, and reactive to light.   Cardiovascular:      Rate and Rhythm: Normal rate and regular rhythm.      Pulses: Intact distal pulses.      Heart sounds: Normal heart sounds.   Pulmonary:      Effort: Pulmonary effort is normal.      Breath sounds: Normal breath sounds.   Abdominal:      General: Bowel sounds are normal.      Palpations: Abdomen is soft.   Musculoskeletal:      Cervical back: Normal range of motion and neck supple.   Skin:     General: Skin is warm and dry.   Neurological:      Mental Status: He is alert and oriented to person, place, and time.            Significant Labs: All pertinent lab results from the last 24 hours have been reviewed.    Significant Imaging: X-Ray: CXR: X-Ray Chest 1 View (CXR): No results found for this visit on 01/23/24.  Assessment and Plan:     Brief HPI:     * Symptomatic bradycardia  78 y.o. male patient with a PMHx of CHF, HTN, A-fib, DM, cirrhosis, history of stroke  presents with slow afib. Initially pt was hemodynamically stable in telemetery HR 30s , asymptomatic,  Pt with HR 20s then and symptomatic and transferred to ICU. Pt cardiologist is Dr. La /ANITA.Patient denies CP, angina or anginal equivalent.Pt started on dopamine but HR still 27-30s.      Isopril started with improvement, temporary PPM this morning. If still bradycardic tomorrow, permanent PPM  Not pt took eliquis yesterday, needs to be > 48 hrs for procedure        VTE Risk Mitigation (From admission, onward)            Ordered     IP VTE HIGH RISK PATIENT  Once         01/23/24 1659     Place sequential compression device  Until discontinued         01/23/24 1659                    Renaldo Santacruz MD  Cardiology  [facility] ([facility])

## 2024-01-26 PROBLEM — N30.00 ACUTE CYSTITIS: Status: ACTIVE | Noted: 2024-01-26

## 2024-01-26 LAB
AMMONIA PLAS-SCNC: 65 UMOL/L (ref 10–50)
ANION GAP SERPL CALC-SCNC: 6 MMOL/L (ref 8–16)
BASOPHILS # BLD AUTO: 0.01 K/UL (ref 0–0.2)
BASOPHILS NFR BLD: 0.1 % (ref 0–1.9)
BUN SERPL-MCNC: 35 MG/DL (ref 8–23)
CALCIUM SERPL-MCNC: 9 MG/DL (ref 8.7–10.5)
CHLORIDE SERPL-SCNC: 112 MMOL/L (ref 95–110)
CO2 SERPL-SCNC: 20 MMOL/L (ref 23–29)
CREAT SERPL-MCNC: 1.3 MG/DL (ref 0.5–1.4)
DIFFERENTIAL METHOD BLD: ABNORMAL
EOSINOPHIL # BLD AUTO: 0 K/UL (ref 0–0.5)
EOSINOPHIL NFR BLD: 0.6 % (ref 0–8)
ERYTHROCYTE [DISTWIDTH] IN BLOOD BY AUTOMATED COUNT: 13.8 % (ref 11.5–14.5)
EST. GFR  (NO RACE VARIABLE): 57 ML/MIN/1.73 M^2
ESTIMATED AVG GLUCOSE: 103 MG/DL (ref 68–131)
GLUCOSE SERPL-MCNC: 245 MG/DL (ref 70–110)
HBA1C MFR BLD: 5.2 % (ref 4–5.6)
HCT VFR BLD AUTO: 35 % (ref 40–54)
HGB BLD-MCNC: 11.7 G/DL (ref 14–18)
IMM GRANULOCYTES # BLD AUTO: 0.03 K/UL (ref 0–0.04)
IMM GRANULOCYTES NFR BLD AUTO: 0.4 % (ref 0–0.5)
LYMPHOCYTES # BLD AUTO: 0.6 K/UL (ref 1–4.8)
LYMPHOCYTES NFR BLD: 7.9 % (ref 18–48)
MAGNESIUM SERPL-MCNC: 1.7 MG/DL (ref 1.6–2.6)
MCH RBC QN AUTO: 34.2 PG (ref 27–31)
MCHC RBC AUTO-ENTMCNC: 33.4 G/DL (ref 32–36)
MCV RBC AUTO: 102 FL (ref 82–98)
MONOCYTES # BLD AUTO: 0.5 K/UL (ref 0.3–1)
MONOCYTES NFR BLD: 6.4 % (ref 4–15)
NEUTROPHILS # BLD AUTO: 6 K/UL (ref 1.8–7.7)
NEUTROPHILS NFR BLD: 84.6 % (ref 38–73)
NRBC BLD-RTO: 0 /100 WBC
PHOSPHATE SERPL-MCNC: 3.7 MG/DL (ref 2.7–4.5)
PLATELET # BLD AUTO: 148 K/UL (ref 150–450)
PMV BLD AUTO: 9.7 FL (ref 9.2–12.9)
POCT GLUCOSE: 175 MG/DL (ref 70–110)
POCT GLUCOSE: 192 MG/DL (ref 70–110)
POCT GLUCOSE: 196 MG/DL (ref 70–110)
POCT GLUCOSE: 204 MG/DL (ref 70–110)
POTASSIUM SERPL-SCNC: 4.6 MMOL/L (ref 3.5–5.1)
RBC # BLD AUTO: 3.42 M/UL (ref 4.6–6.2)
SODIUM SERPL-SCNC: 138 MMOL/L (ref 136–145)
WBC # BLD AUTO: 7.07 K/UL (ref 3.9–12.7)

## 2024-01-26 PROCEDURE — 82140 ASSAY OF AMMONIA: CPT | Performed by: INTERNAL MEDICINE

## 2024-01-26 PROCEDURE — 25000003 PHARM REV CODE 250: Performed by: INTERNAL MEDICINE

## 2024-01-26 PROCEDURE — 99232 SBSQ HOSP IP/OBS MODERATE 35: CPT | Mod: ,,, | Performed by: INTERNAL MEDICINE

## 2024-01-26 PROCEDURE — 94761 N-INVAS EAR/PLS OXIMETRY MLT: CPT

## 2024-01-26 PROCEDURE — 99233 SBSQ HOSP IP/OBS HIGH 50: CPT | Mod: ,,, | Performed by: NURSE PRACTITIONER

## 2024-01-26 PROCEDURE — 36415 COLL VENOUS BLD VENIPUNCTURE: CPT | Performed by: INTERNAL MEDICINE

## 2024-01-26 PROCEDURE — 80048 BASIC METABOLIC PNL TOTAL CA: CPT | Performed by: INTERNAL MEDICINE

## 2024-01-26 PROCEDURE — 83036 HEMOGLOBIN GLYCOSYLATED A1C: CPT | Performed by: NURSE PRACTITIONER

## 2024-01-26 PROCEDURE — 93005 ELECTROCARDIOGRAM TRACING: CPT

## 2024-01-26 PROCEDURE — 93010 ELECTROCARDIOGRAM REPORT: CPT | Mod: ,,, | Performed by: INTERNAL MEDICINE

## 2024-01-26 PROCEDURE — 85025 COMPLETE CBC W/AUTO DIFF WBC: CPT | Performed by: INTERNAL MEDICINE

## 2024-01-26 PROCEDURE — 63600175 PHARM REV CODE 636 W HCPCS: Performed by: INTERNAL MEDICINE

## 2024-01-26 PROCEDURE — 63600175 PHARM REV CODE 636 W HCPCS: Performed by: NURSE PRACTITIONER

## 2024-01-26 PROCEDURE — 25000003 PHARM REV CODE 250: Performed by: NURSE PRACTITIONER

## 2024-01-26 PROCEDURE — 83735 ASSAY OF MAGNESIUM: CPT | Performed by: INTERNAL MEDICINE

## 2024-01-26 PROCEDURE — 84100 ASSAY OF PHOSPHORUS: CPT | Performed by: INTERNAL MEDICINE

## 2024-01-26 PROCEDURE — 51702 INSERT TEMP BLADDER CATH: CPT

## 2024-01-26 PROCEDURE — 36415 COLL VENOUS BLD VENIPUNCTURE: CPT | Mod: XB | Performed by: NURSE PRACTITIONER

## 2024-01-26 PROCEDURE — C9399 UNCLASSIFIED DRUGS OR BIOLOG: HCPCS | Performed by: NURSE PRACTITIONER

## 2024-01-26 PROCEDURE — 20000000 HC ICU ROOM

## 2024-01-26 RX ORDER — SODIUM,POTASSIUM PHOSPHATES 280-250MG
2 POWDER IN PACKET (EA) ORAL
Status: DISCONTINUED | OUTPATIENT
Start: 2024-01-26 | End: 2024-01-29

## 2024-01-26 RX ORDER — LANOLIN ALCOHOL/MO/W.PET/CERES
800 CREAM (GRAM) TOPICAL
Status: DISCONTINUED | OUTPATIENT
Start: 2024-01-27 | End: 2024-01-29

## 2024-01-26 RX ORDER — AMOXICILLIN 250 MG
1 CAPSULE ORAL DAILY PRN
Status: DISCONTINUED | OUTPATIENT
Start: 2024-01-26 | End: 2024-01-29

## 2024-01-26 RX ORDER — FERROUS GLUCONATE 324(37.5)
324 TABLET ORAL
Status: DISCONTINUED | OUTPATIENT
Start: 2024-01-27 | End: 2024-01-29

## 2024-01-26 RX ORDER — HYDROCODONE BITARTRATE AND ACETAMINOPHEN 7.5; 325 MG/15ML; MG/15ML
10 SOLUTION ORAL EVERY 8 HOURS PRN
Status: DISCONTINUED | OUTPATIENT
Start: 2024-01-26 | End: 2024-01-29

## 2024-01-26 RX ORDER — DOXAZOSIN 1 MG/1
1 TABLET ORAL DAILY
Status: DISCONTINUED | OUTPATIENT
Start: 2024-01-26 | End: 2024-01-29

## 2024-01-26 RX ORDER — IBUPROFEN 200 MG
16 TABLET ORAL
Status: DISCONTINUED | OUTPATIENT
Start: 2024-01-26 | End: 2024-01-29

## 2024-01-26 RX ORDER — ALPRAZOLAM 0.25 MG/1
0.25 TABLET ORAL 3 TIMES DAILY PRN
Status: DISCONTINUED | OUTPATIENT
Start: 2024-01-26 | End: 2024-01-29

## 2024-01-26 RX ORDER — LACTULOSE 10 G/15ML
30 SOLUTION ORAL 4 TIMES DAILY
Status: DISCONTINUED | OUTPATIENT
Start: 2024-01-26 | End: 2024-01-26

## 2024-01-26 RX ORDER — INSULIN ASPART 100 [IU]/ML
0-10 INJECTION, SOLUTION INTRAVENOUS; SUBCUTANEOUS EVERY 6 HOURS PRN
Status: DISCONTINUED | OUTPATIENT
Start: 2024-01-26 | End: 2024-01-30 | Stop reason: HOSPADM

## 2024-01-26 RX ORDER — IBUPROFEN 200 MG
24 TABLET ORAL
Status: DISCONTINUED | OUTPATIENT
Start: 2024-01-26 | End: 2024-01-29

## 2024-01-26 RX ORDER — FAMOTIDINE 10 MG/ML
20 INJECTION INTRAVENOUS 2 TIMES DAILY
Status: DISCONTINUED | OUTPATIENT
Start: 2024-01-26 | End: 2024-01-29

## 2024-01-26 RX ORDER — MAGNESIUM SULFATE HEPTAHYDRATE 40 MG/ML
2 INJECTION, SOLUTION INTRAVENOUS ONCE
Status: COMPLETED | OUTPATIENT
Start: 2024-01-26 | End: 2024-01-26

## 2024-01-26 RX ORDER — HALOPERIDOL 5 MG/ML
5 INJECTION INTRAMUSCULAR EVERY 6 HOURS PRN
Status: DISCONTINUED | OUTPATIENT
Start: 2024-01-26 | End: 2024-01-30 | Stop reason: HOSPADM

## 2024-01-26 RX ORDER — ACETAMINOPHEN 500 MG
500 TABLET ORAL EVERY 6 HOURS PRN
Status: DISCONTINUED | OUTPATIENT
Start: 2024-01-26 | End: 2024-01-29

## 2024-01-26 RX ORDER — LACTULOSE 10 G/15ML
30 SOLUTION ORAL 4 TIMES DAILY
Status: DISCONTINUED | OUTPATIENT
Start: 2024-01-26 | End: 2024-01-27

## 2024-01-26 RX ADMIN — CEFTRIAXONE 1 G: 1 INJECTION, POWDER, FOR SOLUTION INTRAMUSCULAR; INTRAVENOUS at 08:01

## 2024-01-26 RX ADMIN — LACTULOSE 30 G: 20 SOLUTION ORAL at 12:01

## 2024-01-26 RX ADMIN — INSULIN ASPART 2 UNITS: 100 INJECTION, SOLUTION INTRAVENOUS; SUBCUTANEOUS at 06:01

## 2024-01-26 RX ADMIN — DEXMEDETOMIDINE HYDROCHLORIDE 1.3 MCG/KG/HR: 4 INJECTION INTRAVENOUS at 12:01

## 2024-01-26 RX ADMIN — HALOPERIDOL LACTATE 5 MG: 5 INJECTION, SOLUTION INTRAMUSCULAR at 08:01

## 2024-01-26 RX ADMIN — DEXMEDETOMIDINE HYDROCHLORIDE 1.4 MCG/KG/HR: 4 INJECTION INTRAVENOUS at 06:01

## 2024-01-26 RX ADMIN — MAGNESIUM SULFATE HEPTAHYDRATE 2 G: 2 INJECTION, SOLUTION INTRAVENOUS at 10:01

## 2024-01-26 RX ADMIN — MUPIROCIN: 20 OINTMENT TOPICAL at 08:01

## 2024-01-26 RX ADMIN — SENNOSIDES AND DOCUSATE SODIUM 1 TABLET: 50; 8.6 TABLET ORAL at 08:01

## 2024-01-26 RX ADMIN — DEXMEDETOMIDINE HYDROCHLORIDE 1.4 MCG/KG/HR: 4 INJECTION INTRAVENOUS at 03:01

## 2024-01-26 RX ADMIN — DEXMEDETOMIDINE HYDROCHLORIDE 1.4 MCG/KG/HR: 4 INJECTION INTRAVENOUS at 09:01

## 2024-01-26 RX ADMIN — DEXMEDETOMIDINE HYDROCHLORIDE 0.4 MCG/KG/HR: 4 INJECTION INTRAVENOUS at 09:01

## 2024-01-26 RX ADMIN — MICONAZOLE NITRATE: 20 POWDER TOPICAL at 08:01

## 2024-01-26 RX ADMIN — ALPRAZOLAM 0.25 MG: 0.25 TABLET ORAL at 03:01

## 2024-01-26 RX ADMIN — LACTULOSE 30 G: 20 SOLUTION ORAL at 08:01

## 2024-01-26 RX ADMIN — APIXABAN 5 MG: 2.5 TABLET, FILM COATED ORAL at 08:01

## 2024-01-26 RX ADMIN — RIFAXIMIN 550 MG: 550 TABLET ORAL at 09:01

## 2024-01-26 RX ADMIN — APIXABAN 5 MG: 2.5 TABLET, FILM COATED ORAL at 09:01

## 2024-01-26 RX ADMIN — FAMOTIDINE 20 MG: 10 INJECTION, SOLUTION INTRAVENOUS at 08:01

## 2024-01-26 RX ADMIN — MICONAZOLE NITRATE: 20 POWDER TOPICAL at 09:01

## 2024-01-26 RX ADMIN — INSULIN ASPART 0 UNITS: 100 INJECTION, SOLUTION INTRAVENOUS; SUBCUTANEOUS at 11:01

## 2024-01-26 RX ADMIN — HYDROCODONE BITARTRATE AND ACETAMINOPHEN 10 ML: 7.5; 325 SOLUTION ORAL at 02:01

## 2024-01-26 RX ADMIN — DOXAZOSIN 1 MG: 1 TABLET ORAL at 10:01

## 2024-01-26 RX ADMIN — DEXMEDETOMIDINE HYDROCHLORIDE 1.4 MCG/KG/HR: 4 INJECTION INTRAVENOUS at 12:01

## 2024-01-26 RX ADMIN — LACTULOSE 30 G: 20 SOLUTION ORAL at 04:01

## 2024-01-26 RX ADMIN — MUPIROCIN: 20 OINTMENT TOPICAL at 09:01

## 2024-01-26 RX ADMIN — FAMOTIDINE 20 MG: 10 INJECTION, SOLUTION INTRAVENOUS at 09:01

## 2024-01-26 RX ADMIN — LACTULOSE 30 G: 20 SOLUTION ORAL at 09:01

## 2024-01-26 NOTE — SUBJECTIVE & OBJECTIVE
Objective:     Vital Signs (Most Recent):  Temp: 97.2 °F (36.2 °C) (01/26/24 0715)  Pulse: 60 (01/26/24 0715)  Resp: (!) 26 (01/26/24 0715)  BP: (!) 146/72 (01/26/24 0715)  SpO2: 99 % (01/26/24 0715) Vital Signs (24h Range):  Temp:  [97.1 °F (36.2 °C)-97.4 °F (36.3 °C)] 97.2 °F (36.2 °C)  Pulse:  [57-64] 60  Resp:  [13-26] 26  SpO2:  [90 %-100 %] 99 %  BP: (129-174)/(63-95) 146/72     Weight: 97.1 kg (214 lb)  Body mass index is 29.02 kg/m².      Intake/Output Summary (Last 24 hours) at 1/26/2024 0828  Last data filed at 1/26/2024 0700  Gross per 24 hour   Intake 1214.49 ml   Output 540 ml   Net 674.49 ml        Physical Exam  Vitals reviewed.   Constitutional:       Appearance: He is well-developed. He is ill-appearing.      Comments: Confused, visibly upset and crying    HENT:      Head: Normocephalic and atraumatic.      Mouth/Throat:      Mouth: Mucous membranes are moist.      Pharynx: Oropharynx is clear.   Eyes:      General: No scleral icterus.     Conjunctiva/sclera: Conjunctivae normal.   Cardiovascular:      Rate and Rhythm: Normal rate. Rhythm irregular.   Pulmonary:      Effort: Pulmonary effort is normal.      Breath sounds: No wheezing or rhonchi.   Abdominal:      General: Bowel sounds are normal.      Palpations: Abdomen is soft.   Musculoskeletal:         General: No deformity.      Cervical back: Normal range of motion and neck supple.      Right lower leg: No edema.      Left lower leg: No edema.      Comments: Left arm sling in place   Skin:     General: Skin is warm and dry.   Neurological:      Comments: Lethargic. Oriented to self and time. Moving all extremities.            Review of Systems   Unable to perform ROS: Mental status change       Lines/Drains/Airways       Drain  Duration             Male External Urinary Catheter 01/24/24 1756 1 day         NG/OG Tube 01/25/24 1555 Left nostril <1 day              Peripheral Intravenous Line  Duration                  Peripheral IV - Single  Lumen 01/24/24 0321 20 G Posterior;Right Hand 2 days         Peripheral IV - Single Lumen 01/24/24 0321 20 G Posterior;Right Wrist 2 days         Peripheral IV - Single Lumen 01/25/24 1400 20 G Anterior;Left Forearm <1 day                    Significant Labs:    CBC/Anemia Profile:  Recent Labs   Lab 01/25/24 0327 01/26/24  0550   WBC 6.45  6.45 7.07   HGB 11.1*  11.1* 11.7*   HCT 32.2*  32.2* 35.0*   *  138* 148*   *  102* 102*   RDW 14.2  14.2 13.8        Chemistries:  Recent Labs   Lab 01/25/24 0327 01/26/24  0550     139 138   K 5.0  5.0 4.6   *  112* 112*   CO2 20*  20* 20*   BUN 32*  32* 35*   CREATININE 1.5*  1.5* 1.3   CALCIUM 9.0  9.0 9.0   ALBUMIN 2.5*  --    PROT 5.3*  --    BILITOT 0.8  --    ALKPHOS 92  --    ALT 13  --    AST 20  --    MG 1.7  1.7 1.7   PHOS 3.1  3.1 3.7       All pertinent labs within the past 24 hours have been reviewed.    Significant Imaging:  I have reviewed all pertinent imaging results/findings within the past 24 hours.

## 2024-01-26 NOTE — PROGRESS NOTES
"[facility] ([facility])  Critical Care Medicine  Progress Note    Patient Name: Ramy Romo  MRN: [id number]  Admission Date: 1/23/2024  Hospital Length of Stay: 2 days  Code Status: Full Code  Attending Provider: Digna Gonzales MD  Primary Care Provider: Félix Rollins MD   Principal Problem: Symptomatic bradycardia    Subjective:     HPI:  78 year old male with known medical issues including atrial fib on eliquis and metoprolol; HTN; HFpEF; DM; and cirrhosis on daily lactulose    Presented to ED on 1/23 from  where eval for weakness/dizziness/diarrhea x 2 days noted bradycardia in 30s  Remained with bradycardia in 30s despite glucagon and atropine trials  Lab eval showed wbc 5.8; creatinine 1.5 (baseline 1.2)  VSS and AAOx3 despite bradycardia  HM admitted to telemetry for close monitoring per cardiology recs    Hospital/ICU Course:  1/24: bradycardia worsening to mid 20s. BP remained stable. Failed to improve with additional atropine and glucagon. Also has increased confusion . Transferred to ICU for more aggressive management of bradycardia  1/25: PPM placed; needed precedex started last night with increased confusion; not redirectable. Ammonia elevated. May need NGT for lactulose and other meds  1/26: PPM placed yesterday. Ongoing non-redirectable confusion and agitation. Oriented to self and year- difficult to understand. Did not sleep at all last night per nurse report. NGT placed yesterday and lactulose started- ammonia on labs down trending but has not had BM    No new subjective & objective note has been filed under this hospital service since the last note was generated.      ABG  No results for input(s): "PH", "PO2", "PCO2", "HCO3", "BE" in the last 168 hours.  Assessment/Plan:     Neuro  Encephalopathy  Likely multifactorial- cystitis, elevated ammonia and delirium  NGT placed 1/25- lactulose restarted but no BM, dose increased 1/26 to QID  Rocephin with cultures " pending  Reorient as able  Currently requiring precedex to prevent removal of medical devices/treatments  Getting EKG to evaluate Qtc; adverse reaction to seroquel- will discuss further with son when he arrives; plan to start additional medication for delirium pending results  Evaluate and treat additional reversible causes as needed    Cardiac/Vascular  * Symptomatic bradycardia  Suspect s/t IVVD and acute on chronic renal failure with beta blocker use  BB held  Transvenous pacer placed  1/25: PPM placement   Cardiology following     Chronic atrial fibrillation  Resume eliquis  Rate currently in 60s        Renal/  Acute cystitis  UA with elevated WBC and leuk esterase   Preliminary cultures with GNR >100K  Rocephin   Will check bladder scan to see if retaining urine- can't get Flomax with NGT, will assess for alternative available     Acute renal failure superimposed on stage 3a chronic kidney disease  Suspect related to IVVD  Appears baseline ~1.3-1.4  Has received some gentle hydration, fluids stopped with hyperchloremia; Cr normalized   Renal dose medications as appropriate  Monitor labs    Endocrine  Diabetes  Glucoses reasonably controlled   Accu checks AcHS with SSI  May need to start enteral feeding if unable to take PO over the course of the day     GI  Cirrhosis of liver without ascites  Elevated ammonia   Despite down trending ammonia, confusion persists and no BM following resumption of lactulose yesterday   Lactulose increased to QID down NGT  Safety and fall precautions      Encephalopathy, UTI, and PPM have led to decreased mobility. Once encephalopathy improves and he is able to participate, will consult PT/OT. Patient requires a hospital bed due to him requiring positioning of the body in ways not feasible with an ordinary bed and is completely immobile in his bilateral upper extremities and limited mobility and cannot independently make changes in body position without the use of the bed. The  positioning of the body cannot be sufficiently resolved by the use of pillows and wedge.     DVT prophylaxis: eliquis   GI prophylaxis: pepcid  Code status: Full  Disposition: cont ICU care until able to wean off precedex.        Tootie Gonzalez NP  Critical Care Medicine  [facility] ([facility])

## 2024-01-26 NOTE — ASSESSMENT & PLAN NOTE
Suspect related to IVVD  Appears baseline ~1.3-1.4  Has received some gentle hydration, fluids stopped with hyperchloremia; Cr normalized   Renal dose medications as appropriate  Monitor labs

## 2024-01-26 NOTE — ASSESSMENT & PLAN NOTE
Elevated ammonia   Despite down trending ammonia, confusion persists and no BM following resumption of lactulose yesterday   Lactulose increased to QID down NGT  Safety and fall precautions

## 2024-01-26 NOTE — SUBJECTIVE & OBJECTIVE
Interval History:     Review of Systems   Constitutional: Negative. Negative for weight gain.   HENT: Negative.     Eyes: Negative.    Cardiovascular: Negative.  Negative for chest pain, leg swelling and palpitations.   Respiratory: Negative.  Negative for shortness of breath.    Endocrine: Negative.    Hematologic/Lymphatic: Negative.    Skin: Negative.    Musculoskeletal:  Negative for muscle weakness.   Gastrointestinal: Negative.    Genitourinary: Negative.    Neurological: Negative.  Negative for dizziness.   Psychiatric/Behavioral: Negative.     Allergic/Immunologic: Negative.    All other systems reviewed and are negative.    Objective:     Vital Signs (Most Recent):  Temp: 97.2 °F (36.2 °C) (01/26/24 0715)  Pulse: 60 (01/26/24 0715)  Resp: (Abnormal) 26 (01/26/24 0715)  BP: (Abnormal) 146/72 (01/26/24 0715)  SpO2: 99 % (01/26/24 0715) Vital Signs (24h Range):  Temp:  [97.1 °F (36.2 °C)-97.4 °F (36.3 °C)] 97.2 °F (36.2 °C)  Pulse:  [57-64] 60  Resp:  [13-26] 26  SpO2:  [90 %-100 %] 99 %  BP: (129-174)/(63-95) 146/72     Weight: 97.1 kg (214 lb)  Body mass index is 29.02 kg/m².     SpO2: 99 %         Intake/Output Summary (Last 24 hours) at 1/26/2024 0915  Last data filed at 1/26/2024 0700  Gross per 24 hour   Intake 1214.49 ml   Output 540 ml   Net 674.49 ml       Lines/Drains/Airways       Drain       Name Duration    Male External Urinary Catheter 01/24/24 1756 1 day         NG/OG Tube 01/25/24 1555 Left nostril <1 day              Peripheral Intravenous Line       Name Duration         Peripheral IV - Single Lumen 01/24/24 0321 20 G Posterior;Right Hand 2 days         Peripheral IV - Single Lumen 01/24/24 0321 20 G Posterior;Right Wrist 2 days         Peripheral IV - Single Lumen 01/25/24 1400 20 G Anterior;Left Forearm <1 day                       Physical Exam       Significant Labs: All pertinent lab results from the last 24 hours have been reviewed.    Significant Imaging: X-Ray: CXR: X-Ray Chest 1  View (CXR): No results found for this visit on 01/23/24.

## 2024-01-26 NOTE — NURSING
Unable to get accurate temperature on patient.  Hooked up bladder temp to monitor, read 93.7.  Placed marcial hugger on patient.

## 2024-01-26 NOTE — PLAN OF CARE
Neuro status comes and goes.  Oriented to place and time, but not to situation and person.  Weaned oxygen to room air.  Bladder scanned patient - 847 mL in bladder; placed sanchez catheter d/t urinary retention and immediately 925 cc emptied into urimeter.  No BM after giving lactulose as well as stool softener.  Paused precedex at this time since pt is not responding to it; administered xanax via NGT to allow patient to sleep.  Restarted eliquis for patient's a-fib.  Family updated at bedside via providers.

## 2024-01-26 NOTE — PLAN OF CARE
Pt disoriented to time, place, and situation. Speech slurred and incomprehensible at beginning of the shift. Speech is improving by becoming comprehensible but is still confused. Needs constant redirection to not move his left arm in sling and becomes agitated when told to not move his arm. RASS goal -1 with Precedex which is currently maxed at 1.4 but pt has not gone to sleep all night and is still agitated. SPO2 99% on 2 L NC. HR ventricular paced with new permanent pacemaker that was placed yesterday afternoon. BP elevated with systolic ranging in 160s-140s. Gave PRN hydralazine per orders to maintain systolic <160. Lactulose given last night but no BM occurred during this shift. External catheter changed this am with total UOP of 407 mL. POC reviewed with son over the phone. Bed in lowest position, side rails up x 3, wheels locked, and alarms on and audible.

## 2024-01-26 NOTE — PROGRESS NOTES
[facility] ([facility])  Critical Care Medicine  Progress Note    Patient Name: Ramy Romo  MRN: [id number]  Admission Date: 1/23/2024  Hospital Length of Stay: 2 days  Code Status: Full Code  Attending Provider: Digna Gonzales MD  Primary Care Provider: Félix Rollins MD   Principal Problem: Symptomatic bradycardia    Subjective:     HPI:  78 year old male with known medical issues including atrial fib on eliquis and metoprolol; HTN; HFpEF; DM; and cirrhosis on daily lactulose    Presented to ED on 1/23 from  where eval for weakness/dizziness/diarrhea x 2 days noted bradycardia in 30s  Remained with bradycardia in 30s despite glucagon and atropine trials  Lab eval showed wbc 5.8; creatinine 1.5 (baseline 1.2)  VSS and AAOx3 despite bradycardia  HM admitted to telemetry for close monitoring per cardiology recs    Hospital/ICU Course:  1/24: bradycardia worsening to mid 20s. BP remained stable. Failed to improve with additional atropine and glucagon. Also has increased confusion . Transferred to ICU for more aggressive management of bradycardia  1/25: PPM placed; needed precedex started last night with increased confusion; not redirectable. Ammonia elevated. May need NGT for lactulose and other meds  1/26: PPM placed yesterday. Ongoing non-redirectable confusion and agitation. Oriented to self and year- difficult to understand. Did not sleep at all last night per nurse report. NGT placed yesterday and lactulose started- ammonia on labs down trending but has not had BM      Objective:     Vital Signs (Most Recent):  Temp: 97.2 °F (36.2 °C) (01/26/24 0715)  Pulse: 60 (01/26/24 0715)  Resp: (!) 26 (01/26/24 0715)  BP: (!) 146/72 (01/26/24 0715)  SpO2: 99 % (01/26/24 0715) Vital Signs (24h Range):  Temp:  [97.1 °F (36.2 °C)-97.4 °F (36.3 °C)] 97.2 °F (36.2 °C)  Pulse:  [57-64] 60  Resp:  [13-26] 26  SpO2:  [90 %-100 %] 99 %  BP: (129-174)/(63-95) 146/72     Weight: 97.1 kg (214 lb)  Body mass  index is 29.02 kg/m².      Intake/Output Summary (Last 24 hours) at 1/26/2024 0828  Last data filed at 1/26/2024 0700  Gross per 24 hour   Intake 1214.49 ml   Output 540 ml   Net 674.49 ml        Physical Exam  Vitals reviewed.   Constitutional:       Appearance: He is well-developed. He is ill-appearing.      Comments: Confused, visibly upset and crying    HENT:      Head: Normocephalic and atraumatic.      Mouth/Throat:      Mouth: Mucous membranes are moist.      Pharynx: Oropharynx is clear.   Eyes:      General: No scleral icterus.     Conjunctiva/sclera: Conjunctivae normal.   Cardiovascular:      Rate and Rhythm: Normal rate. Rhythm irregular.   Pulmonary:      Effort: Pulmonary effort is normal.      Breath sounds: No wheezing or rhonchi.   Abdominal:      General: Bowel sounds are normal.      Palpations: Abdomen is soft.   Musculoskeletal:         General: No deformity.      Cervical back: Normal range of motion and neck supple.      Right lower leg: No edema.      Left lower leg: No edema.      Comments: Left arm sling in place   Skin:     General: Skin is warm and dry.   Neurological:      Comments: Lethargic. Oriented to self and time. Moving all extremities.            Review of Systems   Unable to perform ROS: Mental status change       Lines/Drains/Airways       Drain  Duration             Male External Urinary Catheter 01/24/24 1756 1 day         NG/OG Tube 01/25/24 1555 Left nostril <1 day              Peripheral Intravenous Line  Duration                  Peripheral IV - Single Lumen 01/24/24 0321 20 G Posterior;Right Hand 2 days         Peripheral IV - Single Lumen 01/24/24 0321 20 G Posterior;Right Wrist 2 days         Peripheral IV - Single Lumen 01/25/24 1400 20 G Anterior;Left Forearm <1 day                    Significant Labs:    CBC/Anemia Profile:  Recent Labs   Lab 01/25/24  0327 01/26/24  0550   WBC 6.45  6.45 7.07   HGB 11.1*  11.1* 11.7*   HCT 32.2*  32.2* 35.0*   *  138*  "148*   *  102* 102*   RDW 14.2  14.2 13.8        Chemistries:  Recent Labs   Lab 01/25/24  0327 01/26/24  0550     139 138   K 5.0  5.0 4.6   *  112* 112*   CO2 20*  20* 20*   BUN 32*  32* 35*   CREATININE 1.5*  1.5* 1.3   CALCIUM 9.0  9.0 9.0   ALBUMIN 2.5*  --    PROT 5.3*  --    BILITOT 0.8  --    ALKPHOS 92  --    ALT 13  --    AST 20  --    MG 1.7  1.7 1.7   PHOS 3.1  3.1 3.7       All pertinent labs within the past 24 hours have been reviewed.    Significant Imaging:  I have reviewed all pertinent imaging results/findings within the past 24 hours.    ABG  No results for input(s): "PH", "PO2", "PCO2", "HCO3", "BE" in the last 168 hours.  Assessment/Plan:     Neuro  Encephalopathy  Likely multifactorial- cystitis, elevated ammonia and delirium  NGT placed 1/25- lactulose restarted but no BM, dose increased 1/26 to QID  Rocephin with cultures pending  Reorient as able  Currently requiring precedex to prevent removal of medical devices/treatments  Getting EKG to evaluate Qtc; adverse reaction to seroquel- will discuss further with son when he arrives; plan to start additional medication for delirium pending results  Evaluate and treat additional reversible causes as needed    Cardiac/Vascular  * Symptomatic bradycardia  Suspect s/t IVVD and acute on chronic renal failure with beta blocker use  BB held  Transvenous pacer placed  1/25: PPM placement   Cardiology following     Chronic atrial fibrillation  Resume eliquis  Rate currently in 60s        Renal/  Acute cystitis  UA with elevated WBC and leuk esterase   Preliminary cultures with GNR >100K  Rocephin     Acute renal failure superimposed on stage 3a chronic kidney disease  Suspect related to IVVD  Appears baseline ~1.3-1.4  Has received some gentle hydration, fluids stopped with hyperchloremia; Cr normalized   Renal dose medications as appropriate  Monitor labs    Endocrine  Diabetes  Glucoses reasonably controlled   Accu checks " AcHS with SSI  May need to start enteral feeding if unable to take PO over the course of the day     GI  Cirrhosis of liver without ascites  Elevated ammonia   Despite down trending ammonia, confusion persists and no BM following resumption of lactulose yesterday   Lactulose increased to QID down NGT  Safety and fall precautions           Tootie Gonzalez NP  Critical Care Medicine  [facility] - Intensive Care ([facility])

## 2024-01-26 NOTE — PROGRESS NOTES
[facility] - [facility] ([facility])  Cardiology  Progress Note    Patient Name: Ramy Romo  MRN: [id number]  Admission Date: 1/23/2024  Hospital Length of Stay: 2 days  Code Status: Full Code   Attending Physician: Digna Gonzales MD   Primary Care Physician: Félix Rollins MD  Expected Discharge Date:   Principal Problem:Symptomatic bradycardia    Subjective:     Hospital Course:   1-25-24 Permanent PPM today, ammonia elevated, no lactulose for 2 days, on precedex    1-26-24 s/p PPM- single lead, site looks good , tele shows V paving at 60 bpm    Interval History:     Review of Systems   Constitutional: Negative. Negative for weight gain.   HENT: Negative.     Eyes: Negative.    Cardiovascular: Negative.  Negative for chest pain, leg swelling and palpitations.   Respiratory: Negative.  Negative for shortness of breath.    Endocrine: Negative.    Hematologic/Lymphatic: Negative.    Skin: Negative.    Musculoskeletal:  Negative for muscle weakness.   Gastrointestinal: Negative.    Genitourinary: Negative.    Neurological: Negative.  Negative for dizziness.   Psychiatric/Behavioral: Negative.     Allergic/Immunologic: Negative.    All other systems reviewed and are negative.    Objective:     Vital Signs (Most Recent):  Temp: 97.2 °F (36.2 °C) (01/26/24 0715)  Pulse: 60 (01/26/24 0715)  Resp: (Abnormal) 26 (01/26/24 0715)  BP: (Abnormal) 146/72 (01/26/24 0715)  SpO2: 99 % (01/26/24 0715) Vital Signs (24h Range):  Temp:  [97.1 °F (36.2 °C)-97.4 °F (36.3 °C)] 97.2 °F (36.2 °C)  Pulse:  [57-64] 60  Resp:  [13-26] 26  SpO2:  [90 %-100 %] 99 %  BP: (129-174)/(63-95) 146/72     Weight: 97.1 kg (214 lb)  Body mass index is 29.02 kg/m².     SpO2: 99 %         Intake/Output Summary (Last 24 hours) at 1/26/2024 0915  Last data filed at 1/26/2024 0700  Gross per 24 hour   Intake 1214.49 ml   Output 540 ml   Net 674.49 ml       Lines/Drains/Airways       Drain       Name Duration    Male External Urinary Catheter  01/24/24 1756 1 day         NG/OG Tube 01/25/24 1555 Left nostril <1 day              Peripheral Intravenous Line       Name Duration         Peripheral IV - Single Lumen 01/24/24 0321 20 G Posterior;Right Hand 2 days         Peripheral IV - Single Lumen 01/24/24 0321 20 G Posterior;Right Wrist 2 days         Peripheral IV - Single Lumen 01/25/24 1400 20 G Anterior;Left Forearm <1 day                       Physical Exam       Significant Labs: All pertinent lab results from the last 24 hours have been reviewed.    Significant Imaging: X-Ray: CXR: X-Ray Chest 1 View (CXR): No results found for this visit on 01/23/24.  Assessment and Plan:     Brief HPI:     * Symptomatic bradycardia  78 y.o. male patient with a PMHx of CHF, HTN, A-fib, DM, cirrhosis, history of stroke  presents with slow afib. Initially pt was hemodynamically stable in telemetery HR 30s , asymptomatic,  Pt with HR 20s then and symptomatic and transferred to ICU. Pt cardiologist is Dr. La /ANITA.Patient denies CP, angina or anginal equivalent.Pt started on dopamine but HR still 27-30s.      Isopril started with improvement, temporary PPM this morning. If still bradycardic tomorrow, permanent PPM  Not pt took eliquis yesterday, needs to be > 48 hrs for procedure        VTE Risk Mitigation (From admission, onward)           Ordered     apixaban tablet 5 mg  2 times daily         01/26/24 0837     IP VTE HIGH RISK PATIENT  Once         01/23/24 1659     Place sequential compression device  Until discontinued         01/23/24 1659                    Renaldo Santacruz MD  Cardiology  [facility] ([facility])

## 2024-01-26 NOTE — PROGRESS NOTES
Pharmacist Renal Dose Adjustment Note    Ramy Romo is a 78 y.o. male being treated with the medication famotidine.     Patient Data:    Vital Signs (Most Recent):  Temp: (!) 94.1 °F (34.5 °C) (01/26/24 1315)  Pulse: 60 (01/26/24 1315)  Resp: 14 (01/26/24 1315)  BP: (!) 141/73 (01/26/24 1315)  SpO2: 97 % (01/26/24 1315) Vital Signs (72h Range):  Temp:  [93.6 °F (34.2 °C)-98.8 °F (37.1 °C)]   Pulse:  [28-75]   Resp:  [13-46]   BP: (107-178)/()   SpO2:  [90 %-100 %]      Recent Labs   Lab 01/24/24  0439 01/25/24  0327 01/26/24  0550   CREATININE 1.4 1.5*  1.5* 1.3     Serum creatinine: 1.3 mg/dL 01/26/24 0550  Estimated creatinine clearance: 57.5 mL/min    Medication: famotidine 20 mg IV daily will be changed to famotidine 20 mg IV BID per pharmacy renal dose adjustment protocol for patients with CrCl greater than 50 mL/min.    Pharmacist's Name: Raina Matamoros PharmD  Pharmacist's Extension: [phone]     Thank you for allowing us to participate in this patient's care.     Raina Matamoros PharmD 01/26/2024 1:34 PM

## 2024-01-26 NOTE — ASSESSMENT & PLAN NOTE
Glucoses reasonably controlled   Accu checks AcHS with SSI  May need to start enteral feeding if unable to take PO over the course of the day

## 2024-01-26 NOTE — PLAN OF CARE
[facility] - [facility] (Hospital)  Initial Discharge Assessment       Primary Care Provider: Félix Rollins MD    Admission Diagnosis: Bradycardia [R00.1]  Symptomatic bradycardia [R00.1]  Chest pain [R07.9]    Admission Date: 1/23/2024  Expected Discharge Date:     Transition of Care Barriers: None    Payor: [employer] MANAGED MEDICARE / Plan: [employer] MEDICARE HMO / Product Type: Capitation /     Extended Emergency Contact Information  Primary Emergency Contact: Donte Romo  Mobile Phone: [phone]  Relation: Son  Secondary Emergency Contact: Dipika Romo  Address: [address] [place] [address] [facility]  Home Phone: [phone]  Mobile Phone: [phone]  Relation: Spouse    Discharge Plan A: Home Health  Discharge Plan B: Skilled Nursing Facility      [id number] [place], [place] - [address] [place] [address] AT [facility] [address] [place] [address]  [facility] [address]  Phone: [phone] Fax: [phone]    [facility] [address] [place], [place] - [address] [place] [address]  [address]  [facility] [address]  Phone: [phone] Fax: [phone]      Initial Assessment (most recent)       Adult Discharge Assessment - 01/26/24 1022          Discharge Assessment    Assessment Type Discharge Planning Assessment     Confirmed/corrected address, phone number and insurance Yes     Confirmed Demographics Correct on Facesheet     Source of Information family     When was your last doctors appointment? 11/20/23     Communicated KAYA with patient/caregiver Date not available/Unable to determine     Reason For Admission Symptomatic bradycardia     People in Home spouse     Facility Arrived From: home     Do you expect to return to your current living situation? Yes     Do you have help at home or someone to help you manage your care at home? Yes     Who are your caregiver(s) and their phone number(s)? SonSven and 24/7 private  craegivers     Prior to hospitilization cognitive status: Alert/Oriented     Current cognitive status: Alert/Oriented     Walking or Climbing Stairs Difficulty yes     Walking or Climbing Stairs ambulation difficulty, requires equipment     Mobility Management Wheelchair and walker     Dressing/Bathing Difficulty yes     Dressing/Bathing bathing difficulty, assistance 1 person     Dressing/Bathing Management shower chair     Home Accessibility wheelchair accessible     Home Layout Able to live on 1st floor     Equipment Currently Used at Home wheelchair;walker, rolling     Readmission within 30 days? No     Patient currently being followed by outpatient case management? No     Do you currently have service(s) that help you manage your care at home? Yes     Name and Contact number of agency [employer]     Is the pt/caregiver preference to resume services with current agency Yes     Do you take prescription medications? Yes     Do you have prescription coverage? Yes     Coverage MCR     Do you have any problems affording any of your prescribed medications? No     Is the patient taking medications as prescribed? yes     Who is going to help you get home at discharge? Son, Sven     How do you get to doctors appointments? family or friend will provide     Are you on dialysis? No     Do you take coumadin? No     Discharge Plan A Home Health     Discharge Plan B Skilled Nursing Facility     DME Needed Upon Discharge  hospital bed     Discharge Plan discussed with: Adult children     Transition of Care Barriers None                   Anticipated DC dispo: home health - current with [employer]   Prior Level of Function: dependent with ADLs (caregivers assist), ambulates using a wheelchair    People in home: spouse, has 24/7 private caregivers     Comments:  CM met with patient's son and caregiver at bedside to introduce role and discuss discharge planning.   Patient's spouse is currently at [facility]  House under SNF. Patient has caregivers int he home 24/7. Son requests a hospital bed due to limited mobility to bilateral upper extremities. LUE will be in a sling and have limited function for 6 weeks and RUE has recently had hardware replacement and pt is NWB. Confirmed demographics, insurance, and emergency contacts. CM discharge needs depends on hospital progress. CM will continue following to assist with other needs.

## 2024-01-26 NOTE — ANESTHESIA POSTPROCEDURE EVALUATION
Anesthesia Post Evaluation    Patient: Ramy Romo    Procedure(s) Performed: Procedure(s) (LRB):  INSERTION, CARDIAC PACEMAKER, DUAL CHAMBER/poss single lead vs His lead (Left)    Final Anesthesia Type: MAC      Patient location during evaluation: PACU  Patient participation: No - Unable to Participate, Other Reason (see comments) (Encephalopathy)  Level of consciousness: awake  Post-procedure vital signs: reviewed and stable  Pain management: adequate  Airway patency: patent    PONV status at discharge: No PONV  Anesthetic complications: no      Cardiovascular status: hemodynamically stable  Respiratory status: unassisted  Hydration status: euvolemic  Follow-up not needed.              Vitals Value Taken Time   /76 01/26/24 1001   Temp 36.2 °C (97.2 °F) 01/26/24 0715   Pulse 60 01/26/24 1003   Resp 20 01/26/24 1003   SpO2 97 % 01/26/24 1003   Vitals shown include unvalidated device data.      No case tracking events are documented in the log.      Pain/Tonya Score: Pain Rating Prior to Med Admin: 6 (1/26/2024  2:05 AM)  Pain Rating Post Med Admin: 2 (1/26/2024  3:05 AM)  Tonya Score: 4 (1/25/2024  4:20 PM)

## 2024-01-26 NOTE — ASSESSMENT & PLAN NOTE
UA with elevated WBC and leuk esterase   Preliminary cultures with GNR >100K  Rocephin   Will check bladder scan to see if retaining urine- can't get Flomax with NGT, will assess for alternative available

## 2024-01-26 NOTE — PROGRESS NOTES
Subjective:   Patient ID:  Ramy Romo is a 78 y.o. male who presents for evaluation of Bradycardia (Pt has had diarrhea and weakness x 2 days, had an episode of dizziness, HR in 30's, denies any sx now )      HPI    Ramy Romo is a 78 year old male who presented to [facility] due to 48 hour history of diarrhea and bradycardia from urgent care office.     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 Patient son at bedside to assist with HPI in ICU this AM. Patient has 24 hour caregivers at home which started about a few weeks ago. Patient has limited mobility- uses wheelchair and walker at home. He is able to complete most ADLs with limited assistance.     He follows with Dr La at [facility] regularly. Previous ECHO in October 2023 with normal LVEF. Repeat TTE pending today.     Upon ED evaluation- patient was very dizzy with HR in 30s. Received Glucagon 5 mg and Atropine without any significant improvement noted. Overnight, patient was transferred from Telemetry to ICU due to continued bradycardia with mental status changes. Treated with Isuprel and Dopamine infusion with limited improvement. Temp PPM placed this AM per Dr Santacruz. Post Temp pacer insertion- intermittent capture issues, increased mAs to 10 with capture rate of near 100%, VVI 60 bpm.     Patient seen and examined with son at bedside. Patient awake and alert, oriented to self, pleasant at time of exam today.     1/26/2024 update    Ramy Romo seen in ICU this AM.   He underwent single lead PPM per Dr Coronado yesterday. Biotronik V lead/LBBB VVI 60 bpm. On exam today, he is 100% v paced HR 60 bpm.     He continues to have confusion on exam today and hypothermia. Tanner hugger in place today at time of exam. NGT in place    Eliquis resumed for cardio-embolic protection. Left chest PPM site C/D/I, dressing intact.     Past  Medical History:   Diagnosis Date    Atrial fibrillation     CHF (congestive heart failure)     Diabetes mellitus     Hypertension        Past Surgical History:   Procedure Laterality Date    A-V CARDIAC PACEMAKER INSERTION Left 1/25/2024    Procedure: INSERTION, CARDIAC PACEMAKER, DUAL CHAMBER/poss single lead vs His lead;  Surgeon: Anthony Coronado MD;  Location: [facility] CATH LAB;  Service: Cardiology;  Laterality: Left;  Bio/LBBB pacing    CHOLECYSTECTOMY      INSERTION, PACEMAKER, TEMPORARY TRANSVENOUS N/A 1/24/2024    Procedure: Insertion, Pacemaker, Temporary Transvenous;  Surgeon: Rnealdo Santacruz MD;  Location: [facility] CATH LAB;  Service: Cardiology;  Laterality: N/A;    NECK SURGERY      TOTAL ELBOW ARTHROPLASTY Right 03/2019       Social History     Tobacco Use    Smoking status: Never    Smokeless tobacco: Never   Substance Use Topics    Alcohol use: No       Family History   Problem Relation Age of Onset    Cataracts Father     Glaucoma Father        Wt Readings from Last 3 Encounters:   01/24/24 97.1 kg (214 lb)   10/16/23 128.4 kg (283 lb)   06/14/23 128.4 kg (283 lb 1.1 oz)     Temp Readings from Last 3 Encounters:   01/26/24 (!) 93.6 °F (34.2 °C)   04/09/19 97.7 °F (36.5 °C) (Tympanic)   03/15/19 97.7 °F (36.5 °C) (Tympanic)     BP Readings from Last 3 Encounters:   01/26/24 (!) 150/102   04/09/19 126/65   03/15/19 130/66     Pulse Readings from Last 3 Encounters:   01/26/24 60   04/09/19 69   03/15/19 (!) 48       No current facility-administered medications on file prior to encounter.     Current Outpatient Medications on File Prior to Encounter   Medication Sig Dispense Refill    apixaban (ELIQUIS) 5 mg Tab Take 5 mg by mouth 2 (two) times daily.      ferrous gluconate (FERGON) 324 MG tablet Take 324 mg by mouth daily with breakfast.      fluticasone propionate (FLONASE) 50 mcg/actuation nasal spray 1 spray by Each Nostril route every morning.      furosemide (LASIX) 20 MG tablet Take 20 mg by  mouth once daily.      lactulose (CHRONULAC) 10 gram/15 mL solution Take 30 g by mouth 2 (two) times daily.      metoprolol succinate (TOPROL-XL) 25 MG 24 hr tablet Take 25 mg by mouth once daily.      NOVOLOG FLEXPEN U-100 INSULIN 100 unit/mL (3 mL) InPn pen Inject into the skin as needed.      spironolactone (ALDACTONE) 25 MG tablet Take 25 mg by mouth once daily.      tamsulosin (FLOMAX) 0.4 mg Cap Take 0.4 mg by mouth once daily.      calcium-vitamin D 250-100 mg-unit per tablet Take 1 tablet by mouth once daily.         No cardiac monitor results found for the past 12 months    No results found for this or any previous visit.    No results found for this or any previous visit.        Results for orders placed or performed during the hospital encounter of 01/23/24   EKG 12-lead    Collection Time: 01/25/24  6:24 PM    Narrative    Test Reason : I49.9,    Vent. Rate : 060 BPM     Atrial Rate : 300 BPM     P-R Int : 000 ms          QRS Dur : 152 ms      QT Int : 452 ms       P-R-T Axes : 000 -33 -38 degrees     QTc Int : 452 ms    Wide QRS rhythm  Left axis deviation  Nonspecific intraventricular block  Abnormal ECG  When compared with ECG of 25-JAN-2024 02:00,  Wide QRS rhythm has replaced Electronic ventricular pacemaker    Referred By: [email or web address]   SELF           Confirmed By:          Review of Systems   Constitutional: Positive for malaise/fatigue.   HENT:  Negative for hearing loss and hoarse voice.    Eyes:  Negative for blurred vision and visual disturbance.   Cardiovascular:  Negative for chest pain, claudication, dyspnea on exertion, irregular heartbeat, leg swelling, near-syncope, orthopnea, palpitations, paroxysmal nocturnal dyspnea and syncope.   Respiratory:  Negative for cough, hemoptysis, shortness of breath, sleep disturbances due to breathing, snoring and wheezing.    Endocrine: Negative for cold intolerance and heat intolerance.   Hematologic/Lymphatic: Does not bruise/bleed easily.   Skin:   Negative for color change, dry skin and nail changes.   Musculoskeletal:  Positive for arthritis, back pain and joint pain. Negative for myalgias.   Gastrointestinal:  Negative for bloating, abdominal pain, constipation, nausea and vomiting.   Genitourinary:  Negative for dysuria, flank pain, hematuria and hesitancy.   Neurological:  Positive for dizziness. Negative for headaches, light-headedness, loss of balance, numbness, paresthesias and weakness.   Psychiatric/Behavioral:  Negative for altered mental status.    Allergic/Immunologic: Negative for environmental allergies.         Objective:BP (!) 150/102   Pulse 60   Temp (!) 93.6 °F (34.2 °C)   Resp 18   Ht 6' (1.829 m)   Wt 97.1 kg (214 lb)   SpO2 98%   BMI 29.02 kg/m²      Physical Exam  Vitals and nursing note reviewed.   Constitutional:       General: He is not in acute distress.     Appearance: Normal appearance. He is well-developed. He is not ill-appearing.   HENT:      Head: Normocephalic and atraumatic.      Nose: Nose normal.      Mouth/Throat:      Mouth: Mucous membranes are moist.   Eyes:      Pupils: Pupils are equal, round, and reactive to light.   Neck:      Thyroid: No thyromegaly.      Vascular: No JVD.      Trachea: No tracheal deviation.   Cardiovascular:      Rate and Rhythm: Normal rate and regular rhythm.      Chest Wall: PMI is not displaced.      Pulses: Intact distal pulses.           Radial pulses are 2+ on the right side and 2+ on the left side.        Dorsalis pedis pulses are 2+ on the right side and 2+ on the left side.      Heart sounds: S1 normal and S2 normal. Heart sounds not distant. No murmur heard.     Comments: Left chest PPM in excellent repair  Left arm sling/swathe in place.   Pulmonary:      Effort: Pulmonary effort is normal. No respiratory distress.      Breath sounds: Normal breath sounds. No decreased breath sounds, wheezing or rhonchi.   Abdominal:      General: Bowel sounds are normal. There is no  distension.      Palpations: Abdomen is soft.      Tenderness: There is no abdominal tenderness.   Musculoskeletal:         General: No swelling. Normal range of motion.      Cervical back: Full passive range of motion without pain, normal range of motion and neck supple.      Right lower leg: No edema.      Left lower leg: No edema.      Right ankle: No swelling.      Left ankle: No swelling.   Skin:     General: Skin is warm and dry.      Capillary Refill: Capillary refill takes less than 2 seconds.      Nails: There is no clubbing.   Neurological:      General: No focal deficit present.      Mental Status: He is alert and oriented to person, place, and time.      Motor: Weakness present.   Psychiatric:         Speech: Speech normal.         Behavior: Behavior normal.         Thought Content: Thought content normal.         Judgment: Judgment normal.         Lab Results   Component Value Date    CHOL 141 06/01/2023    CHOL 141 12/07/2022    CHOL 151 11/19/2021     Lab Results   Component Value Date    HDL 58 06/01/2023    HDL 41 12/07/2022    HDL 45 11/19/2021     Lab Results   Component Value Date    LDLCALC 74 06/01/2023    LDLCALC 85 12/07/2022    LDLCALC 91 11/19/2021     Lab Results   Component Value Date    TRIG 35 06/01/2023    TRIG 76 12/07/2022    TRIG 75 11/19/2021     Lab Results   Component Value Date    CHOLHDL 32.7 03/28/2018    CHOLHDL 25.4 08/22/2006    CHOLHDL 24.6 05/01/2006     [unfilled]  Lab Results   Component Value Date    TSH 1.888 01/24/2024     Lab Results   Component Value Date    INR 1.4 (H) 01/25/2024    INR 1.3 (H) 01/24/2024    INR 1.3 (H) 11/20/2023     Lab Results   Component Value Date    WBC 7.07 01/26/2024    HGB 11.7 (L) 01/26/2024    HCT 35.0 (L) 01/26/2024     (H) 01/26/2024     (L) 01/26/2024     BNP  Recent Labs   Lab 01/23/24  1452   *       Estimated Creatinine Clearance: 57.5 mL/min (based on SCr of 1.3 mg/dL).     Assessment:      1. Symptomatic  bradycardia    2. Bradycardia    3. Chest pain    4. Arrhythmia    5. Sinoatrial node dysfunction    6. Chronic atrial fibrillation    7. Acute on chronic combined systolic (congestive) and diastolic (congestive) heart failure    8. Chronic diastolic heart failure    9. Acute on chronic systolic congestive heart failure    10. QT prolongation        Plan:     Symptomatic Bradycardia/Intermittent high grade block  -BB on hold  -s/p Temp PPM implant this AM  -Dopamine and Isuprel infusions stopped post Temp PPM insertion  -Hold Eliquis for planned PPM implant.  -Keep NPO after mN  -Plan for single V lead PPM tomorrow with Dr Coronado    1/26/2024  -s/p PPM implanted per Dr Coronado  -Follow up in EP clinic after discharge  -Home monitor provided to patient's family yesterday  -Continue Eliquis for cardio-embolic protection    Nicole May, FNP-C [facility]    I agree with the NP's summary as listed. LBB pacing appears effective.In addition, it may be helpful to increase PPM baseline rate to 70/CLS.

## 2024-01-26 NOTE — ASSESSMENT & PLAN NOTE
Suspect s/t IVVD and acute on chronic renal failure with beta blocker use  BB held  Transvenous pacer placed  1/25: PPM placement   Cardiology following

## 2024-01-26 NOTE — ASSESSMENT & PLAN NOTE
Likely multifactorial- cystitis, elevated ammonia and delirium  NGT placed 1/25- lactulose restarted but no BM, dose increased 1/26 to QID  Rocephin with cultures pending  Reorient as able  Currently requiring precedex to prevent removal of medical devices/treatments  Getting EKG to evaluate Qtc; adverse reaction to seroquel- will discuss further with son when he arrives; plan to start additional medication for delirium pending results  Evaluate and treat additional reversible causes as needed

## 2024-01-27 LAB
ANION GAP SERPL CALC-SCNC: 11 MMOL/L (ref 8–16)
BASOPHILS # BLD AUTO: 0.02 K/UL (ref 0–0.2)
BASOPHILS NFR BLD: 0.3 % (ref 0–1.9)
BUN SERPL-MCNC: 30 MG/DL (ref 8–23)
CALCIUM SERPL-MCNC: 8.9 MG/DL (ref 8.7–10.5)
CHLORIDE SERPL-SCNC: 114 MMOL/L (ref 95–110)
CO2 SERPL-SCNC: 16 MMOL/L (ref 23–29)
CREAT SERPL-MCNC: 1.3 MG/DL (ref 0.5–1.4)
DIFFERENTIAL METHOD BLD: ABNORMAL
EOSINOPHIL # BLD AUTO: 0.3 K/UL (ref 0–0.5)
EOSINOPHIL NFR BLD: 4.3 % (ref 0–8)
ERYTHROCYTE [DISTWIDTH] IN BLOOD BY AUTOMATED COUNT: 13.7 % (ref 11.5–14.5)
EST. GFR  (NO RACE VARIABLE): 57 ML/MIN/1.73 M^2
GLUCOSE SERPL-MCNC: 161 MG/DL (ref 70–110)
HCT VFR BLD AUTO: 34 % (ref 40–54)
HGB BLD-MCNC: 11.5 G/DL (ref 14–18)
IMM GRANULOCYTES # BLD AUTO: 0.02 K/UL (ref 0–0.04)
IMM GRANULOCYTES NFR BLD AUTO: 0.3 % (ref 0–0.5)
LYMPHOCYTES # BLD AUTO: 0.9 K/UL (ref 1–4.8)
LYMPHOCYTES NFR BLD: 13.4 % (ref 18–48)
MAGNESIUM SERPL-MCNC: 1.6 MG/DL (ref 1.6–2.6)
MCH RBC QN AUTO: 34.1 PG (ref 27–31)
MCHC RBC AUTO-ENTMCNC: 33.8 G/DL (ref 32–36)
MCV RBC AUTO: 101 FL (ref 82–98)
MONOCYTES # BLD AUTO: 0.7 K/UL (ref 0.3–1)
MONOCYTES NFR BLD: 10.4 % (ref 4–15)
NEUTROPHILS # BLD AUTO: 5 K/UL (ref 1.8–7.7)
NEUTROPHILS NFR BLD: 71.3 % (ref 38–73)
NRBC BLD-RTO: 0 /100 WBC
PHOSPHATE SERPL-MCNC: 2.8 MG/DL (ref 2.7–4.5)
PLATELET # BLD AUTO: 143 K/UL (ref 150–450)
PMV BLD AUTO: 10 FL (ref 9.2–12.9)
POCT GLUCOSE: 111 MG/DL (ref 70–110)
POCT GLUCOSE: 128 MG/DL (ref 70–110)
POCT GLUCOSE: 160 MG/DL (ref 70–110)
POCT GLUCOSE: 169 MG/DL (ref 70–110)
POTASSIUM SERPL-SCNC: 4.1 MMOL/L (ref 3.5–5.1)
RBC # BLD AUTO: 3.37 M/UL (ref 4.6–6.2)
SODIUM SERPL-SCNC: 141 MMOL/L (ref 136–145)
WBC # BLD AUTO: 7.04 K/UL (ref 3.9–12.7)

## 2024-01-27 PROCEDURE — 36415 COLL VENOUS BLD VENIPUNCTURE: CPT | Performed by: INTERNAL MEDICINE

## 2024-01-27 PROCEDURE — 92610 EVALUATE SWALLOWING FUNCTION: CPT

## 2024-01-27 PROCEDURE — 21400001 HC TELEMETRY ROOM

## 2024-01-27 PROCEDURE — C9399 UNCLASSIFIED DRUGS OR BIOLOG: HCPCS | Performed by: NURSE PRACTITIONER

## 2024-01-27 PROCEDURE — 99232 SBSQ HOSP IP/OBS MODERATE 35: CPT | Mod: ,,, | Performed by: INTERNAL MEDICINE

## 2024-01-27 PROCEDURE — 25000003 PHARM REV CODE 250: Performed by: NURSE PRACTITIONER

## 2024-01-27 PROCEDURE — 84100 ASSAY OF PHOSPHORUS: CPT | Performed by: INTERNAL MEDICINE

## 2024-01-27 PROCEDURE — 80048 BASIC METABOLIC PNL TOTAL CA: CPT | Performed by: INTERNAL MEDICINE

## 2024-01-27 PROCEDURE — 92523 SPEECH SOUND LANG COMPREHEN: CPT

## 2024-01-27 PROCEDURE — 83735 ASSAY OF MAGNESIUM: CPT | Performed by: INTERNAL MEDICINE

## 2024-01-27 PROCEDURE — 63600175 PHARM REV CODE 636 W HCPCS: Performed by: NURSE PRACTITIONER

## 2024-01-27 PROCEDURE — 25000003 PHARM REV CODE 250: Performed by: INTERNAL MEDICINE

## 2024-01-27 PROCEDURE — 85025 COMPLETE CBC W/AUTO DIFF WBC: CPT | Performed by: INTERNAL MEDICINE

## 2024-01-27 RX ORDER — LACTULOSE 10 G/15ML
30 SOLUTION ORAL 2 TIMES DAILY
Status: DISCONTINUED | OUTPATIENT
Start: 2024-01-27 | End: 2024-01-29

## 2024-01-27 RX ORDER — METOPROLOL TARTRATE 25 MG/1
12.5 TABLET ORAL 2 TIMES DAILY
Status: DISCONTINUED | OUTPATIENT
Start: 2024-01-27 | End: 2024-01-29

## 2024-01-27 RX ORDER — BISACODYL 10 MG/1
10 SUPPOSITORY RECTAL DAILY PRN
Status: DISCONTINUED | OUTPATIENT
Start: 2024-01-27 | End: 2024-01-30 | Stop reason: HOSPADM

## 2024-01-27 RX ADMIN — APIXABAN 5 MG: 2.5 TABLET, FILM COATED ORAL at 08:01

## 2024-01-27 RX ADMIN — LACTULOSE 30 G: 20 SOLUTION ORAL at 12:01

## 2024-01-27 RX ADMIN — MICONAZOLE NITRATE: 20 POWDER TOPICAL at 08:01

## 2024-01-27 RX ADMIN — FAMOTIDINE 20 MG: 10 INJECTION, SOLUTION INTRAVENOUS at 08:01

## 2024-01-27 RX ADMIN — MUPIROCIN: 20 OINTMENT TOPICAL at 08:01

## 2024-01-27 RX ADMIN — ALPRAZOLAM 0.25 MG: 0.25 TABLET ORAL at 04:01

## 2024-01-27 RX ADMIN — RIFAXIMIN 550 MG: 550 TABLET ORAL at 08:01

## 2024-01-27 RX ADMIN — SENNOSIDES AND DOCUSATE SODIUM 1 TABLET: 50; 8.6 TABLET ORAL at 09:01

## 2024-01-27 RX ADMIN — LACTULOSE 30 G: 20 SOLUTION ORAL at 08:01

## 2024-01-27 RX ADMIN — METOPROLOL TARTRATE 12.5 MG: 25 TABLET, FILM COATED ORAL at 08:01

## 2024-01-27 RX ADMIN — METOPROLOL TARTRATE 12.5 MG: 25 TABLET, FILM COATED ORAL at 11:01

## 2024-01-27 RX ADMIN — HYDRALAZINE HYDROCHLORIDE 10 MG: 20 INJECTION, SOLUTION INTRAMUSCULAR; INTRAVENOUS at 04:01

## 2024-01-27 RX ADMIN — CEFTRIAXONE 1 G: 1 INJECTION, POWDER, FOR SOLUTION INTRAMUSCULAR; INTRAVENOUS at 08:01

## 2024-01-27 RX ADMIN — INSULIN ASPART 1 UNITS: 100 INJECTION, SOLUTION INTRAVENOUS; SUBCUTANEOUS at 01:01

## 2024-01-27 RX ADMIN — INSULIN ASPART 2 UNITS: 100 INJECTION, SOLUTION INTRAVENOUS; SUBCUTANEOUS at 06:01

## 2024-01-27 RX ADMIN — HYDRALAZINE HYDROCHLORIDE 10 MG: 20 INJECTION, SOLUTION INTRAMUSCULAR; INTRAVENOUS at 11:01

## 2024-01-27 RX ADMIN — DOXAZOSIN 1 MG: 1 TABLET ORAL at 08:01

## 2024-01-27 RX ADMIN — Medication 800 MG: at 05:01

## 2024-01-27 RX ADMIN — DEXMEDETOMIDINE HYDROCHLORIDE 1.4 MCG/KG/HR: 4 INJECTION INTRAVENOUS at 05:01

## 2024-01-27 RX ADMIN — CEFEPIME 1 G: 1 INJECTION, POWDER, FOR SOLUTION INTRAMUSCULAR; INTRAVENOUS at 04:01

## 2024-01-27 RX ADMIN — DEXMEDETOMIDINE HYDROCHLORIDE 1 MCG/KG/HR: 4 INJECTION INTRAVENOUS at 01:01

## 2024-01-27 NOTE — PROGRESS NOTES
Pharmacist Renal Dose Adjustment Note    Ramy Romo is a 78 y.o. male being treated with the medication cefepime    Patient Data:    Vital Signs (Most Recent):  Temp: 98.6 °F (37 °C) (01/27/24 1600)  Pulse: 60 (01/27/24 1600)  Resp: 18 (01/27/24 1600)  BP: (!) 177/71 (01/27/24 1600)  SpO2: 99 % (01/27/24 1600) Vital Signs (72h Range):  Temp:  [93.6 °F (34.2 °C)-98.7 °F (37.1 °C)]   Pulse:  [57-64]   Resp:  [13-46]   BP: (107-178)/()   SpO2:  [90 %-100 %]      Recent Labs   Lab 01/25/24  0327 01/26/24  0550 01/27/24  0359   CREATININE 1.5*  1.5* 1.3 1.3     Serum creatinine: 1.3 mg/dL 01/27/24 0359  Estimated creatinine clearance: 57.9 mL/min    Cefepime 1 g q8h will be changed to cefepime 1 g q12h for CrCl 30-60 mL/min.     Pharmacist's Name: Meme Diallo  Pharmacist's Extension: [phone]

## 2024-01-27 NOTE — PROGRESS NOTES
[facility] - Intensive Care ([facility])  Critical Care Medicine  Progress Note    Patient Name: Ramy Romo  MRN: [id number]  Admission Date: 1/23/2024  Hospital Length of Stay: 3 days  Code Status: Full Code  Attending Provider: Digna Gonzales MD  Primary Care Provider: Félix Rollins MD   Principal Problem: Symptomatic bradycardia    Subjective:     HPI:  78 year old male with known medical issues including atrial fib on eliquis and metoprolol; HTN; HFpEF; DM; and cirrhosis on daily lactulose    Presented to ED on 1/23 from  where eval for weakness/dizziness/diarrhea x 2 days noted bradycardia in 30s  Remained with bradycardia in 30s despite glucagon and atropine trials  Lab eval showed wbc 5.8; creatinine 1.5 (baseline 1.2)  VSS and AAOx3 despite bradycardia  HM admitted to telemetry for close monitoring per cardiology recs    Hospital/ICU Course:  1/24: bradycardia worsening to mid 20s. BP remained stable. Failed to improve with additional atropine and glucagon. Also has increased confusion . Transferred to ICU for more aggressive management of bradycardia  1/25: PPM placed; needed precedex started last night with increased confusion; not redirectable. Ammonia elevated. May need NGT for lactulose and other meds  1/26: PPM placed yesterday. Ongoing non-redirectable confusion and agitation. Oriented to self and year- difficult to understand. Did not sleep at all last night per nurse report. NGT placed yesterday and lactulose started- ammonia on labs down trending but has not had BM  1/27: precedex restarted last night and given dose  of  haldol. Actually slept a few hours. Much more coherent today and meaningfully interactive with nursing and visitors. Large BM this afternoon      Objective:     Vital Signs (Most Recent):  Temp: 97.5 °F (36.4 °C) (01/27/24 1000)  Pulse: 60 (01/27/24 1000)  Resp: 16 (01/27/24 1000)  BP: (!) 120/59 (01/27/24 1000)  SpO2: 95 % (01/27/24 1000) Vital Signs (24h Range):  Temp:   [93.6 °F (34.2 °C)-97.7 °F (36.5 °C)] 97.5 °F (36.4 °C)  Pulse:  [60] 60  Resp:  [14-35] 16  SpO2:  [94 %-98 %] 95 %  BP: (113-175)/() 120/59     Weight: 98.7 kg (217 lb 9.5 oz)  Body mass index is 29.51 kg/m².      Intake/Output Summary (Last 24 hours) at 1/27/2024 1016  Last data filed at 1/27/2024 1000  Gross per 24 hour   Intake 1151.77 ml   Output 1071 ml   Net 80.77 ml        Physical Exam  Vitals reviewed.   Constitutional:       Appearance: He is well-developed.   HENT:      Head: Normocephalic and atraumatic.      Mouth/Throat:      Mouth: Mucous membranes are moist.      Pharynx: Oropharynx is clear.   Eyes:      Extraocular Movements: Extraocular movements intact.      Conjunctiva/sclera: Conjunctivae normal.   Cardiovascular:      Rate and Rhythm: Normal rate. Rhythm irregular.   Pulmonary:      Effort: Pulmonary effort is normal.      Breath sounds: No wheezing or rhonchi.   Abdominal:      General: Bowel sounds are normal.      Palpations: Abdomen is soft.   Musculoskeletal:         General: No deformity.      Cervical back: Normal range of motion and neck supple.      Right lower leg: No edema.      Left lower leg: No edema.      Comments: Left arm sling   Skin:     General: Skin is warm and dry.   Neurological:      General: No focal deficit present.      Mental Status: He is alert and oriented to person, place, and time.   Psychiatric:         Behavior: Behavior is cooperative.           Review of Systems  Reviewed and negative except for hoarseness and dried mouth.            Lines/Drains/Airways       Drain  Duration                  NG/OG Tube 01/25/24 1555 Left nostril 1 day         Urethral Catheter 01/26/24 0926 1 day              Peripheral Intravenous Line  Duration                  Peripheral IV - Single Lumen 01/24/24 0321 20 G Posterior;Right Hand 3 days         Peripheral IV - Single Lumen 01/24/24 0321 20 G Posterior;Right Wrist 3 days         Peripheral IV - Single Lumen 01/25/24  "1400 20 G Anterior;Left Forearm 1 day                    Significant Labs:    CBC/Anemia Profile:  Recent Labs   Lab 01/26/24  0550 01/27/24  0359   WBC 7.07 7.04   HGB 11.7* 11.5*   HCT 35.0* 34.0*   * 143*   * 101*   RDW 13.8 13.7        Chemistries:  Recent Labs   Lab 01/26/24  0550 01/27/24  0359    141   K 4.6 4.1   * 114*   CO2 20* 16*   BUN 35* 30*   CREATININE 1.3 1.3   CALCIUM 9.0 8.9   MG 1.7 1.6   PHOS 3.7 2.8       All pertinent labs within the past 24 hours have been reviewed.    Significant Imaging:  I have reviewed all pertinent imaging results/findings within the past 24 hours.    ABG  No results for input(s): "PH", "PO2", "PCO2", "HCO3", "BE" in the last 168 hours.  Assessment/Plan:     Neuro  Encephalopathy  Likely multifactorial- cystitis, elevated ammonia and delirium  NGT placed 1/25- lactulose restarted but no BM, dose increased 1/26 to QID  Urine + klebsiella aerogenes- Rocephin resistant. 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/t IVVD and acute on chronic renal failure with beta blocker use  BB held  Transvenous pacer placed  1/25: PPM placement   Cardiology following     Chronic atrial fibrillation  eliquis  Rate currently in 60s        Renal/  Acute cystitis  UA with elevated WBC and leuk esterase   Preliminary cultures with GNR >100K: klebsiella aerogens   Rocephin resistant- changed to Cefepime  Lopez placed 1/26 2/2 retention - started cardura       Acute renal failure superimposed on stage 3a chronic kidney disease  Suspect related to IVVD  Appears baseline ~1.3-1.4  Has received some gentle hydration, fluids stopped with hyperchloremia; Cr normalized   Renal dose medications as appropriate  Monitor labs    Endocrine  Diabetes  Glucoses reasonably controlled   Accu checks AcHS with SSI  Starting tube feeds      GI  Cirrhosis of liver without ascites  Elevated ammonia   Large BM today and " also improved mental status  Lactulose decreased back to home dose  Safety and fall precautions      ST consulted - discussed with SLP Corazon. Continuing NGT and will start TF for now and monitor progress. May need MBSS     DVT prophylaxis: eliquis  GI prophylaxis: not indicated  Code status: Full   Disposition: Stable for transfer. Consult hospital medicine and critical care will sign off.        Tootie Gonzalez NP  Critical Care Medicine  [facility] - Intensive Care ([facility])

## 2024-01-27 NOTE — ASSESSMENT & PLAN NOTE
UA with elevated WBC and leuk esterase   Preliminary cultures with GNR >100K: klebsiella aerogens   Rocephin resistant- changed to Cefepime  Lopez placed 1/26 2/2 retention - started cardura

## 2024-01-27 NOTE — ASSESSMENT & PLAN NOTE
Elevated ammonia   Large BM today and also improved mental status  Lactulose decreased back to home dose  Safety and fall precautions

## 2024-01-27 NOTE — PROGRESS NOTES
[facility] ([facility])  Cardiology  Progress Note    Patient Name: Ramy Romo  MRN: [id number]  Admission Date: 1/23/2024  Hospital Length of Stay: 3 days  Code Status: Full Code   Attending Physician: Digna Gonzales MD   Primary Care Physician: Félix Rollins MD  Expected Discharge Date:   Principal Problem:Symptomatic bradycardia    Subjective:     Hospital Course:   1-25-24 Permanent PPM today, ammonia elevated, no lactulose for 2 days, on precedex    1-26-24 s/p PPM- single lead, site looks good , tele shows V paving at 60 bpm    1-27-24 PPM site looks good, telemetry shows V pacing at 60 bpm, Pt can restart home b blockers and eliquis, if no contraindications     Interval History:     Review of Systems   Constitutional: Negative. Negative for weight gain.   HENT: Negative.     Eyes: Negative.    Cardiovascular: Negative.  Negative for chest pain, leg swelling and palpitations.   Respiratory: Negative.  Negative for shortness of breath.    Endocrine: Negative.    Hematologic/Lymphatic: Negative.    Skin: Negative.    Musculoskeletal:  Negative for muscle weakness.   Gastrointestinal: Negative.    Genitourinary: Negative.    Neurological: Negative.  Negative for dizziness.   Psychiatric/Behavioral: Negative.     Allergic/Immunologic: Negative.    All other systems reviewed and are negative.    Objective:     Vital Signs (Most Recent):  Temp: 97.5 °F (36.4 °C) (01/27/24 1119)  Pulse: (Abnormal) 59 (01/27/24 1119)  Resp: 15 (01/27/24 1119)  BP: (Abnormal) 125/58 (01/27/24 1100)  SpO2: 96 % (01/27/24 1119) Vital Signs (24h Range):  Temp:  [93.6 °F (34.2 °C)-97.7 °F (36.5 °C)] 97.5 °F (36.4 °C)  Pulse:  [59-60] 59  Resp:  [14-35] 15  SpO2:  [94 %-98 %] 96 %  BP: (113-175)/() 125/58     Weight: 98.7 kg (217 lb 9.5 oz)  Body mass index is 29.51 kg/m².     SpO2: 96 %         Intake/Output Summary (Last 24 hours) at 1/27/2024 1150  Last data filed at 1/27/2024 1100  Gross per 24 hour    Intake 1023.2 ml   Output 1016 ml   Net 7.2 ml       Lines/Drains/Airways       Drain       Name Duration         NG/OG Tube 01/25/24 1555 Left nostril 1 day         Urethral Catheter 01/26/24 0926 1 day              Peripheral Intravenous Line       Name Duration         Peripheral IV - Single Lumen 01/24/24 0321 20 G Posterior;Right Hand 3 days         Peripheral IV - Single Lumen 01/24/24 0321 20 G Posterior;Right Wrist 3 days         Peripheral IV - Single Lumen 01/25/24 1400 20 G Anterior;Left Forearm 1 day                       Physical Exam  Vitals and nursing note reviewed.   Constitutional:       Appearance: He is well-developed.   HENT:      Head: Normocephalic and atraumatic.   Eyes:      Conjunctiva/sclera: Conjunctivae normal.      Pupils: Pupils are equal, round, and reactive to light.   Cardiovascular:      Rate and Rhythm: Normal rate and regular rhythm.      Pulses: Intact distal pulses.      Heart sounds: Normal heart sounds.   Pulmonary:      Effort: Pulmonary effort is normal.      Breath sounds: Normal breath sounds.   Abdominal:      General: Bowel sounds are normal.      Palpations: Abdomen is soft.   Musculoskeletal:      Cervical back: Normal range of motion and neck supple.   Skin:     General: Skin is warm and dry.   Neurological:      Mental Status: He is alert and oriented to person, place, and time.            Significant Labs: All pertinent lab results from the last 24 hours have been reviewed.    Significant Imaging: X-Ray: CXR: X-Ray Chest 1 View (CXR): No results found for this visit on 01/23/24.  Assessment and Plan:     Brief HPI:     * Symptomatic bradycardia  78 y.o. male patient with a PMHx of CHF, HTN, A-fib, DM, cirrhosis, history of stroke  presents with slow afib. Initially pt was hemodynamically stable in telemetery HR 30s , asymptomatic,  Pt with HR 20s then and symptomatic and transferred to ICU. Pt cardiologist is Dr. La /ANITA.Patient denies CP, angina or anginal  equivalent.Pt started on dopamine but HR still 27-30s.      Isopril started with improvement, temporary PPM this morning. If still bradycardic tomorrow, permanent PPM  Not pt took eliquis yesterday, needs to be > 48 hrs for procedure        VTE Risk Mitigation (From admission, onward)           Ordered     apixaban tablet 5 mg  2 times daily         01/26/24 0837     IP VTE HIGH RISK PATIENT  Once         01/23/24 1659     Place sequential compression device  Until discontinued         01/23/24 1659                    Renaldo Santacruz MD  Cardiology  [facility] - Intensive [facility] ([facility])

## 2024-01-27 NOTE — PT/OT/SLP EVAL
"Speech Language Pathology Evaluation  Cognitive/Bedside Swallow    Patient Name:  Ramy Romo   MRN:  [id number]  Admitting Diagnosis: Symptomatic bradycardia    Recommendations:                  General Recommendations:  Dysphagia therapy and Modified barium swallow study.  May also consult ENT if dysphonia persists.  Significant nasopharyngeal and sinus hx reported.  Diet recommendations:  NPO, NPO   Aspiration Precautions: Frequent oral care and Standard aspiration precautions   General Precautions: Standard, aspiration  Communication strategies:  provide increased time to answer      History:     Past Medical History:   Diagnosis Date    Atrial fibrillation     CHF (congestive heart failure)     Diabetes mellitus     Hypertension        Past Surgical History:   Procedure Laterality Date    A-V CARDIAC PACEMAKER INSERTION Left 1/25/2024    Procedure: INSERTION, CARDIAC PACEMAKER, DUAL CHAMBER/poss single lead vs His lead;  Surgeon: Anthony Coronado MD;  Location: [facility] CATH LAB;  Service: Cardiology;  Laterality: Left;  Bio/LBBB pacing    CHOLECYSTECTOMY      INSERTION, PACEMAKER, TEMPORARY TRANSVENOUS N/A 1/24/2024    Procedure: Insertion, Pacemaker, Temporary Transvenous;  Surgeon: Renaldo Santacruz MD;  Location: [facility] CATH LAB;  Service: Cardiology;  Laterality: N/A;    NECK SURGERY      TOTAL ELBOW ARTHROPLASTY Right 03/2019       Social History: Patient lives with his wife at home.  Reportedly WC bound but able to ambulate with walker. Hx CVA requiring rehab in 2018.  Hx dementia (dx 2015).  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  He likes to be called " James."  See medical chart for extensive hx.    Prior Intubation HX:  n/a    Modified Barium Swallow: Significant dysphagia hx requiring PEG tube, multiple MBSS' and esophagram completed at [facility] 4/10/2018, 10/17/2022, 10/19/2022.  No significant improvement in swallow function noted in comparison between these studies---pt " aspirated all liquid consistencies on all studies.  No updated studies since 10/19/2022 per chart review.  Pt denied any dysphagia hx.    10/29/2022 Fl Modified Barium Swallow Speech  Order: [id number]  Narrative    Pharyngogram    Clinical history:  Dysphagia    Fluoroscopic evaluation of the pharynx was performed by the speech therapist Delmy Ward, with the assistance of Kwame Sutton PA-C, and Dr. Mainor Montano as the attending physician. Fluoroscopic evaluation is available for review on video recording.    The patient was examined in the lateral projection. Upon first evaluation, osteophyte at the level of C3-C4. The patient was given trials of thin barium, nectar, pudding and cracker.    *  Thin Barium: Audible aspiration. No significant residue.  *  Nectar: Silent aspiration. Moderate pyriform residue.  *  Pudding: No residue, penetration or aspiration.  *  Cracker: Moderate vallecular residue. Patient expectorated vallecular residue. No penetration or aspiration.    Please see speech pathology report to follow for further recommendations.    Fluoro time:4.3 minutes.  Video recording and 0 images obtained.  33 mGy      Impression:  1.  Silent aspiration of nectar without strategies. Audible aspiration of thin barium.  2.  Moderate vallecular residue of cracker that the patient expectorated.  3.  Moderate pyriform residue of nectar.      Thank you for the opportunity to assist in the care of LEXII YEN.    I, Mainor Montano MD, have reviewed the images and report and concur with these findings.  Exam End: 10/19/22 10:42 Last Resulted: 10/19/22 12:37   Received From: [facility] and Its Subsidiaries and Affiliates  Result Received: 01/23/24 14:19      10/25/2023 CT Head Without Contrast  Order: [id number]  Impression      1.  No acute intracranial abnormality.  2.  Findings of chronic small vessel ischemic disease.  Narrative    CT HEAD WO CONTRAST    HISTORY:   Memory loss    Axial images were obtained from the base of the skull to the vertex without the administration of intravenous contrast.  Automated exposure technique was utilized for dose reduction.    COMPARISON: October 8, 2022    FINDINGS:  No evidence of acute large vascular territory infarct.  No acute hemorrhage. No mass effect. No midline shift. No extra-axial fluid collections.    Patchy areas of periventricular and subcortical white matter hypoattenuation likely represent changes of chronic small vessel ischemic disease.    Redemonstrated chronic infarct in the right frontoparietal lobe with encephalomalacia and gliosis.    No hydrocephalus.  Calvarium is intact. Complete opacification of the right maxillary sinus.  Exam End: 10/25/23 20:30 Last Resulted: 10/25/23 20:33   Received From: [facility] and Its Subsidiaries and Affiliates  Result Received: 01/23/24 14:19     12/26/2019 MRI Cervical Spine Without Contrast  Order: [id number]  Impression    1.  Multilevel degenerative changes of the cervical spine most evident at C3-4 and C4-5.  2. Motion throughout this study.  3. Large anterior bridging osteophytes see C2-3, C3-4 measuring up to 1.5 cm.  Narrative    EXAMINATION: MRI CERVICAL SPINE WO CONTRAST    CLINICAL HISTORY:  Spinal stenosis    FINDINGS:  There is disc desiccation loss disc space height throughout the cervical spine.  There is increased cervical lordosis of the cervical spine secondary this patient's kyphosis.  There is intervertebral body fusion C5-6, C6-7.  The spinal cord is normal in size and signal.  There are normal vascular flow voids.  C2-3 osteophyte disc complex when the 2 mm  C3-4 osteophyte disc complex 2 to 3 mm resulting in some spinal stenosis narrowing neural foramina.  C4-5 osteophyte disc complex 2 to 3 mm resulting in spinal stenosis.  C5-6 to millimeter osteophyte disc complex effacing the left ventral spinal cord.  C6-7 osteophyte  disc complex effacing thecal sac.  C7-T1 normal  Exam End: 12/26/19 12:54 Last Resulted: 12/26/19 13:03   Received From: [facility] and Its Subsidiaries and Affiliates  Result Received: 01/23/24 14:19     12/26/2019 MRI Brain Without Contrast  Order: [id number]  Impression    1. Chronic hemorrhagic infarction right sylvian fissure.  Narrative    EXAMINATION: Brain MRI Noncontrast    CLINICAL HISTORY: ,    Syncope,  simple, abnormal neuro exam,Syncope, recurrent      FINDINGS:  Trace diffusion imaging is normal, with no evidence of acute stroke.  There are periventricular white matter changes.  Chronic hemorrhagic ischemic changes are seen at the right sylvian fissure.    Ventricles are normal and there is no mass.    The pituitary fossa and optic chiasm are normal.  Susceptibility images show chronic hemorrhage at the infarction of the right sylvian fissure.  Exam End: 12/26/19 12:54 Last Resulted: 12/26/19 13:00   Received From: [facility] and Its Subsidiaries and Affiliates  Result Received: 01/23/24 14:19     XR CHEST AP PORTABLE     CLINICAL HISTORY:  bradycardia;     TECHNIQUE:  Single frontal view of the chest was performed.     COMPARISON:  Chest radiograph 03/29/2018     FINDINGS:  ECG monitoring leads overlie the chest.  A loop recorder is present over the lower medial left hemithorax.  Surgical clips overlie the right axilla and upper right lung.There is bilateral interstitial coarsening, suspected chronic.  Degenerative change at the 1st costochondral junction somewhat obscures evaluation at the lung apices, particularly on the left.  Underlying pulmonary nodule in this region is difficult to exclude.  No significant pleural fluid.  No pneumothorax.  No large consolidation.     The cardiac silhouette is normal in size. There is aortic calcification.     There is degenerative change of the spine and right shoulder.  No acute  osseous abnormality.  Sclerotic focus is partially visualized over the proximal right humerus.  Cholecystectomy clips are noted.     Impression:     No acute cardiopulmonary abnormality.     Sclerotic focus within the proximal right humerus, partially visualized.        Electronically signed by: Raina Green  Date:                                            01/23/2024  Time:                                           16:15    Prior diet: Unknown current diet. Pt denied dysphagia hx, despite H/P revealing aspiration/dysphagia and multiple MBSS.    Subjective     Pt seen bedside for ST evaluation.  No c/o pain.  No family present.  NG tube present.  Awake with confusion noted.  Patient goals: unable to state     Pain/Comfort:  Pain Rating 1: 0/10  Pain Rating Post-Intervention 1: 0/10  Pain Rating 2: 0/10  Pain Rating Post-Intervention 2: 0/10    Respiratory Status: Room air    Objective:     Cognitive Status:    Hx dementia and CVA.  Able to communicate effectively; however, memory, reasoning and orientation deficits noted.       Receptive Language:   Comprehension:   WFL in conversation    Pragmatics:    WFL    Expressive Language:  Verbal:    WFL  in conversation      Motor Speech:  Intelligibility negatively impacted by voice impairment    Voice:   Severe dysphonia with breathy/hoarse vocal quality.  Pt reportedly screaming/confused over the past two days.  Hx sinus disease requiring ENT surgical intervention.  Resonance also impaired, impacting intelligibility. Pt reported VPI with previous incidents of solids excreting via nasal cavity during swallow.    Oral Musculature Evaluation  Oral Musculature: general weakness, facial asymmetry present, left weakness (hx CVA)  Dentition: scattered dentition, teeth in poor condition  Secretion Management: problems swallowing secretions, other (see comments) (wet vocal quality)  Mucosal Quality: dry, coated tongue, other (see comments) (dried blood removed from hard and  soft palate, tongue)  Oral Labial Strength and Mobility: WFL  Lingual Strength and Mobility: impaired strength  Velar Elevation: impaired, other (see comments) (resonance impairment.  Significant sinus hx, sx)  Buccal Strength and Mobility: decreased tone  Volitional Cough: present, productive  Volitional Swallow: present, weak  Voice Prior to PO Intake: severe dysphonia (breathy/hoarse quality) with resonance impairment    Bedside Swallow Eval:   [place] Swallow Protocol:  [place] Swallow Protocol dictates patient remain NPO if fail screener (Micheller et al. 2014).  The [place] Swallow Protocol was administered. The patient was alert and provided the instructions prior to the beginning of the protocol. The patient consumed 3 oz before putting the cup down. Patient drank with consecutive swallows. Overt s/s of pharyngeal dysphagia included coughing/choking after the swallow and wet vocal quality .     Clinical Swallow Examination:   Of note, patient was fed by SLP throughout evaluation. Right hemiparesis and left UE strapped down s/t PPM. Patient presented with:     CONSISTENCY  NOTES   THIN (IDDSI 0) 3 oz water challenge   Tsp, straw Incoordination of breathing-swallow.  Significant immediate coughing and wet vocal quality present.   PUREE (IDDSI 4/Extremely Thick)   TSP/TBSP bites of pudding/applesauce  Delayed laryngeal excursion, subtle throat clearing and wet vocal quality   SOLID (IDDSI 7/Regular) Bite of Lisa Doone cookie    Deferred s/t severity of overt s/s of dysphagia with previous consistencies.     Thickened liquids were not used in this assessment. David (2018) reported that thickened liquids have no sound evidence at reducing the risk of pneumonia in patients with dysphagia and can cause harm by increasing their risk of dehydration. It also presents an increased risk of UTI, electrolyte imbalance, constipation, fecal impaction, cognitive impairment, functional decline and even death (Silvana, 2002; Connie,  2016).  Thickened liquids are associated with risks including dehydration, increased pharyngeal residue, potential interference with medication absorption, and decreased quality of life (Grace, 2013). Thickened liquids are also more likely to be silently aspirated than thin liquids (Isai et al., 2018). This supports the assertion that we should confirm a patient requires thickened liquids with an instrumental swallow study prior to recommending them.    References:   Grace PEREZ (2013). Thickening agents used for dysphagia management: Effect on bioavailability of water, medication and feelings of satiety. Nutrition Journal, 12, 54. https://doi.org/10.1186/2820-3990-47-54    CHRIS Alex, PRAMOD Hall, CASIE Magaña, & CHRIS Honeycutt (2018). Cough response to aspiration in thin and thick fluids during FEES in hospitalized inpatients. International journal of language & communication disorders, 53(5), 909-918. https://doi.org/10.1111/7106-4132.90174    INTERPRETATION AND RISK ASSESSMENT:  Clinical swallow evaluation (CSE) revealed oral phase characterized by lingual, labial, and buccal strength and range of motion reduced for lip closure, bolus preparation and propulsion. The patient had no anterior loss of the bolus with complete closure of the lips around the utensils. No residue remained in the oral cavity following the swallow. Patient with overt clinical sign/symptoms of aspiration during bedside CSE, even though limited to thin liquids and pureed solids.  Patient presents with a possibly inefficient swallow as indicated by weakness, advancing age, cervical osteophytes (MRI C-spine) and chronic dysphagia. Patient denied dysphagia, globus sensation or odynophagia.  Contributing risk factors for dysphagia include cognitive deficits. Patient with increased risk for silent aspiration given potential sensory deficits related to stroke and potential sensory deficits related to suspected vocal fold dysfunction.    Clinical  signs of oropharyngeal dysphagia, likely acute on chronic with long-standing hx of swallowing impairment requiring PEG tube placement (removed). Swallowing prognosis is guarded secondary to chronic dysphagia without evidence of significant improvement on previous studies and overt s/s of dysphagia during bedside CSE today. Instrumental swallow study is indicated to assess the swallowing anatomy, physiology and rule out aspiration of various consistencies to determine GOC.       Compensatory Strategies  Aspiration precautions      Assessment:     Ramy Romo is a 78 y.o. male with an SLP diagnosis of suspected acute/chronic Dysphagia and Dysphonia in the setting of cardiac hx s/p PPM, cystitis, CVA, dementia liver cirrhosis.  He presents with improving mentation and communication; however, intelligibility significant impacted by dysphonia (breathy/hoarse) and resonance impairment.  Pt reported sinus hx requiring surgical ENT intervention.  Pt presents with reduced secretion management and overt s/s of dysphagia/aspiration during bedside CSE.  Pt denied dysphagia hx; however, severe dysphagia/aspiration reported on previous esophagram and MBSS' at [facility] ([id number]) without marked improvement.  Pt is recommended for a repeat instrumental once clinically indicated and medical/functional status improves.    Goals:   Multidisciplinary Problems       SLP Goals          Problem: SLP    Goal Priority Disciplines Outcome   SLP Goal     SLP    Description: 1.  Pt will consume least restrictive PO diet without incident.    2.  MBSS as comprehensive swallowing assessment.                       Plan:     Patient to be seen:  2 x/week, 3 x/week   Plan of Care expires:  02/03/24  Plan of Care reviewed with:  patient   SLP Follow-Up:  Yes       Discharge recommendations:  Therapy Intensity Recommendations at Discharge:  (Pending acute progress)   Barriers to Discharge:  None    Time Tracking:     SLP Treatment Date:    01/27/24  Speech Start Time:  1300  Speech Stop Time:  1330     Speech Total Time (min):  30 min    Billable Minutes: Eval 15 minutes  and Eval Swallow and Oral Function 15 minutes    01/27/2024

## 2024-01-27 NOTE — PLAN OF CARE
Pt disoriented x 4. Speech slurred and incomprehensible. Needs constant redirection to not move his left arm in sling and becomes agitated when told to not move his arm. RASS goal -1 and met with Precedex for agitation. Precedex weaned off at 0530. SPO2 99% on RA. HR ventricular paced with permanent pacemaker. BP elevated with systolic ranging in 140s-170s. Gave PRN hydralazine per orders to maintain systolic <160. Lactulose given last night but no BM occurred during this shift. Lopez in place with total UOP of 469 mL. Bed in lowest position, side rails up x 3, wheels locked, and alarms on and audible.

## 2024-01-27 NOTE — ASSESSMENT & PLAN NOTE
Likely multifactorial- cystitis, elevated ammonia and delirium  NGT placed 1/25- lactulose restarted but no BM, dose increased 1/26 to QID  Urine + klebsiella aerogenes- Rocephin resistant. Changed to cefepime 1/27  Reorient as able  Improving

## 2024-01-27 NOTE — SUBJECTIVE & OBJECTIVE
Objective:     Vital Signs (Most Recent):  Temp: 97.5 °F (36.4 °C) (01/27/24 1000)  Pulse: 60 (01/27/24 1000)  Resp: 16 (01/27/24 1000)  BP: (!) 120/59 (01/27/24 1000)  SpO2: 95 % (01/27/24 1000) Vital Signs (24h Range):  Temp:  [93.6 °F (34.2 °C)-97.7 °F (36.5 °C)] 97.5 °F (36.4 °C)  Pulse:  [60] 60  Resp:  [14-35] 16  SpO2:  [94 %-98 %] 95 %  BP: (113-175)/() 120/59     Weight: 98.7 kg (217 lb 9.5 oz)  Body mass index is 29.51 kg/m².      Intake/Output Summary (Last 24 hours) at 1/27/2024 1016  Last data filed at 1/27/2024 1000  Gross per 24 hour   Intake 1151.77 ml   Output 1071 ml   Net 80.77 ml        Physical Exam  Vitals reviewed.   Constitutional:       Appearance: He is well-developed.   HENT:      Head: Normocephalic and atraumatic.      Mouth/Throat:      Mouth: Mucous membranes are moist.      Pharynx: Oropharynx is clear.   Eyes:      Extraocular Movements: Extraocular movements intact.      Conjunctiva/sclera: Conjunctivae normal.   Cardiovascular:      Rate and Rhythm: Normal rate. Rhythm irregular.   Pulmonary:      Effort: Pulmonary effort is normal.      Breath sounds: No wheezing or rhonchi.   Abdominal:      General: Bowel sounds are normal.      Palpations: Abdomen is soft.   Musculoskeletal:         General: No deformity.      Cervical back: Normal range of motion and neck supple.      Right lower leg: No edema.      Left lower leg: No edema.      Comments: Left arm sling   Skin:     General: Skin is warm and dry.   Neurological:      General: No focal deficit present.      Mental Status: He is alert and oriented to person, place, and time.   Psychiatric:         Behavior: Behavior is cooperative.           Review of Systems  Reviewed and negative except for hoarseness and dried mouth.            Lines/Drains/Airways       Drain  Duration                  NG/OG Tube 01/25/24 1555 Left nostril 1 day         Urethral Catheter 01/26/24 0926 1 day              Peripheral Intravenous Line   Duration                  Peripheral IV - Single Lumen 01/24/24 0321 20 G Posterior;Right Hand 3 days         Peripheral IV - Single Lumen 01/24/24 0321 20 G Posterior;Right Wrist 3 days         Peripheral IV - Single Lumen 01/25/24 1400 20 G Anterior;Left Forearm 1 day                    Significant Labs:    CBC/Anemia Profile:  Recent Labs   Lab 01/26/24  0550 01/27/24  0359   WBC 7.07 7.04   HGB 11.7* 11.5*   HCT 35.0* 34.0*   * 143*   * 101*   RDW 13.8 13.7        Chemistries:  Recent Labs   Lab 01/26/24  0550 01/27/24  0359    141   K 4.6 4.1   * 114*   CO2 20* 16*   BUN 35* 30*   CREATININE 1.3 1.3   CALCIUM 9.0 8.9   MG 1.7 1.6   PHOS 3.7 2.8       All pertinent labs within the past 24 hours have been reviewed.    Significant Imaging:  I have reviewed all pertinent imaging results/findings within the past 24 hours.

## 2024-01-27 NOTE — SUBJECTIVE & OBJECTIVE
Interval History:     Review of Systems   Constitutional: Negative. Negative for weight gain.   HENT: Negative.     Eyes: Negative.    Cardiovascular: Negative.  Negative for chest pain, leg swelling and palpitations.   Respiratory: Negative.  Negative for shortness of breath.    Endocrine: Negative.    Hematologic/Lymphatic: Negative.    Skin: Negative.    Musculoskeletal:  Negative for muscle weakness.   Gastrointestinal: Negative.    Genitourinary: Negative.    Neurological: Negative.  Negative for dizziness.   Psychiatric/Behavioral: Negative.     Allergic/Immunologic: Negative.    All other systems reviewed and are negative.    Objective:     Vital Signs (Most Recent):  Temp: 97.5 °F (36.4 °C) (01/27/24 1119)  Pulse: (Abnormal) 59 (01/27/24 1119)  Resp: 15 (01/27/24 1119)  BP: (Abnormal) 125/58 (01/27/24 1100)  SpO2: 96 % (01/27/24 1119) Vital Signs (24h Range):  Temp:  [93.6 °F (34.2 °C)-97.7 °F (36.5 °C)] 97.5 °F (36.4 °C)  Pulse:  [59-60] 59  Resp:  [14-35] 15  SpO2:  [94 %-98 %] 96 %  BP: (113-175)/() 125/58     Weight: 98.7 kg (217 lb 9.5 oz)  Body mass index is 29.51 kg/m².     SpO2: 96 %         Intake/Output Summary (Last 24 hours) at 1/27/2024 1150  Last data filed at 1/27/2024 1100  Gross per 24 hour   Intake 1023.2 ml   Output 1016 ml   Net 7.2 ml       Lines/Drains/Airways       Drain       Name Duration         NG/OG Tube 01/25/24 1555 Left nostril 1 day         Urethral Catheter 01/26/24 0926 1 day              Peripheral Intravenous Line       Name Duration         Peripheral IV - Single Lumen 01/24/24 0321 20 G Posterior;Right Hand 3 days         Peripheral IV - Single Lumen 01/24/24 0321 20 G Posterior;Right Wrist 3 days         Peripheral IV - Single Lumen 01/25/24 1400 20 G Anterior;Left Forearm 1 day                       Physical Exam  Vitals and nursing note reviewed.   Constitutional:       Appearance: He is well-developed.   HENT:      Head: Normocephalic and atraumatic.   Eyes:       Conjunctiva/sclera: Conjunctivae normal.      Pupils: Pupils are equal, round, and reactive to light.   Cardiovascular:      Rate and Rhythm: Normal rate and regular rhythm.      Pulses: Intact distal pulses.      Heart sounds: Normal heart sounds.   Pulmonary:      Effort: Pulmonary effort is normal.      Breath sounds: Normal breath sounds.   Abdominal:      General: Bowel sounds are normal.      Palpations: Abdomen is soft.   Musculoskeletal:      Cervical back: Normal range of motion and neck supple.   Skin:     General: Skin is warm and dry.   Neurological:      Mental Status: He is alert and oriented to person, place, and time.            Significant Labs: All pertinent lab results from the last 24 hours have been reviewed.    Significant Imaging: X-Ray: CXR: X-Ray Chest 1 View (CXR): No results found for this visit on 01/23/24.

## 2024-01-28 PROBLEM — N39.0 UTI (URINARY TRACT INFECTION): Status: ACTIVE | Noted: 2024-01-28

## 2024-01-28 LAB
ANION GAP SERPL CALC-SCNC: 13 MMOL/L (ref 8–16)
BACTERIA UR CULT: ABNORMAL
BASOPHILS # BLD AUTO: 0.02 K/UL (ref 0–0.2)
BASOPHILS NFR BLD: 0.2 % (ref 0–1.9)
BUN SERPL-MCNC: 28 MG/DL (ref 8–23)
CALCIUM SERPL-MCNC: 8.8 MG/DL (ref 8.7–10.5)
CHLORIDE SERPL-SCNC: 113 MMOL/L (ref 95–110)
CO2 SERPL-SCNC: 15 MMOL/L (ref 23–29)
CREAT SERPL-MCNC: 1.3 MG/DL (ref 0.5–1.4)
DIFFERENTIAL METHOD BLD: ABNORMAL
EOSINOPHIL # BLD AUTO: 0.2 K/UL (ref 0–0.5)
EOSINOPHIL NFR BLD: 2.7 % (ref 0–8)
ERYTHROCYTE [DISTWIDTH] IN BLOOD BY AUTOMATED COUNT: 14.3 % (ref 11.5–14.5)
EST. GFR  (NO RACE VARIABLE): 57 ML/MIN/1.73 M^2
GLUCOSE SERPL-MCNC: 135 MG/DL (ref 70–110)
HCT VFR BLD AUTO: 33.7 % (ref 40–54)
HGB BLD-MCNC: 11.2 G/DL (ref 14–18)
IMM GRANULOCYTES # BLD AUTO: 0.04 K/UL (ref 0–0.04)
IMM GRANULOCYTES NFR BLD AUTO: 0.5 % (ref 0–0.5)
LYMPHOCYTES # BLD AUTO: 1.6 K/UL (ref 1–4.8)
LYMPHOCYTES NFR BLD: 18.4 % (ref 18–48)
MAGNESIUM SERPL-MCNC: 1.6 MG/DL (ref 1.6–2.6)
MCH RBC QN AUTO: 34.3 PG (ref 27–31)
MCHC RBC AUTO-ENTMCNC: 33.2 G/DL (ref 32–36)
MCV RBC AUTO: 103 FL (ref 82–98)
MONOCYTES # BLD AUTO: 1.1 K/UL (ref 0.3–1)
MONOCYTES NFR BLD: 12.1 % (ref 4–15)
NEUTROPHILS # BLD AUTO: 5.8 K/UL (ref 1.8–7.7)
NEUTROPHILS NFR BLD: 66.1 % (ref 38–73)
NRBC BLD-RTO: 0 /100 WBC
PHOSPHATE SERPL-MCNC: 2.6 MG/DL (ref 2.7–4.5)
PLATELET # BLD AUTO: 161 K/UL (ref 150–450)
PMV BLD AUTO: 9.3 FL (ref 9.2–12.9)
POCT GLUCOSE: 133 MG/DL (ref 70–110)
POCT GLUCOSE: 147 MG/DL (ref 70–110)
POCT GLUCOSE: 148 MG/DL (ref 70–110)
POCT GLUCOSE: 168 MG/DL (ref 70–110)
POTASSIUM SERPL-SCNC: 3.6 MMOL/L (ref 3.5–5.1)
RBC # BLD AUTO: 3.27 M/UL (ref 4.6–6.2)
SODIUM SERPL-SCNC: 141 MMOL/L (ref 136–145)
WBC # BLD AUTO: 8.79 K/UL (ref 3.9–12.7)

## 2024-01-28 PROCEDURE — 80048 BASIC METABOLIC PNL TOTAL CA: CPT | Performed by: INTERNAL MEDICINE

## 2024-01-28 PROCEDURE — 25000003 PHARM REV CODE 250: Performed by: NURSE PRACTITIONER

## 2024-01-28 PROCEDURE — 92507 TX SP LANG VOICE COMM INDIV: CPT

## 2024-01-28 PROCEDURE — 11000001 HC ACUTE MED/SURG PRIVATE ROOM

## 2024-01-28 PROCEDURE — 25000242 PHARM REV CODE 250 ALT 637 W/ HCPCS: Performed by: STUDENT IN AN ORGANIZED HEALTH CARE EDUCATION/TRAINING PROGRAM

## 2024-01-28 PROCEDURE — 25000003 PHARM REV CODE 250: Performed by: INTERNAL MEDICINE

## 2024-01-28 PROCEDURE — 97530 THERAPEUTIC ACTIVITIES: CPT

## 2024-01-28 PROCEDURE — 63600175 PHARM REV CODE 636 W HCPCS: Performed by: NURSE PRACTITIONER

## 2024-01-28 PROCEDURE — 97162 PT EVAL MOD COMPLEX 30 MIN: CPT

## 2024-01-28 PROCEDURE — 97116 GAIT TRAINING THERAPY: CPT

## 2024-01-28 PROCEDURE — 36415 COLL VENOUS BLD VENIPUNCTURE: CPT | Performed by: INTERNAL MEDICINE

## 2024-01-28 PROCEDURE — 85025 COMPLETE CBC W/AUTO DIFF WBC: CPT | Performed by: INTERNAL MEDICINE

## 2024-01-28 PROCEDURE — 21400001 HC TELEMETRY ROOM

## 2024-01-28 PROCEDURE — 99232 SBSQ HOSP IP/OBS MODERATE 35: CPT | Mod: ,,, | Performed by: INTERNAL MEDICINE

## 2024-01-28 PROCEDURE — 92611 MOTION FLUOROSCOPY/SWALLOW: CPT

## 2024-01-28 PROCEDURE — 84100 ASSAY OF PHOSPHORUS: CPT | Performed by: INTERNAL MEDICINE

## 2024-01-28 PROCEDURE — 83735 ASSAY OF MAGNESIUM: CPT | Performed by: INTERNAL MEDICINE

## 2024-01-28 PROCEDURE — 97167 OT EVAL HIGH COMPLEX 60 MIN: CPT

## 2024-01-28 RX ORDER — FLUTICASONE PROPIONATE 50 MCG
2 SPRAY, SUSPENSION (ML) NASAL DAILY
Status: DISCONTINUED | OUTPATIENT
Start: 2024-01-28 | End: 2024-01-30 | Stop reason: HOSPADM

## 2024-01-28 RX ADMIN — APIXABAN 5 MG: 2.5 TABLET, FILM COATED ORAL at 10:01

## 2024-01-28 RX ADMIN — MICONAZOLE NITRATE: 20 POWDER TOPICAL at 10:01

## 2024-01-28 RX ADMIN — ALPRAZOLAM 0.25 MG: 0.25 TABLET ORAL at 11:01

## 2024-01-28 RX ADMIN — LACTULOSE 30 G: 20 SOLUTION ORAL at 10:01

## 2024-01-28 RX ADMIN — HYDROCODONE BITARTRATE AND ACETAMINOPHEN 10 ML: 7.5; 325 SOLUTION ORAL at 11:01

## 2024-01-28 RX ADMIN — CEFEPIME 1 G: 1 INJECTION, POWDER, FOR SOLUTION INTRAMUSCULAR; INTRAVENOUS at 05:01

## 2024-01-28 RX ADMIN — METOPROLOL TARTRATE 12.5 MG: 25 TABLET, FILM COATED ORAL at 10:01

## 2024-01-28 RX ADMIN — FLUTICASONE PROPIONATE 100 MCG: 50 SPRAY, METERED NASAL at 06:01

## 2024-01-28 RX ADMIN — RIFAXIMIN 550 MG: 550 TABLET ORAL at 10:01

## 2024-01-28 RX ADMIN — MUPIROCIN: 20 OINTMENT TOPICAL at 11:01

## 2024-01-28 RX ADMIN — CEFEPIME 1 G: 1 INJECTION, POWDER, FOR SOLUTION INTRAMUSCULAR; INTRAVENOUS at 06:01

## 2024-01-28 NOTE — PLAN OF CARE
Nutrition Recs: 1/28  1. Recommend pt continues EN as medically appropriate.   - Diabetisource AC. Continue at rate of 20 mL/hr, advance as pt tolerates or per MD/NP. Aim for goal rate of 60 mL/hr.   - Formula will provide: 1728 kcal (82% EEN), 86.4 g Protein (109% EPN), 1175.04 mL free water.   - 155 mL fwf q4hr (930 mL/day) or per MD/NP.   - FWF + Free water = 2105.04 mL/day (100% EFN).   2. Recommend pt receives Rip BID via fwf to assist with wound healing.   3. Weigh daily.    Goals:   1. Pt will consume and tolerate >75% of EEN/EPN prior to RD follow up.   2. Pt will receive Rip prior to RD follow up.  Nutrition Goal Status: new  Communication of RD Recs: other (comment) (POC: Sticky Note)  Gagandeep Mojica, Registration Eligible, Provisional LDN

## 2024-01-28 NOTE — ASSESSMENT & PLAN NOTE
Likely secondary to intravascular volume depletion --.  Improved  Avoid nephrotoxins, renal dose medications   Follow up on urinalysis

## 2024-01-28 NOTE — NURSING
Telephoned report to MANDEEP Prabhakar on med/surg.  Family at bedside and notified of room on 5th floor.  Pt will be transported via bed to room 561.

## 2024-01-28 NOTE — PT/OT/SLP PROGRESS
Occupational Therapy      Patient Name:  Ramy Romo   MRN:  [id number]    Patient not seen today secondary to Nursing care. Will follow-up at next opportunity.    1/28/2024

## 2024-01-28 NOTE — PROGRESS NOTES
[facility] - Intensive Care ([facility])  Cardiology  Progress Note    Patient Name: Ramy Romo  MRN: [id number]  Admission Date: 1/23/2024  Hospital Length of Stay: 4 days  Code Status: Full Code   Attending Physician: Rebecca Valladares,*   Primary Care Physician: Félix Rollins MD  Expected Discharge Date:   Principal Problem:Symptomatic bradycardia    Subjective:     Hospital Course:   1-25-24 Permanent PPM today, ammonia elevated, no lactulose for 2 days, on precedex    1-26-24 s/p PPM- single lead, site looks good , tele shows V paving at 60 bpm    1-27-24 PPM site looks good, telemetry shows V pacing at 60 bpm, Pt can restart home beta blockers and eliquis, if no contradicting indications. Labs seen and reviewed.     1-28-24 PPM site is healing well. Patient is occasionally pacing but mosty in Afib. Controlled rate. On b bblockers and eliquis    Interval History:      Review of Systems   Constitutional: Negative. Negative for weight gain.   HENT: Negative.     Eyes: Negative.    Cardiovascular: Negative.  Negative for chest pain, leg swelling and palpitations.   Respiratory: Negative.  Negative for shortness of breath.    Endocrine: Negative.    Hematologic/Lymphatic: Negative.    Skin: Negative.    Musculoskeletal:  Negative for muscle weakness.   Gastrointestinal: Negative.    Genitourinary: Negative.    Neurological: Negative.  Negative for dizziness.   Psychiatric/Behavioral: Negative.     Allergic/Immunologic: Negative.    All other systems reviewed and are negative.    Objective   Vitals:    01/28/24 0746 01/28/24 0800 01/28/24 0810 01/28/24 0900   BP:  (!) 159/68  (!) 161/68   Pulse:  60  60   Resp:  (!) 26  (!) 29   Temp:  100 °F (37.8 °C)  99.9 °F (37.7 °C)   TempSrc:       SpO2:  98%  99%   Weight: 98.7 kg (217 lb 9.5 oz)      Height: 6' (1.829 m)  6' (1.829 m)        Assessment and Plan:     Brief HPI:     * Symptomatic bradycardia  78 y.o. male patient with a PMHx of CHF, HTN, A-fib, DM,  cirrhosis, history of stroke  presents with slow afib. Initially pt was hemodynamically stable in telemetery HR 30s , asymptomatic,  Pt with HR 20s then and symptomatic and transferred to ICU. Pt cardiologist is Dr. La /ANITA.Patient denies CP, angina or anginal equivalent.Pt started on dopamine but HR still 27-30s.      Isopril started with improvement, temporary PPM this morning. If still bradycardic tomorrow, permanent PPM  Not pt took eliquis yesterday, needs to be > 48 hrs for procedure        VTE Risk Mitigation (From admission, onward)           Ordered     apixaban tablet 5 mg  2 times daily         01/26/24 0837     IP VTE HIGH RISK PATIENT  Once         01/23/24 1659     Place sequential compression device  Until discontinued         01/23/24 1659                  SCRIBE #1 NOTE: I, Sakshi Martin, am scribing for, and in the presence of,  Renaldo Santacruz MD. I have scribed the entire note.

## 2024-01-28 NOTE — NURSING
Dr. Valladares rounding on patient. Okay to leave NGT out at this time and await SLT eval today.  Okay to transfer to floor.

## 2024-01-28 NOTE — PLAN OF CARE
Initial PT eval completed. Patient Mod A x2 with bed mobility, Min A x2 with sit<>stand with HHA and Min A x2 with taking a few steps at bedside with HHA. Recommend low intensity therapy with hospital bed.

## 2024-01-28 NOTE — SUBJECTIVE & OBJECTIVE
Interval History:     Accidentally pulled NG tube overnight, otherwise more alert awake, oriented x3, mentation close to baseline.    Initially did not want to go for modified barium swallow study, eventually agreed.    Son Mr. Machuca at bedside, updated plan of care;  Ordered for voiding trail, accordingly we will plan to discontinue Lopez catheter;  Ordered for NG tube, we will resume meds, tube feeds accordingly         Review of Systems        Constitutional:  Negative for chills and fever.   HENT:  Negative for sore throat.    Respiratory:  Negative for cough and shortness of breath.    Cardiovascular:  Negative for chest pain.   Gastrointestinal:   Negative for abdominal pain, nausea and vomiting.   Genitourinary:  Negative for dysuria.   Musculoskeletal:  Negative for back pain.   Skin:  Negative for rash.   Neurological:  Positive for  weakness (generalized). Negative for numbness and headaches.   Hematological:  Does not bruise/bleed easily.   Objective:     Vital Signs (Most Recent):  Temp: 98.1 °F (36.7 °C) (01/28/24 1155)  Pulse: 60 (01/28/24 1155)  Resp: 16 (01/28/24 1155)  BP: (!) 171/72 (01/28/24 1155)  SpO2: 98 % (01/28/24 1155) Vital Signs (24h Range):  Temp:  [98.1 °F (36.7 °C)-100.2 °F (37.9 °C)] 98.1 °F (36.7 °C)  Pulse:  [59-60] 60  Resp:  [16-29] 16  SpO2:  [96 %-99 %] 98 %  BP: (131-177)/(58-83) 171/72     Weight: 98.7 kg (217 lb 9.5 oz)  Body mass index is 29.51 kg/m².    Intake/Output Summary (Last 24 hours) at 1/28/2024 1559  Last data filed at 1/28/2024 0800  Gross per 24 hour   Intake 345.46 ml   Output 572 ml   Net -226.54 ml         Physical Exam      Constitutional: Patient is in no acute distress. Well-developed and well-nourished.  Head: Atraumatic. Normocephalic.  Eyes: PERRL. EOM intact. Conjunctivae are not pale. No scleral icterus.  ENT: Mucous membranes are moist. Oropharynx is clear and symmetric.    Neck: Supple. Full ROM. No lymphadenopathy.  Cardiovascular: HR stable, BP  stable   Pulmonary/Chest: No respiratory distress. Clear to auscultation bilaterally. No wheezing or rales.  Abdominal: Soft and non-distended.  There is no tenderness.  No rebound, guarding, or rigidity. Good bowel sounds.  Genitourinary: No CVA tenderness  Musculoskeletal: Moves all extremities. No obvious deformities. No edema. No calf tenderness.  Skin: Warm and dry.  Neurological:  Alert, awake, and appropriate.  Normal speech.  No acute focal neurological deficits are appreciated.  Psychiatric: Normal affect. Good eye contact. Appropriate in content.      Significant Labs: All pertinent labs within the past 24 hours have been reviewed.  CBC:   Recent Labs   Lab 01/27/24 0359 01/28/24  0352   WBC 7.04 8.79   HGB 11.5* 11.2*   HCT 34.0* 33.7*   * 161     CMP:   Recent Labs   Lab 01/27/24 0359 01/28/24 0352    141   K 4.1 3.6   * 113*   CO2 16* 15*   * 135*   BUN 30* 28*   CREATININE 1.3 1.3   CALCIUM 8.9 8.8   ANIONGAP 11 13       Significant Imaging:     Imaging Results              X-Ray Chest AP Portable (Final result)  Result time 01/23/24 16:15:15      Final result by Raina Green MD (01/23/24 16:15:15)                   Impression:      No acute cardiopulmonary abnormality.    Sclerotic focus within the proximal right humerus, partially visualized.      Electronically signed by: Raina Green  Date:    01/23/2024  Time:    16:15               Narrative:    EXAMINATION:  XR CHEST AP PORTABLE    CLINICAL HISTORY:  bradycardia;    TECHNIQUE:  Single frontal view of the chest was performed.    COMPARISON:  Chest radiograph 03/29/2018    FINDINGS:  ECG monitoring leads overlie the chest.  A loop recorder is present over the lower medial left hemithorax.  Surgical clips overlie the right axilla and upper right lung.There is bilateral interstitial coarsening, suspected chronic.  Degenerative change at the 1st costochondral junction somewhat obscures evaluation at the  lung apices, particularly on the left.  Underlying pulmonary nodule in this region is difficult to exclude.  No significant pleural fluid.  No pneumothorax.  No large consolidation.    The cardiac silhouette is normal in size. There is aortic calcification.    There is degenerative change of the spine and right shoulder.  No acute osseous abnormality.  Sclerotic focus is partially visualized over the proximal right humerus.  Cholecystectomy clips are noted.

## 2024-01-28 NOTE — PT/OT/SLP PROGRESS
Physical Therapy      Patient Name:  Ramy Romo   MRN:  [id number]    Patient not seen today secondary to being with RN, having NGT replaced.  Will continue efforts.    Renetta Branch, PT  01/28/24

## 2024-01-28 NOTE — PLAN OF CARE
Goals to be met by: 2/11/24     Patient will increase functional independence with ADLs by performing:    LE Dressing with Moderate Assistance.  Grooming while standing at sink with Set-up Assistance.  Toilet transfer to bedside commode with Contact Guard Assistance.    DC with low intensity OT indicated.

## 2024-01-28 NOTE — PROGRESS NOTES
[facility] Medicine  Progress Note    Patient Name: Ramy Romo  MRN: [id number]  Patient Class: IP- Inpatient   Admission Date: 1/23/2024  Length of Stay: 4 days  Attending Physician: Rebecca Valladares,*  Primary Care Provider: Félix Rollins MD        Subjective:     Principal Problem:Symptomatic bradycardia        HPI:  Ramy Romo is a 78 y.o. male patient with a PMHx of CHF, HTN, A-fib, DM, cirrhosis, history of stroke who presents to the Emergency Department for evaluation of a bradycardic status which onset today. The pt cargeiver states that the pt went to  this morning and was told to come to the ED because his HR was very low. She states that the pt was very dizzy when he was getting out of the car this morning, and that the neighbor had to help them, so that he would not fall. She also states that the pt hs been experiencing diarrhea and generalized weakness for the past 2 days. Symptoms are constant and moderate in severity. No mitigating or exacerbating factors reported. Patient denies any fever, chills, CP, SOB, headaches, n/v/d, numbness , and all other sxs at this time. Pt states that he is compliant with his home medication regiment. No further complaints or concerns at this time.   Stated having diarrhea for past 2 days, stated improvement in diarrhea today.      In ED, patient glucagon 5mg and atropine 1mg x 2. hr still in the 30's. pt is aao x 3 answering questions appropriately. trop: 0.01, wbc: 5.9, hb: 11.5, cr: 1.5, cxr: naf.  ED provided discussed case with dr agudelo and he recommends hold metoprolol and close monitoring.  Hospital medicine consulted for admission for symptomatic bradycardia.    At baseline, patient ambulates with cane/ wheelchair in home   Code status- alert and oriented x3 during the discussion, opted for full code         Overview/Hospital Course:  78 year old male with known medical issues including atrial fib on eliquis and  metoprolol; HTN; HFpEF; DM; and cirrhosis on daily lactulose presented to ED on 1/23 from Urgent care where eval for weakness/dizziness/diarrhea x 2 days noted bradycardia in 30s; Remained with bradycardia in 30s despite glucagon and atropine trials  HM admitted to telemetry on 1/23/24 for close monitoring per cardiology recs; overnight on 1/23/24, bradycardia worsened to 20s without improvement with additional glucagon, atropine, got transferred to ICU.  Status post empiric pacer placement on 1/24/25 followed by permanent ppm placement on 01/25/2024.  Remained in ICU due to elevated ammonia associated with confusion, requiring Precedex drip.  SLP evaluated and recommended NPO, status post NG tube placement with resumption of lactulose.  Followed by improvement in mentation, subsequently Precedex discontinued on 01/27/24, patient appeared more alert, oriented x3, with mentation close to baseline.  Accordingly patient seemed stable for downgrade per ICU team on 01/27/2024, hospital medicine consulted to assume care.  UTI with urine cultures positive for Klebsiella (from 1/24/24), on cefepime since 01/27/2024 based on cultures.       On 01/28/2024, alert and oriented with mentation at baseline.  SLP evaluated and recommended NPO, modified barium swallow showed- swallow not functional, with audible and silent aspiration findings.  Results similar to those last modified barium swallow study in 2022.  Patient pulled out NG tube overnight, ordered for replacement of NG tube, followed by initiation of meds, tube feeds.    Discussed on code status, goals of care-- full code as of now, to continue palliative discussions.  Status post Lopez catheter on 01/26/2024, ordered for voiding trail on 01/28/2024.              Interval History:     Accidentally pulled NG tube overnight, otherwise more alert awake, oriented x3, mentation close to baseline.    Initially did not want to go for modified barium swallow study, eventually  agreed.    Son Mr. Machuca at bedside, updated plan of care;  Ordered for voiding trail, accordingly we will plan to discontinue Lopez catheter;  Ordered for NG tube, we will resume meds, tube feeds accordingly         Review of Systems        Constitutional:  Negative for chills and fever.   HENT:  Negative for sore throat.    Respiratory:  Negative for cough and shortness of breath.    Cardiovascular:  Negative for chest pain.   Gastrointestinal:   Negative for abdominal pain, nausea and vomiting.   Genitourinary:  Negative for dysuria.   Musculoskeletal:  Negative for back pain.   Skin:  Negative for rash.   Neurological:  Positive for  weakness (generalized). Negative for numbness and headaches.   Hematological:  Does not bruise/bleed easily.   Objective:     Vital Signs (Most Recent):  Temp: 98.1 °F (36.7 °C) (01/28/24 1155)  Pulse: 60 (01/28/24 1155)  Resp: 16 (01/28/24 1155)  BP: (!) 171/72 (01/28/24 1155)  SpO2: 98 % (01/28/24 1155) Vital Signs (24h Range):  Temp:  [98.1 °F (36.7 °C)-100.2 °F (37.9 °C)] 98.1 °F (36.7 °C)  Pulse:  [59-60] 60  Resp:  [16-29] 16  SpO2:  [96 %-99 %] 98 %  BP: (131-177)/(58-83) 171/72     Weight: 98.7 kg (217 lb 9.5 oz)  Body mass index is 29.51 kg/m².    Intake/Output Summary (Last 24 hours) at 1/28/2024 1559  Last data filed at 1/28/2024 0800  Gross per 24 hour   Intake 345.46 ml   Output 572 ml   Net -226.54 ml         Physical Exam      Constitutional: Patient is in no acute distress. Well-developed and well-nourished.  Head: Atraumatic. Normocephalic.  Eyes: PERRL. EOM intact. Conjunctivae are not pale. No scleral icterus.  ENT: Mucous membranes are moist. Oropharynx is clear and symmetric.    Neck: Supple. Full ROM. No lymphadenopathy.  Cardiovascular: HR stable, BP stable   Pulmonary/Chest: No respiratory distress. Clear to auscultation bilaterally. No wheezing or rales.  Abdominal: Soft and non-distended.  There is no tenderness.  No rebound, guarding, or rigidity. Good  bowel sounds.  Genitourinary: No CVA tenderness  Musculoskeletal: Moves all extremities. No obvious deformities. No edema. No calf tenderness.  Skin: Warm and dry.  Neurological:  Alert, awake, and appropriate.  Normal speech.  No acute focal neurological deficits are appreciated.  Psychiatric: Normal affect. Good eye contact. Appropriate in content.      Significant Labs: All pertinent labs within the past 24 hours have been reviewed.  CBC:   Recent Labs   Lab 01/27/24  0359 01/28/24  0352   WBC 7.04 8.79   HGB 11.5* 11.2*   HCT 34.0* 33.7*   * 161     CMP:   Recent Labs   Lab 01/27/24  0359 01/28/24  0352    141   K 4.1 3.6   * 113*   CO2 16* 15*   * 135*   BUN 30* 28*   CREATININE 1.3 1.3   CALCIUM 8.9 8.8   ANIONGAP 11 13       Significant Imaging:     Imaging Results              X-Ray Chest AP Portable (Final result)  Result time 01/23/24 16:15:15      Final result by Raina Green MD (01/23/24 16:15:15)                   Impression:      No acute cardiopulmonary abnormality.    Sclerotic focus within the proximal right humerus, partially visualized.      Electronically signed by: Raina Green  Date:    01/23/2024  Time:    16:15               Narrative:    EXAMINATION:  XR CHEST AP PORTABLE    CLINICAL HISTORY:  bradycardia;    TECHNIQUE:  Single frontal view of the chest was performed.    COMPARISON:  Chest radiograph 03/29/2018    FINDINGS:  ECG monitoring leads overlie the chest.  A loop recorder is present over the lower medial left hemithorax.  Surgical clips overlie the right axilla and upper right lung.There is bilateral interstitial coarsening, suspected chronic.  Degenerative change at the 1st costochondral junction somewhat obscures evaluation at the lung apices, particularly on the left.  Underlying pulmonary nodule in this region is difficult to exclude.  No significant pleural fluid.  No pneumothorax.  No large consolidation.    The cardiac silhouette is  normal in size. There is aortic calcification.    There is degenerative change of the spine and right shoulder.  No acute osseous abnormality.  Sclerotic focus is partially visualized over the proximal right humerus.  Cholecystectomy clips are noted.                                       Assessment/Plan:      * Symptomatic bradycardia  Likely medication induced   Status post transvenous pacer on 01/24/2025 followed by permanent pacer placement on 01/25/24   Heart rate stabilizing   Cardiology on board             UTI (urinary tract infection)  UA from 01/24/2024 suggestive of UTI, urine cultures positive for Klebsiella, antibiotics changed to cefepime on 01/27/2024 based on culture results   Status post Lopez placement on 01/26/2024 due to urinary retention   On 01/28/2024, ordered voiding trail, plan to discontinue Lopez catheter accordingly         Acute renal failure superimposed on stage 3a chronic kidney disease  Likely secondary to intravascular volume depletion --.  Improved  Avoid nephrotoxins, renal dose medications   Follow up on urinalysis    Cirrhosis of liver without ascites  Resume home lactulose       Encephalopathy  Likely secondary to elevated ammonia, UTI, delirium   Initially required Precedex during ICU for brief duration  Status post NG tube placed on 01/25 with resumption of lactulose, subsequently mentation gradually improved  01/28/2024, mentation close to baseline         Likely multifactorial- cystitis, elevated ammonia and delirium  NGT placed 1/25- lactulose restarted but no BM, dose increased 1/26 to QID  Urine + klebsiella aerogenes- Rocephin resistant. Changed to cefepime 1/27  Reorient as able  Improving      Chronic atrial fibrillation  Hold AV blocking drugs given symptomatic bradycardia   Continue Eliquis       Diabetes    Last A1c reviewed-   Lab Results   Component Value Date    HGBA1C 5.2 01/26/2024     Most recent fingerstick glucose reviewed-   Recent Labs   Lab 01/27/24  1820  01/28/24  0055 01/28/24  0518 01/28/24  1340   POCTGLUCOSE 111* 133* 148* 168*       Antihyperglycemics (From admission, onward)      Start     Stop Route Frequency Ordered    01/26/24 1633  insulin aspart U-100 pen 0-10 Units         -- SubQ Every 6 hours PRN 01/26/24 1534          Hold Oral hypoglycemics while patient is in the hospital.  Hold hypoglycemic agents, sliding scale insulin, glucose POC,      VTE Risk Mitigation (From admission, onward)           Ordered     apixaban tablet 5 mg  2 times daily         01/26/24 0837     IP VTE HIGH RISK PATIENT  Once         01/23/24 1659     Place sequential compression device  Until discontinued         01/23/24 1659                    Discharge Planning   KAYA:      Code Status: Full Code   Is the patient medically ready for discharge?:     Reason for patient still in hospital (select all that apply): Patient trending condition and Pending disposition  Discharge Plan A: Home Health                  [facility] Yury Valladares MD  Department of Hospital Medicine   [facility] - Med Surg

## 2024-01-28 NOTE — CONSULTS
[facility] - Intensive Care ([facility])  Adult Nutrition  Consult Note    SUMMARY     Recommendations  1. Recommend pt continues EN as medically appropriate.   - Diabetisource AC. Continue at rate of 20 mL/hr, advance as pt tolerates or per MD/NP. Aim for goal rate of 60 mL/hr.   - Formula will provide: 1728 kcal (82% EEN), 86.4 g Protein (109% EPN), 1175.04 mL free water.   - 155 mL fwf q4hr (930 mL/day) or per MD/NP.   - FWF + Free water = 2105.04 mL/day (100% EFN).   2. Recommend pt receives Rip BID via fwf to assist with wound healing. 3. Weigh daily.    Goals:   1. Pt will consume and tolerate >75% of EEN/EPN prior to RD follow up.   2. Pt will receive Rip prior to RD follow up.  Nutrition Goal Status: new  Communication of RD Recs: other (comment) (POC: Sticky Note)    Assessment and Plan    Nutrition Problem  Inadequate PO intake  Increased protein needs    Related to (etiology):   Alteration in GI tract function  Increased demand for protein    Signs and Symptoms (as evidenced by):   dysphagia/aspiration   Multiple skin tear    Interventions(treatment strategy):  1. Enteral Nutrition  2. Mineral/Vitamin supplement therapy for wound healing  3. Collaboration with other providers.      Nutrition Diagnosis Status:   New       Malnutrition Assessment     Skin (Micronutrient): bruised, dry, wounds unhealed, turgor reduced (Lencho = 15)       Weight Loss (Malnutrition): greater than 7.5% in 3 months                         Reason for Assessment    Reason For Assessment: consult  Diagnosis: cardiac disease, gastrointestinal disease  Relevant Medical History: Symptomatic bradycardia, T2DM, A-Fib, Encephalopathy, Cirrhosis of liver without ascites, CKD3, AMEENA, CHF  Interdisciplinary Rounds: did not attend  General Information Comments:   1/28/24: 78 y.o male admitted with active principal problem of Symptomatic bradycardia. Pt currently NPO. Per EMR, pt with significant hx Dysphagia, initially requiring PEG (2018).  However, has since been removed. Currently,  recommends pt for NPO d/t reduced secretion management and overt s/s of dysphagia/aspiration during bedside eval. NFPE not performed per dietitian covering pt remotely. NFPE will be performed during RD follow up. Per EMR, the pt has had unintentional wt loss of 66 lbs within the past 3 months, -23% wt change. Pt currently charted to weigh 217 lb 9.5 oz, BMI 29.51 (Normal for age). Per ST note, pt presents with improving mentation and communication; however, intelligibility significant impacted by dysphonia (breathy/hoarse) and resonance impairment. Pt LBM: 1/27. Pt currently on room air. Pt noted with elbow and wrist skin tear.  Nutrition Discharge Planning: Pending further SLP evaluation    Nutrition Risk Screen    Nutrition Risk Screen: difficulty chewing/swallowing, large or nonhealing wound, burn or pressure injury, unintentional loss of 10 lbs or more in the past 2 months, tube feeding or parenteral nutrition, reduced oral intake over the last month (Possible reduced PO intake over last month)    Nutrition/Diet History    Spiritual, Cultural Beliefs, Caodaism Practices, Values that Affect Care: no  Food Allergies: NKFA  Factors Affecting Nutritional Intake: altered gastrointestinal function, impaired cognitive status/motor control, difficulty/impaired swallowing, NPO    Anthropometrics    Temp: 100 °F (37.8 °C)  Height: 6' (182.9 cm)  Height (inches): 72 in  Weight Method: Bed Scale  Weight: 98.7 kg (217 lb 9.5 oz)  Weight (lb): 217.6 lb  Ideal Body Weight (IBW), Male: 178 lb  % Ideal Body Weight, Male (lb): 122.25 %  BMI (Calculated): 29.5  BMI Grade: 25 - 29.9 - overweight (Normal for age)       Lab/Procedures/Meds  BMP  Lab Results   Component Value Date     01/28/2024    K 3.6 01/28/2024     (H) 01/28/2024    CO2 15 (L) 01/28/2024    BUN 28 (H) 01/28/2024    CREATININE 1.3 01/28/2024    CALCIUM 8.8 01/28/2024    ANIONGAP 13 01/28/2024     EGFRNORACEVR 57 (A) 01/28/2024     Lab Results   Component Value Date    CALCIUM 8.8 01/28/2024    PHOS 2.6 (L) 01/28/2024     Recent Labs   Lab 01/28/24  0518   POCTGLUCOSE 148*     Lab Results   Component Value Date    ALT 13 01/25/2024    AST 20 01/25/2024    ALKPHOS 92 01/25/2024    BILITOT 0.8 01/25/2024       Pertinent Labs Reviewed: reviewed  Pertinent Medications Reviewed: reviewed  Scheduled Meds:   apixaban  5 mg Per NG tube BID    ceFEPime (MAXIPIME) IVPB EXTENDED INFUSION  1 g Intravenous Q12H    doxazosin  1 mg Per NG tube Daily    famotidine (PF)  20 mg Intravenous BID    ferrous gluconate  324 mg Per NG tube Daily with breakfast    lactulose  30 g Per NG tube BID    metoprolol tartrate  12.5 mg Per NG tube BID    miconazole NITRATE 2 %   Topical (Top) BID    mupirocin   Nasal BID    rifAXIMin  550 mg Per NG tube QHS     Continuous Infusions:  PRN Meds:.acetaminophen, ALPRAZolam, bisacodyL, dextrose 10%, dextrose 10%, glucagon (human recombinant), glucose, glucose, haloperidol lactate, hydrALAZINE, hydrocodone-apap 7.5-325 MG/15 ML, insulin aspart U-100, magnesium oxide, magnesium oxide, naloxone, ondansetron, pneumoc 20-daxa conj-dip cr(PF), potassium bicarbonate, potassium bicarbonate, potassium bicarbonate, potassium, sodium phosphates, potassium, sodium phosphates, potassium, sodium phosphates, senna-docusate 8.6-50 mg, sodium chloride 0.9%    Physical Findings/Assessment         Estimated/Assessed Needs    Weight Used For Calorie Calculations: 98.7 kg (217 lb 9.5 oz)  Energy Calorie Requirements (kcal): 2100 (MSJ, 1.2 AF Per Cirrhosis of liver without ascites)  Energy Need Method: Hayes- Madeline  Protein Requirements: 59-79 (0.6-0.8 g/kg per CKD3/T2DM vs Encephalopathy (Improving))  Weight Used For Protein Calculations: 98.7 kg (217 lb 9.5 oz)  Fluid Requirements (mL): 2100  Estimated Fluid Requirement Method: RDA Method  RDA Method (mL): 2100  CHO Requirement: 263      Nutrition Prescription  Ordered    Current Diet Order: NPO  Current Nutrition Support Formula Ordered: Diabetisource AC  Current Nutrition Support Rate Ordered: 20 (ml)  Current Nutrition Support Frequency Ordered: Continuous    Evaluation of Received Nutrient/Fluid Intake    Enteral Calories (kcal): 576  Enteral Protein (gm): 28.8  Enteral (Free Water) Fluid (mL): 391.68  Total Calories (kcal/kg): 576  % Kcal Needs: 27  Total Protein (gm): 28.8  % Protein Needs: 49  IV Fluid (mL): 265.5  Other Fluid (mL): 380  Total Fluid Intake (mL): 645.5  I/O: 503.3  Energy Calories Required: not meeting needs  Protein Required: not meeting needs  Fluid Required: not meeting needs  Tolerance: tolerating  % Intake of Estimated Energy Needs: 25 - 50 %  % Meal Intake: NPO EN/Diabetisource @20 mL/hr    Nutrition Risk    Level of Risk/Frequency of Follow-up: high (x2 weekly)       Monitor and Evaluation    Food and Nutrient Intake: energy intake, enteral nutrition intake  Food and Nutrient Adminstration: enteral and parenteral nutrition administration  Knowledge/Beliefs/Attitudes: beliefs and attitudes  Physical Activity and Function: nutrition-related ADLs and IADLs, factors affecting access to physical activity  Anthropometric Measurements: weight, weight change, body mass index  Biochemical Data, Medical Tests and Procedures: electrolyte and renal panel, gastrointestinal profile, glucose/endocrine profile  Nutrition-Focused Physical Findings: overall appearance, extremities, muscles and bones, head and eyes, skin       Nutrition Follow-Up    RD Follow-up?: Yes  Gagandeep Mojica, Registration Eligible, Provisional LDN

## 2024-01-28 NOTE — PT/OT/SLP PROGRESS
Speech Language Pathology Treatment    Patient Name:  Ramy Romo   MRN:  [id number]  Admitting Diagnosis: Symptomatic bradycardia    Recommendations:                 General Recommendations:  Modified barium swallow study  Diet recommendations:  NPO, Liquid Diet Level: NPO   Aspiration Precautions: Frequent oral care   General Precautions: Standard, aspiration  Communication strategies:  provide increased time to answer    Assessment:     Ramy Romo is a 78 y.o. male with an SLP diagnosis of suspected acute/chronic Dysphagia and Dysphonia in the setting of cardiac hx s/p PPM, cystitis, CVA, dementia liver cirrhosis.  He presents with improving mentation and communication; however, intelligibility significant impacted by dysphonia (breathy/hoarse) and resonance impairment.  Pt reported sinus hx requiring surgical ENT intervention.  Pt presents with reduced secretion management and overt s/s of dysphagia/aspiration during bedside CSE.  Pt denied dysphagia hx; however, severe dysphagia/aspiration reported on previous esophagram and MBSS' at [facility] ([id number]) without marked improvement.  Pt is recommended for a repeat instrumental.    Subjective     Patient alert and seen at bedside with family  Patient goals: go to the bathroom     Pain/Comfort:  Pain Rating 1: 0/10  Pain Rating Post-Intervention 1: 0/10    Respiratory Status: Room air    Objective:     Has the patient been evaluated by SLP for swallowing?   Yes  Keep patient NPO? Yes   Current Respiratory Status:        Patient with difficulty managing secretions. He exhibited varying levels of confusion. Oral care provided for hydration, sensation, and generation of spontaneous swallow reflex. Patient with throat clearing and increase wet vocal quality upon oral care. Education provided on history of dysphagia, aspiration, and s/s of aspiration. Recommend MBSS to assess the safety of the swallow. Patient adamantly refusing further instrumentation despite  education. Nursing and MD notified.    Goals:   Multidisciplinary Problems       SLP Goals          Problem: SLP    Goal Priority Disciplines Outcome   SLP Goal     SLP Ongoing, Not Progressing   Description: 1.  Pt will consume least restrictive PO diet without incident.    2.  MBSS as comprehensive swallowing assessment.                       Plan:     Patient to be seen:  2 x/week   Plan of Care expires:  02/03/24  Plan of Care reviewed with:  patient, caregiver, family   SLP Follow-Up:  Yes       Discharge recommendations:   (Pending acute progress)     Time Tracking:     SLP Treatment Date:   01/28/24  Speech Start Time:  1218  Speech Stop Time:  1236     Speech Total Time (min):  18 min    Billable Minutes: 15    01/28/2024

## 2024-01-28 NOTE — PT/OT/SLP EVAL
Occupational Therapy Evaluation and Treatment    Name: Ramy Romo  MRN: [id number]  Admitting Diagnosis: Symptomatic bradycardia  Recent Surgery: Procedure(s) (LRB):  INSERTION, CARDIAC PACEMAKER, DUAL CHAMBER/poss single lead vs His lead (Left) 3 Days Post-Op    Recommendations:     Discharge Recommendations: Low Intensity Therapy  Level of Assistance Recommended: 24 hours significant assistance  Discharge Equipment Recommendations: raised toilet  Barriers to discharge: None    Assessment:     Ramy Romo is a 78 y.o. male with a medical diagnosis of Symptomatic bradycardia. He presents with performance deficits affecting function including weakness, impaired self care skills, impaired endurance, impaired functional mobility, impaired balance, decreased upper extremity function, decreased safety awareness, decreased ROM, impaired cardiopulmonary response to activity, orthopedic precautions.     Rehab Prognosis: Good; patient would benefit from acute OT services to address these deficits and reach maximum level of function.    Plan:     Patient to be seen 2 x/week to address the above listed problems via self-care/home management, therapeutic activities, therapeutic exercises, neuromuscular re-education  Plan of Care Expires: 02/11/24  Plan of Care Reviewed with: patient, caregiver, son    Subjective     Chief Complaint: pacer placement, decline in ADL  Patient Comments/Goals: Pt wants to DC home with caregivers.  Pain/Comfort:  Pain Rating 1: 0/10  Pain Rating Post-Intervention 1: 0/10    Patients cultural, spiritual, Oriental orthodox conflicts given the current situation: no    Social History:  Living Environment: Patient lives with their caregivers  in a single story home with number of outside stair(s): 0 and number of inside stair(s): 0  Prior Level of Function: Prior to admission, patient requires assistance with ADLs including donning socks and shoes.  Pt used wheel chair for mobility and also has NWB in  right upper extremity due to fall.  Roles and Routines: Patient was not driving, not working, and retired prior to admission.  Equipment Used at Home: wheelchair, walker, yovani, other (see comments) (lift chair)  DME owned (not currently used): none  Assistance Upon Discharge: sitter(s)    Objective:     Communicated with nurse prior to session. Patient found HOB elevated with telemetry, peripheral IV upon OT entry to room.    General Precautions: Standard, fall, aspiration   Orthopedic Precautions: LUE non weight bearing, RUE non weight bearing   Braces: Sling and swathe    Respiratory Status: Room air    Occupational Performance    Gait belt applied - Yes    Bed Mobility:   Supine to sit from right side of bed with moderate assistance    Functional Mobility/Transfers:  Sit <> Stand Transfer with moderate assistance with hand-held assist  Bed <> Chair Transfer using Step Transfer technique with moderate assistance with hand-held assist    Activities of Daily Living:  Grooming: contact guard assistance  Upper Body Dressing: maximal assistance  Lower Body Dressing: maximal assistance  Toileting: maximal assistance    Cognitive/Visual Perceptual:  Cognitive/Psychosocial Skills:    -     Follows Commands/attention: Follows multistep  commands  -     Safety awareness/insight to disability: intact  -     Mood/Affect/Coping skills/emotional control: Appropriate to situation  Visual/Perceptual:    -     Intact    Physical Exam:  Balance:    -     Sitting: supervision  -     Standing: minimum assistance  Upper Extremity Range of Motion:     -       Right Upper Extremity: Deficits: 80 degrees of shoulder flexion, limited elbow range of motion, and intact wrist/hand ROM  -       Left Upper Extremity: limited by pacer precautions.  Upper Extremity Strength:    -       Right Upper Extremity: 2-/5  -       Left Upper Extremity: 2-/5    AMPAC 6 Click ADL:  AMPAC Total Score: 14    Treatment & Education:  Therapist provided  facilitation and instruction of proper body mechanics, energy conservation, and fall prevention strategies during tasks listed above  Patient educated on role of OT, POC, and goals for therapy  Patient educated on importance of OOB activities with staff member assistance and sitting OOB majority of the day    Patient left sitting edge of bed with all lines intact and call button in reach.    GOALS:   Multidisciplinary Problems       Occupational Therapy Goals          Problem: Occupational Therapy    Goal Priority Disciplines Outcome Interventions   Occupational Therapy Goal     OT, PT/OT     Description: Goals to be met by: 2/11/24     Patient will increase functional independence with ADLs by performing:    LE Dressing with Moderate Assistance.  Grooming while standing at sink with Set-up Assistance.  Toilet transfer to bedside commode with Contact Guard Assistance.                         History:     Past Medical History:   Diagnosis Date    Atrial fibrillation     CHF (congestive heart failure)     Diabetes mellitus     Hypertension          Past Surgical History:   Procedure Laterality Date    A-V CARDIAC PACEMAKER INSERTION Left 1/25/2024    Procedure: INSERTION, CARDIAC PACEMAKER, DUAL CHAMBER/poss single lead vs His lead;  Surgeon: Anthony Coronado MD;  Location: [facility] CATH LAB;  Service: Cardiology;  Laterality: Left;  Bio/LBBB pacing    CHOLECYSTECTOMY      INSERTION, PACEMAKER, TEMPORARY TRANSVENOUS N/A 1/24/2024    Procedure: Insertion, Pacemaker, Temporary Transvenous;  Surgeon: Renaldo Santacruz MD;  Location: [facility] CATH LAB;  Service: Cardiology;  Laterality: N/A;    NECK SURGERY      TOTAL ELBOW ARTHROPLASTY Right 03/2019       Time Tracking:     OT Date of Treatment: 01/28/24  OT Start Time: 1155  OT Stop Time: 1225  OT Total Time (min): 30 min    Billable Minutes: Evaluation 15 and Therapeutic Activity 15    1/28/2024

## 2024-01-28 NOTE — ASSESSMENT & PLAN NOTE
Likely secondary to elevated ammonia, UTI, delirium   Initially required Precedex during ICU for brief duration  Status post NG tube placed on 01/25 with resumption of lactulose, subsequently mentation gradually improved  01/28/2024, mentation close to baseline         Likely multifactorial- cystitis, elevated ammonia and delirium  NGT placed 1/25- lactulose restarted but no BM, dose increased 1/26 to QID  Urine + klebsiella aerogenes- Rocephin resistant. Changed to cefepime 1/27  Reorient as able  Improving

## 2024-01-28 NOTE — ASSESSMENT & PLAN NOTE
Likely medication induced   Status post transvenous pacer on 01/24/2025 followed by permanent pacer placement on 01/25/24   Heart rate stabilizing   Cardiology on board

## 2024-01-28 NOTE — ASSESSMENT & PLAN NOTE
UA from 01/24/2024 suggestive of UTI, urine cultures positive for Klebsiella, antibiotics changed to cefepime on 01/27/2024 based on culture results   Status post Lopez placement on 01/26/2024 due to urinary retention   On 01/28/2024, ordered voiding trail, plan to discontinue Lopez catheter accordingly

## 2024-01-28 NOTE — PT/OT/SLP EVAL
"Physical Therapy Evaluation    Patient Name:  Ramy Romo   MRN:  [id number]    Recommendations:     Discharge Recommendations: Low Intensity Therapy   Discharge Equipment Recommendations: raised toilet, hospital bed   Barriers to discharge: None    Assessment:     Ramy Romo is a 78 y.o. male admitted with a medical diagnosis of Symptomatic bradycardia.  He presents with the following impairments/functional limitations: weakness, impaired balance, decreased safety awareness, impaired endurance, impaired self care skills, decreased coordination, impaired functional mobility, decreased upper extremity function, gait instability, decreased lower extremity function, impaired fine motor, orthopedic precautions.    Rehab Prognosis: Good; patient would benefit from acute skilled PT services to address these deficits and reach maximum level of function.    Recent Surgery: Procedure(s) (LRB):  INSERTION, CARDIAC PACEMAKER, DUAL CHAMBER/poss single lead vs His lead (Left) 3 Days Post-Op    Plan:     During this hospitalization, patient to be seen 3 x/week to address the identified rehab impairments via therapeutic exercises, therapeutic activities, gait training and progress toward the following goals:    Plan of Care Expires:  02/11/24    Subjective     Chief Complaint: "I feel pretty weak right now."  Patient/Family Comments/goals: Caregiver and son would like for patient to discharge home and resume 24/7 caregiver support and  services.   Pain/Comfort:  Pain Rating 1: 0/10    Patients cultural, spiritual, Hindu conflicts given the current situation: no    Living Environment:    Patient lives at home with 24/7 caregiver support, in a single level home with ramp access. Patient spouse is currently in a skilled facility.   Prior to admission, patients level of function was Mod I with use of a yovani walker for short distance ambulation. Patient had just started using a yovani walker on the left with the home health " therapist since a recent right elbow procedure. Patient utilizing a WC for long distance mobility. Patient was Mod I with ADLs. Patient also has good family support from his son. Equipment used at home: wheelchair, walker, yovani, other (see comments) (lift chair).  DME owned (not currently used): none.  Upon discharge, patient will have assistance from caregiver and son.    Objective:     Communicated with MANDEEP Vazquez prior to session.  Patient found supine with telemetry, peripheral IV  upon PT entry to room.    General Precautions: Standard, fall, aspiration, pacemaker precautions   Orthopedic Precautions:LUE non weight bearing  Braces: Sling and swathe  Respiratory Status: Room air    Exams:  Cognitive Exam:  Patient is oriented to Person, Place, Time, and Situation  RLE ROM: WFL  RLE Strength: WFL  LLE ROM: WFL  LLE Strength: WFL    Functional Mobility:  Bed Mobility:     Supine to Sit: moderate assistance and of 2 persons  Transfers:     Sit to Stand:  minimum assistance and of 2 persons with hand-held assist  Gait: 3 side steps, 3 forward steps, 3 backward steps, Min A x2 with HHA      AM-PAC 6 CLICK MOBILITY  Total Score:11       Treatment & Education:    Patient found supine in bed with caregiver at bedside upon PT entrance into room. PT provided education on POC, role of PT and pacemaker precautions.     Patient completed:     Sup>sit: Mod A x2. Patient required Mod A x2 secondary to not being able to utilize BUE due to precautions of LUE and hx of recent procedure on right elbow. Verbal cues given to utilize BLE to hook along the EOB the scoot bottom forward.     Seated balance: SBA    Scooting at EOB to place feet on the floor: CGA with cues not to push through the bed with LUE.    STS: Min A x2 with HHA. Patient compliant with precautions with minimal reminder not to push through the bed with standing.     Gait: Min A x2 with 3 side steps along the bed, 3 steps forward to sink and 3 steps backward to bed.  Patient with forward flexed posture but no LOB while taking steps.     Stand>sit: Min A x2     Caregiver at bedside stated she is able to provide hands on assistance for the patient at home.    Patient left sitting edge of bed with all lines intact, call button in reach, and speech therapist and caregiver present.    GOALS:   Multidisciplinary Problems       Physical Therapy Goals          Problem: Physical Therapy    Goal Priority Disciplines Outcome Goal Variances Interventions   Physical Therapy Goal     PT, PT/OT      Description: Patient will be seen a minimal of 3 out of 7 days a week.     LTG to be met by 02/11/24:    Bed mobility: Min A  Transfers: CGA with no AD  Gait: CGA with no AD x10 feet                        History:     Past Medical History:   Diagnosis Date    Atrial fibrillation     CHF (congestive heart failure)     Diabetes mellitus     Hypertension        Past Surgical History:   Procedure Laterality Date    A-V CARDIAC PACEMAKER INSERTION Left 1/25/2024    Procedure: INSERTION, CARDIAC PACEMAKER, DUAL CHAMBER/poss single lead vs His lead;  Surgeon: Anthony Coronado MD;  Location: [facility] CATH LAB;  Service: Cardiology;  Laterality: Left;  Bio/LBBB pacing    CHOLECYSTECTOMY      INSERTION, PACEMAKER, TEMPORARY TRANSVENOUS N/A 1/24/2024    Procedure: Insertion, Pacemaker, Temporary Transvenous;  Surgeon: Renaldo Santacruz MD;  Location: [facility] CATH LAB;  Service: Cardiology;  Laterality: N/A;    NECK SURGERY      TOTAL ELBOW ARTHROPLASTY Right 03/2019       Time Tracking:     PT Received On: 01/28/24  PT Start Time: 1210     PT Stop Time: 1234  PT Total Time (min): 24 min     Billable Minutes: Evaluation 12 and Gait Training 12      01/28/2024

## 2024-01-28 NOTE — ASSESSMENT & PLAN NOTE
Last A1c reviewed-   Lab Results   Component Value Date    HGBA1C 5.2 01/26/2024     Most recent fingerstick glucose reviewed-   Recent Labs   Lab 01/27/24  1820 01/28/24  0055 01/28/24  0518 01/28/24  1340   POCTGLUCOSE 111* 133* 148* 168*       Antihyperglycemics (From admission, onward)    Start     Stop Route Frequency Ordered    01/26/24 1633  insulin aspart U-100 pen 0-10 Units         -- SubQ Every 6 hours PRN 01/26/24 1534        Hold Oral hypoglycemics while patient is in the hospital.  Hold hypoglycemic agents, sliding scale insulin, glucose POC,

## 2024-01-28 NOTE — HOSPITAL COURSE
78 year old male with known medical issues including atrial fib on eliquis and metoprolol; HTN; HFpEF; DM; and cirrhosis on daily lactulose presented to ED on 1/23 from Urgent care where eval for weakness/dizziness/diarrhea x 2 days noted bradycardia in 30s; Remained with bradycardia in 30s despite glucagon and atropine trials  HM admitted to telemetry on 1/23/24 for close monitoring per cardiology recs; overnight on 1/23/24, bradycardia worsened to 20s without improvement with additional glucagon, atropine, got transferred to ICU.  Status post empiric pacer placement on 1/24/25 followed by permanent ppm placement on 01/25/2024.  Remained in ICU due to elevated ammonia associated with confusion, requiring Precedex drip.  SLP evaluated and recommended NPO, status post NG tube placement with resumption of lactulose.  Followed by improvement in mentation, subsequently Precedex discontinued on 01/27/24, patient appeared more alert, oriented x3, with mentation close to baseline.  Accordingly patient seemed stable for downgrade per ICU team on 01/27/2024, hospital medicine consulted to assume care.  UTI with urine cultures positive for Klebsiella (from 1/24/24), on cefepime since 01/27/2024 based on cultures.       On 01/28/2024, alert and oriented with mentation at baseline.  SLP evaluated and recommended NPO, modified barium swallow showed- swallow not functional, with audible and silent aspiration findings.  Results similar to those last modified barium swallow study in 2022.  Patient pulled out NG tube overnight, ordered for replacement of NG tube, followed by initiation of meds, tube feeds.    Discussed on code status, goals of care-- full code as of now, to continue palliative discussions.  Status post Lopez catheter on 01/26/2024, ordered for voiding trail on 01/28/2024.    Patient seen and evaluated by speech therapy.  He has had chronic aspiration over the past years secondary to his previous stroke.  Of course  recommendation is NPO however patient has been eating and wishes to continue to do so.  We have discussed risk of aspiration including pneumonia and death.  He and family understand and wished to continue to eat.  He has been instructed to eat small bites frequent sips and swallowing.  He is instructed to use universal aspiration precautions.  Patient understands.  Lopez catheter was DC overnight patient was able to void adequately with no postvoid residual per bladder scan.  He is discharged home.    Patient seen and examined on day of discharge and stable for discharge home.

## 2024-01-28 NOTE — PLAN OF CARE
Pt AAOx4. Speech slurred and incomprehensible. SPO2 97% on RA. HR ventricular paced with permanent pacemaker. BP elevated with systolic ranging in 140s-160s. Gave PRN hydralazine per orders to maintain systolic <160. Lactulose given and pt had x 4 BM during this shift. Lopez in place with total UOP of 335 mL. POC reviewed with patient and his son over the phone. Bed in lowest position, side rails up x 3, wheels locked, call light within reach and alarms on and audible.

## 2024-01-29 LAB
ANION GAP SERPL CALC-SCNC: 9 MMOL/L (ref 8–16)
BASOPHILS # BLD AUTO: 0.03 K/UL (ref 0–0.2)
BASOPHILS NFR BLD: 0.4 % (ref 0–1.9)
BUN SERPL-MCNC: 31 MG/DL (ref 8–23)
CALCIUM SERPL-MCNC: 9 MG/DL (ref 8.7–10.5)
CHLORIDE SERPL-SCNC: 114 MMOL/L (ref 95–110)
CO2 SERPL-SCNC: 16 MMOL/L (ref 23–29)
CREAT SERPL-MCNC: 1.3 MG/DL (ref 0.5–1.4)
DIFFERENTIAL METHOD BLD: ABNORMAL
EOSINOPHIL # BLD AUTO: 0.2 K/UL (ref 0–0.5)
EOSINOPHIL NFR BLD: 3.2 % (ref 0–8)
ERYTHROCYTE [DISTWIDTH] IN BLOOD BY AUTOMATED COUNT: 14.3 % (ref 11.5–14.5)
EST. GFR  (NO RACE VARIABLE): 57 ML/MIN/1.73 M^2
GLUCOSE SERPL-MCNC: 161 MG/DL (ref 70–110)
HCT VFR BLD AUTO: 35 % (ref 40–54)
HGB BLD-MCNC: 11.6 G/DL (ref 14–18)
IMM GRANULOCYTES # BLD AUTO: 0.04 K/UL (ref 0–0.04)
IMM GRANULOCYTES NFR BLD AUTO: 0.5 % (ref 0–0.5)
LYMPHOCYTES # BLD AUTO: 1.3 K/UL (ref 1–4.8)
LYMPHOCYTES NFR BLD: 17.3 % (ref 18–48)
MCH RBC QN AUTO: 34.3 PG (ref 27–31)
MCHC RBC AUTO-ENTMCNC: 33.1 G/DL (ref 32–36)
MCV RBC AUTO: 104 FL (ref 82–98)
MONOCYTES # BLD AUTO: 1.1 K/UL (ref 0.3–1)
MONOCYTES NFR BLD: 15 % (ref 4–15)
NEUTROPHILS # BLD AUTO: 4.8 K/UL (ref 1.8–7.7)
NEUTROPHILS NFR BLD: 63.6 % (ref 38–73)
NRBC BLD-RTO: 0 /100 WBC
PLATELET # BLD AUTO: 116 K/UL (ref 150–450)
PMV BLD AUTO: 10.3 FL (ref 9.2–12.9)
POCT GLUCOSE: 129 MG/DL (ref 70–110)
POCT GLUCOSE: 153 MG/DL (ref 70–110)
POCT GLUCOSE: 155 MG/DL (ref 70–110)
POTASSIUM SERPL-SCNC: 4.3 MMOL/L (ref 3.5–5.1)
RBC # BLD AUTO: 3.38 M/UL (ref 4.6–6.2)
SODIUM SERPL-SCNC: 139 MMOL/L (ref 136–145)
WBC # BLD AUTO: 7.51 K/UL (ref 3.9–12.7)

## 2024-01-29 PROCEDURE — 25000003 PHARM REV CODE 250: Performed by: NURSE PRACTITIONER

## 2024-01-29 PROCEDURE — 25000003 PHARM REV CODE 250: Performed by: INTERNAL MEDICINE

## 2024-01-29 PROCEDURE — 99232 SBSQ HOSP IP/OBS MODERATE 35: CPT | Mod: ,,, | Performed by: PHYSICIAN ASSISTANT

## 2024-01-29 PROCEDURE — 97530 THERAPEUTIC ACTIVITIES: CPT

## 2024-01-29 PROCEDURE — 51798 US URINE CAPACITY MEASURE: CPT

## 2024-01-29 PROCEDURE — 63600175 PHARM REV CODE 636 W HCPCS: Performed by: NURSE PRACTITIONER

## 2024-01-29 PROCEDURE — 80048 BASIC METABOLIC PNL TOTAL CA: CPT | Performed by: PHYSICIAN ASSISTANT

## 2024-01-29 PROCEDURE — 85025 COMPLETE CBC W/AUTO DIFF WBC: CPT | Performed by: PHYSICIAN ASSISTANT

## 2024-01-29 PROCEDURE — 36415 COLL VENOUS BLD VENIPUNCTURE: CPT | Performed by: PHYSICIAN ASSISTANT

## 2024-01-29 PROCEDURE — 97110 THERAPEUTIC EXERCISES: CPT

## 2024-01-29 PROCEDURE — 21400001 HC TELEMETRY ROOM

## 2024-01-29 RX ORDER — METOPROLOL SUCCINATE 25 MG/1
12.5 TABLET, EXTENDED RELEASE ORAL DAILY
Qty: 15 TABLET | Refills: 0 | Status: SHIPPED | OUTPATIENT
Start: 2024-01-29 | End: 2024-02-28

## 2024-01-29 RX ORDER — FAMOTIDINE 20 MG/1
20 TABLET, FILM COATED ORAL 2 TIMES DAILY
Status: DISCONTINUED | OUTPATIENT
Start: 2024-01-29 | End: 2024-01-30 | Stop reason: HOSPADM

## 2024-01-29 RX ORDER — ALPRAZOLAM 0.25 MG/1
0.25 TABLET ORAL 3 TIMES DAILY PRN
Status: DISCONTINUED | OUTPATIENT
Start: 2024-01-29 | End: 2024-01-30 | Stop reason: HOSPADM

## 2024-01-29 RX ORDER — METOPROLOL TARTRATE 25 MG/1
12.5 TABLET ORAL 2 TIMES DAILY
Status: DISCONTINUED | OUTPATIENT
Start: 2024-01-29 | End: 2024-01-30 | Stop reason: HOSPADM

## 2024-01-29 RX ORDER — FERROUS GLUCONATE 324(37.5)
324 TABLET ORAL
Status: DISCONTINUED | OUTPATIENT
Start: 2024-01-30 | End: 2024-01-30 | Stop reason: HOSPADM

## 2024-01-29 RX ORDER — DOXAZOSIN 1 MG/1
1 TABLET ORAL DAILY
Status: DISCONTINUED | OUTPATIENT
Start: 2024-01-30 | End: 2024-01-30 | Stop reason: HOSPADM

## 2024-01-29 RX ORDER — IBUPROFEN 200 MG
16 TABLET ORAL
Status: DISCONTINUED | OUTPATIENT
Start: 2024-01-29 | End: 2024-01-30 | Stop reason: HOSPADM

## 2024-01-29 RX ORDER — LEVOFLOXACIN 750 MG/1
750 TABLET ORAL DAILY
Qty: 3 TABLET | Refills: 0 | Status: SHIPPED | OUTPATIENT
Start: 2024-01-29 | End: 2024-02-01

## 2024-01-29 RX ORDER — LACTULOSE 10 G/15ML
30 SOLUTION ORAL 2 TIMES DAILY
Status: DISCONTINUED | OUTPATIENT
Start: 2024-01-29 | End: 2024-01-30 | Stop reason: HOSPADM

## 2024-01-29 RX ORDER — TAMSULOSIN HYDROCHLORIDE 0.4 MG/1
0.4 CAPSULE ORAL NIGHTLY
Status: DISCONTINUED | OUTPATIENT
Start: 2024-01-29 | End: 2024-01-30 | Stop reason: HOSPADM

## 2024-01-29 RX ORDER — IBUPROFEN 200 MG
24 TABLET ORAL
Status: DISCONTINUED | OUTPATIENT
Start: 2024-01-29 | End: 2024-01-30 | Stop reason: HOSPADM

## 2024-01-29 RX ORDER — ACETAMINOPHEN 500 MG
500 TABLET ORAL EVERY 6 HOURS PRN
Status: DISCONTINUED | OUTPATIENT
Start: 2024-01-29 | End: 2024-01-30 | Stop reason: HOSPADM

## 2024-01-29 RX ORDER — AMOXICILLIN 250 MG
1 CAPSULE ORAL DAILY PRN
Status: DISCONTINUED | OUTPATIENT
Start: 2024-01-29 | End: 2024-01-30 | Stop reason: HOSPADM

## 2024-01-29 RX ADMIN — APIXABAN 5 MG: 2.5 TABLET, FILM COATED ORAL at 08:01

## 2024-01-29 RX ADMIN — MICONAZOLE NITRATE: 20 POWDER TOPICAL at 08:01

## 2024-01-29 RX ADMIN — RIFAXIMIN 550 MG: 550 TABLET ORAL at 08:01

## 2024-01-29 RX ADMIN — METOPROLOL TARTRATE 12.5 MG: 25 TABLET, FILM COATED ORAL at 08:01

## 2024-01-29 RX ADMIN — CEFEPIME 1 G: 1 INJECTION, POWDER, FOR SOLUTION INTRAMUSCULAR; INTRAVENOUS at 05:01

## 2024-01-29 RX ADMIN — FAMOTIDINE 20 MG: 10 INJECTION, SOLUTION INTRAVENOUS at 12:01

## 2024-01-29 RX ADMIN — INSULIN ASPART 1 UNITS: 100 INJECTION, SOLUTION INTRAVENOUS; SUBCUTANEOUS at 12:01

## 2024-01-29 RX ADMIN — FAMOTIDINE 20 MG: 10 INJECTION, SOLUTION INTRAVENOUS at 09:01

## 2024-01-29 RX ADMIN — TAMSULOSIN HYDROCHLORIDE 0.4 MG: 0.4 CAPSULE ORAL at 08:01

## 2024-01-29 RX ADMIN — LACTULOSE 30 G: 20 SOLUTION ORAL at 08:01

## 2024-01-29 RX ADMIN — HYDRALAZINE HYDROCHLORIDE 10 MG: 20 INJECTION, SOLUTION INTRAMUSCULAR; INTRAVENOUS at 05:01

## 2024-01-29 RX ADMIN — FAMOTIDINE 20 MG: 20 TABLET ORAL at 08:01

## 2024-01-29 RX ADMIN — HYDRALAZINE HYDROCHLORIDE 10 MG: 20 INJECTION, SOLUTION INTRAMUSCULAR; INTRAVENOUS at 08:01

## 2024-01-29 NOTE — PROGRESS NOTES
Patient pulled NGT out a third time. He refuses to have it replaced. He is aao x 3 answering questions appropriately. He states he fails the swallow test every time but takes his home medications with no problem.

## 2024-01-29 NOTE — PLAN OF CARE
Bed locked, in lowest position. Call bell in reach. Purposeful rounding done. Blood glucose monitoring. Cardiac monitoring in use. Chart check complete. Will continue with plan of care.

## 2024-01-29 NOTE — PLAN OF CARE
[facility] - Med Surg  Discharge Reassessment    Primary Care Provider: Félix Rollins MD    Expected Discharge Date:     Reassessment (most recent)       Discharge Reassessment - 01/29/24 0916          Discharge Reassessment    Assessment Type Discharge Planning Reassessment     Did the patient's condition or plan change since previous assessment? No     Discharge Plan discussed with: Patient     Communicated KAYA with patient/caregiver Date not available/Unable to determine     Discharge Plan A Home Health     Discharge Plan B Skilled Nursing Facility                   Caregiver is Claudette [phone].

## 2024-01-29 NOTE — PLAN OF CARE
Pt remains free from falls/injuries this shift. Safety precautions maintained. Pain managed with relaxation. No s/s of acute distress noted. Will continue to monitor. Chart check completed.

## 2024-01-29 NOTE — PLAN OF CARE
01/29/24 1059   Post-Acute Status   Post-Acute Authorization Home Health   Home Health Status Referrals Sent   Coverage [employer]   Discharge Plan   Discharge Plan A Home Health     [place] [facility] will dc with an palliative based program. Pt also needs an hospital bed unpon dc.  11:54am SW spoke with Pa[place] [facility] who stated a resumption hh order is needed with palliative based program. MD notified.

## 2024-01-29 NOTE — PT/OT/SLP PROGRESS
Physical Therapy  Treatment    Ramy Romo   MRN: [id number]   Admitting Diagnosis: Symptomatic bradycardia    PT Received On: 01/29/24  PT Start Time: 0931     PT Stop Time: 0955    PT Total Time (min): 24 min       Billable Minutes:  Therapeutic Activity 14 and Therapeutic Exercise 10    Treatment Type: Treatment  PT/PTA: PT     Number of PTA visits since last PT visit: 0       General Precautions: Standard, fall  Orthopedic Precautions: LUE non weight bearing, RUE non weight bearing  Braces: Sling and swathe  Respiratory Status: Room air    Spiritual, Cultural Beliefs, Congregational Practices, Values that Affect Care: no    Subjective:  Communicated with NURSE MORA AND EPIC CHART REVIEW  prior to session.   PT AGREED TO TX     Pain/Comfort  Pain Rating 1: 0/10  Pain Rating Post-Intervention 1: 0/10    Objective:   Patient found with: peripheral IV, telemetry    Functional Mobility:  PT MET IN RM SUP IN BED. PT COMPLETED SUP.SIT EOB WITH MIN A AND INC TIME. PT GIVEN CUES FOR NWB BE UE. GT. BELT AND  SOCKS DONNED PRIOR TO OOB MOBILITY.  PT STOOD NO AD WITH MIN A . PT COMPLETED PRE-GT MIP X 20 REPS WITH MIN A. PT T/F TO CHAIR WITH MIN A.   Treatment and Education:  PT CONT TX WITH B LE TE X 20 REPS OF AP, TKE, AND MIP. PT LEFT SEATED IN CHAIR WITH ALL NEEDS MET AND CALL BELL IN REACH.      AM-PAC 6 CLICK MOBILITY  How much help from another person does this patient currently need?   1 = Unable, Total/Dependent Assistance  2 = A lot, Maximum/Moderate Assistance  3 = A little, Minimum/Contact Guard/Supervision  4 = None, Modified [place]/Independent    Turning over in bed (including adjusting bedclothes, sheets and blankets)?: 3  Sitting down on and standing up from a chair with arms (e.g., wheelchair, bedside commode, etc.): 3  Moving from lying on back to sitting on the side of the bed?: 3  Moving to and from a bed to a chair (including a wheelchair)?: 3  Need to walk in hospital room?: 3  Climbing 3-5  steps with a railing?: 1  Basic Mobility Total Score: 16    AM-PAC Raw Score CMS G-Code Modifier Level of Impairment Assistance   6 % Total / Unable   7 - 9 CM 80 - 100% Maximal Assist   10 - 14 CL 60 - 80% Moderate Assist   15 - 19 CK 40 - 60% Moderate Assist   20 - 22 CJ 20 - 40% Minimal Assist   23 CI 1-20% SBA / CGA   24 CH 0% Independent/ Mod I     Patient left up in chair with call button in reach.    Assessment:  PT PROGRESSING WITH FUNC MOBILITY.     Rehab identified problem list/impairments: weakness, impaired endurance, impaired self care skills, impaired functional mobility, gait instability, impaired balance, orthopedic precautions, decreased upper extremity function, decreased lower extremity function, decreased safety awareness, impaired cardiopulmonary response to activity, decreased ROM, decreased coordination    Rehab potential is good.    Activity tolerance: Fair    Discharge recommendations: Low Intensity Therapy (24 HOUR CAREGIVERS)      Barriers to discharge:      Equipment recommendations: raised toilet, hospital bed     GOALS:   Multidisciplinary Problems       Physical Therapy Goals          Problem: Physical Therapy    Goal Priority Disciplines Outcome Goal Variances Interventions   Physical Therapy Goal     PT, PT/OT Ongoing, Progressing     Description: Patient will be seen a minimal of 3 out of 7 days a week.     LTG to be met by 02/11/24:    Bed mobility: Min A  Transfers: CGA with no AD  Gait: CGA with no AD x10 feet                        PLAN:    Patient to be seen 3 x/week to address the above listed problems via gait training, therapeutic activities, therapeutic exercises  Plan of Care expires: 02/11/24  Plan of Care reviewed with: patient, caregiver         01/29/2024

## 2024-01-29 NOTE — PROCEDURES
Modified Barium Swallow    Patient Name:  Ramy Romo   MRN:  [id number]      Recommendations:     Recommendations:                General Recommendations: GI recommendation for alternate means of nutrition  Diet recommendations:  NPO, NPO   Aspiration Precautions: Alternate means of nutrition/hydration   General Precautions: Standard, aspiration  Communication strategies:  provide increased time to answer    Referral     Reason for Referral  Patient was referred for a Modified Barium Swallow Study to assess the efficiency of his/her swallow function, rule out aspiration and make recommendations regarding safe dietary consistencies, effective compensatory strategies, and safe eating environment.     Diagnosis: Symptomatic bradycardia       History:     Past Medical History:   Diagnosis Date    Atrial fibrillation     CHF (congestive heart failure)     Diabetes mellitus     Hypertension        Objective:     Current Respiratory Status: 01/28/24    Alert: yes    Cooperative: yes    Follows Directions: yes    Visualization  Patient was seen in the lateral view    Oral Peripheral Examination  Oral Musculature: general weakness, facial asymmetry present, left weakness (hx CVA)  Dentition: scattered dentition, teeth in poor condition  Secretion Management: problems swallowing secretions, other (see comments) (wet vocal quality)  Mucosal Quality: dry, coated tongue, other (see comments) (dried blood removed from hard and soft palate, tongue)  Oral Labial Strength and Mobility: WFL  Lingual Strength and Mobility: impaired strength  Velar Elevation: impaired, other (see comments) (resonance impairment.  Significant sinus hx, sx)  Buccal Strength and Mobility: decreased tone  Volitional Cough: present, productive  Volitional Swallow: present, weak  Voice Prior to PO Intake: severe dysphonia (breathy/hoarse quality) with resonance impairment  Consistencies Assessed  Thin via cup  Nectar thick via cup  Puree smooth    Oral  Preparation/Oral Phase  Decreased base of tongue mobility  Premature spillage to vallecule    Pharyngeal Phase   Thin liquids -Delayed swallowing initiation with penetration before the swallow above the vocal folds. Material does not exit the airway with audible aspiration after the swallow. Vallecula and pyriform sinus residue. Decrease UES opening. Cues for multiple swallows with cough to clear some stasis.     Nectar liquids- Delayed swallow initiation. Penetration before the swallow. Material does not exit the airway leading to silent aspiration after the swallow. Moderate vallecula stasis pooling over open airway to pyriforms. Material still in airway until cues for cough/swallow/cough.    Puree- Delayed swallow. Vallecula fills up before initiation of swallow. Penetration during the swallow. Material does not exit the airway. Moderate/severe vallecula and pyriform sinus residue. Attempted thin liquid wash to clear residue resulting in silent aspiration    Cervical Esophageal Phase  Decreased UES opening    Assessment:     Impressions    Patient demonstrates severe Oropharyngeal  dysphagia characterized by decrease tongue-based mobility, decrease laryngeal elevation, delayed swallow initiation, poor airway protection, and decrease UES opening.  Audible and silent aspiration present. Moderate to severe stasis resulting in high risk of aspiration after the swallow.     Prognosis: Guarded    Barriers:  Cognitive status    Plan  Refer to GI for alternate means of nutrition and/or palliative care    Education  Results were discussed with patient's family, nurse, and MD    Goals:   Multidisciplinary Problems       SLP Goals          Problem: SLP    Goal Priority Disciplines Outcome   SLP Goal     SLP Ongoing, Not Progressing   Description: 1.  Pt will consume least restrictive PO diet without incident.    2.  MBSS as comprehensive swallowing assessment.                       Plan:   Patient to be seen:  Therapy  Frequency: 2 x/week   Plan of Care expires:  02/03/24  Plan of Care reviewed with:  patient, caregiver, family        Discharge recommendations:   (Pending acute progress)       Time Tracking:   SLP Treatment Date:   01/28/24  Speech Start Time:  1218  Speech Stop Time:  1236     Speech Total Time (min):  18 min    01/28/2024

## 2024-01-29 NOTE — PROGRESS NOTES
[facility] - [facility]  Cardiology  Progress Note    Patient Name: Ramy Romo  MRN: [id number]  Admission Date: 1/23/2024  Hospital Length of Stay: 5 days  Code Status: Full Code   Attending Physician: Maame Brown MD   Primary Care Physician: Félix Rollins MD  Expected Discharge Date:   Principal Problem:Symptomatic bradycardia    Subjective:   HPI:  Ramy Romo is a 78 year old male who presented to [facility] due to 48 hour history of diarrhea and bradycardia from urgent care office.      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 Patient son at bedside to assist with HPI in ICU this AM. Patient has 24 hour caregivers at home which started about a few weeks ago. Patient has limited mobility- uses wheelchair and walker at home. He is able to complete most ADLs with limited assistance.      He follows with Dr La at [facility] regularly. Previous ECHO in October 2023 with normal LVEF. Repeat TTE pending today.      Upon ED evaluation- patient was very dizzy with HR in 30s. Received Glucagon 5 mg and Atropine without any significant improvement noted. Overnight, patient was transferred from Telemetry to ICU due to continued bradycardia with mental status changes. Treated with Isuprel and Dopamine infusion with limited improvement. Temp PPM placed this AM per Dr Santacruz. Post Temp pacer insertion- intermittent capture issues, increased mAs to 10 with capture rate of near 100%, VVI 60 bpm.      Patient seen and examined with son at bedside. Patient awake and alert, oriented to self, pleasant at time of exam today.     Hospital Course:   1-25-24 Permanent PPM today, ammonia elevated, no lactulose for 2 days, on precedex    1-26-24 s/p PPM- single lead, site looks good , tele shows V paving at 60 bpm    1-27-24 PPM site looks good, telemetry shows V pacing at 60 bpm, Pt can restart home  beta blockers and eliquis, if no contradicting indications. Labs seen and reviewed.     1/29/24-Patient seen and examined today, sitting up in bed. Removed NGT overnight. Seems to be mentating at baseline. No CV complaints. Labs pending.        Review of Systems   Constitutional: Positive for malaise/fatigue.   HENT: Negative.     Eyes: Negative.    Cardiovascular: Negative.    Respiratory: Negative.     Endocrine: Negative.    Hematologic/Lymphatic: Negative.    Skin: Negative.    Musculoskeletal:  Positive for arthritis.   Gastrointestinal: Negative.    Genitourinary: Negative.    Neurological:  Positive for weakness.   Psychiatric/Behavioral: Negative.     Allergic/Immunologic: Negative.      Objective:     Vital Signs (Most Recent):  Temp: 98 °F (36.7 °C) (01/29/24 1119)  Pulse: 62 (01/29/24 1334)  Resp: 17 (01/29/24 1119)  BP: (!) 145/65 (01/29/24 1119)  SpO2: 97 % (01/29/24 1119) Vital Signs (24h Range):  Temp:  [97.7 °F (36.5 °C)-98.5 °F (36.9 °C)] 98 °F (36.7 °C)  Pulse:  [58-62] 62  Resp:  [17-19] 17  SpO2:  [97 %-98 %] 97 %  BP: (145-180)/(65-74) 145/65     Weight: 98.7 kg (217 lb 9.5 oz)  Body mass index is 29.51 kg/m².     SpO2: 97 %         Intake/Output Summary (Last 24 hours) at 1/29/2024 1442  Last data filed at 1/28/2024 2215  Gross per 24 hour   Intake 124.66 ml   Output 400 ml   Net -275.34 ml       Lines/Drains/Airways       Peripheral Intravenous Line  Duration                  Peripheral IV - Single Lumen 01/24/24 0321 20 G Posterior;Right Hand 5 days         Peripheral IV - Single Lumen 01/24/24 0321 20 G Posterior;Right Wrist 5 days                       Physical Exam  Vitals and nursing note reviewed.   Constitutional:       General: He is not in acute distress.     Appearance: Normal appearance. He is well-developed. He is not diaphoretic.   HENT:      Head: Normocephalic and atraumatic.   Eyes:      General:         Right eye: No discharge.         Left eye: No discharge.      Pupils:  Pupils are equal, round, and reactive to light.   Cardiovascular:      Rate and Rhythm: Normal rate and regular rhythm.      Heart sounds: Normal heart sounds, S1 normal and S2 normal. No murmur heard.     Comments: Left sided chest wall PPM dressing in place C/D/I; no hematoma, no drainage  Pulmonary:      Effort: Pulmonary effort is normal. No respiratory distress.      Breath sounds: Normal breath sounds.   Abdominal:      General: There is no distension.   Musculoskeletal:      Right lower leg: No edema.      Left lower leg: No edema.   Skin:     General: Skin is warm and dry.      Findings: No erythema.   Neurological:      General: No focal deficit present.      Mental Status: He is alert.   Psychiatric:         Mood and Affect: Mood normal.         Behavior: Behavior normal.         Thought Content: Thought content normal.            Significant Labs: CMP   Recent Labs   Lab 01/28/24  0352      K 3.6   *   CO2 15*   *   BUN 28*   CREATININE 1.3   CALCIUM 8.8   ANIONGAP 13    and CBC   Recent Labs   Lab 01/28/24  0352   WBC 8.79   HGB 11.2*   HCT 33.7*          Significant Imaging: Echocardiogram: Transthoracic echo (TTE) complete (Cupid Only):   Results for orders placed or performed during the hospital encounter of 01/23/24   Echo   Result Value Ref Range    BSA 2.22 m2    LVOT stroke volume 122.33 cm3    LVIDd 4.90 3.5 - 6.0 cm    LV Systolic Volume 37.73 mL    LV Systolic Volume Index 17.2 mL/m2    LVIDs 3.09 2.1 - 4.0 cm    LV Diastolic Volume 112.62 mL    LV Diastolic Volume Index 51.42 mL/m2    IVS 1.29 (A) 0.6 - 1.1 cm    LVOT diameter 2.16 cm    LVOT area 3.7 cm2    FS 37 28 - 44 %    Left Ventricle Relative Wall Thickness 0.54 cm    Posterior Wall 1.33 (A) 0.6 - 1.1 cm    LV mass 256.53 g    LV Mass Index 117 g/m2    MV Peak E Ken 1.19 m/s    TDI LATERAL 0.08 m/s    TDI SEPTAL 0.10 m/s    E/E' ratio 13.22 m/s    MV Peak A Ken 0.52 m/s    TR Max Ken 2.30 m/s    E/A ratio 2.29      IVRT 51.38 msec    E wave deceleration time 210.51 msec    LV SEPTAL E/E' RATIO 11.90 m/s    LV LATERAL E/E' RATIO 14.88 m/s    LVOT peak flavio 1.55 m/s    Left Ventricular Outflow Tract Mean Velocity 1.25 cm/s    Left Ventricular Outflow Tract Mean Gradient 6.71 mmHg    RVOT peak VTI 43.8 cm    TAPSE 1.90 cm    LA size 4.04 cm    Left Atrium Minor Axis 6.47 cm    Left Atrium Major Axis 6.42 cm    RA Major Axis 4.80 cm    AV mean gradient 14 mmHg    AV peak gradient 21 mmHg    Ao peak flavio 2.30 m/s    Ao VTI 34.90 cm    LVOT peak VTI 33.40 cm    AV valve area 3.51 cm²    AV Velocity Ratio 0.67     AV index (prosthetic) 0.96     THONG by Velocity Ratio 2.47 cm²    Mr max flavio 2.91 m/s    MV stenosis pressure 1/2 time 61.05 ms    MV valve area p 1/2 method 3.60 cm2    TV mean gradient 22 mmHg    Triscuspid Valve Regurgitation Peak Gradient 21 mmHg    PV mean gradient 9 mmHg    RVOT peak flavio 1.72 m/s    Ao root annulus 3.51 cm    STJ 3.16 cm    Ascending aorta 3.01 cm    Mean e' 0.09 m/s    ZLVIDS -2.97     ZLVIDD -4.12     LA Volume Index 42.4 mL/m2    LA volume 92.95 cm3    LA WIDTH 4.2 cm    RA Width 3.8 cm    TV resting pulmonary artery pressure 24 mmHg    RV TB RVSP 5 mmHg    Est. RA pres 3 mmHg    Narrative      Left Ventricle: The left ventricle is normal in size. Normal wall   thickness. There is concentric hypertrophy. Normal wall motion. There is   normal systolic function with a visually estimated ejection fraction of 60   - 65%. There is normal diastolic function.    Right Ventricle: Normal right ventricular cavity size. Wall thickness   is normal. Right ventricle wall motion  is normal. Systolic function is   normal.    Left Atrium: Left atrium is moderately dilated.    Pulmonary Artery: The estimated pulmonary artery systolic pressure is   24 mmHg.    IVC/SVC: Normal venous pressure at 3 mmHg.     , EKG: Reviewed, and X-Ray: CXR: X-Ray Chest 1 View (CXR): No results found for this visit on 01/23/24. and  X-Ray Chest PA and Lateral (CXR): No results found for this visit on 01/23/24.  Assessment and Plan:   Patient who presents with symptomatic bradycardia. Stable s/p PPM placement. Dislodged NGT overnight. Needs to continue BB and Eliquis.     * Symptomatic bradycardia  78 y.o. male patient with a PMHx of CHF, HTN, A-fib, DM, cirrhosis, history of stroke  presents with slow afib. Initially pt was hemodynamically stable in telemetery HR 30s , asymptomatic,  Pt with HR 20s then and symptomatic and transferred to ICU. Pt cardiologist is Dr. La /ANITA.Patient denies CP, angina or anginal equivalent.Pt started on dopamine but HR still 27-30s.      Isopril started with improvement, temporary PPM this morning. If still bradycardic tomorrow, permanent PPM  Not pt took eliquis yesterday, needs to be > 48 hrs for procedure    1/29/24  -Stable s/p PPM  -BB and Eliquis resumed     UTI (urinary tract infection)  -Mgmt as per hospital medicine        VTE Risk Mitigation (From admission, onward)           Ordered     apixaban tablet 5 mg  2 times daily         01/26/24 0837     IP VTE HIGH RISK PATIENT  Once         01/23/24 1659     Place sequential compression device  Until discontinued         01/23/24 1652                    Yas Baker PA-C  Cardiology  [facility]

## 2024-01-29 NOTE — SUBJECTIVE & OBJECTIVE
Review of Systems   Constitutional: Positive for malaise/fatigue.   HENT: Negative.     Eyes: Negative.    Cardiovascular: Negative.    Respiratory: Negative.     Endocrine: Negative.    Hematologic/Lymphatic: Negative.    Skin: Negative.    Musculoskeletal:  Positive for arthritis.   Gastrointestinal: Negative.    Genitourinary: Negative.    Neurological:  Positive for weakness.   Psychiatric/Behavioral: Negative.     Allergic/Immunologic: Negative.      Objective:     Vital Signs (Most Recent):  Temp: 98 °F (36.7 °C) (01/29/24 1119)  Pulse: 62 (01/29/24 1334)  Resp: 17 (01/29/24 1119)  BP: (!) 145/65 (01/29/24 1119)  SpO2: 97 % (01/29/24 1119) Vital Signs (24h Range):  Temp:  [97.7 °F (36.5 °C)-98.5 °F (36.9 °C)] 98 °F (36.7 °C)  Pulse:  [58-62] 62  Resp:  [17-19] 17  SpO2:  [97 %-98 %] 97 %  BP: (145-180)/(65-74) 145/65     Weight: 98.7 kg (217 lb 9.5 oz)  Body mass index is 29.51 kg/m².     SpO2: 97 %         Intake/Output Summary (Last 24 hours) at 1/29/2024 1442  Last data filed at 1/28/2024 2215  Gross per 24 hour   Intake 124.66 ml   Output 400 ml   Net -275.34 ml       Lines/Drains/Airways       Peripheral Intravenous Line  Duration                  Peripheral IV - Single Lumen 01/24/24 0321 20 G Posterior;Right Hand 5 days         Peripheral IV - Single Lumen 01/24/24 0321 20 G Posterior;Right Wrist 5 days                       Physical Exam  Vitals and nursing note reviewed.   Constitutional:       General: He is not in acute distress.     Appearance: Normal appearance. He is well-developed. He is not diaphoretic.   HENT:      Head: Normocephalic and atraumatic.   Eyes:      General:         Right eye: No discharge.         Left eye: No discharge.      Pupils: Pupils are equal, round, and reactive to light.   Cardiovascular:      Rate and Rhythm: Normal rate and regular rhythm.      Heart sounds: Normal heart sounds, S1 normal and S2 normal. No murmur heard.     Comments: Left sided chest wall PPM  dressing in place C/D/I; no hematoma, no drainage  Pulmonary:      Effort: Pulmonary effort is normal. No respiratory distress.      Breath sounds: Normal breath sounds.   Abdominal:      General: There is no distension.   Musculoskeletal:      Right lower leg: No edema.      Left lower leg: No edema.   Skin:     General: Skin is warm and dry.      Findings: No erythema.   Neurological:      General: No focal deficit present.      Mental Status: He is alert.   Psychiatric:         Mood and Affect: Mood normal.         Behavior: Behavior normal.         Thought Content: Thought content normal.            Significant Labs: CMP   Recent Labs   Lab 01/28/24  0352      K 3.6   *   CO2 15*   *   BUN 28*   CREATININE 1.3   CALCIUM 8.8   ANIONGAP 13    and CBC   Recent Labs   Lab 01/28/24  0352   WBC 8.79   HGB 11.2*   HCT 33.7*          Significant Imaging: Echocardiogram: Transthoracic echo (TTE) complete ([facility] Only):   Results for orders placed or performed during the hospital encounter of 01/23/24   Echo   Result Value Ref Range    BSA 2.22 m2    LVOT stroke volume 122.33 cm3    LVIDd 4.90 3.5 - 6.0 cm    LV Systolic Volume 37.73 mL    LV Systolic Volume Index 17.2 mL/m2    LVIDs 3.09 2.1 - 4.0 cm    LV Diastolic Volume 112.62 mL    LV Diastolic Volume Index 51.42 mL/m2    IVS 1.29 (A) 0.6 - 1.1 cm    LVOT diameter 2.16 cm    LVOT area 3.7 cm2    FS 37 28 - 44 %    Left Ventricle Relative Wall Thickness 0.54 cm    Posterior Wall 1.33 (A) 0.6 - 1.1 cm    LV mass 256.53 g    LV Mass Index 117 g/m2    MV Peak E Ken 1.19 m/s    TDI LATERAL 0.08 m/s    TDI SEPTAL 0.10 m/s    E/E' ratio 13.22 m/s    MV Peak A Ken 0.52 m/s    TR Max Ken 2.30 m/s    E/A ratio 2.29     IVRT 51.38 msec    E wave deceleration time 210.51 msec    LV SEPTAL E/E' RATIO 11.90 m/s    LV LATERAL E/E' RATIO 14.88 m/s    LVOT peak ken 1.55 m/s    Left Ventricular Outflow Tract Mean Velocity 1.25 cm/s    Left Ventricular Outflow  Tract Mean Gradient 6.71 mmHg    RVOT peak VTI 43.8 cm    TAPSE 1.90 cm    LA size 4.04 cm    Left Atrium Minor Axis 6.47 cm    Left Atrium Major Axis 6.42 cm    RA Major Axis 4.80 cm    AV mean gradient 14 mmHg    AV peak gradient 21 mmHg    Ao peak flavio 2.30 m/s    Ao VTI 34.90 cm    LVOT peak VTI 33.40 cm    AV valve area 3.51 cm²    AV Velocity Ratio 0.67     AV index (prosthetic) 0.96     THONG by Velocity Ratio 2.47 cm²    Mr max flavio 2.91 m/s    MV stenosis pressure 1/2 time 61.05 ms    MV valve area p 1/2 method 3.60 cm2    TV mean gradient 22 mmHg    Triscuspid Valve Regurgitation Peak Gradient 21 mmHg    PV mean gradient 9 mmHg    RVOT peak flavio 1.72 m/s    Ao root annulus 3.51 cm    STJ 3.16 cm    Ascending aorta 3.01 cm    Mean e' 0.09 m/s    ZLVIDS -2.97     ZLVIDD -4.12     LA Volume Index 42.4 mL/m2    LA volume 92.95 cm3    LA WIDTH 4.2 cm    RA Width 3.8 cm    TV resting pulmonary artery pressure 24 mmHg    RV TB RVSP 5 mmHg    Est. RA pres 3 mmHg    Narrative      Left Ventricle: The left ventricle is normal in size. Normal wall   thickness. There is concentric hypertrophy. Normal wall motion. There is   normal systolic function with a visually estimated ejection fraction of 60   - 65%. There is normal diastolic function.    Right Ventricle: Normal right ventricular cavity size. Wall thickness   is normal. Right ventricle wall motion  is normal. Systolic function is   normal.    Left Atrium: Left atrium is moderately dilated.    Pulmonary Artery: The estimated pulmonary artery systolic pressure is   24 mmHg.    IVC/SVC: Normal venous pressure at 3 mmHg.     , EKG: Reviewed, and X-Ray: CXR: X-Ray Chest 1 View (CXR): No results found for this visit on 01/23/24. and X-Ray Chest PA and Lateral (CXR): No results found for this visit on 01/23/24.

## 2024-01-29 NOTE — ASSESSMENT & PLAN NOTE
78 y.o. male patient with a PMHx of CHF, HTN, A-fib, DM, cirrhosis, history of stroke  presents with slow afib. Initially pt was hemodynamically stable in telemetery HR 30s , asymptomatic,  Pt with HR 20s then and symptomatic and transferred to ICU. Pt cardiologist is Dr. La /ANITA.Patient denies CP, angina or anginal equivalent.Pt started on dopamine but HR still 27-30s.      Isopril started with improvement, temporary PPM this morning. If still bradycardic tomorrow, permanent PPM  Not pt took eliquis yesterday, needs to be > 48 hrs for procedure    1/29/24  -Stable s/p PPM  -BB and Eliquis resumed

## 2024-01-30 VITALS
RESPIRATION RATE: 18 BRPM | BODY MASS INDEX: 29.48 KG/M2 | OXYGEN SATURATION: 97 % | HEIGHT: 72 IN | SYSTOLIC BLOOD PRESSURE: 155 MMHG | HEART RATE: 57 BPM | WEIGHT: 217.63 LBS | TEMPERATURE: 98 F | DIASTOLIC BLOOD PRESSURE: 70 MMHG

## 2024-01-30 LAB
ALBUMIN SERPL BCP-MCNC: 2.1 G/DL (ref 3.5–5.2)
ANION GAP SERPL CALC-SCNC: 9 MMOL/L (ref 8–16)
BUN SERPL-MCNC: 27 MG/DL (ref 8–23)
CALCIUM SERPL-MCNC: 8.5 MG/DL (ref 8.7–10.5)
CHLORIDE SERPL-SCNC: 114 MMOL/L (ref 95–110)
CO2 SERPL-SCNC: 19 MMOL/L (ref 23–29)
CREAT SERPL-MCNC: 1.2 MG/DL (ref 0.5–1.4)
EST. GFR  (NO RACE VARIABLE): >60 ML/MIN/1.73 M^2
GLUCOSE SERPL-MCNC: 140 MG/DL (ref 70–110)
PHOSPHATE SERPL-MCNC: 2 MG/DL (ref 2.7–4.5)
POCT GLUCOSE: 137 MG/DL (ref 70–110)
POCT GLUCOSE: 147 MG/DL (ref 70–110)
POCT GLUCOSE: 195 MG/DL (ref 70–110)
POTASSIUM SERPL-SCNC: 3.7 MMOL/L (ref 3.5–5.1)
SODIUM SERPL-SCNC: 142 MMOL/L (ref 136–145)

## 2024-01-30 PROCEDURE — 25000003 PHARM REV CODE 250: Performed by: INTERNAL MEDICINE

## 2024-01-30 PROCEDURE — 97116 GAIT TRAINING THERAPY: CPT

## 2024-01-30 PROCEDURE — 97110 THERAPEUTIC EXERCISES: CPT

## 2024-01-30 PROCEDURE — 63600175 PHARM REV CODE 636 W HCPCS: Performed by: NURSE PRACTITIONER

## 2024-01-30 PROCEDURE — 25000003 PHARM REV CODE 250: Performed by: NURSE PRACTITIONER

## 2024-01-30 PROCEDURE — 36415 COLL VENOUS BLD VENIPUNCTURE: CPT | Performed by: INTERNAL MEDICINE

## 2024-01-30 PROCEDURE — 97530 THERAPEUTIC ACTIVITIES: CPT

## 2024-01-30 PROCEDURE — 80069 RENAL FUNCTION PANEL: CPT | Performed by: INTERNAL MEDICINE

## 2024-01-30 RX ORDER — DOXAZOSIN 1 MG/1
1 TABLET ORAL DAILY
Qty: 30 TABLET | Refills: 0 | Status: SHIPPED | OUTPATIENT
Start: 2024-01-31 | End: 2024-03-01

## 2024-01-30 RX ADMIN — Medication 324 MG: at 10:01

## 2024-01-30 RX ADMIN — APIXABAN 5 MG: 2.5 TABLET, FILM COATED ORAL at 10:01

## 2024-01-30 RX ADMIN — FAMOTIDINE 20 MG: 20 TABLET ORAL at 10:01

## 2024-01-30 RX ADMIN — DOXAZOSIN 1 MG: 1 TABLET ORAL at 10:01

## 2024-01-30 RX ADMIN — METOPROLOL TARTRATE 12.5 MG: 25 TABLET, FILM COATED ORAL at 10:01

## 2024-01-30 RX ADMIN — LACTULOSE 30 G: 20 SOLUTION ORAL at 10:01

## 2024-01-30 RX ADMIN — CEFEPIME 1 G: 1 INJECTION, POWDER, FOR SOLUTION INTRAMUSCULAR; INTRAVENOUS at 05:01

## 2024-01-30 RX ADMIN — MICONAZOLE NITRATE: 20 POWDER TOPICAL at 10:01

## 2024-01-30 RX ADMIN — FLUTICASONE PROPIONATE 100 MCG: 50 SPRAY, METERED NASAL at 10:01

## 2024-01-30 NOTE — PT/OT/SLP PROGRESS
Physical Therapy  Treatment    Ramy Romo   MRN: [id number]   Admitting Diagnosis: Symptomatic bradycardia    PT Received On: 01/30/24  PT Start Time: 1040     PT Stop Time: 1105    PT Total Time (min): 25 min       Billable Minutes:  Gait Training 15 and Therapeutic Exercise 10    Treatment Type: Treatment  PT/PTA: PT     Number of PTA visits since last PT visit: 0       General Precautions: Standard, fall  Orthopedic Precautions: RUE non weight bearing, LUE non weight bearing  Braces: Sling and swathe  Respiratory Status: Room air    Spiritual, Cultural Beliefs, Temple Practices, Values that Affect Care: no    Subjective:  Communicated with NURSE NANNETTE AND [facility] CHART REVIEW  prior to session.   PT AGREED TO TX     Pain/Comfort  Pain Rating 1: 0/10  Pain Rating Post-Intervention 1: 0/10    Objective:   Patient found with: peripheral IV, telemetry    Functional Mobility:  PT MET IN RM SUP IN BED. PT IS KNOWLEDGEABLE ON B UE NWB. PT SUP>SIT EOB WITH MIN A AND INC CUES TO MAINTAIN NWB B UE. GT. BELT AND  SOCKS DONNED PRIOR TO OOB MOBILITY.  PT STOOD WITH MIN A FOR PRE GT MIP. PT MIP X 5 REPS. PT CONT WITH GT TRAINING X 20' WITH MIN A AND SLIGHT POST LEAN WITH SHUFFLING GT. PT RETURNED TO RM CHAIR AND T/F TO CHAIR WITH MIN A.     Treatment and Education:  PT CONT WITH B LE TE X 20 REPS OF AP, TKE AND MIP. PT LEFT SEATED IN CHAIR FOR REST BREAK. PT LEFT SEATED WITH ALL NEEDS MET.     AM-PAC 6 CLICK MOBILITY  How much help from another person does this patient currently need?   1 = Unable, Total/Dependent Assistance  2 = A lot, Maximum/Moderate Assistance  3 = A little, Minimum/Contact Guard/Supervision  4 = None, Modified [place]/Independent    Turning over in bed (including adjusting bedclothes, sheets and blankets)?: 3  Sitting down on and standing up from a chair with arms (e.g., wheelchair, bedside commode, etc.): 3  Moving from lying on back to sitting on the side of the bed?: 3  Moving to and from a  bed to a chair (including a wheelchair)?: 3  Need to walk in hospital room?: 3  Climbing 3-5 steps with a railing?: 1  Basic Mobility Total Score: 16    AM-PAC Raw Score CMS G-Code Modifier Level of Impairment Assistance   6 % Total / Unable   7 - 9 CM 80 - 100% Maximal Assist   10 - 14 CL 60 - 80% Moderate Assist   15 - 19 CK 40 - 60% Moderate Assist   20 - 22 CJ 20 - 40% Minimal Assist   23 CI 1-20% SBA / CGA   24 CH 0% Independent/ Mod I     Patient left up in chair with call button in reach and SITTER present.    Assessment:  PT PROGRESSING WITH GROSS FUNC MOBILITY .     Rehab identified problem list/impairments: weakness, impaired endurance, impaired self care skills, impaired functional mobility, gait instability, impaired balance, pain, decreased lower extremity function, decreased upper extremity function, decreased safety awareness, orthopedic precautions, decreased ROM    Rehab potential is good.    Activity tolerance: Fair    Discharge recommendations: Low Intensity Therapy (24 HOUR CAREGIVERS)      Barriers to discharge:      Equipment recommendations: raised toilet, hospital bed     GOALS:   Multidisciplinary Problems       Physical Therapy Goals          Problem: Physical Therapy    Goal Priority Disciplines Outcome Goal Variances Interventions   Physical Therapy Goal     PT, PT/OT Ongoing, Progressing     Description: Patient will be seen a minimal of 3 out of 7 days a week.     LTG to be met by 02/11/24:    Bed mobility: Min A  Transfers: CGA with no AD  Gait: CGA with no AD x10 feet                        PLAN:    Patient to be seen 3 x/week to address the above listed problems via gait training, therapeutic activities, therapeutic exercises  Plan of Care expires: 02/11/24  Plan of Care reviewed with: patient, caregiver         01/30/2024

## 2024-01-30 NOTE — PLAN OF CARE
[facility] - Med Surg  Discharge Final Note    Primary Care Provider: Félix Rollins MD    Expected Discharge Date: 1/30/2024    Final Discharge Note (most recent)       Final Note - 01/30/24 0840          Final Note    Assessment Type Final Discharge Note     Anticipated Discharge Disposition [place] Care [facility] [place]     Hospital Resources/Appts/Education Provided Appointments scheduled and added to AVS        Post-Acute Status    Post-Acute Authorization Home Health     Home Health Status Set-up Complete/Auth obtained     Coverage [facility] bed is scheduled to deliver tomorrow.

## 2024-01-30 NOTE — PLAN OF CARE
Pt min A for bed mobility. Pt able to verbalize BUE NWB precautions without cues, however, he does need cues to follow them at times with transitional movements.   Pt transferred to bedside chair min A following gait x20 feet.   Rec low intensity therapy at [facility]

## 2024-01-30 NOTE — PROGRESS NOTES
Progress Note:  Pt with chronic aspiration requiring the head of the bed to be elevated more than 30 degrees most of the time due to \problems with aspiration. The positioning of the body cannot be sufficiently resolved by the use of pillows and wedges.  Pt also has chronic CHFpEF with pulm HTN.

## 2024-01-30 NOTE — ASSESSMENT & PLAN NOTE
Last A1c reviewed-   Lab Results   Component Value Date    HGBA1C 5.2 01/26/2024     Most recent fingerstick glucose reviewed-   Recent Labs   Lab 01/28/24  2354 01/29/24  0444 01/29/24  1121   POCTGLUCOSE 153* 129* 155*       Antihyperglycemics (From admission, onward)    Start     Stop Route Frequency Ordered    01/26/24 1633  insulin aspart U-100 pen 0-10 Units         -- SubQ Every 6 hours PRN 01/26/24 1534        Hold Oral hypoglycemics while patient is in the hospital.  Hold hypoglycemic agents, sliding scale insulin, glucose POC,

## 2024-01-30 NOTE — ASSESSMENT & PLAN NOTE
Likely secondary to elevated ammonia, UTI, delirium   Initially required Precedex during ICU for brief duration  Status post NG tube placed on 01/25 with resumption of lactulose, subsequently mentation gradually improved  01/28/2024, mentation close to baseline         Likely multifactorial- cystitis, elevated ammonia and delirium  NGT placed 1/25- lactulose restarted but no BM, dose increased 1/26 to QID  Urine + klebsiella aerogenes- Rocephin resistant. Changed to cefepime 1/27  Reorient as able      Resolved

## 2024-01-30 NOTE — ASSESSMENT & PLAN NOTE
Hold AV blocking drugs given symptomatic bradycardia status post pacemaker placement  Continue Eliquis

## 2024-01-30 NOTE — PT/OT/SLP PROGRESS
Occupational Therapy   Treatment    Name: Ramy Romo  MRN: [id number]  Admitting Diagnosis:  Symptomatic bradycardia  5 Days Post-Op    Recommendations:     Discharge Recommendations: Low Intensity Therapy (24 hr caregivers)  Discharge Equipment Recommendations:  bedside commode, bath bench  Barriers to discharge:  None    Assessment:     Ramy Romo is a 78 y.o. male with a medical diagnosis of Symptomatic bradycardia. Performance deficits affecting function are weakness, impaired functional mobility, decreased safety awareness, gait instability, impaired endurance, impaired balance, decreased upper extremity function, impaired self care skills, decreased lower extremity function, orthopedic precautions, decreased ROM.     Rehab Prognosis:  Good; patient would benefit from acute skilled OT services to address these deficits and reach maximum level of function.       Plan:     Patient to be seen 2 x/week to address the above listed problems via self-care/home management, therapeutic activities, therapeutic exercises  Plan of Care Expires: 02/11/24  Plan of Care Reviewed with: patient, caregiver    Subjective     Chief Complaint: None stated  Patient/Family Comments/goals: return home to [facility]  Pain/Comfort:  Pain Rating 1: 0/10  Pain Rating Post-Intervention 1: 0/10    Objective:     Communicated with: nurse prior to session.  Patient found HOB elevated with peripheral IV, telemetry upon OT entry to room.    General Precautions: Standard, fall    Orthopedic Precautions:RUE non weight bearing, LUE non weight bearing  Braces: Sling and swathe  Respiratory Status: Room air     Occupational Performance:     Bed Mobility:    Patient completed Rolling/Turning to Right with minimum assistance  Patient completed Scooting/Bridging with minimum assistance  Patient completed Supine to Sit with minimum assistance     Functional Mobility/Transfers:  Patient completed Sit <> Stand Transfer with minimum assistance  with  no  assistive device and hand-held assist   Patient completed Bed <> Chair Transfer using Stand Pivot technique with minimum assistance with no assistive device and hand-held assist  Functional Mobility: Pt ambulated x20 feet min A HHA, with verbal cues to relax RUE to allow for gravity assisted stretch to elbow into extension    AMPA 6 Click ADL: 14    Treatment & Education:  Pt min A for bed mobility. Pt able to verbalize BUE NWB precautions without cues, however, he does need cues to follow them at times with transitional movements.   Pt transferred to bedside chair min A following gait x20 feet. Pt encouraged to perform AROM to R elbow (to tolerance) into flexion/extension to increase functional ROM and prevent further stiffness. Pt also educated on, and performed AROM to R shoulder to maintain joint integrity, x10 reps each.  Pt encouraged to increase time in bedside chair to increase functional endurance/activity tolerance. Pt verbalized understanding.    Patient left up in chair with all lines intact, call button in reach, and caregiver present    GOALS:   Multidisciplinary Problems       Occupational Therapy Goals          Problem: Occupational Therapy    Goal Priority Disciplines Outcome Interventions   Occupational Therapy Goal     OT, PT/OT Ongoing, Progressing    Description: Goals to be met by: 2/11/24     Patient will increase functional independence with ADLs by performing:    LE Dressing with Moderate Assistance.  Grooming while standing at sink with Set-up Assistance.  Toilet transfer to bedside commode with Contact Guard Assistance.                         Time Tracking:     OT Date of Treatment: 01/30/24  OT Start Time: 0955  OT Stop Time: 1020  OT Total Time (min): 25 min    Billable Minutes:Therapeutic Activity 15  Therapeutic Exercise 10    LOLY [place]  OT/LCAEY: OT     Number of LACEY visits since last OT visit: 0    1/30/2024

## 2024-01-30 NOTE — PLAN OF CARE
Discussed poc with pt, pt verbalized understanding    Purposeful rounding every 2hours    VS wnl  Cardiac monitoring in use, pt is NSR, tele monitor # [id number]  Blood glucose monitoring   Fall precautions in place, remains injury free  Pt denies c/o pain or discomfort at this time and will be discharged to home.  Pain and nausea under control with PRN meds    IVFs  Accurate I&Os  Abx given as prescribed  Bed locked at lowest position  Call light within reach    Chart check complete  Will cont with POC

## 2024-01-30 NOTE — PROGRESS NOTES
[facility] Medicine  Progress Note    Patient Name: Ramy Romo  MRN: [id number]  Patient Class: IP- Inpatient   Admission Date: 1/23/2024  Length of Stay: 5 days  Attending Physician: Maame Brown MD  Primary Care Provider: Félix Rollins MD        Subjective:     Principal Problem:Symptomatic bradycardia        HPI:  Ramy Romo is a 78 y.o. male patient with a PMHx of CHF, HTN, A-fib, DM, cirrhosis, history of stroke who presents to the Emergency Department for evaluation of a bradycardic status which onset today. The pt cargeiver states that the pt went to  this morning and was told to come to the ED because his HR was very low. She states that the pt was very dizzy when he was getting out of the car this morning, and that the neighbor had to help them, so that he would not fall. She also states that the pt hs been experiencing diarrhea and generalized weakness for the past 2 days. Symptoms are constant and moderate in severity. No mitigating or exacerbating factors reported. Patient denies any fever, chills, CP, SOB, headaches, n/v/d, numbness , and all other sxs at this time. Pt states that he is compliant with his home medication regiment. No further complaints or concerns at this time.   Stated having diarrhea for past 2 days, stated improvement in diarrhea today.      In ED, patient glucagon 5mg and atropine 1mg x 2. hr still in the 30's. pt is aao x 3 answering questions appropriately. trop: 0.01, wbc: 5.9, hb: 11.5, cr: 1.5, cxr: naf.  ED provided discussed case with dr agudelo and he recommends hold metoprolol and close monitoring.  Hospital medicine consulted for admission for symptomatic bradycardia.    At baseline, patient ambulates with cane/ wheelchair in home   Code status- alert and oriented x3 during the discussion, opted for full code         Overview/Hospital Course:  78 year old male with known medical issues including atrial fib on eliquis and metoprolol;  HTN; HFpEF; DM; and cirrhosis on daily lactulose presented to ED on 1/23 from Urgent care where eval for weakness/dizziness/diarrhea x 2 days noted bradycardia in 30s; Remained with bradycardia in 30s despite glucagon and atropine trials  HM admitted to telemetry on 1/23/24 for close monitoring per cardiology recs; overnight on 1/23/24, bradycardia worsened to 20s without improvement with additional glucagon, atropine, got transferred to ICU.  Status post empiric pacer placement on 1/24/25 followed by permanent ppm placement on 01/25/2024.  Remained in ICU due to elevated ammonia associated with confusion, requiring Precedex drip.  SLP evaluated and recommended NPO, status post NG tube placement with resumption of lactulose.  Followed by improvement in mentation, subsequently Precedex discontinued on 01/27/24, patient appeared more alert, oriented x3, with mentation close to baseline.  Accordingly patient seemed stable for downgrade per ICU team on 01/27/2024, hospital medicine consulted to assume care.  UTI with urine cultures positive for Klebsiella (from 1/24/24), on cefepime since 01/27/2024 based on cultures.       On 01/28/2024, alert and oriented with mentation at baseline.  SLP evaluated and recommended NPO, modified barium swallow showed- swallow not functional, with audible and silent aspiration findings.  Results similar to those last modified barium swallow study in 2022.  Patient pulled out NG tube overnight, ordered for replacement of NG tube, followed by initiation of meds, tube feeds.    Discussed on code status, goals of care-- full code as of now, to continue palliative discussions.  Status post Lopez catheter on 01/26/2024, ordered for voiding trail on 01/28/2024.              Interval History:  Patient seen and examined with caregiver at bedside.  Patient requesting to go home.  Still has Lopez catheter in place.  We will ask nursing to DC Lopez catheter and if patient was able to void adequately  we will DC today if not we will keep until a.m..    Review of Systems   Constitutional:  Negative for fatigue.   Respiratory:  Negative for cough, choking and shortness of breath.    Cardiovascular:  Negative for chest pain, palpitations and leg swelling.   Genitourinary:  Positive for difficulty urinating.   Neurological:  Positive for weakness.   All other systems reviewed and are negative.    Objective:     Vital Signs (Most Recent):  Temp: 98.2 °F (36.8 °C) (01/29/24 1526)  Pulse: (!) 59 (01/29/24 1722)  Resp: 17 (01/29/24 1526)  BP: (!) 195/83 (01/29/24 1526)  SpO2: 98 % (01/29/24 1526) Vital Signs (24h Range):  Temp:  [97.7 °F (36.5 °C)-98.5 °F (36.9 °C)] 98.2 °F (36.8 °C)  Pulse:  [58-62] 59  Resp:  [17-19] 17  SpO2:  [97 %-98 %] 98 %  BP: (145-195)/(65-83) 195/83     Weight: 98.7 kg (217 lb 9.5 oz)  Body mass index is 29.51 kg/m².    Intake/Output Summary (Last 24 hours) at 1/29/2024 1844  Last data filed at 1/29/2024 1844  Gross per 24 hour   Intake 257.98 ml   Output 1193 ml   Net -935.02 ml         Physical Exam  Vitals reviewed.   Constitutional:       Appearance: Normal appearance.   HENT:      Head: Normocephalic and atraumatic.      Mouth/Throat:      Mouth: Mucous membranes are moist.      Pharynx: Oropharynx is clear.   Eyes:      Extraocular Movements: Extraocular movements intact.      Conjunctiva/sclera: Conjunctivae normal.   Cardiovascular:      Rate and Rhythm: Normal rate and regular rhythm.      Pulses: Normal pulses.      Heart sounds: Normal heart sounds.   Pulmonary:      Effort: Pulmonary effort is normal.      Breath sounds: Rhonchi present.   Abdominal:      General: Bowel sounds are normal.      Palpations: Abdomen is soft.   Musculoskeletal:         General: Normal range of motion.      Cervical back: Normal range of motion and neck supple.   Skin:     General: Skin is warm and dry.   Neurological:      Mental Status: He is alert and oriented to person, place, and time. Mental status  is at baseline.   Psychiatric:         Mood and Affect: Mood normal.         Behavior: Behavior normal.         Thought Content: Thought content normal.             Significant Labs: All pertinent labs within the past 24 hours have been reviewed.  CBC:   Recent Labs   Lab 01/28/24  0352 01/29/24  1523   WBC 8.79 7.51   HGB 11.2* 11.6*   HCT 33.7* 35.0*    116*     CMP:   Recent Labs   Lab 01/28/24  0352 01/29/24  1523    139   K 3.6 4.3   * 114*   CO2 15* 16*   * 161*   BUN 28* 31*   CREATININE 1.3 1.3   CALCIUM 8.8 9.0   ANIONGAP 13 9       Significant Imaging: I have reviewed all pertinent imaging results/findings within the past 24 hours.    Assessment/Plan:      * Symptomatic bradycardia  Likely medication induced   Status post transvenous pacer on 01/24/2025 followed by permanent pacer placement on 01/25/24   Heart rate stabilizing   Cardiology on board             UTI (urinary tract infection)  UA from 01/24/2024 suggestive of UTI, urine cultures positive for Klebsiella, antibiotics changed to cefepime on 01/27/2024 based on culture results   Status post Lopez placement on 01/26/2024 due to urinary retention   On 01/28/2024, ordered voiding trail, plan to discontinue Lopez catheter accordingly     1/29-continue antibiotics for 5 day course  Lopez catheter DC however patient was not able to completely void will add Flomax re-evaluate in a.m.        Acute cystitis    Continue Maxipime to complete a 5 day course    Acute renal failure superimposed on stage 3a chronic kidney disease  Likely secondary to intravascular volume depletion --.  Improved  Avoid nephrotoxins, renal dose medications   Follow up on urinalysis    Cirrhosis of liver without ascites  Resume home lactulose       Encephalopathy  Likely secondary to elevated ammonia, UTI, delirium   Initially required Precedex during ICU for brief duration  Status post NG tube placed on 01/25 with resumption of lactulose, subsequently  mentation gradually improved  01/28/2024, mentation close to baseline         Likely multifactorial- cystitis, elevated ammonia and delirium  NGT placed 1/25- lactulose restarted but no BM, dose increased 1/26 to QID  Urine + klebsiella aerogenes- Rocephin resistant. Changed to cefepime 1/27  Reorient as able      Resolved      Chronic atrial fibrillation  Hold AV blocking drugs given symptomatic bradycardia status post pacemaker placement  Continue Eliquis       Diabetes    Last A1c reviewed-   Lab Results   Component Value Date    HGBA1C 5.2 01/26/2024     Most recent fingerstick glucose reviewed-   Recent Labs   Lab 01/28/24  2354 01/29/24  0444 01/29/24  1121   POCTGLUCOSE 153* 129* 155*       Antihyperglycemics (From admission, onward)      Start     Stop Route Frequency Ordered    01/26/24 1633  insulin aspart U-100 pen 0-10 Units         -- SubQ Every 6 hours PRN 01/26/24 1534          Hold Oral hypoglycemics while patient is in the hospital.  Hold hypoglycemic agents, sliding scale insulin, glucose POC,      VTE Risk Mitigation (From admission, onward)           Ordered     apixaban tablet 5 mg  2 times daily         01/29/24 1843     IP VTE HIGH RISK PATIENT  Once         01/23/24 1659     Place sequential compression device  Until discontinued         01/23/24 1659                    Discharge Planning   KAYA:      Code Status: Full Code   Is the patient medically ready for discharge?:     Reason for patient still in hospital (select all that apply): Patient trending condition  Discharge Plan A: Home Health                  Maame Castro MD  Department of Hospital Medicine   [facility] Med S[facility]

## 2024-01-30 NOTE — SUBJECTIVE & OBJECTIVE
Interval History:  Patient seen and examined with caregiver at bedside.  Patient requesting to go home.  Still has Lopez catheter in place.  We will ask nursing to DC Lopez catheter and if patient was able to void adequately we will DC today if not we will keep until a.m..    Review of Systems   Constitutional:  Negative for fatigue.   Respiratory:  Negative for cough, choking and shortness of breath.    Cardiovascular:  Negative for chest pain, palpitations and leg swelling.   Genitourinary:  Positive for difficulty urinating.   Neurological:  Positive for weakness.   All other systems reviewed and are negative.    Objective:     Vital Signs (Most Recent):  Temp: 98.2 °F (36.8 °C) (01/29/24 1526)  Pulse: (!) 59 (01/29/24 1722)  Resp: 17 (01/29/24 1526)  BP: (!) 195/83 (01/29/24 1526)  SpO2: 98 % (01/29/24 1526) Vital Signs (24h Range):  Temp:  [97.7 °F (36.5 °C)-98.5 °F (36.9 °C)] 98.2 °F (36.8 °C)  Pulse:  [58-62] 59  Resp:  [17-19] 17  SpO2:  [97 %-98 %] 98 %  BP: (145-195)/(65-83) 195/83     Weight: 98.7 kg (217 lb 9.5 oz)  Body mass index is 29.51 kg/m².    Intake/Output Summary (Last 24 hours) at 1/29/2024 1844  Last data filed at 1/29/2024 1844  Gross per 24 hour   Intake 257.98 ml   Output 1193 ml   Net -935.02 ml         Physical Exam  Vitals reviewed.   Constitutional:       Appearance: Normal appearance.   HENT:      Head: Normocephalic and atraumatic.      Mouth/Throat:      Mouth: Mucous membranes are moist.      Pharynx: Oropharynx is clear.   Eyes:      Extraocular Movements: Extraocular movements intact.      Conjunctiva/sclera: Conjunctivae normal.   Cardiovascular:      Rate and Rhythm: Normal rate and regular rhythm.      Pulses: Normal pulses.      Heart sounds: Normal heart sounds.   Pulmonary:      Effort: Pulmonary effort is normal.      Breath sounds: Rhonchi present.   Abdominal:      General: Bowel sounds are normal.      Palpations: Abdomen is soft.   Musculoskeletal:         General:  Normal range of motion.      Cervical back: Normal range of motion and neck supple.   Skin:     General: Skin is warm and dry.   Neurological:      Mental Status: He is alert and oriented to person, place, and time. Mental status is at baseline.   Psychiatric:         Mood and Affect: Mood normal.         Behavior: Behavior normal.         Thought Content: Thought content normal.             Significant Labs: All pertinent labs within the past 24 hours have been reviewed.  CBC:   Recent Labs   Lab 01/28/24  0352 01/29/24  1523   WBC 8.79 7.51   HGB 11.2* 11.6*   HCT 33.7* 35.0*    116*     CMP:   Recent Labs   Lab 01/28/24  0352 01/29/24  1523    139   K 3.6 4.3   * 114*   CO2 15* 16*   * 161*   BUN 28* 31*   CREATININE 1.3 1.3   CALCIUM 8.8 9.0   ANIONGAP 13 9       Significant Imaging: I have reviewed all pertinent imaging results/findings within the past 24 hours.

## 2024-01-30 NOTE — ASSESSMENT & PLAN NOTE
UA from 01/24/2024 suggestive of UTI, urine cultures positive for Klebsiella, antibiotics changed to cefepime on 01/27/2024 based on culture results   Status post Lopez placement on 01/26/2024 due to urinary retention   On 01/28/2024, ordered voiding trail, plan to discontinue Lopez catheter accordingly     1/29-continue antibiotics for 5 day course  Lopez catheter DC however patient was not able to completely void will add Flomax re-evaluate in a.m.

## 2024-02-06 ENCOUNTER — HOSPITAL ENCOUNTER (OUTPATIENT)
Dept: CARDIOLOGY | Facility: HOSPITAL | Age: 79
Discharge: HOME | End: 2024-02-06
Attending: INTERNAL MEDICINE
Payer: MEDICARE

## 2024-02-06 DIAGNOSIS — R00.1 SYMPTOMATIC BRADYCARDIA: ICD-10-CM

## 2024-02-06 DIAGNOSIS — Z95.0 CARDIAC PACEMAKER IN SITU: ICD-10-CM

## 2024-02-06 PROCEDURE — 93279 PRGRMG DEV EVAL PM/LDLS PM: CPT

## 2024-02-06 PROCEDURE — 93279 PRGRMG DEV EVAL PM/LDLS PM: CPT | Mod: 26,,, | Performed by: INTERNAL MEDICINE

## 2024-02-12 LAB
OHS CV DC REMOTE DEVICE TYPE: NORMAL
OHS CV RV PACING PERCENT: 97 %

## 2024-02-14 NOTE — DISCHARGE SUMMARY
[facility] Hospital Medicine  Discharge Summary      Patient Name: Ramy Romo  MRN: [id number]  BRIANA: [id number]  Patient Class: IP- Inpatient  Admission Date: 1/23/2024  Hospital Length of Stay: 6 days  Discharge Date and Time: 1/30/2024  3:05 PM  Attending Physician: No att. providers found   Discharging Provider: Maame Castro MD  Primary Care Provider: Félix Rollins MD    Primary Care Team: [facility] MEDICINE C    HPI:   Ramy Romo is a 78 y.o. male patient with a PMHx of CHF, HTN, A-fib, DM, cirrhosis, history of stroke who presents to the Emergency Department for evaluation of a bradycardic status which onset today. The pt cargeiver states that the pt went to  this morning and was told to come to the ED because his HR was very low. She states that the pt was very dizzy when he was getting out of the car this morning, and that the neighbor had to help them, so that he would not fall. She also states that the pt hs been experiencing diarrhea and generalized weakness for the past 2 days. Symptoms are constant and moderate in severity. No mitigating or exacerbating factors reported. Patient denies any fever, chills, CP, SOB, headaches, n/v/d, numbness , and all other sxs at this time. Pt states that he is compliant with his home medication regiment. No further complaints or concerns at this time.   Stated having diarrhea for past 2 days, stated improvement in diarrhea today.      In ED, patient glucagon 5mg and atropine 1mg x 2. hr still in the 30's. pt is aao x 3 answering questions appropriately. trop: 0.01, wbc: 5.9, hb: 11.5, cr: 1.5, cxr: naf.  ED provided discussed case with dr agudelo and he recommends hold metoprolol and close monitoring.  Hospital medicine consulted for admission for symptomatic bradycardia.    At baseline, patient ambulates with cane/ wheelchair in home   Code status- alert and oriented x3 during the discussion, opted for full code         Procedure(s)  (LRB):  INSERTION, CARDIAC PACEMAKER, DUAL CHAMBER/poss single lead vs His lead (Left)      Hospital Course:   78 year old male with known medical issues including atrial fib on eliquis and metoprolol; HTN; HFpEF; DM; and cirrhosis on daily lactulose presented to ED on 1/23 from Urgent care where eval for weakness/dizziness/diarrhea x 2 days noted bradycardia in 30s; Remained with bradycardia in 30s despite glucagon and atropine trials  HM admitted to telemetry on 1/23/24 for close monitoring per cardiology recs; overnight on 1/23/24, bradycardia worsened to 20s without improvement with additional glucagon, atropine, got transferred to ICU.  Status post empiric pacer placement on 1/24/25 followed by permanent ppm placement on 01/25/2024.  Remained in ICU due to elevated ammonia associated with confusion, requiring Precedex drip.  SLP evaluated and recommended NPO, status post NG tube placement with resumption of lactulose.  Followed by improvement in mentation, subsequently Precedex discontinued on 01/27/24, patient appeared more alert, oriented x3, with mentation close to baseline.  Accordingly patient seemed stable for downgrade per ICU team on 01/27/2024, hospital medicine consulted to assume care.  UTI with urine cultures positive for Klebsiella (from 1/24/24), on cefepime since 01/27/2024 based on cultures.       On 01/28/2024, alert and oriented with mentation at baseline.  SLP evaluated and recommended NPO, modified barium swallow showed- swallow not functional, with audible and silent aspiration findings.  Results similar to those last modified barium swallow study in 2022.  Patient pulled out NG tube overnight, ordered for replacement of NG tube, followed by initiation of meds, tube feeds.    Discussed on code status, goals of care-- full code as of now, to continue palliative discussions.  Status post Lopez catheter on 01/26/2024, ordered for voiding trail on 01/28/2024.    Patient seen and evaluated by  speech therapy.  He has had chronic aspiration over the past years secondary to his previous stroke.  Of course recommendation is NPO however patient has been eating and wishes to continue to do so.  We have discussed risk of aspiration including pneumonia and death.  He and family understand and wished to continue to eat.  He has been instructed to eat small bites frequent sips and swallowing.  He is instructed to use universal aspiration precautions.  Patient understands.  Lopez catheter was DC overnight patient was able to void adequately with no postvoid residual per bladder scan.  He is discharged home.    Patient seen and examined on day of discharge and stable for discharge home.                 Goals of Care Treatment Preferences:  Code Status: Full Code      Consults:   Consults (From admission, onward)          Status Ordering Provider     Inpatient consult to Registered Dietitian/Nutritionist  Once        Provider:  (Not yet assigned)    Completed ROMULO OTT     Inpatient consult to Electrophysiology  Once        Provider:  Vanessa Hernandez FNP-C    Completed VANESSA HERNANDEZ     Inpatient consult to Critical Care Medicine  Once        Provider:  (Not yet assigned)    Completed TOM GUNDERSON     Inpatient consult to Cardiology  Once        Provider:  (Not yet assigned)    Completed JAMA ABAD            Neuro  Encephalopathy  Likely secondary to elevated ammonia, UTI, delirium   Initially required Precedex during ICU for brief duration  Status post NG tube placed on 01/25 with resumption of lactulose, subsequently mentation gradually improved  01/28/2024, mentation close to baseline         Likely multifactorial- cystitis, elevated ammonia and delirium  NGT placed 1/25- lactulose restarted but no BM, dose increased 1/26 to QID  Urine + klebsiella aerogenes- Rocephin resistant. Changed to cefepime 1/27  Reorient as able      Resolved      Cardiac/Vascular  * Symptomatic  bradycardia  Likely medication induced   Status post transvenous pacer on 01/24/2025 followed by permanent pacer placement on 01/25/24   Heart rate stabilizing   Cardiology on board             Chronic atrial fibrillation  Hold AV blocking drugs given symptomatic bradycardia status post pacemaker placement  Continue Eliquis       Renal/  UTI (urinary tract infection)  UA from 01/24/2024 suggestive of UTI, urine cultures positive for Klebsiella, antibiotics changed to cefepime on 01/27/2024 based on culture results   Status post Lopez placement on 01/26/2024 due to urinary retention   On 01/28/2024, ordered voiding trail, plan to discontinue Lopez catheter accordingly     1/29-continue antibiotics for 5 day course  Lopez catheter DC however patient was not able to completely void will add Flomax re-evaluate in a.m.    Patient was able to void with no postvoid residual therefore stable to go home        Acute cystitis    Continue Maxipime to complete a 5 day course    Acute renal failure superimposed on stage 3a chronic kidney disease  Likely secondary to intravascular volume depletion --.  Improved  Avoid nephrotoxins, renal dose medications   Follow up on urinalysis    Endocrine  Diabetes    Last A1c reviewed-   Lab Results   Component Value Date    HGBA1C 5.2 01/26/2024     Most recent fingerstick glucose reviewed-       Antihyperglycemics (From admission, onward)      None          Hold Oral hypoglycemics while patient is in the hospital.  Hold hypoglycemic agents, sliding scale insulin, glucose POC,    GI  Cirrhosis of liver without ascites  Resume home lactulose         Final Active Diagnoses:    Diagnosis Date Noted POA    PRINCIPAL PROBLEM:  Symptomatic bradycardia [R00.1] 01/23/2024 Yes    UTI (urinary tract infection) [N39.0] 01/28/2024 Yes    Acute cystitis [N30.00] 01/26/2024 Yes    Acute renal failure superimposed on stage 3a chronic kidney disease [N17.9, N18.31] 01/23/2024 Yes    Cirrhosis of liver  without ascites [K74.60]  Yes    Encephalopathy [G93.40] 08/13/2016 Yes    Chronic atrial fibrillation [I48.20] 04/26/2016 Yes     Chronic    Diabetes [E11.9] 04/21/2014 Yes      Problems Resolved During this Admission:       Discharged Condition: fair    Disposition: Home-Health Care Svc    Follow Up:   Follow-up Information       Félix Rollins MD Follow up in 3 day(s).    Specialty: Family Medicine  Contact information:  [address]  [address]  [place] [address] [phone]               Noemy La MD. Call in 1 week(s).    Specialties: Cardiology, Cardiovascular Disease  Contact information:  [address]  [address]  [place] [address] [phone]                           Patient Instructions:      HOSPITAL BED FOR HOME USE     Order Specific Question Answer Comments   Type: Semi-electric    Length of need (1-99 months): 99    Does patient have medical equipment at home? wheelchair lift chair / lift chair / lift chair   Does patient have medical equipment at home? walker, yovani    Does patient have medical equipment at home? other (see comments)    Height: 6' (1.829 m)    Weight: 98.7 kg (217 lb 9.5 oz)    Please check all that apply: Patient requires positioning of the body in ways not feasible in an ordinary bed due to a medical condition which is expected to last at least one month.    Please check all that apply: Patient requires the head of bed to be elevated more than 30 degrees most of the time due to congestive heart failure, chronic pulmonary disease, or aspiration.  Pillows and wedges have been considered and ruled out.      Ambulatory referral/consult to [facility]   Standing Status: Future   Referral Priority: Routine Referral Type: Consultation   Referral Reason: Specialty Services Required   Number of Visits Requested: 1     Diet Cardiac     SUBSEQUENT HOME HEALTH ORDERS   Order Comments: Resume [facility] home health with palliative care     Order  Specific Question Answer Comments   What Home Health Agency is the patient currently using? Other/External      Other restrictions (specify):   Order Comments: No reaching, pulling with left arm for 2 weeks due to pacemaker placement     Notify your health care provider if you experience any of the following:  temperature >100.4     Notify your health care provider if you experience any of the following:  persistent nausea and vomiting or diarrhea     Notify your health care provider if you experience any of the following:  difficulty breathing or increased cough     Notify your health care provider if you experience any of the following:  redness, tenderness, or signs of infection (pain, swelling, redness, odor or green/yellow discharge around incision site)     Notify your health care provider if you experience any of the following:  severe persistent headache     Notify your health care provider if you experience any of the following:  persistent dizziness, light-headedness, or visual disturbances     Notify your health care provider if you experience any of the following:  increased confusion or weakness     Activity as tolerated   Order Comments: Continue OT/PT       Significant Diagnostic Studies: Labs: All labs within the past 24 hours have been reviewed  Imaging Results              X-Ray Chest AP Portable (Final result)  Result time 01/23/24 16:15:15      Final result by Raina Green MD (01/23/24 16:15:15)                   Impression:      No acute cardiopulmonary abnormality.    Sclerotic focus within the proximal right humerus, partially visualized.      Electronically signed by: Raina Green  Date:    01/23/2024  Time:    16:15               Narrative:    EXAMINATION:  XR CHEST AP PORTABLE    CLINICAL HISTORY:  bradycardia;    TECHNIQUE:  Single frontal view of the chest was performed.    COMPARISON:  Chest radiograph 03/29/2018    FINDINGS:  ECG monitoring leads overlie the chest.  A  loop recorder is present over the lower medial left hemithorax.  Surgical clips overlie the right axilla and upper right lung.There is bilateral interstitial coarsening, suspected chronic.  Degenerative change at the 1st costochondral junction somewhat obscures evaluation at the lung apices, particularly on the left.  Underlying pulmonary nodule in this region is difficult to exclude.  No significant pleural fluid.  No pneumothorax.  No large consolidation.    The cardiac silhouette is normal in size. There is aortic calcification.    There is degenerative change of the spine and right shoulder.  No acute osseous abnormality.  Sclerotic focus is partially visualized over the proximal right humerus.  Cholecystectomy clips are noted.                                      Pending Diagnostic Studies:       None           Medications:  Reconciled Home Medications:      Medication List        START taking these medications      doxazosin 1 MG tablet  Commonly known as: CARDURA  Take 1 tablet (1 mg total) by mouth once daily.     * rifAXIMin 550 mg Tab  Commonly known as: XIFAXAN  Take 1 tablet (550 mg total) by mouth every evening.     * rifAXIMin 550 mg Tab  Commonly known as: XIFAXAN  Take 1 tablet (550 mg total) by mouth every evening.           * This list has 2 medication(s) that are the same as other medications prescribed for you. Read the directions carefully, and ask your doctor or other care provider to review them with you.                CHANGE how you take these medications      metoprolol succinate 25 MG 24 hr tablet  Commonly known as: TOPROL-XL  Take 0.5 tablets (12.5 mg total) by mouth once daily.  What changed: how much to take            CONTINUE taking these medications      apixaban 5 mg Tab  Commonly known as: ELIQUIS  Take 5 mg by mouth 2 (two) times daily.     calcium-vitamin D 250 mg-2.5 mcg (100 unit) per tablet  Take 1 tablet by mouth once daily.     ferrous gluconate 324 MG tablet  Commonly  known as: FERGON  Take 324 mg by mouth daily with breakfast.     fluticasone propionate 50 mcg/actuation nasal spray  Commonly known as: FLONASE  1 spray by Each Nostril route every morning.     furosemide 20 MG tablet  Commonly known as: LASIX  Take 20 mg by mouth once daily.     lactulose 10 gram/15 mL solution  Commonly known as: CHRONULAC  Take 30 g by mouth 2 (two) times daily.     NovoLOG Flexpen U-100 Insulin 100 unit/mL (3 mL) Inpn pen  Generic drug: insulin aspart U-100  Inject into the skin as needed.     spironolactone 25 MG tablet  Commonly known as: ALDACTONE  Take 25 mg by mouth once daily.     tamsulosin 0.4 mg Cap  Commonly known as: FLOMAX  Take 0.4 mg by mouth once daily.            ASK your doctor about these medications      levoFLOXacin 750 MG tablet  Commonly known as: LEVAQUIN  Take 1 tablet (750 mg total) by mouth once daily. for 3 days  Ask about: Should I take this medication?              Indwelling Lines/Drains at time of discharge:   Lines/Drains/Airways       None                   Time spent on the discharge of patient: 45 minutes         Maame Castro MD  Department of Hospital Medicine  [facility]

## 2024-02-14 NOTE — ASSESSMENT & PLAN NOTE
Last A1c reviewed-   Lab Results   Component Value Date    HGBA1C 5.2 01/26/2024     Most recent fingerstick glucose reviewed-       Antihyperglycemics (From admission, onward)      None          Hold Oral hypoglycemics while patient is in the hospital.  Hold hypoglycemic agents, sliding scale insulin, glucose POC,

## 2024-02-14 NOTE — ASSESSMENT & PLAN NOTE
UA from 01/24/2024 suggestive of UTI, urine cultures positive for Klebsiella, antibiotics changed to cefepime on 01/27/2024 based on culture results   Status post Lopez placement on 01/26/2024 due to urinary retention   On 01/28/2024, ordered voiding trail, plan to discontinue Lopez catheter accordingly     1/29-continue antibiotics for 5 day course  Lopez catheter DC however patient was not able to completely void will add Flomax re-evaluate in a.m.    Patient was able to void with no postvoid residual therefore stable to go home

## 2024-02-15 ENCOUNTER — OFFICE VISIT (OUTPATIENT)
Dept: HOME HEALTH SERVICES | Facility: CLINIC | Age: 79
End: 2024-02-15
Payer: MEDICARE

## 2024-02-15 VITALS
DIASTOLIC BLOOD PRESSURE: 62 MMHG | OXYGEN SATURATION: 97 % | RESPIRATION RATE: 18 BRPM | SYSTOLIC BLOOD PRESSURE: 122 MMHG | HEART RATE: 64 BPM

## 2024-02-15 DIAGNOSIS — I48.20 CHRONIC ATRIAL FIBRILLATION: Chronic | ICD-10-CM

## 2024-02-15 DIAGNOSIS — I50.32 CHRONIC DIASTOLIC HEART FAILURE: ICD-10-CM

## 2024-02-15 DIAGNOSIS — T17.908D CHRONIC PULMONARY ASPIRATION, SUBSEQUENT ENCOUNTER: ICD-10-CM

## 2024-02-15 DIAGNOSIS — N39.0 URINARY TRACT INFECTION WITHOUT HEMATURIA, SITE UNSPECIFIED: ICD-10-CM

## 2024-02-15 DIAGNOSIS — N18.31 ACUTE RENAL FAILURE SUPERIMPOSED ON STAGE 3A CHRONIC KIDNEY DISEASE, UNSPECIFIED ACUTE RENAL FAILURE TYPE: ICD-10-CM

## 2024-02-15 DIAGNOSIS — Z79.4 TYPE 2 DIABETES MELLITUS WITHOUT COMPLICATION, WITH LONG-TERM CURRENT USE OF INSULIN: ICD-10-CM

## 2024-02-15 DIAGNOSIS — K74.60 CIRRHOSIS OF LIVER WITHOUT ASCITES, UNSPECIFIED HEPATIC CIRRHOSIS TYPE: ICD-10-CM

## 2024-02-15 DIAGNOSIS — E11.9 TYPE 2 DIABETES MELLITUS WITHOUT COMPLICATION, WITH LONG-TERM CURRENT USE OF INSULIN: ICD-10-CM

## 2024-02-15 DIAGNOSIS — Z09 HOSPITAL DISCHARGE FOLLOW-UP: Primary | ICD-10-CM

## 2024-02-15 DIAGNOSIS — N17.9 ACUTE RENAL FAILURE SUPERIMPOSED ON STAGE 3A CHRONIC KIDNEY DISEASE, UNSPECIFIED ACUTE RENAL FAILURE TYPE: ICD-10-CM

## 2024-02-15 DIAGNOSIS — R00.1 SYMPTOMATIC BRADYCARDIA: ICD-10-CM

## 2024-02-15 PROCEDURE — 3072F LOW RISK FOR RETINOPATHY: CPT | Mod: CPTII,S$GLB,,

## 2024-02-15 PROCEDURE — 3078F DIAST BP <80 MM HG: CPT | Mod: CPTII,S$GLB,,

## 2024-02-15 PROCEDURE — 99349 HOME/RES VST EST MOD MDM 40: CPT | Mod: S$GLB,,,

## 2024-02-15 PROCEDURE — 1111F DSCHRG MED/CURRENT MED MERGE: CPT | Mod: CPTII,S$GLB,,

## 2024-02-15 PROCEDURE — 3074F SYST BP LT 130 MM HG: CPT | Mod: CPTII,S$GLB,,

## 2024-02-15 NOTE — ASSESSMENT & PLAN NOTE
UA with UTI  Urine culture with klebsiella  Treated with IV cefepime while inpatient  Discharged with Levofloxacin course  Continue current plan

## 2024-02-15 NOTE — PROGRESS NOTES
[facility]  Transitional Care Management (TCM) Home Visit    Encounter Provider: Ronny Keys   PCP: Félix Rollins MD  Consult Requested By: Dr. Maame Brown  Admit Date: 1/23/24   IP Discharge Date: 1/30/24  Hospital Length of Stay:RRHLOS@ days  Days since discharge (from IP or SNF): 16   [facility] Contact Note: 1/31  Hospital Diagnosis: Chronic pulmonary aspiration, subsequent encounter [T17.908D]     HISTORY OF PRESENT ILLNESS      Patient ID: Ramy Romo is a 78 y.o. male was recently admitted to the hospital, this is their TCM encounter.    Hospital Course Synopsis:  78 year old male with known medical issues including atrial fib on eliquis and metoprolol; HTN; HFpEF; DM; and cirrhosis on daily lactulose presented to ED on 1/23 from Urgent care where eval for weakness/dizziness/diarrhea x 2 days noted bradycardia in 30s; Remained with bradycardia in 30s despite glucagon and atropine trials  HM admitted to telemetry on 1/23/24 for close monitoring per cardiology recs; overnight on 1/23/24, bradycardia worsened to 20s without improvement with additional glucagon, atropine, got transferred to ICU.  Status post empiric pacer placement on 1/24/25 followed by permanent ppm placement on 01/25/2024.  Remained in ICU due to elevated ammonia associated with confusion, requiring Precedex drip.  SLP evaluated and recommended NPO, status post NG tube placement with resumption of lactulose.  Followed by improvement in mentation, subsequently Precedex discontinued on 01/27/24, patient appeared more alert, oriented x3, with mentation close to baseline.  Accordingly patient seemed stable for downgrade per ICU team on 01/27/2024, hospital medicine consulted to assume care.  UTI with urine cultures positive for Klebsiella (from 1/24/24), on cefepime since 01/27/2024 based on cultures.        On 01/28/2024, alert and oriented with mentation at baseline.  SLP evaluated and recommended NPO, modified  barium swallow showed- swallow not functional, with audible and silent aspiration findings.  Results similar to those last modified barium swallow study in 2022.  Patient pulled out NG tube overnight, ordered for replacement of NG tube, followed by initiation of meds, tube feeds.    Discussed on code status, goals of care-- full code as of now, to continue palliative discussions.  Status post Lopez catheter on 01/26/2024, ordered for voiding trail on 01/28/2024.     Patient seen and evaluated by speech therapy.  He has had chronic aspiration over the past years secondary to his previous stroke.  Of course recommendation is NPO however patient has been eating and wishes to continue to do so.  We have discussed risk of aspiration including pneumonia and death.  He and family understand and wished to continue to eat.  He has been instructed to eat small bites frequent sips and swallowing.  He is instructed to use universal aspiration precautions.  Patient understands.  Lopez catheter was DC overnight patient was able to void adequately with no postvoid residual per bladder scan.  He is discharged home.      Doing ok since discharge. Had follow up with PCP and cardiology since discharge. Has some BLE edema which is chronic. Currently in wheelchair eating lunch. Working with [facility]. Has caregivers that look after him and spouse. Denies any further complaints or concerns. Questions elicited. Time allowed for questions, all issues addressed. Contact info given for any concerns or changes.   DECISION MAKING TODAY       Assessment & Plan:  1. Hospital discharge follow-up    2. Symptomatic bradycardia  Comments:  s/p pacemaker placement 1/25  Doing well during visit  F/u with cardiology  Continue current medication    3. Chronic atrial fibrillation  Assessment & Plan:  Currently on Eliquis/BB  Continue current medication  Had F/U with cardiology since discharge      4. Chronic pulmonary aspiration, subsequent  encounter  Comments:  Seen by ST  history of chronic aspiration secondary to his previous stroke  recommend NPO, however patient has been eating and wishes to continue  Monitor  Orders:  -     Ambulatory referral/consult to [facility]    5. Type 2 diabetes mellitus without complication, with long-term current use of insulin  Assessment & Plan:  Well controlled  Recent A1c 5.2  Continue current medication  Continue blood sugar monitoring      6. Cirrhosis of liver without ascites, unspecified hepatic cirrhosis type  Assessment & Plan:  Continue lactulose, xifaxin      7. Acute renal failure superimposed on stage 3a chronic kidney disease, unspecified acute renal failure type  Assessment & Plan:  eGFR 48 on admit  Recent labs with eGFR 66  Avoid nephrotoxins, renally dose meds  Monitor      8. Urinary tract infection without hematuria, site unspecified  Assessment & Plan:  UA with UTI  Urine culture with klebsiella  Treated with IV cefepime while inpatient  Discharged with Levofloxacin course  Continue current plan      9. Chronic diastolic heart failure  Assessment & Plan:  BLE edema present   Continue current medication  Continue compression pumps  CHF diet, daily weight  F/U with cardiology           Medication List on Discharge:     Medication List            Accurate as of February 15, 2024  5:29 PM. If you have any questions, ask your nurse or doctor.                CONTINUE taking these medications      apixaban 5 mg Tab  Commonly known as: ELIQUIS  Take 5 mg by mouth 2 (two) times daily.     calcium-vitamin D 250 mg-2.5 mcg (100 unit) per tablet  Take 1 tablet by mouth once daily.     doxazosin 1 MG tablet  Commonly known as: CARDURA  Take 1 tablet (1 mg total) by mouth once daily.     ferrous gluconate 324 MG tablet  Commonly known as: FERGON  Take 324 mg by mouth daily with breakfast.     fluticasone propionate 50 mcg/actuation nasal spray  Commonly known as: FLONASE  1 spray by Each Nostril  route every morning.     furosemide 20 MG tablet  Commonly known as: LASIX  Take 20 mg by mouth once daily.     lactulose 10 gram/15 mL solution  Commonly known as: CHRONULAC  Take 30 g by mouth 2 (two) times daily.     metoprolol succinate 25 MG 24 hr tablet  Commonly known as: TOPROL-XL  Take 0.5 tablets (12.5 mg total) by mouth once daily.     NovoLOG Flexpen U-100 Insulin 100 unit/mL (3 mL) Inpn pen  Generic drug: insulin aspart U-100  Inject into the skin as needed.     * rifAXIMin 550 mg Tab  Commonly known as: XIFAXAN  Take 1 tablet (550 mg total) by mouth every evening.     * rifAXIMin 550 mg Tab  Commonly known as: XIFAXAN  Take 1 tablet (550 mg total) by mouth every evening.     spironolactone 25 MG tablet  Commonly known as: ALDACTONE  Take 25 mg by mouth once daily.     tamsulosin 0.4 mg Cap  Commonly known as: FLOMAX  Take 0.4 mg by mouth once daily.           * This list has 2 medication(s) that are the same as other medications prescribed for you. Read the directions carefully, and ask your doctor or other care provider to review them with you.                  Medication Reconciliation:  Were medications changed on discharge? Yes  Were medications in the home? Yes  Is the patient taking the medications as directed? Yes  Does the patient understand the medications and changes? Yes  Does updated med list accurately reflects meds patient is currently taking? Yes    ENVIRONMENT OF CARE      Family and/or Caregiver present at visit?  Yes  Name of Caregiver: Claudette  History provided by: patient and caregiver    Advance Care Planning   Advanced Care Planning Status:  Patient has had an ACP conversation  Living Will: No  Power of : No  [place]: No    Does Caregiver have HCPoA: No  Changes today: None  Is patient hospice appropriate: No  (If needed, use PPS <30 or FAST score >7)  Was referral to hospice placed: No     Impression upon entering the home:  Physical Dwelling: single family home    Appearance of home environment: cleaniness: clean, walking pathways: clear, lighting: adequate, and home structure: sound structure  Functional Status: minimal assistance  Mobility: ambulatory with person assist  Nutritional access: adequate intake and access  Home Health: Yes,  [facility] .    DME/Supplies: rolling walker and wheelchair     Diagnostic tests reviewed/disposition: I have reviewed all completed as well as pending diagnostic tests at the time of discharge.  Disease/illness education: Take all medication as prescribed. Activity as tolerated. Keep all upcoming appts.   Establishment or re-establishment of referral orders for community resources: No other necessary community resources.   Discussion with other health care providers: No discussion with other health care providers necessary.   Does patient have a PCP at [place]? No   Repatriation plan with PCP? Had follow up since discharge.  Does patient have an ostomy (ileostomy, colostomy, suprapubic catheter, nephrostomy tube, tracheostomy, PEG tube, pleurex catheter, cholecystostomy, etc)? No  Were BPAs reviewed? Yes    Social History     Socioeconomic History    Marital status:    Tobacco Use    Smoking status: Never    Smokeless tobacco: Never   Substance and Sexual Activity    Alcohol use: No     Social Determinants of Health     Financial Resource Strain: Patient Unable To Answer (1/25/2024)    Overall Financial Resource Strain (CARDIA)     Difficulty of Paying Living Expenses: Patient unable to answer   Food Insecurity: Patient Unable To Answer (1/25/2024)    Hunger Vital Sign     Worried About Running Out of Food in the Last Year: Patient unable to answer     Ran Out of Food in the Last Year: Patient unable to answer   Transportation Needs: Patient Unable To Answer (1/25/2024)    PRAPARE - Transportation     Lack of Transportation (Medical): Patient unable to answer     Lack of Transportation (Non-Medical): Patient unable to answer    Stress: Patient Unable To Answer (1/25/2024)    [employer] - Occupational Stress Questionnaire     Feeling of Stress : Patient unable to answer   Housing Stability: Patient Unable To Answer (1/25/2024)    Housing Stability Vital Sign     Unable to Pay for Housing in the Last Year: Patient unable to answer     Number of Places Lived in the Last Year: 1     Unstable Housing in the Last Year: Patient unable to answer       OBJECTIVE:     Vital Signs:  Vitals:    02/15/24 1156   BP: 122/62   Pulse: 64   Resp: 18       Review of Systems   Constitutional:  Positive for fatigue.   HENT: Negative.     Eyes: Negative.    Respiratory: Negative.  Negative for chest tightness.    Cardiovascular:  Positive for leg swelling.   Gastrointestinal: Negative.    Endocrine: Negative.    Genitourinary: Negative.    Musculoskeletal:  Positive for arthralgias.   Skin: Negative.    Allergic/Immunologic: Negative.    Neurological: Negative.    Hematological: Negative.    Psychiatric/Behavioral: Negative.  Negative for agitation.    All other systems reviewed and are negative.      Physical Exam:  Physical Exam  Vitals reviewed.   Constitutional:       General: He is not in acute distress.     Appearance: Normal appearance. He is well-developed.   HENT:      Head: Normocephalic and atraumatic.      Nose: Nose normal.      Mouth/Throat:      Mouth: Mucous membranes are dry.   Eyes:      Pupils: Pupils are equal, round, and reactive to light.   Cardiovascular:      Rate and Rhythm: Normal rate and regular rhythm.      Pulses: Normal pulses.      Heart sounds: Normal heart sounds.   Pulmonary:      Effort: Pulmonary effort is normal.      Breath sounds: Normal breath sounds.   Abdominal:      General: Bowel sounds are normal.      Palpations: Abdomen is soft.   Musculoskeletal:         General: Normal range of motion.      Cervical back: Normal range of motion and neck supple.      Right lower leg: Edema present.       Left lower leg: Edema present.   Skin:     General: Skin is warm and dry.   Neurological:      General: No focal deficit present.      Mental Status: He is alert and oriented to person, place, and time. Mental status is at baseline.   Psychiatric:         Behavior: Behavior normal.         Thought Content: Thought content normal.         Judgment: Judgment normal.         INSTRUCTIONS FOR PATIENT:   - Continue all medications, treatments and therapies as ordered.   - Follow all instructions, recommendations as discussed.  - Maintain Safety Precautions at all times.  - Attend all medical appointments as scheduled.  - For worsening symptoms: call Primary Care Physician or Nurse Practitioner.  - For emergencies, call 911 or immediately report to the nearest emergency room.   Scheduled Follow-up, Appts Reviewed with Modifications if Needed: Yes  No future appointments.    Signature: Ronny Keys NP    Transition of Care Visit:  I have reviewed and updated the history and problem list.  I have reconciled the medication list.  I have discussed the hospitalization and current medical issues, prognosis and plans with the patient/family.

## 2024-02-15 NOTE — ASSESSMENT & PLAN NOTE
BLE edema present   Continue current medication  Continue compression pumps  CHF diet, daily weight  F/U with cardiology

## 2024-03-11 ENCOUNTER — DOCUMENT SCAN (OUTPATIENT)
Dept: HOME HEALTH SERVICES | Facility: HOSPITAL | Age: 79
End: 2024-03-11
Payer: MEDICARE

## 2024-03-20 ENCOUNTER — CLINICAL SUPPORT (OUTPATIENT)
Dept: CARDIOLOGY | Facility: HOSPITAL | Age: 79
End: 2024-03-20
Attending: INTERNAL MEDICINE
Payer: MEDICARE

## 2024-03-20 ENCOUNTER — CLINICAL SUPPORT (OUTPATIENT)
Dept: CARDIOLOGY | Facility: HOSPITAL | Age: 79
End: 2024-03-20

## 2024-03-20 DIAGNOSIS — Z95.0 PRESENCE OF CARDIAC PACEMAKER: ICD-10-CM

## 2024-03-20 DIAGNOSIS — I44.2 ATRIOVENTRICULAR BLOCK, COMPLETE: ICD-10-CM

## 2024-03-20 PROCEDURE — 93296 REM INTERROG EVL PM/IDS: CPT | Performed by: INTERNAL MEDICINE

## 2024-03-20 PROCEDURE — 93294 REM INTERROG EVL PM/LDLS PM: CPT | Mod: S$GLB,,, | Performed by: INTERNAL MEDICINE

## 2024-04-28 LAB — OHS CV DC REMOTE DEVICE TYPE: NORMAL

## 2024-04-29 PROBLEM — N17.9 ACUTE RENAL FAILURE SUPERIMPOSED ON STAGE 3A CHRONIC KIDNEY DISEASE: Status: RESOLVED | Noted: 2024-01-23 | Resolved: 2024-04-29

## 2024-04-29 PROBLEM — N39.0 UTI (URINARY TRACT INFECTION): Status: RESOLVED | Noted: 2024-01-28 | Resolved: 2024-04-29

## 2024-04-29 PROBLEM — N18.31 ACUTE RENAL FAILURE SUPERIMPOSED ON STAGE 3A CHRONIC KIDNEY DISEASE: Status: RESOLVED | Noted: 2024-01-23 | Resolved: 2024-04-29

## 2024-08-14 ENCOUNTER — OFFICE VISIT (OUTPATIENT)
Dept: PODIATRY | Facility: CLINIC | Age: 79
End: 2024-08-14
Payer: MEDICARE

## 2024-08-14 VITALS — WEIGHT: 217.63 LBS | HEIGHT: 72 IN | BODY MASS INDEX: 29.48 KG/M2

## 2024-08-14 DIAGNOSIS — I50.22 CHRONIC SYSTOLIC HEART FAILURE: ICD-10-CM

## 2024-08-14 DIAGNOSIS — Z79.01 CURRENT USE OF LONG TERM ANTICOAGULATION: ICD-10-CM

## 2024-08-14 DIAGNOSIS — E11.8 DIABETES MELLITUS WITH COMPLICATION, WITH LONG-TERM CURRENT USE OF INSULIN: Primary | ICD-10-CM

## 2024-08-14 DIAGNOSIS — Z79.4 DIABETES MELLITUS WITH COMPLICATION, WITH LONG-TERM CURRENT USE OF INSULIN: Primary | ICD-10-CM

## 2024-08-14 DIAGNOSIS — I48.20 CHRONIC ATRIAL FIBRILLATION: ICD-10-CM

## 2024-08-14 DIAGNOSIS — L60.3 ONYCHODYSTROPHY: ICD-10-CM

## 2024-08-14 PROCEDURE — 1101F PT FALLS ASSESS-DOCD LE1/YR: CPT | Mod: CPTII,S$GLB,, | Performed by: PODIATRIST

## 2024-08-14 PROCEDURE — 11721 DEBRIDE NAIL 6 OR MORE: CPT | Mod: Q9,S$GLB,, | Performed by: PODIATRIST

## 2024-08-14 PROCEDURE — 3288F FALL RISK ASSESSMENT DOCD: CPT | Mod: CPTII,S$GLB,, | Performed by: PODIATRIST

## 2024-08-14 PROCEDURE — 1160F RVW MEDS BY RX/DR IN RCRD: CPT | Mod: CPTII,S$GLB,, | Performed by: PODIATRIST

## 2024-08-14 PROCEDURE — 99999 PR PBB SHADOW E&M-EST. PATIENT-LVL III: CPT | Mod: PBBFAC,,, | Performed by: PODIATRIST

## 2024-08-14 PROCEDURE — 3072F LOW RISK FOR RETINOPATHY: CPT | Mod: CPTII,S$GLB,, | Performed by: PODIATRIST

## 2024-08-14 PROCEDURE — 1159F MED LIST DOCD IN RCRD: CPT | Mod: CPTII,S$GLB,, | Performed by: PODIATRIST

## 2024-08-14 PROCEDURE — 99213 OFFICE O/P EST LOW 20 MIN: CPT | Mod: 25,S$GLB,, | Performed by: PODIATRIST

## 2024-08-15 NOTE — PROGRESS NOTES
Subjective:       Patient ID: Ramy Romo is a 79 y.o. male.    Chief Complaint: Nail Care (Nail care, diabetic pt, pt is wearing sandals, no pain )    HPI: Patient presents to the office today for his yearly diabetic foot exam and risk evaluation.  He presents with the chief complaint of elongated, thickened and dystrophic nail plates to the B/L foot.  This patient is a Diabetic Type II, complicated with Peripheral Angiopathy and Peripheral Neuropathy. Patient does follow with Primary Care and/or Endocrinology for management of Diabetes Mellitus. This patient's PMD is Félix Rollins MD. This patient last saw his/her primary care provider on 06/26/2024    Hemoglobin A1C   Date Value Ref Range Status   06/17/2024 5.7 <=6.5 % Final   06/04/2024 5.8 (H) 4.8 - 5.6 % Final     Comment:              Prediabetes: 5.7 - 6.4           Diabetes: >6.4           Glycemic control for adults with diabetes: <7.0   01/26/2024 5.2 4.0 - 5.6 % Final     Comment:     ADA Screening Guidelines:  5.7-6.4%  Consistent with prediabetes  >or=6.5%  Consistent with diabetes    High levels of fetal hemoglobin interfere with the HbA1C  assay. Heterozygous hemoglobin variants (HbS, HgC, etc)do  not significantly interfere with this assay.   However, presence of multiple variants may affect accuracy.     10/03/2023 6.5 (H) 4.8 - 5.6 % Final     Comment:              Prediabetes: 5.7 - 6.4           Diabetes: >6.4           Glycemic control for adults with diabetes: <7.0   08/20/2016 9.7 (H) 4.5 - 6.2 % Final     Comment:     According to ADA guidelines, hemoglobin A1C <7.0% represents  optimal control in non-pregnant diabetic patients.  Different  metrics may apply to specific populations.   Standards of Medical Care in Diabetes - 2016.  For the purpose of screening for the presence of diabetes:  <5.7%     Consistent with the absence of diabetes  5.7-6.4%  Consistent with increasing risk for diabetes   (prediabetes)  >or=6.5%   Consistent with diabetes  Currently no consensus exists for use of hemoglobin A1C  for diagnosis of diabetes for children.     08/12/2016 9.9 (H) 4.5 - 6.2 % Final     Comment:     According to ADA guidelines, hemoglobin A1C <7.0% represents  optimal control in non-pregnant diabetic patients.  Different  metrics may apply to specific populations.   Standards of Medical Care in Diabetes - 2016.  For the purpose of screening for the presence of diabetes:  <5.7%     Consistent with the absence of diabetes  5.7-6.4%  Consistent with increasing risk for diabetes   (prediabetes)  >or=6.5%  Consistent with diabetes  Currently no consensus exists for use of hemoglobin A1C  for diagnosis of diabetes for children.     .     Review of patient's allergies indicates:   Allergen Reactions    Seroquel [quetiapine] Hallucinations     Per son patient had adverse reaction, became agitated and combating     Sulfa (sulfonamide antibiotics) Rash       Past Medical History:   Diagnosis Date    Atrial fibrillation     CHF (congestive heart failure)     Diabetes mellitus     Hypertension        Family History   Problem Relation Name Age of Onset    Cataracts Father      Glaucoma Father         Social History     Socioeconomic History    Marital status:    Tobacco Use    Smoking status: Never    Smokeless tobacco: Never   Substance and Sexual Activity    Alcohol use: No     Social Determinants of Health     Financial Resource Strain: Patient Unable To Answer (1/25/2024)    Overall Financial Resource Strain (CARDIA)     Difficulty of Paying Living Expenses: Patient unable to answer   Food Insecurity: No Food Insecurity (5/1/2024)    Received from [facility] Subsidiaries and Affiliates    Hunger Vital Sign     Worried About Running Out of Food in the Last Year: Never true     Ran Out of Food in the Last Year: Never true   Transportation Needs: No Transportation Needs (5/1/2024)    Received  from [facility]    PRAPARE - Transportation     Lack of Transportation (Medical): No     Lack of Transportation (Non-Medical): No   Physical Activity: Insufficiently Active (6/30/2021)    Received from [facility], [facility]    Exercise Vital Sign     Days of Exercise per Week: 4 days     Minutes of Exercise per Session: 30 min   Stress: Patient Unable To Answer (1/25/2024)    [employer] - Occupational Stress Questionnaire     Feeling of Stress : Patient unable to answer   Housing Stability: Patient Unable To Answer (1/25/2024)    Housing Stability Vital Sign     Unable to Pay for Housing in the Last Year: Patient unable to answer     Number of Places Lived in the Last Year: 1     Unstable Housing in the Last Year: Patient unable to answer       Past Surgical History:   Procedure Laterality Date    A-V CARDIAC PACEMAKER INSERTION Left 1/25/2024    Procedure: INSERTION, CARDIAC PACEMAKER, DUAL CHAMBER/poss single lead vs His lead;  Surgeon: Anthony Coronado MD;  Location: [facility] CATH LAB;  Service: Cardiology;  Laterality: Left;  Bio/LBBB pacing    CHOLECYSTECTOMY      INSERTION, PACEMAKER, TEMPORARY TRANSVENOUS N/A 1/24/2024    Procedure: Insertion, Pacemaker, Temporary Transvenous;  Surgeon: Renaldo Santacruz MD;  Location: [facility] CATH LAB;  Service: Cardiology;  Laterality: N/A;    NECK SURGERY      TOTAL ELBOW ARTHROPLASTY Right 03/2019       Review of Systems       Objective:   Ht 6' (1.829 m)   Wt 98.7 kg (217 lb 9.5 oz)   BMI 29.51 kg/m²     Physical Exam  LOWER EXTREMITY PHYSICAL EXAMINATION    VASCULAR:  The right dorsalis pedis pulse 2/4 and the right posterior tibial pulse 2/4.  The left dorsalis pedis pulse 2/4 and posterior tibial pulse on the left  is 2/4.  Capillary refill is intact.  Pedal hair growth intact     NEUROLOGY:  Protective sensation is intact with [place] Shawn monofilament. Proprioception is intact. Intact sensation to light touch.  Vibratory sensation is diminished to the 1st metatarsal phalangeal joint.     DERMATOLOGY:  Skin is supple, moist, intact.  There is no signs of callusing, ulcerations, other lesions identified to the dorsal or plantar aspect of the right or left foot.  The R1, 2, 5 and left L1,2, 5 are thickened, discolored dystrophic.  There is subungual debris.  Nail plates have area of dark discoloration.  The remaining nails 3-4 on the right foot and the left foot are elongated but of normal color, thickness, and texture.   There is no signs of ingrowing into the medial or lateral borders.  There is no evidence of wounds or skin breakdown.  No edema or erythema.  No obvious lacerations or fissuring.  Interdigital spaces are clean, dry, intact.  No rashes or scars appreciated.    ORTHOPEDIC: Manual Muscle Testing is 5/5 in all planes on the left and right, without pains, with and without resistance. Gait pattern is non-antalgic.    Assessment:     1. Diabetes mellitus with complication, with long-term current use of insulin    2. Chronic atrial fibrillation    3. Current use of long term anticoagulation    4. Chronic systolic heart failure    5. Onychodystrophy        Plan:     Diabetes mellitus with complication, with long-term current use of insulin    Chronic atrial fibrillation    Current use of long term anticoagulation    Chronic systolic heart failure    Onychodystrophy      Thorough discussion is had with the patient this afternoon, concerning the diagnosis, its etiology, and the treatment algorithm at present.  Greater than 50% of this visit spent on counseling and coordination of care. Greater than 15 minutes of a 20 minute appointment spent on education about the diabetic foot, neuropathy, and prevention of limb  loss.  Shoe inspection. Diabetic Foot Education. Patient reminded of the importance of good nutrition and blood sugar control to help prevent podiatric complications of diabetes. Patient instructed on proper foot hygeine. We discussed wearing proper and supportive shoe gear, daily foot inspections, never walking barefooted or sock footed, never putting sharp instruments to feet which can cause major complications associated with infection, ulcers, lacerations.      Dystrophic nail plates, as outlined above (R#1,2,5  ; L#1,2,5 ), are sharply debrided with double action nail nipper, and/or with the assistance of a mechanical rotary silvino, with removal of all offending nail and nail border(s), for reduction of pains. Nails are reduced in terms of length, width and girth with removal of subungual debris to facilitate pain free weight bearing and ambulation. The elongated nails as outlined in the objective portion of this note, were trimmed to appropriate length, with a double action nail nipper, for alleviation/reduction of pains as well. Follow up in approx. 3-4 months.      No future appointments.

## 2025-04-04 ENCOUNTER — HOSPITAL ENCOUNTER (INPATIENT)
Facility: HOSPITAL | Age: 80
LOS: 4 days | Discharge: HOME HEALTH | DRG: 871 | End: 2025-04-08
Attending: FAMILY MEDICINE | Admitting: FAMILY MEDICINE
Payer: MEDICARE

## 2025-04-04 DIAGNOSIS — N30.00 ACUTE CYSTITIS WITHOUT HEMATURIA: ICD-10-CM

## 2025-04-04 DIAGNOSIS — G93.41 ENCEPHALOPATHY, METABOLIC: ICD-10-CM

## 2025-04-04 DIAGNOSIS — K76.82 HEPATIC ENCEPHALOPATHY: ICD-10-CM

## 2025-04-04 DIAGNOSIS — Z13.6 SCREENING FOR CARDIOVASCULAR CONDITION: ICD-10-CM

## 2025-04-04 DIAGNOSIS — A41.9 SEPSIS, DUE TO UNSPECIFIED ORGANISM, UNSPECIFIED WHETHER ACUTE ORGAN DYSFUNCTION PRESENT: Primary | ICD-10-CM

## 2025-04-04 DIAGNOSIS — R41.82 ALTERED MENTAL STATUS, UNSPECIFIED ALTERED MENTAL STATUS TYPE: ICD-10-CM

## 2025-04-04 DIAGNOSIS — R78.81 BACTEREMIA: ICD-10-CM

## 2025-04-04 PROBLEM — A41.50 SEPSIS DUE TO GRAM-NEGATIVE UTI: Status: ACTIVE | Noted: 2025-04-04

## 2025-04-04 PROBLEM — N39.0 SEPSIS DUE TO GRAM-NEGATIVE UTI: Status: ACTIVE | Noted: 2025-04-04

## 2025-04-04 LAB
ABSOLUTE EOSINOPHIL (OHS): 0.04 K/UL
ABSOLUTE MONOCYTE (OHS): 0.98 K/UL (ref 0.3–1)
ABSOLUTE NEUTROPHIL COUNT (OHS): 11.27 K/UL (ref 1.8–7.7)
ALBUMIN SERPL BCP-MCNC: 3.3 G/DL (ref 3.5–5.2)
ALP SERPL-CCNC: 88 UNIT/L (ref 40–150)
ALT SERPL W/O P-5'-P-CCNC: 31 UNIT/L (ref 10–44)
AMMONIA PLAS-SCNC: 52 UMOL/L (ref 10–50)
ANION GAP (OHS): 9 MMOL/L (ref 8–16)
AST SERPL-CCNC: 44 UNIT/L (ref 11–45)
BACTERIA #/AREA URNS AUTO: ABNORMAL /HPF
BASOPHILS # BLD AUTO: 0.03 K/UL
BASOPHILS NFR BLD AUTO: 0.2 %
BILIRUB SERPL-MCNC: 1.8 MG/DL (ref 0.1–1)
BILIRUB UR QL STRIP.AUTO: NEGATIVE
BNP SERPL-MCNC: 271 PG/ML (ref 0–99)
BUN SERPL-MCNC: 38 MG/DL (ref 8–23)
CALCIUM SERPL-MCNC: 10 MG/DL (ref 8.7–10.5)
CHLORIDE SERPL-SCNC: 107 MMOL/L (ref 95–110)
CLARITY UR: ABNORMAL
CO2 SERPL-SCNC: 21 MMOL/L (ref 23–29)
COLOR UR AUTO: YELLOW
CREAT SERPL-MCNC: 1.8 MG/DL (ref 0.5–1.4)
ERYTHROCYTE [DISTWIDTH] IN BLOOD BY AUTOMATED COUNT: 13 % (ref 11.5–14.5)
GFR SERPLBLD CREATININE-BSD FMLA CKD-EPI: 38 ML/MIN/1.73/M2
GLUCOSE SERPL-MCNC: 139 MG/DL (ref 70–110)
GLUCOSE UR QL STRIP: NEGATIVE
HCT VFR BLD AUTO: 35.5 % (ref 40–54)
HCV AB SERPL QL IA: NEGATIVE
HGB BLD-MCNC: 12.2 GM/DL (ref 14–18)
HGB UR QL STRIP: ABNORMAL
HIV 1+2 AB+HIV1 P24 AG SERPL QL IA: NEGATIVE
HYALINE CASTS UR QL AUTO: 5 /LPF (ref 0–1)
IMM GRANULOCYTES # BLD AUTO: 0.07 K/UL (ref 0–0.04)
IMM GRANULOCYTES NFR BLD AUTO: 0.5 % (ref 0–0.5)
INFLUENZA A MOLECULAR (OHS): NEGATIVE
INFLUENZA B MOLECULAR (OHS): NEGATIVE
KETONES UR QL STRIP: NEGATIVE
LACTATE SERPL-SCNC: 2.7 MMOL/L (ref 0.5–2.2)
LACTATE SERPL-SCNC: 3.7 MMOL/L (ref 0.5–2.2)
LEUKOCYTE ESTERASE UR QL STRIP: ABNORMAL
LYMPHOCYTES # BLD AUTO: 0.52 K/UL (ref 1–4.8)
MCH RBC QN AUTO: 35.1 PG (ref 27–31)
MCHC RBC AUTO-ENTMCNC: 34.4 G/DL (ref 32–36)
MCV RBC AUTO: 102 FL (ref 82–98)
MICROSCOPIC COMMENT: ABNORMAL
NITRITE UR QL STRIP: NEGATIVE
NUCLEATED RBC (/100WBC) (OHS): 0 /100 WBC
OHS QRS DURATION: 156 MS
OHS QTC CALCULATION: 452 MS
PH UR STRIP: 6 [PH]
PLATELET # BLD AUTO: 118 K/UL (ref 150–450)
PMV BLD AUTO: 9.3 FL (ref 9.2–12.9)
POCT GLUCOSE: 165 MG/DL (ref 70–110)
POTASSIUM SERPL-SCNC: 4.4 MMOL/L (ref 3.5–5.1)
PROCALCITONIN SERPL-MCNC: 1.49 NG/ML
PROT SERPL-MCNC: 6.8 GM/DL (ref 6–8.4)
PROT UR QL STRIP: NEGATIVE
RBC # BLD AUTO: 3.48 M/UL (ref 4.6–6.2)
RBC #/AREA URNS AUTO: 14 /HPF (ref 0–4)
RELATIVE EOSINOPHIL (OHS): 0.3 %
RELATIVE LYMPHOCYTE (OHS): 4 % (ref 18–48)
RELATIVE MONOCYTE (OHS): 7.6 % (ref 4–15)
RELATIVE NEUTROPHIL (OHS): 87.4 % (ref 38–73)
SODIUM SERPL-SCNC: 137 MMOL/L (ref 136–145)
SP GR UR STRIP: 1.01
TROPONIN I SERPL DL<=0.01 NG/ML-MCNC: 0.03 NG/ML
UROBILINOGEN UR STRIP-ACNC: NEGATIVE EU/DL
WBC # BLD AUTO: 12.91 K/UL (ref 3.9–12.7)
WBC #/AREA URNS AUTO: 78 /HPF (ref 0–5)
WBC CLUMPS UR QL AUTO: ABNORMAL

## 2025-04-04 PROCEDURE — 25000003 PHARM REV CODE 250: Performed by: FAMILY MEDICINE

## 2025-04-04 PROCEDURE — 87086 URINE CULTURE/COLONY COUNT: CPT | Performed by: FAMILY MEDICINE

## 2025-04-04 PROCEDURE — 93010 ELECTROCARDIOGRAM REPORT: CPT | Mod: ,,, | Performed by: INTERNAL MEDICINE

## 2025-04-04 PROCEDURE — 85025 COMPLETE CBC W/AUTO DIFF WBC: CPT | Performed by: FAMILY MEDICINE

## 2025-04-04 PROCEDURE — 87154 CUL TYP ID BLD PTHGN 6+ TRGT: CPT | Performed by: FAMILY MEDICINE

## 2025-04-04 PROCEDURE — 93005 ELECTROCARDIOGRAM TRACING: CPT

## 2025-04-04 PROCEDURE — 21400001 HC TELEMETRY ROOM

## 2025-04-04 PROCEDURE — 99285 EMERGENCY DEPT VISIT HI MDM: CPT | Mod: 25

## 2025-04-04 PROCEDURE — 87502 INFLUENZA DNA AMP PROBE: CPT | Performed by: FAMILY MEDICINE

## 2025-04-04 PROCEDURE — 82140 ASSAY OF AMMONIA: CPT | Performed by: FAMILY MEDICINE

## 2025-04-04 PROCEDURE — 81003 URINALYSIS AUTO W/O SCOPE: CPT | Performed by: FAMILY MEDICINE

## 2025-04-04 PROCEDURE — 11000001 HC ACUTE MED/SURG PRIVATE ROOM

## 2025-04-04 PROCEDURE — 86803 HEPATITIS C AB TEST: CPT | Performed by: FAMILY MEDICINE

## 2025-04-04 PROCEDURE — 96361 HYDRATE IV INFUSION ADD-ON: CPT

## 2025-04-04 PROCEDURE — 87389 HIV-1 AG W/HIV-1&-2 AB AG IA: CPT | Performed by: FAMILY MEDICINE

## 2025-04-04 PROCEDURE — 87186 SC STD MICRODIL/AGAR DIL: CPT | Performed by: FAMILY MEDICINE

## 2025-04-04 PROCEDURE — 63600175 PHARM REV CODE 636 W HCPCS: Performed by: FAMILY MEDICINE

## 2025-04-04 PROCEDURE — 84145 PROCALCITONIN (PCT): CPT | Performed by: FAMILY MEDICINE

## 2025-04-04 PROCEDURE — 83880 ASSAY OF NATRIURETIC PEPTIDE: CPT | Performed by: FAMILY MEDICINE

## 2025-04-04 PROCEDURE — 82435 ASSAY OF BLOOD CHLORIDE: CPT | Performed by: FAMILY MEDICINE

## 2025-04-04 PROCEDURE — 84484 ASSAY OF TROPONIN QUANT: CPT | Performed by: FAMILY MEDICINE

## 2025-04-04 PROCEDURE — 96374 THER/PROPH/DIAG INJ IV PUSH: CPT

## 2025-04-04 PROCEDURE — 83605 ASSAY OF LACTIC ACID: CPT | Performed by: FAMILY MEDICINE

## 2025-04-04 RX ORDER — CEFTRIAXONE 1 G/1
1 INJECTION, POWDER, FOR SOLUTION INTRAMUSCULAR; INTRAVENOUS
Status: COMPLETED | OUTPATIENT
Start: 2025-04-04 | End: 2025-04-04

## 2025-04-04 RX ORDER — HALOPERIDOL LACTATE 5 MG/ML
5 INJECTION, SOLUTION INTRAMUSCULAR EVERY 6 HOURS PRN
Status: DISCONTINUED | OUTPATIENT
Start: 2025-04-04 | End: 2025-04-08 | Stop reason: HOSPADM

## 2025-04-04 RX ORDER — ACETAMINOPHEN 325 MG/1
650 TABLET ORAL EVERY 6 HOURS PRN
Status: DISCONTINUED | OUTPATIENT
Start: 2025-04-04 | End: 2025-04-08 | Stop reason: HOSPADM

## 2025-04-04 RX ORDER — IBUPROFEN 200 MG
24 TABLET ORAL
Status: DISCONTINUED | OUTPATIENT
Start: 2025-04-04 | End: 2025-04-08 | Stop reason: HOSPADM

## 2025-04-04 RX ORDER — FERROUS GLUCONATE 324(37.5)
324 TABLET ORAL
Status: DISCONTINUED | OUTPATIENT
Start: 2025-04-05 | End: 2025-04-08 | Stop reason: HOSPADM

## 2025-04-04 RX ORDER — INSULIN ASPART 100 [IU]/ML
0-5 INJECTION, SOLUTION INTRAVENOUS; SUBCUTANEOUS
Status: DISCONTINUED | OUTPATIENT
Start: 2025-04-04 | End: 2025-04-08 | Stop reason: HOSPADM

## 2025-04-04 RX ORDER — TAMSULOSIN HYDROCHLORIDE 0.4 MG/1
0.4 CAPSULE ORAL DAILY
Status: DISCONTINUED | OUTPATIENT
Start: 2025-04-04 | End: 2025-04-08 | Stop reason: HOSPADM

## 2025-04-04 RX ORDER — ONDANSETRON 4 MG/1
4 TABLET, ORALLY DISINTEGRATING ORAL EVERY 6 HOURS PRN
Status: DISCONTINUED | OUTPATIENT
Start: 2025-04-04 | End: 2025-04-08 | Stop reason: HOSPADM

## 2025-04-04 RX ORDER — HYDRALAZINE HYDROCHLORIDE 20 MG/ML
10 INJECTION INTRAMUSCULAR; INTRAVENOUS EVERY 6 HOURS PRN
Status: DISCONTINUED | OUTPATIENT
Start: 2025-04-04 | End: 2025-04-08 | Stop reason: HOSPADM

## 2025-04-04 RX ORDER — CEFTRIAXONE 1 G/1
1 INJECTION, POWDER, FOR SOLUTION INTRAMUSCULAR; INTRAVENOUS
Status: DISCONTINUED | OUTPATIENT
Start: 2025-04-05 | End: 2025-04-05

## 2025-04-04 RX ORDER — GLUCAGON 1 MG
1 KIT INJECTION
Status: DISCONTINUED | OUTPATIENT
Start: 2025-04-04 | End: 2025-04-08 | Stop reason: HOSPADM

## 2025-04-04 RX ORDER — IBUPROFEN 200 MG
16 TABLET ORAL
Status: DISCONTINUED | OUTPATIENT
Start: 2025-04-04 | End: 2025-04-08 | Stop reason: HOSPADM

## 2025-04-04 RX ADMIN — LACTULOSE 30 G: 20 SOLUTION ORAL at 09:04

## 2025-04-04 RX ADMIN — TAMSULOSIN HYDROCHLORIDE 0.4 MG: 0.4 CAPSULE ORAL at 03:04

## 2025-04-04 RX ADMIN — LACTULOSE 30 G: 20 SOLUTION ORAL at 04:04

## 2025-04-04 RX ADMIN — SODIUM CHLORIDE 2934 ML: 9 INJECTION, SOLUTION INTRAVENOUS at 12:04

## 2025-04-04 RX ADMIN — APIXABAN 5 MG: 2.5 TABLET, FILM COATED ORAL at 09:04

## 2025-04-04 RX ADMIN — CEFTRIAXONE 1 G: 1 INJECTION, POWDER, FOR SOLUTION INTRAMUSCULAR; INTRAVENOUS at 12:04

## 2025-04-04 NOTE — ASSESSMENT & PLAN NOTE
Patient has paroxysmal (<7 days) atrial fibrillation. Patient is currently in sinus rhythm. [id number] Score: 3. The patients heart rate in the last 24 hours is as follows:  Pulse  Min: 59  Max: 60     Antiarrhythmics  metoprolol succinate (TOPROL-XL) 24 hr split tablet 12.5 mg, Daily, Oral    Anticoagulants  apixaban tablet 5 mg, 2 times daily, Oral    Plan  - Replete lytes with a goal of K>4, Mg >2  - Patient is anticoagulated, see medications listed above.  - Patient's afib is currently controlled  -

## 2025-04-04 NOTE — SUBJECTIVE & OBJECTIVE
Past Medical History:   Diagnosis Date    Atrial fibrillation     CHF (congestive heart failure)     Diabetes mellitus     Hypertension        Past Surgical History:   Procedure Laterality Date    A-V CARDIAC PACEMAKER INSERTION Left 1/25/2024    Procedure: INSERTION, CARDIAC PACEMAKER, DUAL CHAMBER/poss single lead vs His lead;  Surgeon: Anthony Coronado MD;  Location: [facility] CATH LAB;  Service: Cardiology;  Laterality: Left;  Bio/LBBB pacing    CHOLECYSTECTOMY      INSERTION, PACEMAKER, TEMPORARY TRANSVENOUS N/A 1/24/2024    Procedure: Insertion, Pacemaker, Temporary Transvenous;  Surgeon: Renaldo Santacruz MD;  Location: [facility] CATH LAB;  Service: Cardiology;  Laterality: N/A;    NECK SURGERY      TOTAL ELBOW ARTHROPLASTY Right 03/2019       Review of patient's allergies indicates:   Allergen Reactions    Seroquel [quetiapine] Hallucinations     Per son patient had adverse reaction, became agitated and combating     Sulfa (sulfonamide antibiotics) Rash       No current facility-administered medications on file prior to encounter.     Current Outpatient Medications on File Prior to Encounter   Medication Sig    apixaban (ELIQUIS) 5 mg Tab Take 5 mg by mouth 2 (two) times daily.    calcium-vitamin D 250-100 mg-unit per tablet Take 1 tablet by mouth once daily.    doxazosin (CARDURA) 1 MG tablet Take 1 tablet (1 mg total) by mouth once daily.    ferrous gluconate (FERGON) 324 MG tablet Take 324 mg by mouth daily with breakfast.    fluticasone propionate (FLONASE) 50 mcg/actuation nasal spray 1 spray by Each Nostril route every morning.    furosemide (LASIX) 20 MG tablet Take 20 mg by mouth once daily.    lactulose (CHRONULAC) 10 gram/15 mL solution Take 30 g by mouth 2 (two) times daily.    metoprolol succinate (TOPROL-XL) 25 MG 24 hr tablet Take 0.5 tablets (12.5 mg total) by mouth once daily.    NOVOLOG FLEXPEN U-100 INSULIN 100 unit/mL (3 mL) InPn pen Inject into the skin as needed.    spironolactone  (ALDACTONE) 25 MG tablet Take 25 mg by mouth once daily.    tamsulosin (FLOMAX) 0.4 mg Cap Take 0.4 mg by mouth once daily.     Family History       Problem Relation (Age of Onset)    Cataracts Father    Glaucoma Father          Tobacco Use    Smoking status: Never    Smokeless tobacco: Never   Substance and Sexual Activity    Alcohol use: No    Drug use: Not on file    Sexual activity: Not on file     Review of Systems   Constitutional:  Negative for fatigue and fever.   HENT:  Negative for sinus pressure.    Eyes:  Negative for visual disturbance.   Respiratory:  Negative for shortness of breath.    Cardiovascular:  Negative for chest pain.   Gastrointestinal:  Negative for nausea and vomiting.   Genitourinary:  Negative for difficulty urinating.   Musculoskeletal:  Negative for back pain.   Skin:  Negative for rash.   Neurological:  Negative for headaches.   Psychiatric/Behavioral:  Positive for confusion.      Objective:     Vital Signs (Most Recent):  Temp: 99.4 °F (37.4 °C) (04/04/25 1026)  Pulse: 60 (04/04/25 1310)  Resp: (!) 25 (04/04/25 1310)  BP: 139/64 (04/04/25 1310)  SpO2: 99 % (04/04/25 1310) Vital Signs (24h Range):  Temp:  [99.4 °F (37.4 °C)] 99.4 °F (37.4 °C)  Pulse:  [59-60] 60  Resp:  [20-25] 25  SpO2:  [99 %] 99 %  BP: (135-154)/(45-65) 139/64     Weight: 97.8 kg (215 lb 9.6 oz)  Body mass index is 29.24 kg/m².     Physical Exam  Constitutional:       General: He is not in acute distress.     Appearance: He is well-developed. He is not diaphoretic.   HENT:      Head: Normocephalic and atraumatic.   Eyes:      Pupils: Pupils are equal, round, and reactive to light.   Cardiovascular:      Rate and Rhythm: Normal rate and regular rhythm.      Heart sounds: Normal heart sounds. No murmur heard.     No friction rub. No gallop.   Pulmonary:      Effort: Pulmonary effort is normal. No respiratory distress.      Breath sounds: Normal breath sounds. No stridor. No wheezing or rales.   Abdominal:       General: Bowel sounds are normal. There is no distension.      Palpations: Abdomen is soft. There is no mass.      Tenderness: There is no abdominal tenderness. There is no guarding.   Skin:     General: Skin is warm.      Findings: No erythema.   Neurological:      General: No focal deficit present.      Mental Status: He is disoriented.              CRANIAL NERVES     CN III, IV, VI   Pupils are equal, round, and reactive to light.       Significant Labs:   Results for orders placed or performed during the hospital encounter of 04/04/25   EKG 12-lead    Collection Time: 04/04/25 11:19 AM   Result Value Ref Range    QRS Duration 156 ms    [facility] QTC Calculation 452 ms   Comprehensive metabolic panel    Collection Time: 04/04/25 11:32 AM   Result Value Ref Range    Sodium 137 136 - 145 mmol/L    Potassium 4.4 3.5 - 5.1 mmol/L    Chloride 107 95 - 110 mmol/L    CO2 21 (L) 23 - 29 mmol/L    Glucose 139 (H) 70 - 110 mg/dL    BUN 38 (H) 8 - 23 mg/dL    Creatinine 1.8 (H) 0.5 - 1.4 mg/dL    Calcium 10.0 8.7 - 10.5 mg/dL    Protein Total 6.8 6.0 - 8.4 gm/dL    Albumin 3.3 (L) 3.5 - 5.2 g/dL    Bilirubin Total 1.8 (H) 0.1 - 1.0 mg/dL    ALP 88 40 - 150 unit/L    AST 44 11 - 45 unit/L    ALT 31 10 - 44 unit/L    Anion Gap 9 8 - 16 mmol/L    eGFR 38 (L) >60 mL/min/1.73/m2   Lactic acid, plasma #1    Collection Time: 04/04/25 11:32 AM   Result Value Ref Range    Lactic Acid Level 2.7 (H) 0.5 - 2.2 mmol/L   Brain natriuretic peptide    Collection Time: 04/04/25 11:32 AM   Result Value Ref Range     (H) 0 - 99 pg/mL   Troponin I    Collection Time: 04/04/25 11:32 AM   Result Value Ref Range    Troponin-I 0.032 (H) <=0.026 ng/mL   Procalcitonin    Collection Time: 04/04/25 11:32 AM   Result Value Ref Range    Procalcitonin 1.49 (H) <0.25 ng/mL   CBC with Differential    Collection Time: 04/04/25 11:32 AM   Result Value Ref Range    WBC 12.91 (H) 3.90 - 12.70 K/uL    RBC 3.48 (L) 4.60 - 6.20 M/uL    HGB 12.2 (L) 14.0 - 18.0  gm/dL    HCT 35.5 (L) 40.0 - 54.0 %     (H) 82 - 98 fL    MCH 35.1 (H) 27.0 - 31.0 pg    MCHC 34.4 32.0 - 36.0 g/dL    RDW 13.0 11.5 - 14.5 %    Platelet Count 118 (L) 150 - 450 K/uL    MPV 9.3 9.2 - 12.9 fL    Nucleated RBC 0 <=0 /100 WBC    Neut % 87.4 (H) 38 - 73 %    Lymph % 4.0 (L) 18 - 48 %    Mono % 7.6 4 - 15 %    Eos % 0.3 <=8 %    Basophil % 0.2 <=1.9 %    Imm Grans % 0.5 0.0 - 0.5 %    Neut # 11.27 (H) 1.8 - 7.7 K/uL    Lymph # 0.52 (L) 1 - 4.8 K/uL    Mono # 0.98 0.3 - 1 K/uL    Eos # 0.04 <=0.5 K/uL    Baso # 0.03 <=0.2 K/uL    Imm Grans # 0.07 (H) 0.00 - 0.04 K/uL   Hepatitis C Antibody    Collection Time: 04/04/25 11:32 AM   Result Value Ref Range    Hep C Ab Interp Negative Negative   HIV 1/2 Ag/Ab (4th Gen)    Collection Time: 04/04/25 11:32 AM   Result Value Ref Range    HIV 1/2 Ag/Ab Negative Negative   Ammonia    Collection Time: 04/04/25 12:26 PM   Result Value Ref Range    Ammonia 52 (H) 10 - 50 umol/L   Influenza A & B by Molecular    Collection Time: 04/04/25 12:37 PM    Specimen: Nasopharyngeal Swab   Result Value Ref Range    INFLUENZA A MOLECULAR Negative Negative    INFLUENZA B MOLECULAR  Negative Negative   Urinalysis, Reflex to Urine Culture    Collection Time: 04/04/25 12:37 PM    Specimen: Urine   Result Value Ref Range    Color, UA Yellow Straw, Naila, Yellow, Light-Orange    Appearance, UA Hazy (A) Clear    pH, UA 6.0 5.0 - 8.0    Spec Grav UA 1.015 1.005 - 1.030    Protein, UA Negative Negative    Glucose, UA Negative Negative    Ketones, UA Negative Negative    Bilirubin, UA Negative Negative    Blood, UA 2+ (A) Negative    Nitrites, UA Negative Negative    Urobilinogen, UA Negative <2.0 EU/dL    Leukocyte Esterase, UA 3+ (A) Negative   Urinalysis Microscopic    Collection Time: 04/04/25 12:37 PM   Result Value Ref Range    RBC, UA 14 (H) 0 - 4 /HPF    WBC, UA 78 (H) 0 - 5 /HPF    WBC Clumps, UA Many (A) None, Rare    Bacteria, UA Many (A) None, Rare, Occasional /HPF     Hyaline Casts, UA 5 (H) 0 - 1 /LPF    Microscopic Comment          Significant Imaging:   Imaging Results              CT Head Without Contrast (Final result)  Result time 04/04/25 13:04:53      Final result by Norma Knight MD (Timothy) (04/04/25 13:04:53)                   Impression:      No acute intracranial abnormality.  Chronic intracranial changes.  Opacification of the right maxillary sinus.    All CT scans at this facility use dose modulation, iterative reconstructions, and/or weight base dosing when appropriate to reduce radiation dose to as low as reasonably achievable.      Electronically signed by: Norma Knight MD  Date:    04/04/2025  Time:    13:04               Narrative:    EXAMINATION:  CT HEAD WITHOUT CONTRAST    CLINICAL HISTORY:  Mental status change, unknown cause;    TECHNIQUE:  Noncontrast images were obtained    COMPARISON:  CT brain 03/26/2018    FINDINGS:  No intracranial acute hemorrhage or acute focal brain parenchymal abnormality is identified.  Atrophy is present.  Patchy periventricular white matter hypodensity secondary to chronic small vessel ischemic changes.  Chronic encephalomalacia right frontal parietal lobe related to remote intracranial hemorrhage.    Calvarium is intact.  There is opacification of the right maxillary sinus.                                       X-Ray Chest AP Portable (Final result)  Result time 04/04/25 11:58:30      Final result by Norma Knight MD (Timothy) (04/04/25 11:58:30)                   Impression:      Clear lungs.      Electronically signed by: Norma Knight MD  Date:    04/04/2025  Time:    11:58               Narrative:    EXAMINATION:  XR CHEST AP PORTABLE    CLINICAL HISTORY:  Sepsis;    COMPARISON:  Chest, 01/25/2024    FINDINGS:  One view.  Mild cardiomegaly.  Right ventricle pacemaker lead.  Clear lungs.

## 2025-04-04 NOTE — PLAN OF CARE
Discussed poc with pt, pt verbalized understanding  Purposeful rounding every 2hours  VS wnl ex HR is bradycardic at times.   Cardiac monitoring in use, pt is NSR, tele monitor #[id number]  Blood glucose monitoring, no coverage needed  Fall precautions in place, remains injury free  Pt denies c/o nausea or pain  Accurate I&Os  Abx given as prescribed  Bed locked at lowest position  Call light within reach  Will cont with POC

## 2025-04-04 NOTE — ED PROVIDER NOTES
SCRIBE #1 NOTE: I, Giancarlo Phillips, am scribing for, and in the presence of, Denice De Dios MD. I have scribed the entire note.       History     Chief Complaint   Patient presents with    Altered Mental Status     Per ems pt is altered and having weakness for an unknown amount of time. Pt states he doesn't feel right and he feels sick. Pt has a hx of UTI     Review of patient's allergies indicates:   Allergen Reactions    Seroquel [quetiapine] Hallucinations     Per son patient had adverse reaction, became agitated and combating     Sulfa (sulfonamide antibiotics) Rash         History of Present Illness     HPI    Limited HPI and ROS due to patient's confusion AMS    4/4/2025, 11:37 AM  History obtained from the pt, medical records, pt's son, and pt's caregiver      History of Present Illness: Ramy Romo is a 79 y.o. male patient with a PMHx of DM, HTN, AFIB, stroke, pacemaker, and CHF who presents to the Emergency Department for evaluation of AMS which onset within the past 2 days. Pt is confused with increased weakness. Pt has a fever. Denies abd pain and CP. C/o bilateral shoulder pain. Pt takes Lactulose 45 mg 3x daily. Pt has been eating good. Pt's caregiver lives with the pt at home. Pt has a hx of recurrent UTIs. Symptoms are constant and moderate in severity. No mitigating or exacerbating factors reported. No prior Tx specified.  No further complaints or concerns at this time.       Arrival mode: Ambulance Service    PCP: Félix Rollins MD        Past Medical History:  Past Medical History:   Diagnosis Date    Atrial fibrillation     CHF (congestive heart failure)     Diabetes mellitus     Hypertension        Past Surgical History:  Past Surgical History:   Procedure Laterality Date    A-V CARDIAC PACEMAKER INSERTION Left 1/25/2024    Procedure: INSERTION, CARDIAC PACEMAKER, DUAL CHAMBER/poss single lead vs His lead;  Surgeon: Anthony Coronado MD;  Location: [facility] CATH LAB;  Service:  Cardiology;  Laterality: Left;  Bio/LBBB pacing    CHOLECYSTECTOMY      INSERTION, PACEMAKER, TEMPORARY TRANSVENOUS N/A 1/24/2024    Procedure: Insertion, Pacemaker, Temporary Transvenous;  Surgeon: Renaldo Santacruz MD;  Location: [facility] CATH LAB;  Service: Cardiology;  Laterality: N/A;    NECK SURGERY      TOTAL ELBOW ARTHROPLASTY Right 03/2019         Family History:  Family History   Problem Relation Name Age of Onset    Cataracts Father      Glaucoma Father         Social History:  Social History     Tobacco Use    Smoking status: Never    Smokeless tobacco: Never   Substance and Sexual Activity    Alcohol use: No    Drug use: Not on file    Sexual activity: Not on file        Review of Systems     Review of Systems   Unable to perform ROS: Mental status change   Constitutional:  Positive for fever.   Cardiovascular:  Negative for chest pain.   Gastrointestinal:  Negative for abdominal pain.   Musculoskeletal:  Positive for arthralgias (bilateral).   Neurological:  Positive for weakness (generalized).   Psychiatric/Behavioral:  Positive for confusion.         Physical Exam     Initial Vitals [04/04/25 1026]   BP Pulse Resp Temp SpO2   (!) 137/45 (!) 59 20 99.4 °F (37.4 °C) 99 %      MAP       --          Physical Exam  Nursing Notes and Vital Signs Reviewed.  Constitutional: Patient is in no acute distress. Pale and ill-appearing.  Head: Atraumatic. Normocephalic.  Eyes: PERRL. EOM intact. Conjunctivae are not pale. No scleral icterus.  ENT: Mucous membranes are moist. Oropharynx is clear and symmetric.    Neck: Supple. Full ROM. No lymphadenopathy.  Cardiovascular: Bradycardia. Regular rhythm. No murmurs, rubs, or gallops. Distal pulses are 2+ and symmetric.  Pulmonary/Chest: No respiratory distress. Clear to auscultation bilaterally. No wheezing or rales.  Abdominal: Soft and non-distended.  There is no tenderness.  No rebound, guarding, or rigidity. Good bowel sounds.  Genitourinary: No CVA  tenderness.  Musculoskeletal: Moves all extremities. No obvious deformities. No edema. No calf tenderness.  Skin: Warm and dry.  Neurological:  Alert and awake. Confused. Normal speech.  No acute focal neurological deficits are appreciated.  Psychiatric: Normal affect. Good eye contact. Appropriate in content.       ED Course   Critical Care    Date/Time: 4/4/2025 12:42 PM    Performed by: Denice De Dios MD  Authorized by: Denice De Dios MD  Direct patient critical care time: 15 minutes  Additional history critical care time: 8 minutes  Ordering / reviewing critical care time: 8 minutes  Documentation critical care time: 9 minutes  Consulting other physicians critical care time: 5 minutes  Total critical care time (exclusive of procedural time) : 45 minutes  Critical care time was exclusive of teaching time and separately billable procedures and treating other patients.  Critical care was necessary to treat or prevent imminent or life-threatening deterioration of the following conditions: hepatic failure and sepsis (Hepatic encephalopathy).  Critical care was time spent personally by me on the following activities: blood draw for specimens, development of treatment plan with patient or surrogate, discussions with consultants, interpretation of cardiac output measurements, examination of patient, evaluation of patient's response to treatment, ordering and performing treatments and interventions, obtaining history from patient or surrogate, ordering and review of radiographic studies, ordering and review of laboratory studies, pulse oximetry, re-evaluation of patient's condition and review of old charts.        ED Vital Signs:  Vitals:    04/04/25 1915 04/04/25 2000 04/04/25 2006 04/04/25 2100   BP:   (!) 157/69    Pulse: 62 (!) 58 60 (!) 58   Resp:   18    Temp:   98.2 °F (36.8 °C)    TempSrc:   Oral    SpO2:   97%    Weight:       Height:        04/04/25 2340 04/05/25 0009 04/05/25 0118 04/05/25 0320   BP:   (!) 158/70     Pulse: (!) 59 60 (!) 58 (!) 58   Resp:  18     Temp:  98.3 °F (36.8 °C)     TempSrc:  Oral     SpO2:  (!) 87%     Weight:       Height:        04/05/25 0400 04/05/25 0414 04/05/25 0735 04/05/25 0750   BP:  (!) 160/67     Pulse: 68 60 61 (!) 59   Resp:  20     Temp:  98.3 °F (36.8 °C)     TempSrc:  Oral     SpO2:  96%     Weight:       Height:        04/05/25 0810 04/05/25 0839 04/05/25 0935   BP: (!) 152/67 (!) 152/67    Pulse: 60 60 (!) 58   Resp: 18     Temp: (!) 100.4 °F (38 °C) (!) 100.4 °F (38 °C)    TempSrc: Oral     SpO2: (!) 94%     Weight:      Height:          Abnormal Lab Results:  Labs Reviewed   COMPREHENSIVE METABOLIC PANEL - Abnormal       Result Value    Sodium 137      Potassium 4.4      Chloride 107      CO2 21 (*)     Glucose 139 (*)     BUN 38 (*)     Creatinine 1.8 (*)     Calcium 10.0      Protein Total 6.8      Albumin 3.3 (*)     Bilirubin Total 1.8 (*)     ALP 88      AST 44      ALT 31      Anion Gap 9      eGFR 38 (*)    LACTIC ACID, PLASMA - Abnormal    Lactic Acid Level 2.7 (*)     Narrative:     Falsely low lactic acid results can be found in samples containing >=13.0 mg/dL total bilirubin and/or >=3.5 mg/dL direct bilirubin.    LACTIC ACID, PLASMA - Abnormal    Lactic Acid Level 3.7 (*)     Narrative:     Falsely low lactic acid results can be found in samples containing >=13.0 mg/dL total bilirubin and/or >=3.5 mg/dL direct bilirubin.    URINALYSIS, REFLEX TO URINE CULTURE - Abnormal    Color, UA Yellow      Appearance, UA Hazy (*)     pH, UA 6.0      Spec Grav UA 1.015      Protein, UA Negative      Glucose, UA Negative      Ketones, UA Negative      Bilirubin, UA Negative      Blood, UA 2+ (*)     Nitrites, UA Negative      Urobilinogen, UA Negative      Leukocyte Esterase, UA 3+ (*)    B-TYPE NATRIURETIC PEPTIDE - Abnormal     (*)    TROPONIN I - Abnormal    Troponin-I 0.032 (*)    PROCALCITONIN - Abnormal    Procalcitonin 1.49 (*)    CBC WITH DIFFERENTIAL -  Abnormal    WBC 12.91 (*)     RBC 3.48 (*)     HGB 12.2 (*)     HCT 35.5 (*)      (*)     MCH 35.1 (*)     MCHC 34.4      RDW 13.0      Platelet Count 118 (*)     MPV 9.3      Nucleated RBC 0      Neut % 87.4 (*)     Lymph % 4.0 (*)     Mono % 7.6      Eos % 0.3      Basophil % 0.2      Imm Grans % 0.5      Neut # 11.27 (*)     Lymph # 0.52 (*)     Mono # 0.98      Eos # 0.04      Baso # 0.03      Imm Grans # 0.07 (*)    AMMONIA - Abnormal    Ammonia 52 (*)    URINALYSIS MICROSCOPIC - Abnormal    RBC, UA 14 (*)     WBC, UA 78 (*)     WBC Clumps, UA Many (*)     Bacteria, UA Many (*)     Hyaline Casts, UA 5 (*)     Microscopic Comment       INFLUENZA A & B BY MOLECULAR - Normal    INFLUENZA A MOLECULAR Negative      INFLUENZA B MOLECULAR  Negative     HEPATITIS C ANTIBODY - Normal    Hep C Ab Interp Negative     HIV 1 / 2 ANTIBODY - Normal    HIV 1/2 Ag/Ab Negative     CULTURE, URINE   CBC W/ AUTO DIFFERENTIAL    Narrative:     The following orders were created for panel order CBC auto differential.  Procedure                               Abnormality         Status                     ---------                               -----------         ------                     CBC with Differential[[id number]]       Abnormal            Final result                 Please view results for these tests on the individual orders.   HEP C VIRUS HOLD SPECIMEN   POCT GLUCOSE MONITORING CONTINUOUS        All Lab Results:  Results for orders placed or performed during the hospital encounter of 04/04/25   EKG 12-lead    Collection Time: 04/04/25 11:19 AM   Result Value Ref Range    QRS Duration 156 ms    [facility] QTC Calculation 452 ms   Blood culture x two cultures. Draw prior to antibiotics.    Collection Time: 04/04/25 11:32 AM    Specimen: Peripheral, Antecubital, Left; Blood   Result Value Ref Range    Blood Culture No Growth After 6 Hours    Blood culture x two cultures. Draw prior to antibiotics.    Collection Time: 04/04/25  11:32 AM    Specimen: Peripheral, Antecubital, Left; Blood   Result Value Ref Range    Blood Culture No Growth After 6 Hours    Comprehensive metabolic panel    Collection Time: 04/04/25 11:32 AM   Result Value Ref Range    Sodium 137 136 - 145 mmol/L    Potassium 4.4 3.5 - 5.1 mmol/L    Chloride 107 95 - 110 mmol/L    CO2 21 (L) 23 - 29 mmol/L    Glucose 139 (H) 70 - 110 mg/dL    BUN 38 (H) 8 - 23 mg/dL    Creatinine 1.8 (H) 0.5 - 1.4 mg/dL    Calcium 10.0 8.7 - 10.5 mg/dL    Protein Total 6.8 6.0 - 8.4 gm/dL    Albumin 3.3 (L) 3.5 - 5.2 g/dL    Bilirubin Total 1.8 (H) 0.1 - 1.0 mg/dL    ALP 88 40 - 150 unit/L    AST 44 11 - 45 unit/L    ALT 31 10 - 44 unit/L    Anion Gap 9 8 - 16 mmol/L    eGFR 38 (L) >60 mL/min/1.73/m2   Lactic acid, plasma #1    Collection Time: 04/04/25 11:32 AM   Result Value Ref Range    Lactic Acid Level 2.7 (H) 0.5 - 2.2 mmol/L   Brain natriuretic peptide    Collection Time: 04/04/25 11:32 AM   Result Value Ref Range     (H) 0 - 99 pg/mL   Troponin I    Collection Time: 04/04/25 11:32 AM   Result Value Ref Range    Troponin-I 0.032 (H) <=0.026 ng/mL   Procalcitonin    Collection Time: 04/04/25 11:32 AM   Result Value Ref Range    Procalcitonin 1.49 (H) <0.25 ng/mL   CBC with Differential    Collection Time: 04/04/25 11:32 AM   Result Value Ref Range    WBC 12.91 (H) 3.90 - 12.70 K/uL    RBC 3.48 (L) 4.60 - 6.20 M/uL    HGB 12.2 (L) 14.0 - 18.0 gm/dL    HCT 35.5 (L) 40.0 - 54.0 %     (H) 82 - 98 fL    MCH 35.1 (H) 27.0 - 31.0 pg    MCHC 34.4 32.0 - 36.0 g/dL    RDW 13.0 11.5 - 14.5 %    Platelet Count 118 (L) 150 - 450 K/uL    MPV 9.3 9.2 - 12.9 fL    Nucleated RBC 0 <=0 /100 WBC    Neut % 87.4 (H) 38 - 73 %    Lymph % 4.0 (L) 18 - 48 %    Mono % 7.6 4 - 15 %    Eos % 0.3 <=8 %    Basophil % 0.2 <=1.9 %    Imm Grans % 0.5 0.0 - 0.5 %    Neut # 11.27 (H) 1.8 - 7.7 K/uL    Lymph # 0.52 (L) 1 - 4.8 K/uL    Mono # 0.98 0.3 - 1 K/uL    Eos # 0.04 <=0.5 K/uL    Baso # 0.03 <=0.2 K/uL     Imm Grans # 0.07 (H) 0.00 - 0.04 K/uL   Hepatitis C Antibody    Collection Time: 04/04/25 11:32 AM   Result Value Ref Range    Hep C Ab Interp Negative Negative   HIV 1/2 Ag/Ab (4th Gen)    Collection Time: 04/04/25 11:32 AM   Result Value Ref Range    HIV 1/2 Ag/Ab Negative Negative   Ammonia    Collection Time: 04/04/25 12:26 PM   Result Value Ref Range    Ammonia 52 (H) 10 - 50 umol/L   Influenza A & B by Molecular    Collection Time: 04/04/25 12:37 PM    Specimen: Nasopharyngeal Swab   Result Value Ref Range    INFLUENZA A MOLECULAR Negative Negative    INFLUENZA B MOLECULAR  Negative Negative   Urinalysis, Reflex to Urine Culture    Collection Time: 04/04/25 12:37 PM    Specimen: Urine   Result Value Ref Range    Color, UA Yellow Straw, Naila, Yellow, Light-Orange    Appearance, UA Hazy (A) Clear    pH, UA 6.0 5.0 - 8.0    Spec Grav UA 1.015 1.005 - 1.030    Protein, UA Negative Negative    Glucose, UA Negative Negative    Ketones, UA Negative Negative    Bilirubin, UA Negative Negative    Blood, UA 2+ (A) Negative    Nitrites, UA Negative Negative    Urobilinogen, UA Negative <2.0 EU/dL    Leukocyte Esterase, UA 3+ (A) Negative   Urinalysis Microscopic    Collection Time: 04/04/25 12:37 PM   Result Value Ref Range    RBC, UA 14 (H) 0 - 4 /HPF    WBC, UA 78 (H) 0 - 5 /HPF    WBC Clumps, UA Many (A) None, Rare    Bacteria, UA Many (A) None, Rare, Occasional /HPF    Hyaline Casts, UA 5 (H) 0 - 1 /LPF    Microscopic Comment     Lactic acid, plasma #2    Collection Time: 04/04/25  3:03 PM   Result Value Ref Range    Lactic Acid Level 3.7 (HH) 0.5 - 2.2 mmol/L   POCT glucose    Collection Time: 04/04/25  5:23 PM   Result Value Ref Range    POCT Glucose 165 (H) 70 - 110 mg/dL   POCT glucose    Collection Time: 04/05/25  5:55 AM   Result Value Ref Range    POCT Glucose 157 (H) 70 - 110 mg/dL   Basic Metabolic Panel    Collection Time: 04/05/25  5:57 AM   Result Value Ref Range    Sodium 138 136 - 145 mmol/L     Potassium 4.3 3.5 - 5.1 mmol/L    Chloride 112 (H) 95 - 110 mmol/L    CO2 20 (L) 23 - 29 mmol/L    Glucose 155 (H) 70 - 110 mg/dL    BUN 45 (H) 8 - 23 mg/dL    Creatinine 1.7 (H) 0.5 - 1.4 mg/dL    Calcium 8.9 8.7 - 10.5 mg/dL    Anion Gap 6 (L) 8 - 16 mmol/L    eGFR 41 (L) >60 mL/min/1.73/m2   CBC with Differential    Collection Time: 04/05/25  5:57 AM   Result Value Ref Range    WBC 9.92 3.90 - 12.70 K/uL    RBC 2.97 (L) 4.60 - 6.20 M/uL    HGB 10.2 (L) 14.0 - 18.0 gm/dL    HCT 30.5 (L) 40.0 - 54.0 %     (H) 82 - 98 fL    MCH 34.3 (H) 27.0 - 31.0 pg    MCHC 33.4 32.0 - 36.0 g/dL    RDW 13.3 11.5 - 14.5 %    Platelet Count 92 (L) 150 - 450 K/uL    MPV 9.8 9.2 - 12.9 fL    Nucleated RBC 0 <=0 /100 WBC    Neut % 84.4 (H) 38 - 73 %    Lymph % 8.2 (L) 18 - 48 %    Mono % 6.6 4 - 15 %    Eos % 0.0 <=8 %    Basophil % 0.2 <=1.9 %    Imm Grans % 0.6 (H) 0.0 - 0.5 %    Neut # 8.38 (H) 1.8 - 7.7 K/uL    Lymph # 0.81 (L) 1 - 4.8 K/uL    Mono # 0.65 0.3 - 1 K/uL    Eos # 0.00 <=0.5 K/uL    Baso # 0.02 <=0.2 K/uL    Imm Grans # 0.06 (H) 0.00 - 0.04 K/uL       Imaging Results:  Imaging Results              CT Head Without Contrast (Final result)  Result time 04/04/25 13:04:53      Final result by Norma Knight MD (Timothy) (04/04/25 13:04:53)                   Impression:      No acute intracranial abnormality.  Chronic intracranial changes.  Opacification of the right maxillary sinus.    All CT scans at this facility use dose modulation, iterative reconstructions, and/or weight base dosing when appropriate to reduce radiation dose to as low as reasonably achievable.      Electronically signed by: Norma Knight MD  Date:    04/04/2025  Time:    13:04               Narrative:    EXAMINATION:  CT HEAD WITHOUT CONTRAST    CLINICAL HISTORY:  Mental status change, unknown cause;    TECHNIQUE:  Noncontrast images were obtained    COMPARISON:  CT brain 03/26/2018    FINDINGS:  No intracranial acute hemorrhage or acute  focal brain parenchymal abnormality is identified.  Atrophy is present.  Patchy periventricular white matter hypodensity secondary to chronic small vessel ischemic changes.  Chronic encephalomalacia right frontal parietal lobe related to remote intracranial hemorrhage.    Calvarium is intact.  There is opacification of the right maxillary sinus.                                       X-Ray Chest AP Portable (Final result)  Result time 04/04/25 11:58:30      Final result by Norma Knight MD (Timothy) (04/04/25 11:58:30)                   Impression:      Clear lungs.      Electronically signed by: Norma Knight MD  Date:    04/04/2025  Time:    11:58               Narrative:    EXAMINATION:  XR CHEST AP PORTABLE    CLINICAL HISTORY:  Sepsis;    COMPARISON:  Chest, 01/25/2024    FINDINGS:  One view.  Mild cardiomegaly.  Right ventricle pacemaker lead.  Clear lungs.                                       The EKG was ordered, reviewed, and independently interpreted by the ED provider.  Interpretation time: 11:19  Rate: 60 BPM  Rhythm: Wide QRS rhythm   Interpretation: Left axis deviation. Nonspecific intraventricular block. Inferior infarct, age undetermined. No STEMI.             The Emergency Provider reviewed the vital signs and test results, which are outlined above.     ED Discussion     1:34 PM: Discussed case with BRANDIE Trevizo ([facility]). Dr. Hurst agrees with current care and management of pt and accepts admission.   Admitting Service: [facility]  Admitting Physician: Dr. Hurst  Admit to: Inpatient    1:34 PM: Re-evaluated pt.  I have discussed test results, shared treatment plan, and the need for admission with patient/family/caretaker at bedside. Pt and/or family/caretaker express understanding at this time and agree with all information. All questions answered. Pt/caretaker/family member(s) have no further questions or concerns at this time. Pt is ready for admit.                Medical  Decision Making  78 yo male brought in for worsening AMS over past 2 days.  He says he is having trouble going to the bathroom, but sitter and son at bedside says he is having regular soft BMS, up to 4 per day (takes lactulose TID) and is not having difficulty urinating.  They do say he is getting more confused, like when his ammonia gets high.  He has a h/o UTI, but not currently on abx.  Temp 99.8.  WBC 12.9  lactic 2.7, procal 1.49.  Septic fluid bolus and IV rocephin started.  U/a  3+ LE and 78 WBCs. Ammonia 57    Amount and/or Complexity of Data Reviewed  Independent Historian: guardian and caregiver  External Data Reviewed: labs, radiology, ECG and notes.  Labs: ordered. Decision-making details documented in ED Course.  Radiology: ordered and independent interpretation performed. Decision-making details documented in ED Course.  ECG/medicine tests: ordered and independent interpretation performed. Decision-making details documented in ED Course.    Risk  OTC drugs.  Prescription drug management.  Decision regarding hospitalization.    Critical Care  Total time providing critical care: 45 minutes                ED Medication(s):  Medications   apixaban tablet 5 mg (5 mg Oral Given 4/5/25 0840)   metoprolol succinate (TOPROL-XL) 24 hr split tablet 12.5 mg (12.5 mg Oral Given 4/5/25 0839)   tamsulosin 24 hr capsule 0.4 mg (0.4 mg Oral Given 4/5/25 0840)   ferrous gluconate tablet 324 mg (324 mg Oral Given 4/5/25 0840)   cefTRIAXone injection 1 g (has no administration in time range)   acetaminophen tablet 650 mg (650 mg Oral Given 4/5/25 0839)   ondansetron disintegrating tablet 4 mg (has no administration in time range)   hydrALAZINE injection 10 mg (has no administration in time range)   glucose chewable tablet 16 g (has no administration in time range)   glucose chewable tablet 24 g (has no administration in time range)   dextrose 50% injection 12.5 g (has no administration in time range)   dextrose 50%  injection 25 g (has no administration in time range)   glucagon (human recombinant) injection 1 mg (has no administration in time range)   insulin aspart U-100 pen 0-5 Units (has no administration in time range)   lactulose 20 gram/30 mL solution Soln 30 g (30 g Oral Given 4/5/25 0840)   sodium chloride 0.9% bolus 1,000 mL 1,000 mL ( Intravenous Canceled Entry 4/4/25 1530)   haloperidol lactate injection 5 mg (has no administration in time range)   sodium chloride 0.9% bolus 2,934 mL 2,934 mL (0 mLs Intravenous Stopped 4/4/25 1417)   cefTRIAXone injection 1 g (1 g Intravenous Given 4/4/25 1240)       Current Discharge Medication List                  Scribe Attestation:   Scribe #1: I performed the above scribed service and the documentation accurately describes the services I performed. I attest to the accuracy of the note.     Attending:   Physician Attestation Statement for Scribe #1: I, Denice De Dios MD, personally performed the services described in this documentation, as scribed by Giancarlo Phililps, in my presence, and it is both accurate and complete.           Clinical Impression       ICD-10-CM ICD-9-CM   1. Sepsis, due to unspecified organism, unspecified whether acute organ dysfunction present  A41.9 038.9     995.91   2. Screening for cardiovascular condition  Z13.6 V81.2   3. Hepatic encephalopathy  K76.82 572.2   4. Acute cystitis without hematuria  N30.00 595.0   5. Altered mental status, unspecified altered mental status type  R41.82 780.97       Disposition:   Disposition: Admitted  Condition: Stable         Denice De Dios MD  04/05/25 1035

## 2025-04-04 NOTE — ED NOTES
Lactic acid 3.7. Dr. Hurst notified. VORB to administer additional 1L bolus of NaCl per MD. Patient has left unit to be transferred to [facility]. Order for 1L NaCl bolus ordered.

## 2025-04-04 NOTE — H&P
[facility] - Emergency Dept.  [facility] Medicine  History & Physical    Patient Name: Ramy Romo  MRN: [id number]  Patient Class: IP- Inpatient  Admission Date: 4/4/2025  Attending Physician: Crow Hurst MD  Primary Care Provider: Félix Rollins MD         Patient information was obtained from ER records.     Subjective:     Principal Problem:Encephalopathy, metabolic    Chief Complaint:   Chief Complaint   Patient presents with    Altered Mental Status     Per ems pt is altered and having weakness for an unknown amount of time. Pt states he doesn't feel right and he feels sick. Pt has a hx of UTI        HPI: Patient is a 79 y.o. male patient with a PMHx of DM, HTN, AFIB, stroke, pacemaker, liver cirrhosis and CHF diastolic who presents to the Emergency Department for evaluation of AMS which onset within the past 2 days.  Patient denies any fever or dysuria however does report chills.  Caregiver at bedside reports that mentation is not at baseline.  Of note patient does have a history of recurrent UTIs.  No other issues at this time.      In the ED, UA showed 78 WBC.  Patient is started on Rocephin and fluids.    Past Medical History:   Diagnosis Date    Atrial fibrillation     CHF (congestive heart failure)     Diabetes mellitus     Hypertension        Past Surgical History:   Procedure Laterality Date    A-V CARDIAC PACEMAKER INSERTION Left 1/25/2024    Procedure: INSERTION, CARDIAC PACEMAKER, DUAL CHAMBER/poss single lead vs His lead;  Surgeon: Anthony Coronado MD;  Location: [facility] CATH LAB;  Service: Cardiology;  Laterality: Left;  Bio/LBBB pacing    CHOLECYSTECTOMY      INSERTION, PACEMAKER, TEMPORARY TRANSVENOUS N/A 1/24/2024    Procedure: Insertion, Pacemaker, Temporary Transvenous;  Surgeon: Renaldo Santacruz MD;  Location: [facility] CATH LAB;  Service: Cardiology;  Laterality: N/A;    NECK SURGERY      TOTAL ELBOW ARTHROPLASTY Right 03/2019       Review of patient's allergies indicates:   Allergen  Reactions    Seroquel [quetiapine] Hallucinations     Per son patient had adverse reaction, became agitated and combating     Sulfa (sulfonamide antibiotics) Rash       No current facility-administered medications on file prior to encounter.     Current Outpatient Medications on File Prior to Encounter   Medication Sig    apixaban (ELIQUIS) 5 mg Tab Take 5 mg by mouth 2 (two) times daily.    calcium-vitamin D 250-100 mg-unit per tablet Take 1 tablet by mouth once daily.    doxazosin (CARDURA) 1 MG tablet Take 1 tablet (1 mg total) by mouth once daily.    ferrous gluconate (FERGON) 324 MG tablet Take 324 mg by mouth daily with breakfast.    fluticasone propionate (FLONASE) 50 mcg/actuation nasal spray 1 spray by Each Nostril route every morning.    furosemide (LASIX) 20 MG tablet Take 20 mg by mouth once daily.    lactulose (CHRONULAC) 10 gram/15 mL solution Take 30 g by mouth 2 (two) times daily.    metoprolol succinate (TOPROL-XL) 25 MG 24 hr tablet Take 0.5 tablets (12.5 mg total) by mouth once daily.    NOVOLOG FLEXPEN U-100 INSULIN 100 unit/mL (3 mL) InPn pen Inject into the skin as needed.    spironolactone (ALDACTONE) 25 MG tablet Take 25 mg by mouth once daily.    tamsulosin (FLOMAX) 0.4 mg Cap Take 0.4 mg by mouth once daily.     Family History       Problem Relation (Age of Onset)    Cataracts Father    Glaucoma Father          Tobacco Use    Smoking status: Never    Smokeless tobacco: Never   Substance and Sexual Activity    Alcohol use: No    Drug use: Not on file    Sexual activity: Not on file     Review of Systems   Constitutional:  Negative for fatigue and fever.   HENT:  Negative for sinus pressure.    Eyes:  Negative for visual disturbance.   Respiratory:  Negative for shortness of breath.    Cardiovascular:  Negative for chest pain.   Gastrointestinal:  Negative for nausea and vomiting.   Genitourinary:  Negative for difficulty urinating.   Musculoskeletal:  Negative for back pain.   Skin:   Negative for rash.   Neurological:  Negative for headaches.   Psychiatric/Behavioral:  Positive for confusion.      Objective:     Vital Signs (Most Recent):  Temp: 99.4 °F (37.4 °C) (04/04/25 1026)  Pulse: 60 (04/04/25 1310)  Resp: (!) 25 (04/04/25 1310)  BP: 139/64 (04/04/25 1310)  SpO2: 99 % (04/04/25 1310) Vital Signs (24h Range):  Temp:  [99.4 °F (37.4 °C)] 99.4 °F (37.4 °C)  Pulse:  [59-60] 60  Resp:  [20-25] 25  SpO2:  [99 %] 99 %  BP: (135-154)/(45-65) 139/64     Weight: 97.8 kg (215 lb 9.6 oz)  Body mass index is 29.24 kg/m².     Physical Exam  Constitutional:       General: He is not in acute distress.     Appearance: He is well-developed. He is not diaphoretic.   HENT:      Head: Normocephalic and atraumatic.   Eyes:      Pupils: Pupils are equal, round, and reactive to light.   Cardiovascular:      Rate and Rhythm: Normal rate and regular rhythm.      Heart sounds: Normal heart sounds. No murmur heard.     No friction rub. No gallop.   Pulmonary:      Effort: Pulmonary effort is normal. No respiratory distress.      Breath sounds: Normal breath sounds. No stridor. No wheezing or rales.   Abdominal:      General: Bowel sounds are normal. There is no distension.      Palpations: Abdomen is soft. There is no mass.      Tenderness: There is no abdominal tenderness. There is no guarding.   Skin:     General: Skin is warm.      Findings: No erythema.   Neurological:      General: No focal deficit present.      Mental Status: He is disoriented.              CRANIAL NERVES     CN III, IV, VI   Pupils are equal, round, and reactive to light.       Significant Labs:   Results for orders placed or performed during the hospital encounter of 04/04/25   EKG 12-lead    Collection Time: 04/04/25 11:19 AM   Result Value Ref Range    QRS Duration 156 ms    [facility] QTC Calculation 452 ms   Comprehensive metabolic panel    Collection Time: 04/04/25 11:32 AM   Result Value Ref Range    Sodium 137 136 - 145 mmol/L    Potassium  4.4 3.5 - 5.1 mmol/L    Chloride 107 95 - 110 mmol/L    CO2 21 (L) 23 - 29 mmol/L    Glucose 139 (H) 70 - 110 mg/dL    BUN 38 (H) 8 - 23 mg/dL    Creatinine 1.8 (H) 0.5 - 1.4 mg/dL    Calcium 10.0 8.7 - 10.5 mg/dL    Protein Total 6.8 6.0 - 8.4 gm/dL    Albumin 3.3 (L) 3.5 - 5.2 g/dL    Bilirubin Total 1.8 (H) 0.1 - 1.0 mg/dL    ALP 88 40 - 150 unit/L    AST 44 11 - 45 unit/L    ALT 31 10 - 44 unit/L    Anion Gap 9 8 - 16 mmol/L    eGFR 38 (L) >60 mL/min/1.73/m2   Lactic acid, plasma #1    Collection Time: 04/04/25 11:32 AM   Result Value Ref Range    Lactic Acid Level 2.7 (H) 0.5 - 2.2 mmol/L   Brain natriuretic peptide    Collection Time: 04/04/25 11:32 AM   Result Value Ref Range     (H) 0 - 99 pg/mL   Troponin I    Collection Time: 04/04/25 11:32 AM   Result Value Ref Range    Troponin-I 0.032 (H) <=0.026 ng/mL   Procalcitonin    Collection Time: 04/04/25 11:32 AM   Result Value Ref Range    Procalcitonin 1.49 (H) <0.25 ng/mL   CBC with Differential    Collection Time: 04/04/25 11:32 AM   Result Value Ref Range    WBC 12.91 (H) 3.90 - 12.70 K/uL    RBC 3.48 (L) 4.60 - 6.20 M/uL    HGB 12.2 (L) 14.0 - 18.0 gm/dL    HCT 35.5 (L) 40.0 - 54.0 %     (H) 82 - 98 fL    MCH 35.1 (H) 27.0 - 31.0 pg    MCHC 34.4 32.0 - 36.0 g/dL    RDW 13.0 11.5 - 14.5 %    Platelet Count 118 (L) 150 - 450 K/uL    MPV 9.3 9.2 - 12.9 fL    Nucleated RBC 0 <=0 /100 WBC    Neut % 87.4 (H) 38 - 73 %    Lymph % 4.0 (L) 18 - 48 %    Mono % 7.6 4 - 15 %    Eos % 0.3 <=8 %    Basophil % 0.2 <=1.9 %    Imm Grans % 0.5 0.0 - 0.5 %    Neut # 11.27 (H) 1.8 - 7.7 K/uL    Lymph # 0.52 (L) 1 - 4.8 K/uL    Mono # 0.98 0.3 - 1 K/uL    Eos # 0.04 <=0.5 K/uL    Baso # 0.03 <=0.2 K/uL    Imm Grans # 0.07 (H) 0.00 - 0.04 K/uL   Hepatitis C Antibody    Collection Time: 04/04/25 11:32 AM   Result Value Ref Range    Hep C Ab Interp Negative Negative   HIV 1/2 Ag/Ab (4th Gen)    Collection Time: 04/04/25 11:32 AM   Result Value Ref Range    HIV 1/2  Ag/Ab Negative Negative   Ammonia    Collection Time: 04/04/25 12:26 PM   Result Value Ref Range    Ammonia 52 (H) 10 - 50 umol/L   Influenza A & B by Molecular    Collection Time: 04/04/25 12:37 PM    Specimen: Nasopharyngeal Swab   Result Value Ref Range    INFLUENZA A MOLECULAR Negative Negative    INFLUENZA B MOLECULAR  Negative Negative   Urinalysis, Reflex to Urine Culture    Collection Time: 04/04/25 12:37 PM    Specimen: Urine   Result Value Ref Range    Color, UA Yellow Straw, Naila, Yellow, Light-Orange    Appearance, UA Hazy (A) Clear    pH, UA 6.0 5.0 - 8.0    Spec Grav UA 1.015 1.005 - 1.030    Protein, UA Negative Negative    Glucose, UA Negative Negative    Ketones, UA Negative Negative    Bilirubin, UA Negative Negative    Blood, UA 2+ (A) Negative    Nitrites, UA Negative Negative    Urobilinogen, UA Negative <2.0 EU/dL    Leukocyte Esterase, UA 3+ (A) Negative   Urinalysis Microscopic    Collection Time: 04/04/25 12:37 PM   Result Value Ref Range    RBC, UA 14 (H) 0 - 4 /HPF    WBC, UA 78 (H) 0 - 5 /HPF    WBC Clumps, UA Many (A) None, Rare    Bacteria, UA Many (A) None, Rare, Occasional /HPF    Hyaline Casts, UA 5 (H) 0 - 1 /LPF    Microscopic Comment          Significant Imaging:   Imaging Results              CT Head Without Contrast (Final result)  Result time 04/04/25 13:04:53      [facility] MD (04/04/25 13:04:53)                   Impression:      No acute intracranial abnormality.  Chronic intracranial changes.  Opacification of the right maxillary sinus.    All CT scans at this facility use dose modulation, iterative reconstructions, and/or weight base dosing when appropriate to reduce radiation dose to as low as reasonably achievable.      Electronically signed by: Norma Knight MD  Date:    04/04/2025  Time:    13:04               Narrative:    EXAMINATION:  CT HEAD WITHOUT CONTRAST    CLINICAL HISTORY:  Mental status change, unknown  "cause;    TECHNIQUE:  Noncontrast images were obtained    COMPARISON:  CT brain 03/26/2018    FINDINGS:  No intracranial acute hemorrhage or acute focal brain parenchymal abnormality is identified.  Atrophy is present.  Patchy periventricular white matter hypodensity secondary to chronic small vessel ischemic changes.  Chronic encephalomalacia right frontal parietal lobe related to remote intracranial hemorrhage.    Calvarium is intact.  There is opacification of the right maxillary sinus.                                       X-Ray Chest AP Portable (Final result)  Result time 04/04/25 11:58:30      Final result by Norma KnightBereket), MD (04/04/25 11:58:30)                   Impression:      Clear lungs.      Electronically signed by: Norma Knight MD  Date:    04/04/2025  Time:    11:58               Narrative:    EXAMINATION:  XR CHEST AP PORTABLE    CLINICAL HISTORY:  Sepsis;    COMPARISON:  Chest, 01/25/2024    FINDINGS:  One view.  Mild cardiomegaly.  Right ventricle pacemaker lead.  Clear lungs.                                        Assessment/Plan:     Assessment & Plan  Encephalopathy, metabolic  Encephalopathy likely metabolic secondary to infection   Continue to treat UTI    Diabetes  Patient's FSGs are controlled on current medication regimen.  Last A1c reviewed-   Lab Results   Component Value Date    HGBA1C 5.6 03/28/2025     Most recent fingerstick glucose reviewed- No results for input(s): "POCTGLUCOSE" in the last 24 hours.  Current correctional scale  Low  Maintain anti-hyperglycemic dose as follows-   Antihyperglycemics (From admission, onward)      Start     Stop Route Frequency Ordered    04/04/25 1547  insulin aspart U-100 pen 0-5 Units         -- SubQ Before meals & nightly PRN 04/04/25 1447          Hold Oral hypoglycemics while patient is in the hospital.  Chronic atrial fibrillation  Patient has paroxysmal (<7 days) atrial fibrillation. Patient is currently in sinus rhythm. " [id number] Score: 3. The patients heart rate in the last 24 hours is as follows:  Pulse  Min: 59  Max: 60     Antiarrhythmics  metoprolol succinate (TOPROL-XL) 24 hr split tablet 12.5 mg, Daily, Oral    Anticoagulants  apixaban tablet 5 mg, 2 times daily, Oral    Plan  - Replete lytes with a goal of K>4, Mg >2  - Patient is anticoagulated, see medications listed above.  - Patient's afib is currently controlled  -       Chronic diastolic heart failure  Patient appears euvolemic   Will hold diuretics for now    Cirrhosis of liver without ascites  Continue lactulose  Sepsis  This patient does have evidence of infective focus  My overall impression is sepsis.  Source: Urinary Tract  Antibiotics given-   Antibiotics (72h ago, onward)      Start     Stop Route Frequency Ordered    04/05/25 1200  cefTRIAXone injection 1 g         -- IV Every 24 hours (non-standard times) 04/04/25 1443          Latest lactate reviewed-  Recent Labs   Lab 04/04/25  1132   LACTATE 2.7*     Organ dysfunction indicated by Encephalopathy    1L fluid bolus given, hx of chf    Post- resuscitation assessment Yes - I attest a sepsis perfusion exam was performed within 6 hours of sepsis, severe sepsis, or septic shock presentation, following fluid resuscitation.      Will Not start Pressors- Levophed for MAP of 65  Source control achieved by: rocephin    Urine and blood cultures pending  VTE Risk Mitigation (From admission, onward)           Ordered     apixaban tablet 5 mg  2 times daily         04/04/25 1441                                    Crow Hurst MD  Department of Hospital Medicine  [facility] - Emergency Dept.

## 2025-04-04 NOTE — HPI
Patient is a 79 y.o. male patient with a PMHx of DM, HTN, AFIB, stroke, pacemaker, liver cirrhosis and CHF diastolic who presents to the Emergency Department for evaluation of AMS which onset within the past 2 days.  Patient denies any fever or dysuria however does report chills.  Caregiver at bedside reports that mentation is not at baseline.  Of note patient does have a history of recurrent UTIs.  No other issues at this time.      In the ED, UA showed 78 WBC.  Patient is started on Rocephin and fluids.

## 2025-04-04 NOTE — ASSESSMENT & PLAN NOTE
"Patient's FSGs are controlled on current medication regimen.  Last A1c reviewed-   Lab Results   Component Value Date    HGBA1C 5.6 03/28/2025     Most recent fingerstick glucose reviewed- No results for input(s): "POCTGLUCOSE" in the last 24 hours.  Current correctional scale  Low  Maintain anti-hyperglycemic dose as follows-   Antihyperglycemics (From admission, onward)      Start     Stop Route Frequency Ordered    04/04/25 1547  insulin aspart U-100 pen 0-5 Units         -- SubQ Before meals & nightly PRN 04/04/25 1447          Hold Oral hypoglycemics while patient is in the hospital.  "

## 2025-04-04 NOTE — ASSESSMENT & PLAN NOTE
This patient does have evidence of infective focus  My overall impression is sepsis.  Source: Urinary Tract  Antibiotics given-   Antibiotics (72h ago, onward)      Start     Stop Route Frequency Ordered    04/05/25 1200  cefTRIAXone injection 1 g         -- IV Every 24 hours (non-standard times) 04/04/25 1443          Latest lactate reviewed-  Recent Labs   Lab 04/04/25  1132   LACTATE 2.7*     Organ dysfunction indicated by Encephalopathy    1L fluid bolus given, hx of chf    Post- resuscitation assessment Yes - I attest a sepsis perfusion exam was performed within 6 hours of sepsis, severe sepsis, or septic shock presentation, following fluid resuscitation.      Will Not start Pressors- Levophed for MAP of 65  Source control achieved by: rocephin    Urine and blood cultures pending

## 2025-04-05 PROBLEM — R78.81 BACTEREMIA: Status: ACTIVE | Noted: 2025-04-05

## 2025-04-05 LAB
ABSOLUTE EOSINOPHIL (OHS): 0 K/UL
ABSOLUTE MONOCYTE (OHS): 0.65 K/UL (ref 0.3–1)
ABSOLUTE NEUTROPHIL COUNT (OHS): 8.38 K/UL (ref 1.8–7.7)
ANION GAP (OHS): 6 MMOL/L (ref 8–16)
AORTIC ROOT ANNULUS: 3.71 CM
ASCENDING AORTA: 3.37 CM
AV INDEX (PROSTH): 0.65
AV MEAN GRADIENT: 5 MMHG
AV PEAK GRADIENT: 9 MMHG
AV VALVE AREA BY VELOCITY RATIO: 2.8 CM²
AV VALVE AREA: 2.7 CM²
AV VELOCITY RATIO: 0.67
BASOPHILS # BLD AUTO: 0.02 K/UL
BASOPHILS NFR BLD AUTO: 0.2 %
BSA FOR ECHO PROCEDURE: 2.23 M2
BUN SERPL-MCNC: 45 MG/DL (ref 8–23)
CALCIUM SERPL-MCNC: 8.9 MG/DL (ref 8.7–10.5)
CHLORIDE SERPL-SCNC: 112 MMOL/L (ref 95–110)
CO2 SERPL-SCNC: 20 MMOL/L (ref 23–29)
CREAT SERPL-MCNC: 1.7 MG/DL (ref 0.5–1.4)
CV ECHO LV RWT: 0.53 CM
DOP CALC AO PEAK VEL: 1.5 M/S
DOP CALC AO VTI: 34.1 CM
DOP CALC LVOT AREA: 4.2 CM2
DOP CALC LVOT DIAMETER: 2.3 CM
DOP CALC LVOT PEAK VEL: 1 M/S
DOP CALC LVOT STROKE VOLUME: 92.2 CM3
DOP CALC RVOT PEAK VEL: 0.87 M/S
DOP CALC RVOT VTI: 21.3 CM
DOP CALCLVOT PEAK VEL VTI: 22.2 CM
E WAVE DECELERATION TIME: 309 MSEC
E/A RATIO: 53.5
E/E' RATIO: 19 M/S
ECHO LV POSTERIOR WALL: 1.4 CM (ref 0.6–1.1)
ERYTHROCYTE [DISTWIDTH] IN BLOOD BY AUTOMATED COUNT: 13.3 % (ref 11.5–14.5)
FRACTIONAL SHORTENING: 18.9 % (ref 28–44)
GFR SERPLBLD CREATININE-BSD FMLA CKD-EPI: 41 ML/MIN/1.73/M2
GLUCOSE SERPL-MCNC: 155 MG/DL (ref 70–110)
HCT VFR BLD AUTO: 30.5 % (ref 40–54)
HGB BLD-MCNC: 10.2 GM/DL (ref 14–18)
IMM GRANULOCYTES # BLD AUTO: 0.06 K/UL (ref 0–0.04)
IMM GRANULOCYTES NFR BLD AUTO: 0.6 % (ref 0–0.5)
INTERVENTRICULAR SEPTUM: 1.4 CM (ref 0.6–1.1)
LA MAJOR: 7.3 CM
LA MINOR: 7.4 CM
LA WIDTH: 5.1 CM
LACTATE SERPL-SCNC: 1.7 MMOL/L (ref 0.5–2.2)
LEFT ATRIUM SIZE: 5.2 CM
LEFT ATRIUM VOLUME INDEX: 75 ML/M2
LEFT ATRIUM VOLUME: 166 CM3
LEFT INTERNAL DIMENSION IN SYSTOLE: 4.3 CM (ref 2.1–4)
LEFT VENTRICLE DIASTOLIC VOLUME INDEX: 60.91 ML/M2
LEFT VENTRICLE DIASTOLIC VOLUME: 134 ML
LEFT VENTRICLE MASS INDEX: 145 G/M2
LEFT VENTRICLE SYSTOLIC VOLUME INDEX: 38.6 ML/M2
LEFT VENTRICLE SYSTOLIC VOLUME: 85 ML
LEFT VENTRICULAR INTERNAL DIMENSION IN DIASTOLE: 5.3 CM (ref 3.5–6)
LEFT VENTRICULAR MASS: 318.9 G
LV LATERAL E/E' RATIO: 13.4 M/S
LV SEPTAL E/E' RATIO: 35.7 M/S
LVED V (TEICH): 133.85 ML
LVES V (TEICH): 85.01 ML
LVOT MG: 2.24 MMHG
LVOT MV: 0.68 CM/S
LYMPHOCYTES # BLD AUTO: 0.81 K/UL (ref 1–4.8)
MCH RBC QN AUTO: 34.3 PG (ref 27–31)
MCHC RBC AUTO-ENTMCNC: 33.4 G/DL (ref 32–36)
MCV RBC AUTO: 103 FL (ref 82–98)
MV PEAK A VEL: 0.02 M/S
MV PEAK E VEL: 1.07 M/S
MV STENOSIS PRESSURE HALF TIME: 89.71 MS
MV VALVE AREA P 1/2 METHOD: 2.45 CM2
NUCLEATED RBC (/100WBC) (OHS): 0 /100 WBC
OHS CV RV/LV RATIO: 0.87 CM
PISA TR MAX VEL: 2.5 M/S
PLATELET # BLD AUTO: 92 K/UL (ref 150–450)
PMV BLD AUTO: 9.8 FL (ref 9.2–12.9)
POCT GLUCOSE: 133 MG/DL (ref 70–110)
POCT GLUCOSE: 135 MG/DL (ref 70–110)
POCT GLUCOSE: 143 MG/DL (ref 70–110)
POCT GLUCOSE: 157 MG/DL (ref 70–110)
POTASSIUM SERPL-SCNC: 4.3 MMOL/L (ref 3.5–5.1)
PV MEAN GRADIENT: 2 MMHG
PV MV: 0.77 M/S
PV PEAK GRADIENT: 5 MMHG
PV PEAK VELOCITY: 1.09 M/S
RA MAJOR: 6.45 CM
RA PRESSURE ESTIMATED: 3 MMHG
RBC # BLD AUTO: 2.97 M/UL (ref 4.6–6.2)
RELATIVE EOSINOPHIL (OHS): 0 %
RELATIVE LYMPHOCYTE (OHS): 8.2 % (ref 18–48)
RELATIVE MONOCYTE (OHS): 6.6 % (ref 4–15)
RELATIVE NEUTROPHIL (OHS): 84.4 % (ref 38–73)
RIGHT VENTRICLE DIASTOLIC BASEL DIMENSION: 4.6 CM
RIGHT VENTRICULAR END-DIASTOLIC DIMENSION: 4.56 CM
RV TB RVSP: 6 MMHG
SODIUM SERPL-SCNC: 138 MMOL/L (ref 136–145)
STJ: 3.67 CM
TDI LATERAL: 0.08 M/S
TDI SEPTAL: 0.03 M/S
TDI: 0.06 M/S
TR MAX PG: 25 MMHG
TRICUSPID ANNULAR PLANE SYSTOLIC EXCURSION: 1.71 CM
TV REST PULMONARY ARTERY PRESSURE: 28 MMHG
WBC # BLD AUTO: 9.92 K/UL (ref 3.9–12.7)
Z-SCORE OF LEFT VENTRICULAR DIMENSION IN END DIASTOLE: -3.49
Z-SCORE OF LEFT VENTRICULAR DIMENSION IN END SYSTOLE: -0.47

## 2025-04-05 PROCEDURE — 85025 COMPLETE CBC W/AUTO DIFF WBC: CPT | Performed by: FAMILY MEDICINE

## 2025-04-05 PROCEDURE — 21400001 HC TELEMETRY ROOM

## 2025-04-05 PROCEDURE — 82310 ASSAY OF CALCIUM: CPT | Performed by: FAMILY MEDICINE

## 2025-04-05 PROCEDURE — 36415 COLL VENOUS BLD VENIPUNCTURE: CPT | Performed by: FAMILY MEDICINE

## 2025-04-05 PROCEDURE — 63600175 PHARM REV CODE 636 W HCPCS: Performed by: FAMILY MEDICINE

## 2025-04-05 PROCEDURE — 83605 ASSAY OF LACTIC ACID: CPT | Performed by: FAMILY MEDICINE

## 2025-04-05 PROCEDURE — 25000003 PHARM REV CODE 250: Performed by: FAMILY MEDICINE

## 2025-04-05 PROCEDURE — 11000001 HC ACUTE MED/SURG PRIVATE ROOM

## 2025-04-05 RX ORDER — CEFEPIME HYDROCHLORIDE 2 G/1
2 INJECTION, POWDER, FOR SOLUTION INTRAVENOUS
Status: DISCONTINUED | OUTPATIENT
Start: 2025-04-05 | End: 2025-04-05

## 2025-04-05 RX ORDER — CEFEPIME HYDROCHLORIDE 2 G/1
2 INJECTION, POWDER, FOR SOLUTION INTRAVENOUS
Status: DISCONTINUED | OUTPATIENT
Start: 2025-04-05 | End: 2025-04-07

## 2025-04-05 RX ADMIN — ACETAMINOPHEN 650 MG: 325 TABLET ORAL at 08:04

## 2025-04-05 RX ADMIN — CEFTRIAXONE 1 G: 1 INJECTION, POWDER, FOR SOLUTION INTRAMUSCULAR; INTRAVENOUS at 11:04

## 2025-04-05 RX ADMIN — METOPROLOL SUCCINATE 12.5 MG: 25 TABLET, EXTENDED RELEASE ORAL at 08:04

## 2025-04-05 RX ADMIN — TAMSULOSIN HYDROCHLORIDE 0.4 MG: 0.4 CAPSULE ORAL at 08:04

## 2025-04-05 RX ADMIN — APIXABAN 5 MG: 2.5 TABLET, FILM COATED ORAL at 08:04

## 2025-04-05 RX ADMIN — Medication 324 MG: at 08:04

## 2025-04-05 RX ADMIN — CEFEPIME 2 G: 2 INJECTION, POWDER, FOR SOLUTION INTRAVENOUS at 08:04

## 2025-04-05 RX ADMIN — LACTULOSE 30 G: 20 SOLUTION ORAL at 04:04

## 2025-04-05 RX ADMIN — LACTULOSE 30 G: 20 SOLUTION ORAL at 08:04

## 2025-04-05 NOTE — ASSESSMENT & PLAN NOTE
Patient has paroxysmal (<7 days) atrial fibrillation. Patient is currently in sinus rhythm. [id number] Score: 3. The patients heart rate in the last 24 hours is as follows:  Pulse  Min: 58  Max: 68     Antiarrhythmics  metoprolol succinate (TOPROL-XL) 24 hr split tablet 12.5 mg, Daily, Oral    Anticoagulants  apixaban tablet 5 mg, 2 times daily, Oral    Plan  - Replete lytes with a goal of K>4, Mg >2  - Patient is anticoagulated, see medications listed above.  - Patient's afib is currently controlled  -

## 2025-04-05 NOTE — ASSESSMENT & PLAN NOTE
Encephalopathy likely metabolic secondary to infection   Continue to treat UTI - continue Rocephin  Elevated ammonia  Urology consulted due to recurrent UTIs

## 2025-04-05 NOTE — ASSESSMENT & PLAN NOTE
-continue Rocephin   -infectious disease consulted  -echo pending  -sensitivity and specificity pending

## 2025-04-05 NOTE — ASSESSMENT & PLAN NOTE
Patient's FSGs are controlled on current medication regimen.  Last A1c reviewed-   Lab Results   Component Value Date    HGBA1C 5.6 03/28/2025     Most recent fingerstick glucose reviewed-   Recent Labs   Lab 04/05/25  0555 04/05/25  1804   POCTGLUCOSE 157* 135*     Current correctional scale  Low  Maintain anti-hyperglycemic dose as follows-   Antihyperglycemics (From admission, onward)      Start     Stop Route Frequency Ordered    04/04/25 1547  insulin aspart U-100 pen 0-5 Units         -- SubQ Before meals & nightly PRN 04/04/25 1447          Hold Oral hypoglycemics while patient is in the hospital.

## 2025-04-05 NOTE — HOSPITAL COURSE
Mr. Romo is a 71-year-old male presented with altered mental status secondary to UTI and elevated ammonia level.  Patient was also noted to have a mild elevation in bilirubin.  Discussion held with patient's son at bedside regarding recurrent admissions for urinary tract infection and bacteremia.  Blood cultures returned positive this afternoon in the anaerobic bottle but specificity and sensitivities pending.  Infectious Disease has been consulted and echo has been ordered.  Urology has also been consulted as son states that patient has been repeatedly [facility]  Patient's lactic acid on admission was 3.7 with repeat is 1.7.  Patient's renal function has returned to normal and the CT abdomen with and without contrast with liver mass protocol has been ordered.  Final urine cultures and blood cultures from admission has been positive for Klebsiella aerogenes.  Repeat blood cultures done on 04/06/2025 are negative at 36 hours.  Antibiotic has been changed to Merrem based on sensitivities.  Final recommendations received from Infectious Disease.  Patient will be discharged on 14 days of ertapenem.  PICC line placed on 04/08/2025 and home antibiotics set up.  Teaching done at bedside and plan of care discussed with patient and caregiver at bedside.  Patient will need to follow up with his primary care physician as well as hepatology.  [facility]  Patient will also need to follow up Dr. Cruz with Urology for resolution of his nonobstructing kidney stones which we will need to be addressed once the bacteremia has cleared.  Patient is stable for discharge at this time.

## 2025-04-05 NOTE — PROGRESS NOTES
Pharmacist Renal Dose Adjustment Note    Ramy Romo is a 79 y.o. male being treated with the medication Cefepime    Patient Data:    Vital Signs (Most Recent):  Temp: 98.1 °F (36.7 °C) (04/05/25 1532)  Pulse: 63 (04/05/25 1735)  Resp: 18 (04/05/25 1532)  BP: 138/66 (04/05/25 1532)  SpO2: 97 % (04/05/25 1532) Vital Signs (72h Range):  Temp:  [97.7 °F (36.5 °C)-100.4 °F (38 °C)]   Pulse:  [58-68]   Resp:  [18-25]   BP: (135-163)/(45-88)   SpO2:  [87 %-100 %]      Recent Labs   Lab 04/04/25  1132 04/05/25  0557   CREATININE 1.8* 1.7*     Serum creatinine: 1.7 mg/dL (H) 04/05/25 0557  Estimated creatinine clearance: 43.4 mL/min (A)    Medication:Cefepime dose: 2 grams frequency every 24 hours will be changed to medication:Cefepime dose:2 grams frequency:every 12 hours    Pharmacist's Name: Coco Joseph  Pharmacist's Extension: [phone]

## 2025-04-05 NOTE — PLAN OF CARE
[facility] - Med Surg  Initial Discharge Assessment       Primary Care Provider: Félix Rollins MD    Admission Diagnosis: Hepatic encephalopathy [K76.82]  Screening for cardiovascular condition [Z13.6]  Acute cystitis without hematuria [N30.00]  Altered mental status, unspecified altered mental status type [R41.82]  Sepsis, due to unspecified organism, unspecified whether acute organ dysfunction present [A41.9]    Admission Date: 4/4/2025  Expected Discharge Date:     Transition of Care Barriers: None    Payor: [employer] MEDICARE / Plan: [employer] MEDICARE HMO / Product Type: Capitation /     Extended Emergency Contact Information  Primary Emergency Contact: Harris,CLAUDETTE  Mobile Phone: [phone]  Relation: Friend  Preferred language: English   needed? No  Secondary Emergency Contact: Donte Romo  Mobile Phone: [phone]  Relation: Son    Discharge Plan A: Home with family         [id number] [place], [place] - [address] [place] [address] AT [facility] [address] [place] [id number]  Phone: [phone] Fax: [phone]    [facility] [place], [place] - [address] [place] [address]  [address] [place] [address]  Phone: [phone] Fax: [phone]      Initial Assessment (most recent)       Adult Discharge Assessment - 04/05/25 1048          Discharge Assessment    Assessment Type Discharge Planning Assessment     Confirmed/corrected address, phone number and insurance Yes     Confirmed Demographics Correct on Facesheet     Source of Information patient     Reason For Admission AMS     People in Home alone     Do you expect to return to your current living situation? Yes     Do you have help at home or someone to help you manage your care at home? No     Prior to hospitilization cognitive status: Alert/Oriented     Current cognitive status: Alert/Oriented     Walking or Climbing Stairs Difficulty no     Dressing/Bathing  Difficulty no     Equipment Currently Used at Home walker, standard;wheelchair     Readmission within 30 days? No     Patient currently being followed by outpatient case management? No     Do you currently have service(s) that help you manage your care at home? No     Do you take prescription medications? Yes     Do you have prescription coverage? Yes     Coverage [employer]     Do you have any problems affording any of your prescribed medications? No     Is the patient taking medications as prescribed? yes     Who is going to help you get home at discharge? Donte Romo, Son [phone]     How do you get to doctors appointments? family or friend will provide     Are you on dialysis? No     Do you take coumadin? No     Discharge Plan A Home with family     DME Needed Upon Discharge  none     Discharge Plan discussed with: Patient     Transition of Care Barriers None

## 2025-04-05 NOTE — SUBJECTIVE & OBJECTIVE
Interval History:  Follow up for UTI and bacteremia.  Continue Rocephin.  ID and urology consulted.  Patient was lethargic this morning but woken up to painful stimuli.  Discussion held with patient's son who stated that the patient had not been slept well last night.    Review of Systems  Objective:     Vital Signs (Most Recent):  Temp: 98.1 °F (36.7 °C) (04/05/25 1532)  Pulse: 60 (04/05/25 1532)  Resp: 18 (04/05/25 1532)  BP: 138/66 (04/05/25 1532)  SpO2: 97 % (04/05/25 1532) Vital Signs (24h Range):  Temp:  [97.7 °F (36.5 °C)-100.4 °F (38 °C)] 98.1 °F (36.7 °C)  Pulse:  [58-68] 60  Resp:  [18-20] 18  SpO2:  [87 %-98 %] 97 %  BP: (135-160)/(65-70) 138/66     Weight: 97.5 kg (215 lb)  Body mass index is 29.16 kg/m².  No intake or output data in the 24 hours ending 04/05/25 1813      Physical Exam  Vitals and nursing note reviewed.   Constitutional:       Appearance: Normal appearance.   HENT:      Head: Normocephalic and atraumatic.      Nose: Nose normal.      Mouth/Throat:      Mouth: Mucous membranes are moist.   Eyes:      Extraocular Movements: Extraocular movements intact.      Conjunctiva/sclera: Conjunctivae normal.   Cardiovascular:      Rate and Rhythm: Normal rate and regular rhythm.      Pulses: Normal pulses.      Heart sounds: Normal heart sounds.   Pulmonary:      Effort: Pulmonary effort is normal.      Breath sounds: Normal breath sounds.   Abdominal:      General: Abdomen is flat. Bowel sounds are normal.      Palpations: Abdomen is soft.   Musculoskeletal:         General: Normal range of motion.      Cervical back: Normal range of motion and neck supple.   Skin:     General: Skin is warm.      Capillary Refill: Capillary refill takes less than 2 seconds.   Neurological:      Mental Status: He is alert. He is disoriented.   Psychiatric:         Mood and Affect: Mood normal.         Behavior: Behavior normal.               Significant Labs: All pertinent labs within the past 24 hours have been  reviewed.  Recent Lab Results  (Last 5 results in the past 24 hours)        04/05/25  1804   04/05/25  1642   04/05/25  1445   04/05/25  0557   04/05/25  0555        Anion Gap       6         Ao root annulus     3.71           Ascending aorta     3.37           Ao peak flavio     1.5           Ao VTI     34.1           AV valve area     2.7           THONG by Velocity Ratio     2.8           AV mean gradient     5           AV index (prosthetic)     0.65           AV peak gradient     9           AV Velocity Ratio     0.67           Baso #       0.02         Basophil %       0.2         BSA     2.23           BUN       45         Calcium       8.9         Chloride       112         CO2       20         Creatinine       1.7         Left Ventricle Relative Wall Thickness     0.53           E/A ratio     53.50           E/E' ratio     19           eGFR       41  Comment: Estimated GFR calculated using the CKD-EPI creatinine (2021) equation.         Eos #       0.00         Eos %       0.0         E wave deceleration time     309           FS     18.9           Glucose       155         Gran # (ANC)       8.38         Hematocrit       30.5         Hemoglobin       10.2         Immature Grans (Abs)       0.06  Comment: Mild elevation in immature granulocytes is non specific and can be seen in a variety of conditions including stress response, acute inflammation, trauma and pregnancy. Correlation with other laboratory and clinical findings is essential.         Immature Granulocytes       0.6         IVSd     1.4           LA WIDTH     5.1           Lactic Acid Level   1.7             Left Atrium Major Axis     7.3           Left Atrium Minor Axis     7.4           LA size     5.2           LA Vol     166           LA vol index     75           LVOT area     4.2           LV LATERAL E/E' RATIO     13.4           LV SEPTAL E/E' RATIO     35.7           LV EDV BP     134           LV Diastolic Volume Index     60.91            Left Ventricular End Diastolic Volume by Teichholz Method     133.85           Left Ventricular End Systolic Volume by Teichholz Method     85.01           LVIDd     5.3           LVIDs     4.3           LV mass     318.9           LV Mass Index     145.0           Left Ventricular Outflow Tract Mean Gradient     2.24           Left Ventricular Outflow Tract Mean Velocity     0.68           LVOT diameter     2.3           LVOT peak ken     1.0           LVOT stroke volume     92.2           LVOT peak VTI     22.2           LV ESV BP     85           LV Systolic Volume Index     38.6           Lymph #       0.81         Lymph %       8.2         MCH       34.3         MCHC       33.4         MCV       103         Mean e'     0.06           Mono #       0.65         Mono %       6.6         MPV       9.8         MV valve area p 1/2 method     2.45           MV Peak A Ken     0.02           MV Peak E Ken     1.07           MV stenosis pressure 1/2 time     89.71           Neut %       84.4         nRBC       0         Platelet Count       92         POCT Glucose 135         157       Potassium       4.3         Pulmonary Valve Mean Velocity     0.77           PV mean gradient     2           PV peak gradient     5           PV PEAK VELOCITY     1.09           PW     1.4           RA Major Axis     6.45           Est. RA pres     3           RBC       2.97         RDW       13.3         RV TB RVSP     6           RV/LV Ratio     0.87           RVDD     4.56           RV- wei basal diam     4.6           RVOT peak ken     0.87           RVOT peak VTI     21.3           Sodium       138         STJ     3.67           TAPSE     1.71           TDI SEPTAL     0.03           TDI LATERAL     0.08           Triscuspid Valve Regurgitation Peak Gradient     25           TR Max Ken     2.5           TV resting pulmonary artery pressure     28           WBC       9.92         ZLVIDD     -3.49           ZLVIDS     -0.47                                   Significant Imaging: I have reviewed all pertinent imaging results/findings within the past 24 hours.

## 2025-04-05 NOTE — PLAN OF CARE
Discussed poc with pt, pt verbalized understanding  Purposeful rounding every 2hours  VS wnl  Cardiac monitoring in use, pt is NSR, tele monitor # [id number]  Blood glucose monitoring, no coverage needed  Fall precautions in place, remains injury free  Pt denies c/o nausea or pain  Accurate I&Os  Abx given as prescribed  Bed locked at lowest position  Call light within reach  Will cont with POC

## 2025-04-05 NOTE — ASSESSMENT & PLAN NOTE
This patient does have evidence of infective focus  My overall impression is sepsis.  Source: Urinary Tract  Antibiotics given-   Antibiotics (72h ago, onward)      Start     Stop Route Frequency Ordered    04/05/25 1200  cefTRIAXone injection 1 g         -- IV Every 24 hours (non-standard times) 04/04/25 1443          Latest lactate reviewed-  Recent Labs   Lab 04/05/25  1642   LACTATE 1.7     Organ dysfunction indicated by Encephalopathy    1L fluid bolus given, hx of chf    Post- resuscitation assessment Yes - I attest a sepsis perfusion exam was performed within 6 hours of sepsis, severe sepsis, or septic shock presentation, following fluid resuscitation.      Will Not start Pressors- Levophed for MAP of 65  Source control achieved by: rocephin    Urine and blood cultures pending

## 2025-04-05 NOTE — PLAN OF CARE
Discussed poc with pt's son at the bedside. Son (Amilcar [phone]) not in full understanding about pt's plan of care    Purposeful rounding every 2hours    VS wnl  Cardiac monitoring in use  Blood glucose monitoring   Fall precautions in place, remains injury free    Accurate I&Os  Bed locked at lowest position  Call light within reach    Chart check continued  Will cont with POC

## 2025-04-05 NOTE — PROGRESS NOTES
[facility]  Progress Note    Patient Name: Ramy Romo  MRN: [id number]  Patient Class: IP- Inpatient   Admission Date: 4/4/2025  Length of Stay: 1 days  Attending Physician: Ganga Castillo MD  Primary Care Provider: Félix Rollins MD        Subjective     Principal Problem:Encephalopathy, metabolic        HPI:  Patient is a 79 y.o. male patient with a PMHx of DM, HTN, AFIB, stroke, pacemaker, liver cirrhosis and CHF diastolic who presents to the Emergency Department for evaluation of AMS which onset within the past 2 days.  Patient denies any fever or dysuria however does report chills.  Caregiver at bedside reports that mentation is not at baseline.  Of note patient does have a history of recurrent UTIs.  No other issues at this time.      In the ED, UA showed 78 WBC.  Patient is started on Rocephin and fluids.    Overview/Hospital Course:  Mr. Romo is a 71-year-old male presented with altered mental status secondary to UTI and elevated ammonia level.  Patient was also noted to have a mild elevation in bilirubin.  Discussion held with patient's son at bedside regarding recurrent admissions for urinary tract infection and bacteremia.  Blood cultures returned positive this afternoon in the anaerobic bottle but specificity and sensitivities pending.  Infectious Disease has been consulted and echo has been ordered.  Urology has also been consulted as son states that patient has been [facility]  Patient's lactic acid on admission was 3.7 with repeat is 1.7.    Interval History:  Follow up for UTI and bacteremia.  Continue Rocephin.  ID and urology consulted.  Patient was lethargic this morning but woken up to painful stimuli.  Discussion held with patient's son who stated that the patient had not been slept well last night.    Review of Systems  Objective:     Vital Signs (Most Recent):  Temp: 98.1 °F (36.7 °C) (04/05/25  1532)  Pulse: 60 (04/05/25 1532)  Resp: 18 (04/05/25 1532)  BP: 138/66 (04/05/25 1532)  SpO2: 97 % (04/05/25 1532) Vital Signs (24h Range):  Temp:  [97.7 °F (36.5 °C)-100.4 °F (38 °C)] 98.1 °F (36.7 °C)  Pulse:  [58-68] 60  Resp:  [18-20] 18  SpO2:  [87 %-98 %] 97 %  BP: (135-160)/(65-70) 138/66     Weight: 97.5 kg (215 lb)  Body mass index is 29.16 kg/m².  No intake or output data in the 24 hours ending 04/05/25 1813      Physical Exam  Vitals and nursing note reviewed.   Constitutional:       Appearance: Normal appearance.   HENT:      Head: Normocephalic and atraumatic.      Nose: Nose normal.      Mouth/Throat:      Mouth: Mucous membranes are moist.   Eyes:      Extraocular Movements: Extraocular movements intact.      Conjunctiva/sclera: Conjunctivae normal.   Cardiovascular:      Rate and Rhythm: Normal rate and regular rhythm.      Pulses: Normal pulses.      Heart sounds: Normal heart sounds.   Pulmonary:      Effort: Pulmonary effort is normal.      Breath sounds: Normal breath sounds.   Abdominal:      General: Abdomen is flat. Bowel sounds are normal.      Palpations: Abdomen is soft.   Musculoskeletal:         General: Normal range of motion.      Cervical back: Normal range of motion and neck supple.   Skin:     General: Skin is warm.      Capillary Refill: Capillary refill takes less than 2 seconds.   Neurological:      Mental Status: He is alert. He is disoriented.   Psychiatric:         Mood and Affect: Mood normal.         Behavior: Behavior normal.               Significant Labs: All pertinent labs within the past 24 hours have been reviewed.  Recent Lab Results  (Last 5 results in the past 24 hours)        04/05/25  1804   04/05/25  1642   04/05/25  1445   04/05/25  0557   04/05/25  0555        Anion Gap       6         Ao root annulus     3.71           Ascending aorta     3.37           Ao peak flavio     1.5           Ao VTI     34.1           AV valve area     2.7           THONG by Velocity Ratio      2.8           AV mean gradient     5           AV index (prosthetic)     0.65           AV peak gradient     9           AV Velocity Ratio     0.67           Baso #       0.02         Basophil %       0.2         BSA     2.23           BUN       45         Calcium       8.9         Chloride       112         CO2       20         Creatinine       1.7         Left Ventricle Relative Wall Thickness     0.53           E/A ratio     53.50           E/E' ratio     19           eGFR       41  Comment: Estimated GFR calculated using the CKD-EPI creatinine (2021) equation.         Eos #       0.00         Eos %       0.0         E wave deceleration time     309           FS     18.9           Glucose       155         Gran # (ANC)       8.38         Hematocrit       30.5         Hemoglobin       10.2         Immature Grans (Abs)       0.06  Comment: Mild elevation in immature granulocytes is non specific and can be seen in a variety of conditions including stress response, acute inflammation, trauma and pregnancy. Correlation with other laboratory and clinical findings is essential.         Immature Granulocytes       0.6         IVSd     1.4           LA WIDTH     5.1           Lactic Acid Level   1.7             Left Atrium Major Axis     7.3           Left Atrium Minor Axis     7.4           LA size     5.2           LA Vol     166           LA vol index     75           LVOT area     4.2           LV LATERAL E/E' RATIO     13.4           LV SEPTAL E/E' RATIO     35.7           LV EDV BP     134           LV Diastolic Volume Index     60.91           Left Ventricular End Diastolic Volume by Teichholz Method     133.85           Left Ventricular End Systolic Volume by Teichholz Method     85.01           LVIDd     5.3           LVIDs     4.3           LV mass     318.9           LV Mass Index     145.0           Left Ventricular Outflow Tract Mean Gradient     2.24           Left Ventricular Outflow Tract Mean Velocity      0.68           LVOT diameter     2.3           LVOT peak ken     1.0           LVOT stroke volume     92.2           LVOT peak VTI     22.2           LV ESV BP     85           LV Systolic Volume Index     38.6           Lymph #       0.81         Lymph %       8.2         MCH       34.3         MCHC       33.4         MCV       103         Mean e'     0.06           Mono #       0.65         Mono %       6.6         MPV       9.8         MV valve area p 1/2 method     2.45           MV Peak A Ken     0.02           MV Peak E Ken     1.07           MV stenosis pressure 1/2 time     89.71           Neut %       84.4         nRBC       0         Platelet Count       92         POCT Glucose 135         157       Potassium       4.3         Pulmonary Valve Mean Velocity     0.77           PV mean gradient     2           PV peak gradient     5           PV PEAK VELOCITY     1.09           PW     1.4           RA Major Axis     6.45           Est. RA pres     3           RBC       2.97         RDW       13.3         RV TB RVSP     6           RV/LV Ratio     0.87           RVDD     4.56           RV- wei basal diam     4.6           RVOT peak ken     0.87           RVOT peak VTI     21.3           Sodium       138         STJ     3.67           TAPSE     1.71           TDI SEPTAL     0.03           TDI LATERAL     0.08           Triscuspid Valve Regurgitation Peak Gradient     25           TR Max Ken     2.5           TV resting pulmonary artery pressure     28           WBC       9.92         ZLVIDD     -3.49           ZLVIDS     -0.47                                  Significant Imaging: I have reviewed all pertinent imaging results/findings within the past 24 hours.      Assessment & Plan  Encephalopathy, metabolic  Encephalopathy likely metabolic secondary to infection   Continue to treat UTI - continue Rocephin  Elevated ammonia  Urology consulted due to recurrent UTIs  Diabetes  Patient's FSGs are controlled  on current medication regimen.  Last A1c reviewed-   Lab Results   Component Value Date    HGBA1C 5.6 03/28/2025     Most recent fingerstick glucose reviewed-   Recent Labs   Lab 04/05/25  0555 04/05/25  1804   POCTGLUCOSE 157* 135*     Current correctional scale  Low  Maintain anti-hyperglycemic dose as follows-   Antihyperglycemics (From admission, onward)      Start     Stop Route Frequency Ordered    04/04/25 1547  insulin aspart U-100 pen 0-5 Units         -- SubQ Before meals & nightly PRN 04/04/25 1447          Hold Oral hypoglycemics while patient is in the hospital.  Chronic atrial fibrillation  Patient has paroxysmal (<7 days) atrial fibrillation. Patient is currently in sinus rhythm. [id number] Score: 3. The patients heart rate in the last 24 hours is as follows:  Pulse  Min: 58  Max: 68     Antiarrhythmics  metoprolol succinate (TOPROL-XL) 24 hr split tablet 12.5 mg, Daily, Oral    Anticoagulants  apixaban tablet 5 mg, 2 times daily, Oral    Plan  - Replete lytes with a goal of K>4, Mg >2  - Patient is anticoagulated, see medications listed above.  - Patient's afib is currently controlled  -       Chronic diastolic heart failure  Patient appears euvolemic   Will hold diuretics for now    Cirrhosis of liver without ascites  Continue lactulose  Sepsis  This patient does have evidence of infective focus  My overall impression is sepsis.  Source: Urinary Tract  Antibiotics given-   Antibiotics (72h ago, onward)      Start     Stop Route Frequency Ordered    04/05/25 1200  cefTRIAXone injection 1 g         -- IV Every 24 hours (non-standard times) 04/04/25 1443          Latest lactate reviewed-  Recent Labs   Lab 04/05/25  1642   LACTATE 1.7     Organ dysfunction indicated by Encephalopathy    1L fluid bolus given, hx of chf    Post- resuscitation assessment Yes - I attest a sepsis perfusion exam was performed within 6 hours of sepsis, severe sepsis, or septic shock presentation, following fluid  resuscitation.      Will Not start Pressors- Levophed for MAP of 65  Source control achieved by: rocephin    Urine and blood cultures pending  Bacteremia  -continue Rocephin   -infectious disease consulted  -echo pending  -sensitivity and specificity pending    VTE Risk Mitigation (From admission, onward)           Ordered     apixaban tablet 5 mg  2 times daily         04/04/25 1441                    Discharge Planning   KAYA:      Code Status: Full Code   Medical Readiness for Discharge Date:   Discharge Plan A: Home with family                        Ganga Castillo MD  Department of Hospital Medicine   [facility] Med S[facility]

## 2025-04-06 LAB
ABSOLUTE EOSINOPHIL (OHS): 0.04 K/UL
ABSOLUTE MONOCYTE (OHS): 0.73 K/UL (ref 0.3–1)
ABSOLUTE NEUTROPHIL COUNT (OHS): 5.82 K/UL (ref 1.8–7.7)
ALBUMIN SERPL BCP-MCNC: 2.5 G/DL (ref 3.5–5.2)
ALP SERPL-CCNC: 70 UNIT/L (ref 40–150)
ALT SERPL W/O P-5'-P-CCNC: 25 UNIT/L (ref 10–44)
AMMONIA PLAS-SCNC: 111 UMOL/L (ref 10–50)
ANION GAP (OHS): 5 MMOL/L (ref 8–16)
AST SERPL-CCNC: 36 UNIT/L (ref 11–45)
BASOPHILS # BLD AUTO: 0.02 K/UL
BASOPHILS NFR BLD AUTO: 0.3 %
BILIRUB DIRECT SERPL-MCNC: 0.4 MG/DL (ref 0.1–0.3)
BILIRUB SERPL-MCNC: 0.7 MG/DL (ref 0.1–1)
BUN SERPL-MCNC: 46 MG/DL (ref 8–23)
CALCIUM SERPL-MCNC: 8.6 MG/DL (ref 8.7–10.5)
CHLORIDE SERPL-SCNC: 114 MMOL/L (ref 95–110)
CO2 SERPL-SCNC: 19 MMOL/L (ref 23–29)
CREAT SERPL-MCNC: 1.6 MG/DL (ref 0.5–1.4)
ERYTHROCYTE [DISTWIDTH] IN BLOOD BY AUTOMATED COUNT: 13.2 % (ref 11.5–14.5)
GFR SERPLBLD CREATININE-BSD FMLA CKD-EPI: 44 ML/MIN/1.73/M2
GLUCOSE SERPL-MCNC: 138 MG/DL (ref 70–110)
HCT VFR BLD AUTO: 30.4 % (ref 40–54)
HGB BLD-MCNC: 10.1 GM/DL (ref 14–18)
IMM GRANULOCYTES # BLD AUTO: 0.04 K/UL (ref 0–0.04)
IMM GRANULOCYTES NFR BLD AUTO: 0.5 % (ref 0–0.5)
INR PPP: 1.1 (ref 0.8–1.2)
LYMPHOCYTES # BLD AUTO: 0.94 K/UL (ref 1–4.8)
MCH RBC QN AUTO: 34.6 PG (ref 27–31)
MCHC RBC AUTO-ENTMCNC: 33.2 G/DL (ref 32–36)
MCV RBC AUTO: 104 FL (ref 82–98)
NUCLEATED RBC (/100WBC) (OHS): 0 /100 WBC
PLATELET # BLD AUTO: 88 K/UL (ref 150–450)
PMV BLD AUTO: 10.3 FL (ref 9.2–12.9)
POCT GLUCOSE: 107 MG/DL (ref 70–110)
POCT GLUCOSE: 120 MG/DL (ref 70–110)
POCT GLUCOSE: 134 MG/DL (ref 70–110)
POCT GLUCOSE: 135 MG/DL (ref 70–110)
POTASSIUM SERPL-SCNC: 4.3 MMOL/L (ref 3.5–5.1)
PROT SERPL-MCNC: 5.5 GM/DL (ref 6–8.4)
PROTHROMBIN TIME: 13 SECONDS (ref 9–12.5)
RBC # BLD AUTO: 2.92 M/UL (ref 4.6–6.2)
RELATIVE EOSINOPHIL (OHS): 0.5 %
RELATIVE LYMPHOCYTE (OHS): 12.4 % (ref 18–48)
RELATIVE MONOCYTE (OHS): 9.6 % (ref 4–15)
RELATIVE NEUTROPHIL (OHS): 76.7 % (ref 38–73)
SODIUM SERPL-SCNC: 138 MMOL/L (ref 136–145)
WBC # BLD AUTO: 7.59 K/UL (ref 3.9–12.7)

## 2025-04-06 PROCEDURE — 85025 COMPLETE CBC W/AUTO DIFF WBC: CPT | Performed by: FAMILY MEDICINE

## 2025-04-06 PROCEDURE — 11000001 HC ACUTE MED/SURG PRIVATE ROOM

## 2025-04-06 PROCEDURE — 36415 COLL VENOUS BLD VENIPUNCTURE: CPT | Performed by: FAMILY MEDICINE

## 2025-04-06 PROCEDURE — 63600175 PHARM REV CODE 636 W HCPCS: Performed by: FAMILY MEDICINE

## 2025-04-06 PROCEDURE — 82140 ASSAY OF AMMONIA: CPT | Performed by: FAMILY MEDICINE

## 2025-04-06 PROCEDURE — 21400001 HC TELEMETRY ROOM

## 2025-04-06 PROCEDURE — 25000003 PHARM REV CODE 250: Performed by: FAMILY MEDICINE

## 2025-04-06 PROCEDURE — 99222 1ST HOSP IP/OBS MODERATE 55: CPT | Mod: ,,, | Performed by: UROLOGY

## 2025-04-06 PROCEDURE — 85610 PROTHROMBIN TIME: CPT | Performed by: FAMILY MEDICINE

## 2025-04-06 PROCEDURE — 82248 BILIRUBIN DIRECT: CPT | Performed by: FAMILY MEDICINE

## 2025-04-06 PROCEDURE — 80048 BASIC METABOLIC PNL TOTAL CA: CPT | Performed by: FAMILY MEDICINE

## 2025-04-06 PROCEDURE — 87040 BLOOD CULTURE FOR BACTERIA: CPT | Performed by: FAMILY MEDICINE

## 2025-04-06 RX ORDER — SODIUM CHLORIDE 9 MG/ML
INJECTION, SOLUTION INTRAVENOUS CONTINUOUS
Status: DISCONTINUED | OUTPATIENT
Start: 2025-04-06 | End: 2025-04-08 | Stop reason: HOSPADM

## 2025-04-06 RX ADMIN — LACTULOSE 30 G: 20 SOLUTION ORAL at 08:04

## 2025-04-06 RX ADMIN — CEFEPIME 2 G: 2 INJECTION, POWDER, FOR SOLUTION INTRAVENOUS at 08:04

## 2025-04-06 RX ADMIN — SODIUM CHLORIDE: 9 INJECTION, SOLUTION INTRAVENOUS at 12:04

## 2025-04-06 RX ADMIN — APIXABAN 5 MG: 2.5 TABLET, FILM COATED ORAL at 08:04

## 2025-04-06 RX ADMIN — RIFAXIMIN 550 MG: 550 TABLET ORAL at 08:04

## 2025-04-06 RX ADMIN — LACTULOSE 30 G: 20 SOLUTION ORAL at 03:04

## 2025-04-06 RX ADMIN — METOPROLOL SUCCINATE 12.5 MG: 25 TABLET, EXTENDED RELEASE ORAL at 08:04

## 2025-04-06 RX ADMIN — TAMSULOSIN HYDROCHLORIDE 0.4 MG: 0.4 CAPSULE ORAL at 08:04

## 2025-04-06 RX ADMIN — Medication 324 MG: at 06:04

## 2025-04-06 NOTE — PLAN OF CARE
Discussed poc with pt and son at the bedside    Purposeful rounding every 2hours    VS wnl  Cardiac monitoring in use  Blood glucose monitoring   Fall precautions in place, remains injury free    Accurate I&Os    Bed locked at lowest position  Call light within reach    Chart check continued  Will cont with POC

## 2025-04-06 NOTE — PROGRESS NOTES
[facility] Medicine  Progress Note    Patient Name: Ramy Romo  MRN: [id number]  Patient Class: IP- Inpatient   Admission Date: 4/4/2025  Length of Stay: 2 days  Attending Physician: Ganga Castillo MD  Primary Care Provider: Félix Rollins MD        Subjective     Principal Problem:Encephalopathy, metabolic        HPI:  Patient is a 79 y.o. male patient with a PMHx of DM, HTN, AFIB, stroke, pacemaker, liver cirrhosis and CHF diastolic who presents to the Emergency Department for evaluation of AMS which onset within the past 2 days.  Patient denies any fever or dysuria however does report chills.  Caregiver at bedside reports that mentation is not at baseline.  Of note patient does have a history of recurrent UTIs.  No other issues at this time.      In the ED, UA showed 78 WBC.  Patient is started on Rocephin and fluids.    Overview/Hospital Course:  Mr. Romo is a 71-year-old male presented with altered mental status secondary to UTI and elevated ammonia level.  Patient was also noted to have a mild elevation in bilirubin.  Discussion held with patient's son at bedside regarding recurrent admissions for urinary tract infection and bacteremia.  Blood cultures returned positive this afternoon in the anaerobic bottle but specificity and sensitivities pending.  Infectious Disease has been consulted and echo has been ordered.  Urology has also been consulted as son states that patient has been repeatedly [facility]  Patient's lactic acid on admission was 3.7 with repeat is 1.7.    Interval History:  Follow up for altered mental status, UTI, hepatic encephalopathy.  Patient's mental status has improved although baseline is unclear.  Spoke at length with his son who states that at baseline patient is normally self reliant and does not exhibit signs of confusion.  Patient's ammonia level is noted to be elevated despite being on lactulose.  Patient also  states that he does take Xifaxan med on outpatient basis and is followed by Dr. Mello for his liver function.  Case was discussed with Dr. Cruz, urology, who states that she did note renal stones that are nonobstructing and would eventually need to be addressed but nonemergent.  Spoke with patient's son regarding update.  He would like to follow up with Dr. Cruz upon discharge and therefore follow up information has been included.  He also understands that we will be holding off on the liver mass protocol CT due to renal function and may need to be followed up as an outpatient with Dr. Mello if renal function does not resolve.  He did confirm patient is on rifaximin and that has been resumed.    Review of Systems  Objective:     Vital Signs (Most Recent):  Temp: 97.9 °F (36.6 °C) (04/06/25 0817)  Pulse: 60 (04/06/25 0939)  Resp: 18 (04/06/25 0817)  BP: (!) 143/63 (04/06/25 0817)  SpO2: 99 % (04/06/25 0817) Vital Signs (24h Range):  Temp:  [97.7 °F (36.5 °C)-98.2 °F (36.8 °C)] 97.9 °F (36.6 °C)  Pulse:  [58-63] 60  Resp:  [18-20] 18  SpO2:  [94 %-99 %] 99 %  BP: (135-156)/(63-67) 143/63     Weight: 97.5 kg (215 lb)  Body mass index is 29.16 kg/m².    Intake/Output Summary (Last 24 hours) at 4/6/2025 1202  Last data filed at 4/6/2025 0815  Gross per 24 hour   Intake 120 ml   Output 550 ml   Net -430 ml         Physical Exam  Vitals and nursing note reviewed.   Constitutional:       Appearance: Normal appearance.   HENT:      Head: Normocephalic and atraumatic.      Nose: Nose normal.      Mouth/Throat:      Mouth: Mucous membranes are moist.   Eyes:      Extraocular Movements: Extraocular movements intact.      Conjunctiva/sclera: Conjunctivae normal.   Cardiovascular:      Rate and Rhythm: Normal rate and regular rhythm.      Pulses: Normal pulses.      Heart sounds: Normal heart sounds.   Pulmonary:      Effort: Pulmonary effort is normal.      Breath sounds: Normal breath sounds.   Abdominal:      General:  Abdomen is flat. Bowel sounds are normal.      Palpations: Abdomen is soft.   Musculoskeletal:         General: Normal range of motion.      Cervical back: Normal range of motion and neck supple.   Skin:     General: Skin is warm.      Capillary Refill: Capillary refill takes less than 2 seconds.   Neurological:      General: No focal deficit present.      Mental Status: He is alert.   Psychiatric:         Mood and Affect: Mood normal.         Behavior: Behavior normal.               Significant Labs: All pertinent labs within the past 24 hours have been reviewed.  Recent Lab Results  (Last 5 results in the past 24 hours)        04/06/25  0825   04/06/25  0650   04/06/25  0608   04/06/25  0448   04/05/25  2032        Albumin       2.5         ALP       70         ALT       25         Ammonia   111             Anion Gap       5         AST       36         Baso #       0.02         Basophil %       0.3         Bilirubin Direct       0.4         BILIRUBIN TOTAL       0.7  Comment: For infants and newborns, interpretation of results should be based   on gestational age, weight and in agreement with clinical   observations.    Premature Infant recommended reference ranges:   0-24 hours:  <8.0 mg/dL   24-48 hours: <12.0 mg/dL   3-5 days:    <15.0 mg/dL   6-29 days:   <15.0 mg/dL         BUN       46         Calcium       8.6         Chloride       114         CO2       19         Creatinine       1.6         eGFR       44  Comment: Estimated GFR calculated using the CKD-EPI creatinine (2021) equation.         Eos #       0.04         Eos %       0.5         Glucose       138         Gran # (ANC)       5.82         Hematocrit       30.4         Hemoglobin       10.1         Immature Grans (Abs)       0.04  Comment: Mild elevation in immature granulocytes is non specific and can be seen in a variety of conditions including stress response, acute inflammation, trauma and pregnancy. Correlation with other laboratory and  clinical findings is essential.         Immature Granulocytes       0.5         INR 1.1  Comment: Coumadin Therapy:    2.0 - 3.0 for INR for all indicators except mechanical heart valves    and antiphospholipid syndromes which should use 2.5 - 3.5.               Lactic Acid Level               Lymph #       0.94         Lymph %       12.4         MCH       34.6         MCHC       33.2         MCV       104         Mono #       0.73         Mono %       9.6         MPV       10.3         Neut %       76.7         nRBC       0         Platelet Count       88         POCT Glucose     134     143       Potassium       4.3         PROTEIN TOTAL       5.5         PT 13.0               RBC       2.92         RDW       13.2         Sodium       138         WBC       7.59                                Significant Imaging:   CT Renal Stone Study Abd Pelvis WO   Final Result      Bilateral nonobstructive nephrolithiasis.      Indeterminate low-density lesion in the right hepatic dome measuring 5.5 cm and 1.3 cm low-density lesion in the right kidney.  Recommend dedicated liver mass protocol CT with and without intravenous contrast for further evaluation.      Small bilateral pleural effusions.      All CT scans at [this location] are performed using dose modulation techniques as appropriate to a performed exam including the following: automated exposure control; adjustment of the mA and/or kV according to patient size (this includes techniques or standardized protocols for targeted exams where dose is matched to indication / reason for exam; i.e. extremities or head); use of iterative reconstruction technique.      RADIOLOGIST:   LESLEY Wilson M.D.      Finalized on: 4/5/2025 8:16 PM By:  LESLEY Wilson MD, MD   [facility]# [id number]      2025-04-05 20:18:14.216     [facility]      CT Head Without Contrast   Final Result      No acute intracranial abnormality.  Chronic intracranial changes.  Opacification of the right maxillary sinus.       All CT scans at this facility use dose modulation, iterative reconstructions, and/or weight base dosing when appropriate to reduce radiation dose to as low as reasonably achievable.         Electronically signed by: Norma Knight MD   Date:    04/04/2025   Time:    13:04      X-Ray Chest AP Portable   Final Result      Clear lungs.         Electronically signed by: Norma Knight MD   Date:    04/04/2025   Time:    11:58      US Retroperitoneal Complete    (Results Pending)          Assessment & Plan  Encephalopathy, metabolic  Encephalopathy likely metabolic secondary to infection   Continue to treat UTI - continue Rocephin  Elevated ammonia - continue lactulose  Urology consulted due to recurrent UTIs.  Case dw Dr. Cruz.  Renal stones noted, but nonobstructing.  Will need outpatient intervention.  Diabetes  Patient's FSGs are controlled on current medication regimen.  Last A1c reviewed-   Lab Results   Component Value Date    HGBA1C 5.6 03/28/2025     Most recent fingerstick glucose reviewed-   Recent Labs   Lab 04/05/25  1804 04/05/25  2032 04/06/25  0608 04/06/25  1218   POCTGLUCOSE 135* 143* 134* 107     Current correctional scale  Low  Maintain anti-hyperglycemic dose as follows-   Antihyperglycemics (From admission, onward)      Start     Stop Route Frequency Ordered    04/04/25 1547  insulin aspart U-100 pen 0-5 Units         -- SubQ Before meals & nightly PRN 04/04/25 1447          Hold Oral hypoglycemics while patient is in the hospital.  Chronic atrial fibrillation  Patient has paroxysmal (<7 days) atrial fibrillation. Patient is currently in sinus rhythm. [id number] Score: 3. The patients heart rate in the last 24 hours is as follows:  Pulse  Min: 58  Max: 63     Antiarrhythmics  metoprolol succinate (TOPROL-XL) 24 hr split tablet 12.5 mg, Daily, Oral    Anticoagulants  apixaban tablet 5 mg, 2 times daily, Oral    Plan  - Replete lytes with a goal of K>4, Mg >2  - Patient is anticoagulated, see  medications listed above.  - Patient's afib is currently controlled  -       Chronic diastolic heart failure  Patient appears euvolemic   Will hold diuretics for now    Cirrhosis of liver without ascites  Continue lactulose  Sepsis  This patient does have evidence of infective focus  My overall impression is sepsis.  Source: Urinary Tract  Antibiotics given-   Antibiotics (72h ago, onward)      Start     Stop Route Frequency Ordered    04/06/25 2100  rifAXIMin tablet 550 mg         -- Oral 2 times daily 04/06/25 1229    04/05/25 2000  ceFEPIme injection 2 g         -- IV Every 12 hours (non-standard times) 04/05/25 1856          Latest lactate reviewed-  Recent Labs   Lab 04/05/25  1642   LACTATE 1.7     Organ dysfunction indicated by Encephalopathy    1L fluid bolus given, hx of chf    Post- resuscitation assessment Yes - I attest a sepsis perfusion exam was performed within 6 hours of sepsis, severe sepsis, or septic shock presentation, following fluid resuscitation.      Will Not start Pressors- Levophed for MAP of 65  Source control achieved by: rocephin    Urine and blood cultures pending  Bacteremia  -continue Cefepime  -infectious disease consulted  -echo here is low normal systolic function with a visually estimated ejection fraction of 50 - 55%. There is diastolic dysfunction but grade cannot be determined.   -sensitivity and specificity pending, hx of Klebsiella    VTE Risk Mitigation (From admission, onward)           Ordered     apixaban tablet 5 mg  2 times daily         04/04/25 1441                    Discharge Planning   KAYA:      Code Status: Full Code   Medical Readiness for Discharge Date:   Discharge Plan A: Home with family                        Ganga Castillo MD  Department of Hospital Medicine   [facility] - Med Surg

## 2025-04-06 NOTE — ASSESSMENT & PLAN NOTE
Encephalopathy likely metabolic secondary to infection   Continue to treat UTI - continue Rocephin  Elevated ammonia - continue lactulose  Urology consulted due to recurrent UTIs.  Case dw Dr. Cruz.  Renal stones noted, but nonobstructing.  Will need outpatient intervention.

## 2025-04-06 NOTE — ASSESSMENT & PLAN NOTE
Patient's FSGs are controlled on current medication regimen.  Last A1c reviewed-   Lab Results   Component Value Date    HGBA1C 5.6 03/28/2025     Most recent fingerstick glucose reviewed-   Recent Labs   Lab 04/05/25  1804 04/05/25 2032 04/06/25  0608 04/06/25  1218   POCTGLUCOSE 135* 143* 134* 107     Current correctional scale  Low  Maintain anti-hyperglycemic dose as follows-   Antihyperglycemics (From admission, onward)      Start     Stop Route Frequency Ordered    04/04/25 1547  insulin aspart U-100 pen 0-5 Units         -- SubQ Before meals & nightly PRN 04/04/25 1447          Hold Oral hypoglycemics while patient is in the hospital.

## 2025-04-06 NOTE — CONSULTS
Reason for consultation: recurrent UTI    Provider requesting consultation: Dr. Castillo    History of Present Illness:   Ramy Romo is a 79 y.o. male who presented to the ED with altered mental status. He has a PMHx significant for liver cirrhosis and CHF, as well as DM, A-fib, CVA and pacemaker. He reports that he has been to multiple hospitals and is frequently being treated for UTIs, but that he has also been told by his urologist that he has asymptomatic bacteruria and that is not the cause of his AMS. He denies any dysuria, gross hematuria, frequency, urgency weak stream or stranguria. He has a known history of non-obstructing renal stones. Upon review of the chart, he sees Dr. Mccracken at [facility]. He has multiple cultures growing Klebsiella aerogenes and sometimes also Enterococcus. Per the notes, his symptoms have improved in the past despite being treated with non-culture specific antibiotics.         Review of Systems   Constitutional:  Negative for chills and fever.   Cardiovascular:  Negative for chest pain.   Gastrointestinal:  Negative for abdominal pain.   Genitourinary:  Negative for difficulty urinating and hematuria.   All other systems reviewed and are negative.        Past Medical History:   Diagnosis Date    Atrial fibrillation     CHF (congestive heart failure)     Diabetes mellitus     Hypertension        Past Surgical History:   Procedure Laterality Date    A-V CARDIAC PACEMAKER INSERTION Left 1/25/2024    Procedure: INSERTION, CARDIAC PACEMAKER, DUAL CHAMBER/poss single lead vs His lead;  Surgeon: Anthony Coronado MD;  Location: [facility] CATH LAB;  Service: Cardiology;  Laterality: Left;  Bio/LBBB pacing    CHOLECYSTECTOMY      INSERTION, PACEMAKER, TEMPORARY TRANSVENOUS N/A 1/24/2024    Procedure: Insertion, Pacemaker, Temporary Transvenous;  Surgeon: Renaldo Santacruz MD;  Location: [facility] CATH LAB;  Service: Cardiology;  Laterality: N/A;    NECK SURGERY      TOTAL ELBOW  ARTHROPLASTY Right 03/2019       Family History   Problem Relation Name Age of Onset    Cataracts Father      Glaucoma Father         Social History[1]    Current Medications[2]    Review of patient's allergies indicates:   Allergen Reactions    Seroquel [quetiapine] Hallucinations     Per son patient had adverse reaction, became agitated and combating     Sulfa (sulfonamide antibiotics) Rash       Physical Exam  Vitals:    04/06/25 0817   BP: (!) 143/63   Pulse: (!) 59   Resp: 18   Temp: 97.9 °F (36.6 °C)       General: Well-developed, well-nourished in no acute distress  HEENT: Normocephalic, atraumatic, Extraocular movements intact  Neck: supple, trachea midline, no cervical or supraclavicular lymphadenopathy  Respirations: even and unlabored  Back: midline spine, no CVA tenderness  Abdomen: soft, Non-tender, non-distended, no organomegaly or palpable masses, no rebound or guarding  : circumcised male phallus without lesions, orthotopic urethral meatus, no inguinal hernia, no inguinal lymphadenopathy, no scrotal lesions, right testicle descended without mass or tenderness, 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 Draw prior to antibiotics.    Specimen: Peripheral, Antecubital, Left; Blood   Result Value Ref Range    Blood Culture Positive - Aerobic/Pediatric Bottle (A)     GRAM STAIN Gram Negative Rods (AA)     GRAM STAIN Gram Negative Rods (AA)    Blood culture x two cultures. Draw prior to antibiotics.    Specimen: Peripheral, Antecubital, Left; Blood   Result Value Ref Range    Blood Culture Positive - Aerobic/Pediatric Bottle  (A)     GRAM STAIN Gram Negative Rods (AA)    Influenza A & B by Molecular    Specimen: Nasopharyngeal Swab   Result Value Ref Range    INFLUENZA A MOLECULAR Negative Negative    INFLUENZA B MOLECULAR  Negative Negative   Urine culture    Specimen: Urine   Result Value Ref Range    Urine Culture >100,000 cfu/ml Gram-negative Rods (A)    Rapid Organism ID by PCR (from Blood culture)    Specimen: Peripheral, Antecubital, Left; Blood   Result Value Ref Range    Enterococcus faecalis Not Detected Not Detected    Enterococcus faecium Not Detected Not Detected    Listeria monocytogenes Not Detected Not Detected    Staphylococcus spp. Not Detected Not Detected    Staphylococcus aureus Not Detected Not Detected    Staphylococcus epidermidis Not Detected Not Detected    Staphylococcus lugdunensis Not Detected Not Detected    Streptococcus spp. Not Detected Not Detected    Streptococcus agalactiae (Group B) Not Detected Not Detected    Streptococcus pneumoniae Not Detected Not Detected    Streptococcus pyogenes (Group A) Not Detected Not Detected    Acinetobacter calcoaceticus/baumannii complex Not Detected Not Detected    Bacteroides fragilis Not Detected Not Detected    Enterobacterales Not Detected Not Detected    Enterobacter cloacae complex Not Detected Not Detected    Escherichia coli Not Detected Not Detected    Klebsiella aerogenes Not Detected Not Detected    Klebsiella oxytoca Not Detected Not Detected    Klebsiella pneumoniae group Not Detected Not Detected    Proteus spp. Not Detected Not Detected    Salmonella spp. Not Detected Not Detected    Serratia marcescens Not Detected Not Detected    Haemophilus influenzae Not Detected Not Detected    Neisseria meningitidis Not Detected Not Detected    Pseudomonas aeruginosa Not Detected Not Detected    Stenotrophomonas maltophilia Not Detected Not Detected    Candida albicans Not Detected Not Detected    Candida auris Not Detected Not Detected    Candida glabrata Not  Detected Not Detected    Candida krusei Not Detected Not Detected    Candida parapsilosis Not Detected Not Detected    Candida tropicalis Not Detected Not Detected    Cryptococcus neoformans/gattii Not Detected Not Detected    CTX-M (ESBL )      IMP (Cabapenemase )      KPC resistance gene (Carbapenemase )      mcr-1      mecA ID      mecA/C and MREJ (MRSA) gene      NDM (Carbapenemase )      OXA-48-like (Carbapenemase )      Ashly/B (VRE gene)      VIM (Carbapenemase )       Lab Results   Component Value Date    WBC 7.59 04/06/2025    HGB 10.1 (L) 04/06/2025    HCT 30.4 (L) 04/06/2025     (H) 04/06/2025    PLT 88 (L) 04/06/2025       BMP  Lab Results   Component Value Date     04/06/2025    K 4.3 04/06/2025     (H) 04/06/2025    CO2 19 (L) 04/06/2025    BUN 46 (H) 04/06/2025    CREATININE 1.6 (H) 04/06/2025    CALCIUM 8.6 (L) 04/06/2025    ANIONGAP 5 (L) 04/06/2025    EGFRNORACEVR 44 (L) 04/06/2025     CT scan dated 4/5/26 personally reviewed and agree with official read:   Results for orders placed or performed during the hospital encounter of 04/04/25 (from the past 2160 hours)   CT Renal Stone Study Abd Pelvis WO    Narrative    EXAM: CT RENAL STONE STUDY ABD PELVIS WO    CLINICAL HISTORY:  Nephrolithiasis.  Flank pain.    COMPARISON: None available.    TECHNIQUE: Axial CT imaging through the abdomen and pelvis performed without intravenous contrast are submitted for interpretation. Multiplanar reformats were performed and interpreted.      FINDINGS:    Small bilateral pleural effusions.    Nonobstructive bilateral lower pole renal calculi measuring up to 10 mm in the left and 7 mm in the right.  No ureteral calculi.  Mild perinephric fat stranding, symmetric.  1.3 cm low-density lesion arising exophytically from the lateral interpolar region of the kidney likely a complex cyst.    5.5 cm low-density lesion in the right hepatic parenchyma  indeterminate.    The spleen and pancreas have a normal non-contrasted appearance.    The adrenal glands are within normal limits.    The gallbladder has been removed.    No free intraperitoneal fluid or air.  The small bowel is normal.  Colonic diverticulosis.No abdominal lymphadenopathy.    Atheromatous calcified plaque involves the abdominal aorta and its branches without evidence of aneurysm.        Pelvis:    The prostate is unremarkable.  No free pelvic fluid or adenopathy.  Small fat-containing umbilical hernia. The urinary bladder is normal.    No significant osseous abnormality identified.          Impression    Bilateral nonobstructive nephrolithiasis.    Indeterminate low-density lesion in the right hepatic dome measuring 5.5 cm and 1.3 cm low-density lesion in the right kidney.  Recommend dedicated liver mass protocol CT with and without intravenous contrast for further evaluation.    Small bilateral pleural effusions.    All CT scans at [this location] are performed using dose modulation techniques as appropriate to a performed exam including the following: automated exposure control; adjustment of the mA and/or kV according to patient size (this includes techniques or standardized protocols for targeted exams where dose is matched to indication / reason for exam; i.e. extremities or head); use of iterative reconstruction technique.    RADIOLOGIST:  LESLEY Wilson M.D.    Finalized on: 4/5/2025 8:16 PM By:  LESLEY Wilson MD, MD  [facility]# [id number]      2025-04-05 20:18:14.216     [facility]   CT Head Without Contrast    Narrative    EXAMINATION:  CT HEAD WITHOUT CONTRAST    CLINICAL HISTORY:  Mental status change, unknown cause;    TECHNIQUE:  Noncontrast images were obtained    COMPARISON:  CT brain 03/26/2018    FINDINGS:  No intracranial acute hemorrhage or acute focal brain parenchymal abnormality is identified.  Atrophy is present.  Patchy periventricular white matter hypodensity secondary to chronic small  vessel ischemic changes.  Chronic encephalomalacia right frontal parietal lobe related to remote intracranial hemorrhage.    Calvarium is intact.  There is opacification of the right maxillary sinus.      Impression    No acute intracranial abnormality.  Chronic intracranial changes.  Opacification of the right maxillary sinus.    All CT scans at this facility use dose modulation, iterative reconstructions, and/or weight base dosing when appropriate to reduce radiation dose to as low as reasonably achievable.      Electronically signed by: Norma Knight MD  Date:    04/04/2025  Time:    13:04       Assessment:     Recurrent Klebsiella UTI  Gram negative roby bacteremia  Non-obstructing renal stones  Renal lesion  Liver cirrhosis with lesion on CT    Plan:    Antibiotic changed to cefepime based on past cultures.   Will need to follow up current blood and urine cultures  Will get renal US to further evaluate renal lesion seen on non-contrast CT  Will defer to primary team on further workup of liver lesion  Pt does have multiple non-obstructing renal stones and given his persistent Klebsiella UTIs, he may benefit from treatment of those stones once he is over this initial episode of bacteremia.   No surgical plans during this hospitalization.   Pt already is established with an outside urologist, Dr. Mccracken, and he may follow up with him as an outpatient, or me if he chooses.                          [1]   Social History  Tobacco Use    Smoking status: Never    Smokeless tobacco: Never   Substance Use Topics    Alcohol use: No   [2]   Current Facility-Administered Medications   Medication Dose Route Frequency Provider Last Rate Last Admin    acetaminophen tablet 650 mg  650 mg Oral Q6H PRN Crow Hurst MD   650 mg at 04/05/25 2036    apixaban tablet 5 mg  5 mg Oral BID Crow Hurst MD   5 mg at 04/06/25 0813    ceFEPIme injection 2 g  2 g Intravenous Q12H Ganga Castillo MD   2 g at 04/06/25 0812    dextrose 50% injection  12.5 g  12.5 g Intravenous PRN Crow Hurst MD        dextrose 50% injection 25 g  25 g Intravenous PRN Crow Hurst MD        ferrous gluconate tablet 324 mg  324 mg Oral Daily with breakfast Crow Hurst MD   324 mg at 04/06/25 0633    glucagon (human recombinant) injection 1 mg  1 mg Intramuscular PRN Crow Hurst MD        glucose chewable tablet 16 g  16 g Oral PRN Crow Hurst MD        glucose chewable tablet 24 g  24 g Oral PRN Crow Hurst MD        haloperidol lactate injection 5 mg  5 mg Intravenous Q6H PRN Crow Hurst MD        hydrALAZINE injection 10 mg  10 mg Intravenous Q6H PRN Crow Hurst MD        insulin aspart U-100 pen 0-5 Units  0-5 Units Subcutaneous QID (AC + HS) PRN Crow Hurst MD        lactulose 20 gram/30 mL solution Soln 30 g  30 g Oral TID Crow Hurst MD   30 g at 04/06/25 0812    metoprolol succinate (TOPROL-XL) 24 hr split tablet 12.5 mg  12.5 mg Oral Daily Crow Hurst MD   12.5 mg at 04/06/25 0813    ondansetron disintegrating tablet 4 mg  4 mg Oral Q6H PRN Crow Hurst MD        tamsulosin 24 hr capsule 0.4 mg  0.4 mg Oral Daily Crow Hurst MD   0.4 mg at 04/06/25 0812

## 2025-04-06 NOTE — ASSESSMENT & PLAN NOTE
-continue Cefepime  -infectious disease consulted  -echo here is low normal systolic function with a visually estimated ejection fraction of 50 - 55%. There is diastolic dysfunction but grade cannot be determined.   -sensitivity and specificity pending, hx of Klebsiella

## 2025-04-06 NOTE — ASSESSMENT & PLAN NOTE
Patient has paroxysmal (<7 days) atrial fibrillation. Patient is currently in sinus rhythm. [id number] Score: 3. The patients heart rate in the last 24 hours is as follows:  Pulse  Min: 58  Max: 63     Antiarrhythmics  metoprolol succinate (TOPROL-XL) 24 hr split tablet 12.5 mg, Daily, Oral    Anticoagulants  apixaban tablet 5 mg, 2 times daily, Oral    Plan  - Replete lytes with a goal of K>4, Mg >2  - Patient is anticoagulated, see medications listed above.  - Patient's afib is currently controlled  -

## 2025-04-06 NOTE — ASSESSMENT & PLAN NOTE
This patient does have evidence of infective focus  My overall impression is sepsis.  Source: Urinary Tract  Antibiotics given-   Antibiotics (72h ago, onward)      Start     Stop Route Frequency Ordered    04/06/25 2100  rifAXIMin tablet 550 mg         -- Oral 2 times daily 04/06/25 1229    04/05/25 2000  ceFEPIme injection 2 g         -- IV Every 12 hours (non-standard times) 04/05/25 1856          Latest lactate reviewed-  Recent Labs   Lab 04/05/25  1642   LACTATE 1.7     Organ dysfunction indicated by Encephalopathy    1L fluid bolus given, hx of chf    Post- resuscitation assessment Yes - I attest a sepsis perfusion exam was performed within 6 hours of sepsis, severe sepsis, or septic shock presentation, following fluid resuscitation.      Will Not start Pressors- Levophed for MAP of 65  Source control achieved by: rocephin    Urine and blood cultures pending

## 2025-04-06 NOTE — PLAN OF CARE
Discussed poc with pt, pt verbalized understanding    Purposeful rounding every 2hours    VS wnl  Cardiac monitoring in use, pt is aflutter, tele monitor # [id number]  Blood glucose monitoring   Fall precautions in place, remains injury free  Pt denies c/o pain and nausea     IVFs started today  Accurate I&Os  Abx given as prescribed  Bed locked at lowest position  Call light within reach    Chart check complete  Will cont with POC    Us retroperitoneal done today  Rifaximin added to regimen  Repeat Bcx done today

## 2025-04-06 NOTE — CARE UPDATE
Chart reviewed-  GNR bacteremia /UTI  Plan-  Will follow ID of GNR  On Cefepime  Full note to follow

## 2025-04-06 NOTE — SUBJECTIVE & OBJECTIVE
Interval History:  Follow up for altered mental status, UTI, hepatic encephalopathy.  Patient's mental status has improved although baseline is unclear.  Spoke at length with his son who states that at baseline patient is normally self reliant and does not exhibit signs of confusion.  Patient's ammonia level is noted to be elevated despite being on lactulose.  Patient also states that he does take Xifaxan med on outpatient basis and is followed by Dr. Mello for his liver function.  Case was discussed with Dr. Cruz, urology, who states that she did note renal stones that are nonobstructing and would eventually need to be addressed but nonemergent.  Spoke with patient's son regarding update.  He would like to follow up with Dr. Cruz upon discharge and therefore follow up information has been included.  He also understands that we will be holding off on the liver mass protocol CT due to renal function and may need to be followed up as an outpatient with Dr. Mello if renal function does not resolve.  He did confirm patient is on rifaximin and that has been resumed.    Review of Systems  Objective:     Vital Signs (Most Recent):  Temp: 97.9 °F (36.6 °C) (04/06/25 0817)  Pulse: 60 (04/06/25 0939)  Resp: 18 (04/06/25 0817)  BP: (!) 143/63 (04/06/25 0817)  SpO2: 99 % (04/06/25 0817) Vital Signs (24h Range):  Temp:  [97.7 °F (36.5 °C)-98.2 °F (36.8 °C)] 97.9 °F (36.6 °C)  Pulse:  [58-63] 60  Resp:  [18-20] 18  SpO2:  [94 %-99 %] 99 %  BP: (135-156)/(63-67) 143/63     Weight: 97.5 kg (215 lb)  Body mass index is 29.16 kg/m².    Intake/Output Summary (Last 24 hours) at 4/6/2025 1202  Last data filed at 4/6/2025 0815  Gross per 24 hour   Intake 120 ml   Output 550 ml   Net -430 ml         Physical Exam  Vitals and nursing note reviewed.   Constitutional:       Appearance: Normal appearance.   HENT:      Head: Normocephalic and atraumatic.      Nose: Nose normal.      Mouth/Throat:      Mouth: Mucous membranes are moist.    Eyes:      Extraocular Movements: Extraocular movements intact.      Conjunctiva/sclera: Conjunctivae normal.   Cardiovascular:      Rate and Rhythm: Normal rate and regular rhythm.      Pulses: Normal pulses.      Heart sounds: Normal heart sounds.   Pulmonary:      Effort: Pulmonary effort is normal.      Breath sounds: Normal breath sounds.   Abdominal:      General: Abdomen is flat. Bowel sounds are normal.      Palpations: Abdomen is soft.   Musculoskeletal:         General: Normal range of motion.      Cervical back: Normal range of motion and neck supple.   Skin:     General: Skin is warm.      Capillary Refill: Capillary refill takes less than 2 seconds.   Neurological:      General: No focal deficit present.      Mental Status: He is alert.   Psychiatric:         Mood and Affect: Mood normal.         Behavior: Behavior normal.               Significant Labs: All pertinent labs within the past 24 hours have been reviewed.  Recent Lab Results  (Last 5 results in the past 24 hours)        04/06/25  0825   04/06/25  0650   04/06/25  0608   04/06/25  0448   04/05/25 2032        Albumin       2.5         ALP       70         ALT       25         Ammonia   111             Anion Gap       5         AST       36         Baso #       0.02         Basophil %       0.3         Bilirubin Direct       0.4         BILIRUBIN TOTAL       0.7  Comment: For infants and newborns, interpretation of results should be based   on gestational age, weight and in agreement with clinical   observations.    Premature Infant recommended reference ranges:   0-24 hours:  <8.0 mg/dL   24-48 hours: <12.0 mg/dL   3-5 days:    <15.0 mg/dL   6-29 days:   <15.0 mg/dL         BUN       46         Calcium       8.6         Chloride       114         CO2       19         Creatinine       1.6         eGFR       44  Comment: Estimated GFR calculated using the CKD-EPI creatinine (2021) equation.         Eos #       0.04         Eos %       0.5          Glucose       138         Gran # (ANC)       5.82         Hematocrit       30.4         Hemoglobin       10.1         Immature Grans (Abs)       0.04  Comment: Mild elevation in immature granulocytes is non specific and can be seen in a variety of conditions including stress response, acute inflammation, trauma and pregnancy. Correlation with other laboratory and clinical findings is essential.         Immature Granulocytes       0.5         INR 1.1  Comment: Coumadin Therapy:    2.0 - 3.0 for INR for all indicators except mechanical heart valves    and antiphospholipid syndromes which should use 2.5 - 3.5.               Lactic Acid Level               Lymph #       0.94         Lymph %       12.4         MCH       34.6         MCHC       33.2         MCV       104         Mono #       0.73         Mono %       9.6         MPV       10.3         Neut %       76.7         nRBC       0         Platelet Count       88         POCT Glucose     134     143       Potassium       4.3         PROTEIN TOTAL       5.5         PT 13.0               RBC       2.92         RDW       13.2         Sodium       138         WBC       7.59                                Significant Imaging:   CT Renal Stone Study Abd Pelvis WO   Final Result      Bilateral nonobstructive nephrolithiasis.      Indeterminate low-density lesion in the right hepatic dome measuring 5.5 cm and 1.3 cm low-density lesion in the right kidney.  Recommend dedicated liver mass protocol CT with and without intravenous contrast for further evaluation.      Small bilateral pleural effusions.      All CT scans at [this location] are performed using dose modulation techniques as appropriate to a performed exam including the following: automated exposure control; adjustment of the mA and/or kV according to patient size (this includes techniques or standardized protocols for targeted exams where dose is matched to indication / reason for exam; i.e. extremities or  head); use of iterative reconstruction technique.      RADIOLOGIST:   LESLEY Wilson M.D.      Finalized on: 4/5/2025 8:16 PM By:  LESLEY Wilson MD, MD   [facility]# [id number]      2025-04-05 20:18:14.216     [facility]      CT Head Without Contrast   Final Result      No acute intracranial abnormality.  Chronic intracranial changes.  Opacification of the right maxillary sinus.      All CT scans at this facility use dose modulation, iterative reconstructions, and/or weight base dosing when appropriate to reduce radiation dose to as low as reasonably achievable.         Electronically signed by: Norma Knight MD   Date:    04/04/2025   Time:    13:04      X-Ray Chest AP Portable   Final Result      Clear lungs.         Electronically signed by: Norma Knight MD   Date:    04/04/2025   Time:    11:58      US Retroperitoneal Complete    (Results Pending)

## 2025-04-07 LAB
ABSOLUTE EOSINOPHIL (OHS): 0.28 K/UL
ABSOLUTE MONOCYTE (OHS): 0.79 K/UL (ref 0.3–1)
ABSOLUTE NEUTROPHIL COUNT (OHS): 3.77 K/UL (ref 1.8–7.7)
ACB COMPLEX DNA BLD POS QL NAA+NON-PROBE: NOT DETECTED
ANION GAP (OHS): 5 MMOL/L (ref 8–16)
B FRAGILIS DNA BLD POS QL NAA+PROBE: NOT DETECTED
BACTERIA BLD CULT: ABNORMAL
BACTERIA UR CULT: ABNORMAL
BASOPHILS # BLD AUTO: 0.02 K/UL
BASOPHILS NFR BLD AUTO: 0.3 %
BUN SERPL-MCNC: 38 MG/DL (ref 8–23)
C ALBICANS DNA BLD POS QL NAA+PROBE: NOT DETECTED
C AURIS DNA BLD POS QL NAA+NON-PROBE: NOT DETECTED
C GATTII+NEOFOR DNA CSF QL NAA+NON-PROBE: NOT DETECTED
C GLABRATA DNA BLD POS QL NAA+PROBE: NOT DETECTED
C KRUSEI DNA BLD POS QL NAA+PROBE: NOT DETECTED
C PARAP DNA BLD POS QL NAA+PROBE: NOT DETECTED
C TROPICLS DNA BLD POS QL NAA+PROBE: NOT DETECTED
CALCIUM SERPL-MCNC: 7.9 MG/DL (ref 8.7–10.5)
CHLORIDE SERPL-SCNC: 113 MMOL/L (ref 95–110)
CO2 SERPL-SCNC: 19 MMOL/L (ref 23–29)
COLISTIN RES MCR-1 ISLT/SPM QL: NORMAL
CREAT SERPL-MCNC: 1.3 MG/DL (ref 0.5–1.4)
CREAT SERPL-MCNC: 1.3 MG/DL (ref 0.5–1.4)
E CLOAC COMP DNA BLD POS QL NAA+PROBE: NOT DETECTED
E COLI DNA BLD POS QL NAA+PROBE: NOT DETECTED
E FAECALIS+OTHR E SP RRNA BLD POS FISH: NOT DETECTED
E FAECIUM HSP60 BLD POS QL PROBE: NOT DETECTED
ENTEROBACTERALES DNA BLD POS NAA+N-PRB: NOT DETECTED
ERYTHROCYTE [DISTWIDTH] IN BLOOD BY AUTOMATED COUNT: 12.9 % (ref 11.5–14.5)
ESBL CFT TO CFT-CLAV IC RTO BD POS IMP: NORMAL
GFR SERPLBLD CREATININE-BSD FMLA CKD-EPI: 56 ML/MIN/1.73/M2
GFR SERPLBLD CREATININE-BSD FMLA CKD-EPI: 56 ML/MIN/1.73/M2
GLUCOSE SERPL-MCNC: 108 MG/DL (ref 70–110)
GP B STREP DNA CSF QL NAA+NON-PROBE: NOT DETECTED
GRAM STN SPEC: ABNORMAL
HAEM INFLU DNA CSF QL NAA+NON-PROBE: NOT DETECTED
HCT VFR BLD AUTO: 27.4 % (ref 40–54)
HGB BLD-MCNC: 9.4 GM/DL (ref 14–18)
IMM GRANULOCYTES # BLD AUTO: 0.01 K/UL (ref 0–0.04)
IMM GRANULOCYTES NFR BLD AUTO: 0.2 % (ref 0–0.5)
IMP CARBAPENEMASE ISLT QL IA.RAPID: NORMAL
K OXYTOCA OMPA BLD POS QL PROBE: NOT DETECTED
KLEBSIELLA SP DNA BLD POS QL NAA+NON-PRB: NOT DETECTED
KLEBSIELLA SP DNA BLD POS QL NAA+NON-PRB: NOT DETECTED
KPC CARBAPENEMASE ISLT QL IA.RAPID: NORMAL
L MONOCYTOG DNA CSF QL NAA+NON-PROBE: NOT DETECTED
LYMPHOCYTES # BLD AUTO: 1.3 K/UL (ref 1–4.8)
MCH RBC QN AUTO: 34.6 PG (ref 27–31)
MCHC RBC AUTO-ENTMCNC: 34.3 G/DL (ref 32–36)
MCV RBC AUTO: 101 FL (ref 82–98)
MECA+MECC NOSE QL NAA+PROBE: NORMAL
MECA+MECC+MREJ ISLT/SPM QL: NORMAL
N MEN DNA CSF QL NAA+NON-PROBE: NOT DETECTED
NDM CARBAPENEMASE ISLT QL IA.RAPID: NORMAL
NUCLEATED RBC (/100WBC) (OHS): 0 /100 WBC
OXA-48-LIKE CRBPNASE ISLT QL IA.RAPID: NORMAL
P AERUGINOSA DNA BLD POS QL NAA+PROBE: NOT DETECTED
PLATELET # BLD AUTO: 92 K/UL (ref 150–450)
PMV BLD AUTO: 10.3 FL (ref 9.2–12.9)
POCT GLUCOSE: 113 MG/DL (ref 70–110)
POCT GLUCOSE: 122 MG/DL (ref 70–110)
POCT GLUCOSE: 132 MG/DL (ref 70–110)
POCT GLUCOSE: 142 MG/DL (ref 70–110)
POTASSIUM SERPL-SCNC: 4.1 MMOL/L (ref 3.5–5.1)
PROTEUS SP DNA BLD POS QL NAA+PROBE: NOT DETECTED
RBC # BLD AUTO: 2.72 M/UL (ref 4.6–6.2)
RELATIVE EOSINOPHIL (OHS): 4.5 %
RELATIVE LYMPHOCYTE (OHS): 21.1 % (ref 18–48)
RELATIVE MONOCYTE (OHS): 12.8 % (ref 4–15)
RELATIVE NEUTROPHIL (OHS): 61.1 % (ref 38–73)
S AUREUS DNA BLD POS QL NAA+PROBE: NOT DETECTED
S ENT+BONG DNA STL QL NAA+NON-PROBE: NOT DETECTED
S EPIDERMIDIS HSP60 BLD POS QL PROBE: NOT DETECTED
S LUGDUNENSIS SODA BLD POS QL PROBE: NOT DETECTED
S MALTOPH DNA BLD POS QL NAA+PROBE: NOT DETECTED
S MARCESCENS DNA BLD POS QL NAA+PROBE: NOT DETECTED
S PNEUM DNA CSF QL NAA+NON-PROBE: NOT DETECTED
S PYOGENES HSP60 BLD POS QL PROBE: NOT DETECTED
SODIUM SERPL-SCNC: 137 MMOL/L (ref 136–145)
STAPH SP TUF BLD POS QL PROBE: NOT DETECTED
STREPTOCOCCUS SP TUF BLD POS QL PROBE: NOT DETECTED
VAN(A+B+C1+C2) GENES ISLT/SPM: NORMAL
VIM CARBAPENEMASE ISLT QL IA.RAPID: NORMAL
WBC # BLD AUTO: 6.17 K/UL (ref 3.9–12.7)

## 2025-04-07 PROCEDURE — 80048 BASIC METABOLIC PNL TOTAL CA: CPT | Performed by: FAMILY MEDICINE

## 2025-04-07 PROCEDURE — 63600175 PHARM REV CODE 636 W HCPCS: Performed by: FAMILY MEDICINE

## 2025-04-07 PROCEDURE — 82565 ASSAY OF CREATININE: CPT | Performed by: FAMILY MEDICINE

## 2025-04-07 PROCEDURE — 85025 COMPLETE CBC W/AUTO DIFF WBC: CPT | Performed by: FAMILY MEDICINE

## 2025-04-07 PROCEDURE — 36415 COLL VENOUS BLD VENIPUNCTURE: CPT | Performed by: FAMILY MEDICINE

## 2025-04-07 PROCEDURE — 25500020 PHARM REV CODE 255: Performed by: FAMILY MEDICINE

## 2025-04-07 PROCEDURE — 63600175 PHARM REV CODE 636 W HCPCS: Performed by: INTERNAL MEDICINE

## 2025-04-07 PROCEDURE — 25000003 PHARM REV CODE 250: Performed by: FAMILY MEDICINE

## 2025-04-07 PROCEDURE — 21400001 HC TELEMETRY ROOM

## 2025-04-07 RX ORDER — MEROPENEM 1 G/1
1 INJECTION, POWDER, FOR SOLUTION INTRAVENOUS
Status: DISCONTINUED | OUTPATIENT
Start: 2025-04-07 | End: 2025-04-08

## 2025-04-07 RX ADMIN — METOPROLOL SUCCINATE 12.5 MG: 25 TABLET, EXTENDED RELEASE ORAL at 10:04

## 2025-04-07 RX ADMIN — CEFEPIME 2 G: 2 INJECTION, POWDER, FOR SOLUTION INTRAVENOUS at 10:04

## 2025-04-07 RX ADMIN — APIXABAN 5 MG: 2.5 TABLET, FILM COATED ORAL at 10:04

## 2025-04-07 RX ADMIN — LACTULOSE 30 G: 20 SOLUTION ORAL at 03:04

## 2025-04-07 RX ADMIN — IOHEXOL 100 ML: 350 INJECTION, SOLUTION INTRAVENOUS at 05:04

## 2025-04-07 RX ADMIN — Medication 324 MG: at 10:04

## 2025-04-07 RX ADMIN — MEROPENEM 1 G: 1 INJECTION INTRAVENOUS at 03:04

## 2025-04-07 RX ADMIN — TAMSULOSIN HYDROCHLORIDE 0.4 MG: 0.4 CAPSULE ORAL at 10:04

## 2025-04-07 RX ADMIN — RIFAXIMIN 550 MG: 550 TABLET ORAL at 10:04

## 2025-04-07 RX ADMIN — LACTULOSE 30 G: 20 SOLUTION ORAL at 10:04

## 2025-04-07 RX ADMIN — LACTULOSE 30 G: 20 SOLUTION ORAL at 09:04

## 2025-04-07 RX ADMIN — SODIUM CHLORIDE: 9 INJECTION, SOLUTION INTRAVENOUS at 09:04

## 2025-04-07 RX ADMIN — RIFAXIMIN 550 MG: 550 TABLET ORAL at 09:04

## 2025-04-07 RX ADMIN — APIXABAN 5 MG: 2.5 TABLET, FILM COATED ORAL at 09:04

## 2025-04-07 RX ADMIN — SODIUM CHLORIDE: 9 INJECTION, SOLUTION INTRAVENOUS at 12:04

## 2025-04-07 NOTE — ASSESSMENT & PLAN NOTE
-Cefepime changed to Merrem based on sensitivities  -infectious disease consulted  -echo here is low normal systolic function with a visually estimated ejection fraction of 50 - 55%. There is diastolic dysfunction but grade cannot be determined.   -urine cultures:  Klebsiella aerogenes

## 2025-04-07 NOTE — ASSESSMENT & PLAN NOTE
Patient's FSGs are controlled on current medication regimen.  Last A1c reviewed-   Lab Results   Component Value Date    HGBA1C 5.6 03/28/2025     Most recent fingerstick glucose reviewed-   Recent Labs   Lab 04/06/25 2013 04/07/25  0517 04/07/25  1300   POCTGLUCOSE 135* 113* 132*     Current correctional scale  Low  Maintain anti-hyperglycemic dose as follows-   Antihyperglycemics (From admission, onward)      Start     Stop Route Frequency Ordered    04/04/25 1547  insulin aspart U-100 pen 0-5 Units         -- SubQ Before meals & nightly PRN 04/04/25 1447          Hold Oral hypoglycemics while patient is in the hospital.

## 2025-04-07 NOTE — SUBJECTIVE & OBJECTIVE
Interval History:  Follow up for Klebsiella UTI and bacteremia.  Patient has been transitioned to [place].  Mental status is back at baseline.  PT/OT has been consulted for evaluation.  Patient is also receiving a CT abdomen with and without contrast with liver mass protocol per recommendations from CT abdomen done on admit.  Patient does follow with Dr. Mello as an outpatient for his liver disease and has been encouraged to continue following upon discharge.    Review of Systems  Objective:     Vital Signs (Most Recent):  Temp: 98.6 °F (37 °C) (04/07/25 1633)  Pulse: (!) 58 (04/07/25 1700)  Resp: 20 (04/07/25 1633)  BP: (!) 174/78 (04/07/25 1633)  SpO2: 97 % (04/07/25 1633) Vital Signs (24h Range):  Temp:  [98.2 °F (36.8 °C)-98.7 °F (37.1 °C)] 98.6 °F (37 °C)  Pulse:  [58-66] 58  Resp:  [18-20] 20  SpO2:  [95 %-99 %] 97 %  BP: (136-174)/(67-78) 174/78     Weight: 97.5 kg (215 lb)  Body mass index is 29.16 kg/m².    Intake/Output Summary (Last 24 hours) at 4/7/2025 1722  Last data filed at 4/7/2025 0551  Gross per 24 hour   Intake 741.32 ml   Output 1200 ml   Net -458.68 ml         Physical Exam  Vitals and nursing note reviewed.   Constitutional:       Appearance: Normal appearance.   HENT:      Head: Normocephalic and atraumatic.      Nose: Nose normal.      Mouth/Throat:      Mouth: Mucous membranes are moist.   Eyes:      Extraocular Movements: Extraocular movements intact.      Conjunctiva/sclera: Conjunctivae normal.   Cardiovascular:      Rate and Rhythm: Normal rate and regular rhythm.      Pulses: Normal pulses.      Heart sounds: Normal heart sounds.   Pulmonary:      Effort: Pulmonary effort is normal.      Breath sounds: Normal breath sounds.   Abdominal:      General: Abdomen is flat. Bowel sounds are normal.      Palpations: Abdomen is soft.   Musculoskeletal:         General: Normal range of motion.      Cervical back: Normal range of motion and neck supple.   Skin:     General: Skin is warm.       Capillary Refill: Capillary refill takes less than 2 seconds.   Neurological:      Mental Status: He is alert and oriented to person, place, and time. Mental status is at baseline.   Psychiatric:         Mood and Affect: Mood normal.         Behavior: Behavior normal.               Significant Labs: All pertinent labs within the past 24 hours have been reviewed.  Recent Lab Results  (Last 5 results in the past 24 hours)        04/07/25  1416   04/07/25  1300   04/07/25  0535   04/07/25  0517   04/06/25 2013        Anion Gap     5           Baso #     0.02           Basophil %     0.3           BUN     38           Calcium     7.9           Chloride     113           CO2     19           Creatinine 1.3     1.3           eGFR 56  Comment: Estimated GFR calculated using the CKD-EPI creatinine (2021) equation.     56  Comment: Estimated GFR calculated using the CKD-EPI creatinine (2021) equation.           Eos #     0.28           Eos %     4.5           Glucose     108           Gran # (ANC)     3.77           Hematocrit     27.4           Hemoglobin     9.4           Immature Grans (Abs)     0.01  Comment: Mild elevation in immature granulocytes is non specific and can be seen in a variety of conditions including stress response, acute inflammation, trauma and pregnancy. Correlation with other laboratory and clinical findings is essential.           Immature Granulocytes     0.2           Lymph #     1.30           Lymph %     21.1           MCH     34.6           MCHC     34.3           MCV     101           Mono #     0.79           Mono %     12.8           MPV     10.3           Neut %     61.1           nRBC     0           Platelet Count     92           POCT Glucose   132     113   135       Potassium     4.1           RBC     2.72           RDW     12.9           Sodium     137           WBC     6.17                                  Significant Imaging:   US Retroperitoneal Complete   Final Result      1.   Bilateral nonobstructing renal stones measuring up to 8 mm.   2.  Bilateral simple appearing cysts measuring up to 1.3 cm on the right and 1.0 cm on the left.  The exophytic midpole left renal cyst corresponds to the hyperdense lesion seen on recent CT scan.   3.  Left effusion.   4.  Otherwise, unremarkable study.  Negative for hydronephrosis.      Finalized on: 4/6/2025 12:33 PM By:  Braden Meehan MD   [facility]# [id number]      2025-04-06 12:35:38.986     [facility]      CT Renal Stone Study Abd Pelvis WO   Final Result      Bilateral nonobstructive nephrolithiasis.      Indeterminate low-density lesion in the right hepatic dome measuring 5.5 cm and 1.3 cm low-density lesion in the right kidney.  Recommend dedicated liver mass protocol CT with and without intravenous contrast for further evaluation.      Small bilateral pleural effusions.      All CT scans at [this location] are performed using dose modulation techniques as appropriate to a performed exam including the following: automated exposure control; adjustment of the mA and/or kV according to patient size (this includes techniques or standardized protocols for targeted exams where dose is matched to indication / reason for exam; i.e. extremities or head); use of iterative reconstruction technique.      RADIOLOGIST:   LESLEY Wilson M.D.      Finalized on: 4/5/2025 8:16 PM By:  LESLEY Wilson MD, MD   [facility]# [id number]      2025-04-05 20:18:14.216     [facility]      CT Head Without Contrast   Final Result      No acute intracranial abnormality.  Chronic intracranial changes.  Opacification of the right maxillary sinus.      All CT scans at this facility use dose modulation, iterative reconstructions, and/or weight base dosing when appropriate to reduce radiation dose to as low as reasonably achievable.         Electronically signed by: Norma Knight MD   Date:    04/04/2025   Time:    13:04      X-Ray Chest AP Portable   Final Result      Clear lungs.         Electronically  signed by: Norma Knight MD   Date:    04/04/2025   Time:    11:58      CT Abdomen With Without Contrast    (Results Pending)

## 2025-04-07 NOTE — ASSESSMENT & PLAN NOTE
Encephalopathy likely metabolic secondary to infection   Continue to treat UTI - continue Rocephin  Elevated ammonia - continue lactulose  Urology consulted due to recurrent UTIs.  Case dw Dr. Cruz.  Renal stones noted, but nonobstructing.  Will need outpatient intervention.    Resolved

## 2025-04-07 NOTE — ASSESSMENT & PLAN NOTE
Patient has paroxysmal (<7 days) atrial fibrillation. Patient is currently in sinus rhythm. [id number] Score: 3. The patients heart rate in the last 24 hours is as follows:  Pulse  Min: 58  Max: 66     Antiarrhythmics  metoprolol succinate (TOPROL-XL) 24 hr split tablet 12.5 mg, Daily, Oral    Anticoagulants  apixaban tablet 5 mg, 2 times daily, Oral    Plan  - Replete lytes with a goal of K>4, Mg >2  - Patient is anticoagulated, see medications listed above.  - Patient's afib is currently controlled  -

## 2025-04-07 NOTE — PLAN OF CARE
Pt is stable. No Sx of acute distress  Denies any pain   NS at 75mL/hr  Cardiac monitor: [id number]; atrial flutter, wlily  Repeat Bcs drawn 4/6- continuing cefepime  Urology consulted   Blood glucose monitored    Chart orders reviewed. Bed in lowest position, wheels locked, call light within reach. Pt remains injury free. Will cont POC

## 2025-04-07 NOTE — PLAN OF CARE
Discussed poc with pt, pt verbalized understanding    Purposeful rounding every 2hours    VS wnl  Cardiac monitoring in use, pt is NSR/SB, tele monitor # [id number]  Blood glucose monitoring   Fall precautions in place, remains injury free    IVFs  Accurate I&Os  Abx given as prescribed  Bed locked at lowest position  Call light within reach    Chart check complete  Will cont with POC

## 2025-04-07 NOTE — CONSULTS
[facility] - Med Surg  Wound Care    Patient Name:  aRmy Romo   MRN:  [id number]  Date: 4/7/2025  Diagnosis: Encephalopathy, metabolic    History:     Past Medical History:   Diagnosis Date    Atrial fibrillation     CHF (congestive heart failure)     Diabetes mellitus     Hypertension        Social History[1]    Precautions:     Allergies as of 04/04/2025 - Reviewed 04/04/2025   Allergen Reaction Noted    Seroquel [quetiapine] Hallucinations 01/24/2024    Sulfa (sulfonamide antibiotics) Rash 05/05/2012       WO Assessment Details/Treatment     Consulted on this 80 y/o male patient for wound to right hip. Patient was admitted 4/4/25 for metabolic encephalopathy, PMH significant for DM, HTN, AFIB, stroke, pacemaker, liver cirrhosis and CHF diastolic.     Patient resting in bed, awake and alert. Caregiver present at bedside. Wound care nurse gave introduction and reason for visit, patient agreeable to assessment and treatment.   Skin assessed.     --Scattered intact ecchymosis noted to the BUE.   --Intertrigo with partial thickness breakdown noted to the abdominal fold mostly on the right side extending into the bilateral groin. Wound beds are moist pink/red with some bleeding noted. Cleansed gently with bath wipes. Patted dry. Applied zinc oxide moisture barrier paste. Recommend applying BID and PRN.   --Patient positioned onto left side, noted intact blanchable redness to the sacrum. Applied clear moisture barrier cream. Recommend turning every 2 hours.         --Foams peeled back from bilateral heels, noted intact blanchable redness to bilateral heels. Painted with cavilon. Reapplied bordered foam dressings. Applied heel offloading boots.    Recommend pressure injury preventions such as turning every 2 hours, heel offloading, and moisture management. Orders placed for continued wound care. Plan to F/U in a week.      04/07/25 0936   WOCN Assessment   WOCN Total Time (mins) 45   Visit Date 04/07/25   Visit Time 0936    Consult Type New   WOCN Speciality Wound   Wound moisture;At risk for pressure Injury   Intervention assessed;changed;applied;chart review;orders   Teaching on-going   Skin Interventions   Device Skin Pressure Protection positioning supports utilized   Pressure Reduction Devices positioning supports utilized   Pressure Reduction Techniques frequent weight shift encouraged;weight shift assistance provided   Skin Protection incontinence pads utilized   Positioning   Body Position left;side-lying   Head of Bed (HOB) Positioning HOB at 30 degrees   Positioning/Transfer Devices pillows;in use   Pressure Injury Prevention    Check Moisture Management Pad Done   Sacral Foam Dressing Patient incontinent, barrier cream applied   Heel protection technique Heel boot   Heel preventative measures Peel back dressing/boot, assess skin and reapply   Check Medical Devices Done        Wound 04/04/25 1600 Intertrigo Abdomen #1   Date First Assessed/Time First Assessed: 04/04/25 1600   Present on Original Admission: Yes  Primary Wound Type: Intertrigo  Location: (c) Abdomen  Wound Number: #1   Wound Image     Dressing Appearance Open to air   Drainage Amount Scant   Drainage Characteristics/Odor Sanguineous   Appearance Pink;Red;Moist;Bleeding   Tissue loss description Partial thickness   Red (%), Wound Tissue Color 100 %   Periwound Area Intact;Moist;Redness   Wound Edges Irregular   Care Cleansed with:;Soap and water   Dressing Applied;Other (comment)  (zinc oxide moisture barrier paste)   Periwound Care Moisture barrier applied   Dressing Change Due 04/07/25 04/07/2025         [1]   Social History  Socioeconomic History    Marital status:    Tobacco Use    Smoking status: Never    Smokeless tobacco: Never   Substance and Sexual Activity    Alcohol use: No     Social Drivers of Health     Financial Resource Strain: Patient Unable To Answer (4/6/2025)    Overall Financial Resource Strain (CARDIA)     Difficulty of  Paying Living Expenses: Patient unable to answer   Food Insecurity: Patient Unable To Answer (4/6/2025)    Hunger Vital Sign     Worried About Running Out of Food in the Last Year: Patient unable to answer     Ran Out of Food in the Last Year: Patient unable to answer   Transportation Needs: Patient Unable To Answer (4/5/2025)    PRAPARE - Transportation     Lack of Transportation (Medical): Patient unable to answer     Lack of Transportation (Non-Medical): Patient unable to answer   Physical Activity: Insufficiently Active (6/30/2021)    [facility]    Exercise Vital Sign     Days of Exercise per Week: 4 days     Minutes of Exercise per Session: 30 min   Stress: Patient Unable To Answer (4/6/2025)    [employer] [place] [employer] - Occupational Stress Questionnaire     Feeling of Stress : Patient unable to answer   Housing Stability: Patient Unable To Answer (4/6/2025)    Housing Stability Vital Sign     Unable to Pay for Housing in the Last Year: Patient unable to answer     Homeless in the Last Year: Patient unable to answer

## 2025-04-07 NOTE — ASSESSMENT & PLAN NOTE
This patient does have evidence of infective focus  My overall impression is sepsis.  Source: Urinary Tract  Antibiotics given-   Antibiotics (72h ago, onward)      Start     Stop Route Frequency Ordered    04/07/25 1545  meropenem injection 1 g         -- IV Every 8 hours (non-standard times) 04/07/25 1441    04/06/25 2100  rifAXIMin tablet 550 mg         -- Oral 2 times daily 04/06/25 1229          Latest lactate reviewed-  Recent Labs   Lab 04/05/25  1642   LACTATE 1.7     Organ dysfunction indicated by Encephalopathy    1L fluid bolus given, hx of chf    Post- resuscitation assessment Yes - I attest a sepsis perfusion exam was performed within 6 hours of sepsis, severe sepsis, or septic shock presentation, following fluid resuscitation.      Will Not start Pressors- Levophed for MAP of 65  Source control achieved by: rocephin    Urine and blood cultures pending

## 2025-04-07 NOTE — PROGRESS NOTES
[facility]  Progress Note    Patient Name: Ramy Romo  MRN: [id number]  Patient Class: IP- Inpatient   Admission Date: 4/4/2025  Length of Stay: 3 days  Attending Physician: Ganga Castillo MD  Primary Care Provider: Félix Rollins MD        Subjective     Principal Problem:Encephalopathy, metabolic        HPI:  Patient is a 79 y.o. male patient with a PMHx of DM, HTN, AFIB, stroke, pacemaker, liver cirrhosis and CHF diastolic who presents to the Emergency Department for evaluation of AMS which onset within the past 2 days.  Patient denies any fever or dysuria however does report chills.  Caregiver at bedside reports that mentation is not at baseline.  Of note patient does have a history of recurrent UTIs.  No other issues at this time.      In the ED, UA showed 78 WBC.  Patient is started on Rocephin and fluids.    Overview/Hospital Course:  Mr. Romo is a 71-year-old male presented with altered mental status secondary to UTI and elevated ammonia level.  Patient was also noted to have a mild elevation in bilirubin.  Discussion held with patient's son at bedside regarding recurrent admissions for urinary tract infection and bacteremia.  Blood cultures returned positive this afternoon in the anaerobic bottle but specificity and sensitivities pending.  Infectious Disease has been consulted and echo has been ordered.  Urology has also been consulted as son states that patient has been repeatedly [facility]  Patient's lactic acid on admission was 3.7 with repeat is 1.7.  Patient's renal function has returned to normal and the CT abdomen with and without contrast with liver mass protocol has been ordered.  Final urine cultures and blood cultures from admission has been positive for Klebsiella aerogenes.  Repeat blood cultures done on 04/06/2025 are negative at 24 hours.  Antibiotic has been changed to Merrem based on  sensitivities.    Interval History:  Follow up for Klebsiella UTI and bacteremia.  Patient has been transitioned to Merrem.  Mental status is back at baseline.  PT/OT has been consulted for evaluation.  Patient is also receiving a CT abdomen with and without contrast with liver mass protocol per recommendations from CT abdomen done on admit.  Patient does follow with Dr. Mello as an outpatient for his liver disease and has been encouraged to continue following upon discharge.    Review of Systems  Objective:     Vital Signs (Most Recent):  Temp: 98.6 °F (37 °C) (04/07/25 1633)  Pulse: (!) 58 (04/07/25 1700)  Resp: 20 (04/07/25 1633)  BP: (!) 174/78 (04/07/25 1633)  SpO2: 97 % (04/07/25 1633) Vital Signs (24h Range):  Temp:  [98.2 °F (36.8 °C)-98.7 °F (37.1 °C)] 98.6 °F (37 °C)  Pulse:  [58-66] 58  Resp:  [18-20] 20  SpO2:  [95 %-99 %] 97 %  BP: (136-174)/(67-78) 174/78     Weight: 97.5 kg (215 lb)  Body mass index is 29.16 kg/m².    Intake/Output Summary (Last 24 hours) at 4/7/2025 1722  Last data filed at 4/7/2025 0551  Gross per 24 hour   Intake 741.32 ml   Output 1200 ml   Net -458.68 ml         Physical Exam  Vitals and nursing note reviewed.   Constitutional:       Appearance: Normal appearance.   HENT:      Head: Normocephalic and atraumatic.      Nose: Nose normal.      Mouth/Throat:      Mouth: Mucous membranes are moist.   Eyes:      Extraocular Movements: Extraocular movements intact.      Conjunctiva/sclera: Conjunctivae normal.   Cardiovascular:      Rate and Rhythm: Normal rate and regular rhythm.      Pulses: Normal pulses.      Heart sounds: Normal heart sounds.   Pulmonary:      Effort: Pulmonary effort is normal.      Breath sounds: Normal breath sounds.   Abdominal:      General: Abdomen is flat. Bowel sounds are normal.      Palpations: Abdomen is soft.   Musculoskeletal:         General: Normal range of motion.      Cervical back: Normal range of motion and neck supple.   Skin:     General: Skin is  warm.      Capillary Refill: Capillary refill takes less than 2 seconds.   Neurological:      Mental Status: He is alert and oriented to person, place, and time. Mental status is at baseline.   Psychiatric:         Mood and Affect: Mood normal.         Behavior: Behavior normal.               Significant Labs: All pertinent labs within the past 24 hours have been reviewed.  Recent Lab Results  (Last 5 results in the past 24 hours)        04/07/25  1416   04/07/25  1300   04/07/25  0535   04/07/25  0517   04/06/25 2013        Anion Gap     5           Baso #     0.02           Basophil %     0.3           BUN     38           Calcium     7.9           Chloride     113           CO2     19           Creatinine 1.3     1.3           eGFR 56  Comment: Estimated GFR calculated using the CKD-EPI creatinine (2021) equation.     56  Comment: Estimated GFR calculated using the CKD-EPI creatinine (2021) equation.           Eos #     0.28           Eos %     4.5           Glucose     108           Gran # (ANC)     3.77           Hematocrit     27.4           Hemoglobin     9.4           Immature Grans (Abs)     0.01  Comment: Mild elevation in immature granulocytes is non specific and can be seen in a variety of conditions including stress response, acute inflammation, trauma and pregnancy. Correlation with other laboratory and clinical findings is essential.           Immature Granulocytes     0.2           Lymph #     1.30           Lymph %     21.1           MCH     34.6           MCHC     34.3           MCV     101           Mono #     0.79           Mono %     12.8           MPV     10.3           Neut %     61.1           nRBC     0           Platelet Count     92           POCT Glucose   132     113   135       Potassium     4.1           RBC     2.72           RDW     12.9           Sodium     137           WBC     6.17                                  Significant Imaging:   US Retroperitoneal Complete   Final Result       1.  Bilateral nonobstructing renal stones measuring up to 8 mm.   2.  Bilateral simple appearing cysts measuring up to 1.3 cm on the right and 1.0 cm on the left.  The exophytic midpole left renal cyst corresponds to the hyperdense lesion seen on recent CT scan.   3.  Left effusion.   4.  Otherwise, unremarkable study.  Negative for hydronephrosis.      Finalized on: 4/6/2025 12:33 PM By:  Braden Meehan MD   [facility]# [id number]      2025-04-06 12:35:38.986     [facility]      CT Renal Stone Study Abd Pelvis WO   Final Result      Bilateral nonobstructive nephrolithiasis.      Indeterminate low-density lesion in the right hepatic dome measuring 5.5 cm and 1.3 cm low-density lesion in the right kidney.  Recommend dedicated liver mass protocol CT with and without intravenous contrast for further evaluation.      Small bilateral pleural effusions.      All CT scans at [this location] are performed using dose modulation techniques as appropriate to a performed exam including the following: automated exposure control; adjustment of the mA and/or kV according to patient size (this includes techniques or standardized protocols for targeted exams where dose is matched to indication / reason for exam; i.e. extremities or head); use of iterative reconstruction technique.      RADIOLOGIST:   LESLEY Wilson M.D.      Finalized on: 4/5/2025 8:16 PM By:  LESLEY Wilson MD, MD   [facility]# [id number]      2025-04-05 20:18:14.216     [facility]      CT Head Without Contrast   Final Result      No acute intracranial abnormality.  Chronic intracranial changes.  Opacification of the right maxillary sinus.      All CT scans at this facility use dose modulation, iterative reconstructions, and/or weight base dosing when appropriate to reduce radiation dose to as low as reasonably achievable.         Electronically signed by: Norma Knight MD   Date:    04/04/2025   Time:    13:04      X-Ray Chest AP Portable   Final Result      Clear lungs.          Electronically signed by: Norma Knight MD   Date:    04/04/2025   Time:    11:58      CT Abdomen With Without Contrast    (Results Pending)          Assessment & Plan  Encephalopathy, metabolic  Encephalopathy likely metabolic secondary to infection   Continue to treat UTI - continue Rocephin  Elevated ammonia - continue lactulose  Urology consulted due to recurrent UTIs.  Case dw Dr. Cruz.  Renal stones noted, but nonobstructing.  Will need outpatient intervention.    Resolved  Diabetes  Patient's FSGs are controlled on current medication regimen.  Last A1c reviewed-   Lab Results   Component Value Date    HGBA1C 5.6 03/28/2025     Most recent fingerstick glucose reviewed-   Recent Labs   Lab 04/06/25 2013 04/07/25  0517 04/07/25  1300   POCTGLUCOSE 135* 113* 132*     Current correctional scale  Low  Maintain anti-hyperglycemic dose as follows-   Antihyperglycemics (From admission, onward)      Start     Stop Route Frequency Ordered    04/04/25 1547  insulin aspart U-100 pen 0-5 Units         -- SubQ Before meals & nightly PRN 04/04/25 1447          Hold Oral hypoglycemics while patient is in the hospital.  Chronic atrial fibrillation  Patient has paroxysmal (<7 days) atrial fibrillation. Patient is currently in sinus rhythm. [id number] Score: 3. The patients heart rate in the last 24 hours is as follows:  Pulse  Min: 58  Max: 66     Antiarrhythmics  metoprolol succinate (TOPROL-XL) 24 hr split tablet 12.5 mg, Daily, Oral    Anticoagulants  apixaban tablet 5 mg, 2 times daily, Oral    Plan  - Replete lytes with a goal of K>4, Mg >2  - Patient is anticoagulated, see medications listed above.  - Patient's afib is currently controlled  -       Chronic diastolic heart failure  Patient appears euvolemic   Will hold diuretics for now    Cirrhosis of liver without ascites  Continue lactulose  Sepsis  This patient does have evidence of infective focus  My overall impression is sepsis.  Source: Urinary  Tract  Antibiotics given-   Antibiotics (72h ago, onward)      Start     Stop Route Frequency Ordered    04/07/25 1545  meropenem injection 1 g         -- IV Every 8 hours (non-standard times) 04/07/25 1441    04/06/25 2100  rifAXIMin tablet 550 mg         -- Oral 2 times daily 04/06/25 1229          Latest lactate reviewed-  Recent Labs   Lab 04/05/25  1642   LACTATE 1.7     Organ dysfunction indicated by Encephalopathy    1L fluid bolus given, hx of chf    Post- resuscitation assessment Yes - I attest a sepsis perfusion exam was performed within 6 hours of sepsis, severe sepsis, or septic shock presentation, following fluid resuscitation.      Will Not start Pressors- Levophed for MAP of 65  Source control achieved by: rocephin    Urine and blood cultures pending  Bacteremia  -Cefepime changed to Merrem based on sensitivities  -infectious disease consulted  -echo here is low normal systolic function with a visually estimated ejection fraction of 50 - 55%. There is diastolic dysfunction but grade cannot be determined.   -urine cultures:  Klebsiella aerogenes    VTE Risk Mitigation (From admission, onward)           Ordered     apixaban tablet 5 mg  2 times daily         04/04/25 1441                    Discharge Planning   KAYA:      Code Status: Full Code   Medical Readiness for Discharge Date:   Discharge Plan A: Home with family                Please place Justification for DME        Ganga Castillo MD  Department of Hospital Medicine   [facility]

## 2025-04-08 VITALS
OXYGEN SATURATION: 97 % | TEMPERATURE: 98 F | BODY MASS INDEX: 29.12 KG/M2 | HEART RATE: 60 BPM | HEIGHT: 72 IN | RESPIRATION RATE: 18 BRPM | DIASTOLIC BLOOD PRESSURE: 70 MMHG | WEIGHT: 215 LBS | SYSTOLIC BLOOD PRESSURE: 154 MMHG

## 2025-04-08 LAB
ABSOLUTE EOSINOPHIL (OHS): 0.34 K/UL
ABSOLUTE MONOCYTE (OHS): 0.45 K/UL (ref 0.3–1)
ABSOLUTE NEUTROPHIL COUNT (OHS): 3.29 K/UL (ref 1.8–7.7)
ANION GAP (OHS): 5 MMOL/L (ref 8–16)
BASOPHILS # BLD AUTO: 0.04 K/UL
BASOPHILS NFR BLD AUTO: 0.7 %
BUN SERPL-MCNC: 27 MG/DL (ref 8–23)
CALCIUM SERPL-MCNC: 7.7 MG/DL (ref 8.7–10.5)
CHLORIDE SERPL-SCNC: 115 MMOL/L (ref 95–110)
CO2 SERPL-SCNC: 19 MMOL/L (ref 23–29)
CREAT SERPL-MCNC: 1.1 MG/DL (ref 0.5–1.4)
ERYTHROCYTE [DISTWIDTH] IN BLOOD BY AUTOMATED COUNT: 12.8 % (ref 11.5–14.5)
GFR SERPLBLD CREATININE-BSD FMLA CKD-EPI: >60 ML/MIN/1.73/M2
GLUCOSE SERPL-MCNC: 111 MG/DL (ref 70–110)
HCT VFR BLD AUTO: 29.5 % (ref 40–54)
HGB BLD-MCNC: 9.9 GM/DL (ref 14–18)
IMM GRANULOCYTES # BLD AUTO: 0.03 K/UL (ref 0–0.04)
IMM GRANULOCYTES NFR BLD AUTO: 0.6 % (ref 0–0.5)
LYMPHOCYTES # BLD AUTO: 1.2 K/UL (ref 1–4.8)
MCH RBC QN AUTO: 34.1 PG (ref 27–31)
MCHC RBC AUTO-ENTMCNC: 33.6 G/DL (ref 32–36)
MCV RBC AUTO: 102 FL (ref 82–98)
NUCLEATED RBC (/100WBC) (OHS): 0 /100 WBC
PLATELET # BLD AUTO: 92 K/UL (ref 150–450)
PMV BLD AUTO: 9.4 FL (ref 9.2–12.9)
POCT GLUCOSE: 121 MG/DL (ref 70–110)
POTASSIUM SERPL-SCNC: 4.4 MMOL/L (ref 3.5–5.1)
RBC # BLD AUTO: 2.9 M/UL (ref 4.6–6.2)
RELATIVE EOSINOPHIL (OHS): 6.4 %
RELATIVE LYMPHOCYTE (OHS): 22.4 % (ref 18–48)
RELATIVE MONOCYTE (OHS): 8.4 % (ref 4–15)
RELATIVE NEUTROPHIL (OHS): 61.5 % (ref 38–73)
SODIUM SERPL-SCNC: 139 MMOL/L (ref 136–145)
WBC # BLD AUTO: 5.35 K/UL (ref 3.9–12.7)

## 2025-04-08 PROCEDURE — C1751 CATH, INF, PER/CENT/MIDLINE: HCPCS

## 2025-04-08 PROCEDURE — 25000003 PHARM REV CODE 250: Performed by: FAMILY MEDICINE

## 2025-04-08 PROCEDURE — 97166 OT EVAL MOD COMPLEX 45 MIN: CPT

## 2025-04-08 PROCEDURE — 97530 THERAPEUTIC ACTIVITIES: CPT

## 2025-04-08 PROCEDURE — 63600175 PHARM REV CODE 636 W HCPCS: Performed by: INTERNAL MEDICINE

## 2025-04-08 PROCEDURE — 97161 PT EVAL LOW COMPLEX 20 MIN: CPT

## 2025-04-08 PROCEDURE — 36573 INSJ PICC RS&I 5 YR+: CPT

## 2025-04-08 PROCEDURE — 80048 BASIC METABOLIC PNL TOTAL CA: CPT | Performed by: FAMILY MEDICINE

## 2025-04-08 PROCEDURE — 02HV33Z INSERTION OF INFUSION DEVICE INTO SUPERIOR VENA CAVA, PERCUTANEOUS APPROACH: ICD-10-PCS | Performed by: FAMILY MEDICINE

## 2025-04-08 PROCEDURE — 85025 COMPLETE CBC W/AUTO DIFF WBC: CPT | Performed by: FAMILY MEDICINE

## 2025-04-08 PROCEDURE — 97116 GAIT TRAINING THERAPY: CPT

## 2025-04-08 PROCEDURE — 63600175 PHARM REV CODE 636 W HCPCS: Performed by: FAMILY MEDICINE

## 2025-04-08 PROCEDURE — 36415 COLL VENOUS BLD VENIPUNCTURE: CPT | Performed by: FAMILY MEDICINE

## 2025-04-08 RX ORDER — SODIUM CHLORIDE 0.9 % (FLUSH) 0.9 %
10 SYRINGE (ML) INJECTION EVERY 12 HOURS PRN
Status: DISCONTINUED | OUTPATIENT
Start: 2025-04-08 | End: 2025-04-08 | Stop reason: HOSPADM

## 2025-04-08 RX ORDER — MUPIROCIN 20 MG/G
OINTMENT TOPICAL 2 TIMES DAILY
Status: DISCONTINUED | OUTPATIENT
Start: 2025-04-08 | End: 2025-04-08 | Stop reason: HOSPADM

## 2025-04-08 RX ADMIN — ERTAPENEM 1 G: 1 INJECTION INTRAMUSCULAR; INTRAVENOUS at 12:04

## 2025-04-08 RX ADMIN — METOPROLOL SUCCINATE 12.5 MG: 25 TABLET, EXTENDED RELEASE ORAL at 09:04

## 2025-04-08 RX ADMIN — APIXABAN 5 MG: 2.5 TABLET, FILM COATED ORAL at 09:04

## 2025-04-08 RX ADMIN — TAMSULOSIN HYDROCHLORIDE 0.4 MG: 0.4 CAPSULE ORAL at 09:04

## 2025-04-08 RX ADMIN — Medication 324 MG: at 09:04

## 2025-04-08 RX ADMIN — LACTULOSE 30 G: 20 SOLUTION ORAL at 09:04

## 2025-04-08 RX ADMIN — RIFAXIMIN 550 MG: 550 TABLET ORAL at 09:04

## 2025-04-08 RX ADMIN — MEROPENEM 1 G: 1 INJECTION INTRAVENOUS at 12:04

## 2025-04-08 RX ADMIN — MEROPENEM 1 G: 1 INJECTION INTRAVENOUS at 09:04

## 2025-04-08 NOTE — PT/OT/SLP EVAL
Physical Therapy Evaluation    Patient Name:  Ramy Romo   MRN:  [id number]    Recommendations:     Discharge Recommendations: Low Intensity Therapy   Discharge Equipment Recommendations: none   Barriers to discharge: None    Assessment:     Ramy Romo is a 79 y.o. male admitted with a medical diagnosis of Encephalopathy, metabolic.  He presents with the following impairments/functional limitations: weakness, impaired endurance, impaired self care skills, impaired functional mobility, gait instability, decreased lower extremity function, decreased ROM, impaired balance .    Rehab Prognosis: Good; patient would benefit from acute skilled PT services to address these deficits and reach maximum level of function.    Recent Surgery: * No surgery found *      Plan:     During this hospitalization, patient to be seen 3 x/week to address the identified rehab impairments via gait training, therapeutic exercises, therapeutic activities and progress toward the following goals:    Plan of Care Expires:  04/22/25    Subjective     Chief Complaint: WEAKNESS   Patient/Family Comments/goals: GO HOME   Pain/Comfort:  Pain Rating 1: 0/10  Pain Rating Post-Intervention 1: 0/10    Patients cultural, spiritual, Hoahaoism conflicts given the current situation:      Living Environment:   PT LIVES AT HOME ALONE HOWEVER HAS 24/7 CAREGIVERS BETWEEN HIGHER ASSISTANCE AND SONS  Prior to admission, patients level of function was S WITH GT SHORT HH DISTANCES AND USE OF WC LONG DISTANCES .  Equipment used at home: walker, rolling, rollator, wheelchair, shower chair.  DME owned (not currently used): none.  Upon discharge, patient will have assistance from CAREGIVERS .    Objective:     Communicated with NURSE AND EPIC CHART REVIEW  prior to session.  Patient found supine with telemetry, peripheral IV  upon PT entry to room.    General Precautions: Standard, fall  Orthopedic Precautions:N/A   Braces: N/A  Respiratory Status: Room  "air    Exams:  Cognitive Exam:  Patient is oriented to Person, Place, Time, and Situation  RLE ROM: WFL  RLE Strength: WFL  LLE ROM: WFL  LLE Strength: WFL    Functional Mobility:  Bed Mobility:     Rolling Right: stand by assistance  Supine to Sit: stand by assistance  Transfers:     Sit to Stand:  contact guard assistance with rolling walker  Bed to Chair: contact guard assistance with  rolling walker  using  Stand Pivot  Gait: PT GT TRAINED X 40' WITH RW AND CGA       AM-PAC 6 CLICK MOBILITY  Total Score:18       Treatment & Education:  GT. BELT AND  SOCKS DONNED PRIOR TO OOB MOBILITY.   PT RETURNED TO RM T/F TO CHAIR WITH RW AND CGA. PT EDUCATED ON ROLE OF P.T. AND OOB TO CHAIR FOR ALL MEALS  PT EDUCATED ON "CALL DON'T FALL", ENCOURAGED TO CALL FOR ASSISTANCE WITH ALL NEEDS FOR OOB MOBILITY.      Patient left up in chair with call button in reach and chair alarm on.    GOALS:   Multidisciplinary Problems       Physical Therapy Goals          Problem: Physical Therapy    Goal Priority Disciplines Outcome Interventions   Physical Therapy Goal     PT, PT/OT     Description: LT25  1. PT WILL COMPLETE BED MOBILITY IND  2. PT WILL STAND T/F TO CHAIR WITH RW SHAILESH  3. PT WILL GT TRAIN X 50' WITH RW MOD I TO PROGRESS GT/ IND  4. PT WILL INC AMPAC SCORE BY 2 POINTS TO PROGRESS GROSS FUNC MOBILITY.                          DME Justifications:  No DME recommended requiring DME justifications    History:     Past Medical History:   Diagnosis Date    Atrial fibrillation     CHF (congestive heart failure)     Diabetes mellitus     Hypertension        Past Surgical History:   Procedure Laterality Date    A-V CARDIAC PACEMAKER INSERTION Left 2024    Procedure: INSERTION, CARDIAC PACEMAKER, DUAL CHAMBER/poss single lead vs His lead;  Surgeon: Anthony Coronado MD;  Location: [facility] CATH LAB;  Service: Cardiology;  Laterality: Left;  Bio/LBBB pacing    CHOLECYSTECTOMY      INSERTION, PACEMAKER, TEMPORARY " TRANSVENOUS N/A 1/24/2024    Procedure: Insertion, Pacemaker, Temporary Transvenous;  Surgeon: Renaldo Santacruz MD;  Location: [facility] CATH LAB;  Service: Cardiology;  Laterality: N/A;    NECK SURGERY      TOTAL ELBOW ARTHROPLASTY Right 03/2019       Time Tracking:     PT Received On: 04/08/25  PT Start Time: 0930     PT Stop Time: 0955  PT Total Time (min): 25 min     Billable Minutes: Evaluation 15 and Gait Training 10      04/08/2025

## 2025-04-08 NOTE — ASSESSMENT & PLAN NOTE
"This patient does have evidence of infective focus  My overall impression is sepsis.  Source: Urinary Tract  Antibiotics given-   Antibiotics (72h ago, onward)      None          Latest lactate reviewed-  No results for input(s): "LACTATE", "POCLAC" in the last 72 hours.    Organ dysfunction indicated by Encephalopathy    1L fluid bolus given, hx of chf    Post- resuscitation assessment Yes - I attest a sepsis perfusion exam was performed within 6 hours of sepsis, severe sepsis, or septic shock presentation, following fluid resuscitation.      Will Not start Pressors- Levophed for MAP of 65  Source control achieved by: rocephin    Urine and blood cultures pending  "

## 2025-04-08 NOTE — PLAN OF CARE
Pt being discharged Home in stable condition with home health. PICC line placed will go home with it to continue ABX. IV removed, catheter intact, pt tolerated well.  Discharge instructions given to pt and caregiver, pt and caregiver verbalized understanding.

## 2025-04-08 NOTE — PROCEDURES
Ramy Romo is a 79 y.o. male patient.    Temp: 97.9 °F (36.6 °C) (04/08/25 1134)  Pulse: 60 (04/08/25 1134)  Resp: 18 (04/08/25 1134)  BP: (!) 154/70 (04/08/25 1134)  SpO2: 97 % (04/08/25 1134)  Weight: 97.5 kg (215 lb) (04/05/25 1343)  Height: 6' (182.9 cm) (04/05/25 1343)    PICC  Date/Time: 4/8/2025 11:02 AM  Performed by: Alex Almonte, RN  Consent Done: Yes  Time out: Immediately prior to procedure a time out was called to verify the correct patient, procedure, equipment, support staff and site/side marked as required  Indications: med administration and vascular access  Anesthesia: local infiltration  Local anesthetic: lidocaine 1% without epinephrine  Anesthetic Total (mL): 2  Preparation: skin prepped with chlorhexidine (without alcohol)  Skin prep agent dried: skin prep agent completely dried prior to procedure  Sterile barriers: all five maximum sterile barriers used - cap, mask, sterile gown, sterile gloves, and large sterile sheet  Hand hygiene: hand hygiene performed prior to central venous catheter insertion  Location details: right basilic  Catheter type: double lumen  Catheter size: 4 Fr  Catheter Length: 34cm    Ultrasound guidance: yes  Vessel Caliber: medium and patent, compressibility normal  Needle advanced into vessel with real time Ultrasound guidance.  Guidewire confirmed in vessel.  Sterile sheath used.  Number of attempts: 1  Post-procedure: blood return through all ports, chlorhexidine patch and sterile dressing applied  Specimens: No  Implants: No  Assessment: placement verified by x-ray, free fluid flow, no pneumothorax on x-ray and successful placement          Alex Almonte RN  4/8/2025

## 2025-04-08 NOTE — CARE UPDATE
Outpatient Antibiotic Therapy Plan:     Please send referral to [facility].     1) Infection:  Bacteremia     2) Discharge Antibiotics:     Intravenous antibiotics:  IV Ertapenem one gram daily    3) Therapy Duration:  2 weeks      Estimated end date of IV antibiotics: 04/20/25     4) Outpatient Weekly Labs:     Order the following labs to be drawn on Mondays:   CBC  CMP   CPK (when on Daptomycin)  ESR  CRP  -Can pull picc line at end of treatment unless instructed otherwise .   Please send all labs to [facility] .

## 2025-04-08 NOTE — ASSESSMENT & PLAN NOTE
Patient's FSGs are controlled on current medication regimen.  Last A1c reviewed-   Lab Results   Component Value Date    HGBA1C 5.6 03/28/2025     Most recent fingerstick glucose reviewed-   Recent Labs   Lab 04/07/25 1805 04/07/25 1957 04/08/25  0547   POCTGLUCOSE 122* 142* 121*     Current correctional scale  Low  Maintain anti-hyperglycemic dose as follows-   Antihyperglycemics (From admission, onward)      None          Hold Oral hypoglycemics while patient is in the hospital.

## 2025-04-08 NOTE — ASSESSMENT & PLAN NOTE
Patient has paroxysmal (<7 days) atrial fibrillation. Patient is currently in sinus rhythm. [id number] Score: 3. The patients heart rate in the last 24 hours is as follows:  Pulse  Min: 58  Max: 61     Antiarrhythmics       Anticoagulants       Plan  - Replete lytes with a goal of K>4, Mg >2  - Patient is anticoagulated, see medications listed above.  - Patient's afib is currently controlled  -

## 2025-04-08 NOTE — PLAN OF CARE
[facility]  Discharge Final Note    Primary Care Provider: Félix Rollins MD    Expected Discharge Date: 4/8/2025    Final Discharge Note (most recent)       Final Note - 04/08/25 1356          Final Note    Assessment Type Final Discharge Note     Anticipated Discharge Disposition Home-Health Care [facility]     Hospital Resources/Appts/Education Provided Post-Acute resouces added to AVS        Post-Acute Status    Post-Acute Authorization [facility];Home Health     Home Health Status Set-up Complete/Auth obtained     [facility] Status Pending payor review/awaiting authorization (if required)                     Important Message from Medicare  Important Message from Medicare regarding Discharge Appeal Rights: Given to patient/caregiver, Explained to patient/caregiver, Signed/date by patient/caregiver     Date IMM was signed: 04/08/25  Time IMM was signed: 1128     Follow-up providers       Desi Cruz MD   Specialty: Urology    [id number] [place], [place] [id number]   Phone: [phone]       Next Steps: Follow up in 1 week(s)    Piero Mello MD   Specialty: Gastroenterology    [facility]  [address] [place] [address]   Phone: [phone]       Next Steps: Follow up in 1 week(s)    Instructions: Liver cirrhosis.  CT abdomen recommened outpatient follow up with Hepatology    Félix Rollins MD   Specialty: Family Medicine   Relationship: PCP - General    [facility]  [address]  [address]  [facility] [address]   Phone: [phone]       Next Steps: Follow up in 1 week(s)    Instructions: hospital follow up    Richard Reyes MD, [facility]   Specialty: Infectious Diseases, Hospitalist    [address] [place] [address]   Phone: [phone]       Next Steps: Follow up in 2 week(s)              After-discharge care                Home Medical Care       [facility]   Service: Home Health Services    [address] [facility] [place] [address]   Phone: [phone]                             DC Dispo: home with [facility] and [facility].  PCP f/u: patient follows with [facility] and will need to schedule.    Bedside teach with infusion nurse completed. Pending insurance approval for medications. CM contacted Infusion to notify patient is no longer in room per [facility].

## 2025-04-08 NOTE — PLAN OF CARE
04/08/25 1137   Rounds   Attendance Provider;Nurse ;Charge nurse;Physical therapist   Discharge Plan A Home Health   Why the patient remains in the hospital Requires continued medical care     CM met with patient at bedside to discuss d/c planning and recommendation for home IV abx with [facility]. Patient is agreeable, CM provided HH agency list with CMS star ratings. Patient prefers [facility], knows friend from [employer] who works for agency.    CM discussed sending referral to [facility]. CM also spoke with son, Sven, and caregiver, Claudette. Claudette is willing to assist with home IV abx administration at home and will be at the hospital today with patient. CM updated Milka with [facility].

## 2025-04-08 NOTE — PLAN OF CARE
[facility] enroute for bedside teach with family. Pending insurance approval for abx at this time.

## 2025-04-08 NOTE — PT/OT/SLP EVAL
Occupational Therapy Evaluation and Treatment    Name: Ramy Romo  MRN: [id number]  Admitting Diagnosis: Encephalopathy, metabolic  Recent Surgery: * No surgery found *      Recommendations:     Discharge Recommendations: Low Intensity Therapy  Level of Assistance Recommended: 24 hours light assistance  Discharge Equipment Recommendations: none  Barriers to discharge:      Assessment:     Ramy Romo is a 79 y.o. male with a medical diagnosis of Encephalopathy, metabolic. He presents with performance deficits affecting function including weakness, impaired self care skills, impaired balance, impaired endurance, impaired functional mobility, gait instability, decreased safety awareness.     Rehab Prognosis: Good; patient would benefit from acute OT services to address these deficits and reach maximum level of function.    Plan:     Patient to be seen 2 x/week to address the above listed problems via self-care/home management, therapeutic exercises, therapeutic activities  Plan of Care Expires: 04/22/25  Plan of Care Reviewed with: patient (CAREGIVER)    Subjective     Chief Complaint: DEBILITY AND GENERALIZED WEAKNESS  Patient Comments/Goals: GET STRONGER  Pain/Comfort:  Pain Rating 1: 0/10    Patients cultural, spiritual, Gnosticist conflicts given the current situation:      Social History:  Living Environment: Patient lives with their CAREGIVER  in a single story home with number of outside stair(s): 0  Prior Level of Function: Prior to admission, patient was modified independent and requires assistance with ADLs including TUB TRANSFER  Roles and Routines: Patient was driving and not working prior to admission.  Equipment Used at Home: walker, rolling, rollator, shower chair, wheelchair, grab bar  Assistance Upon Discharge:  CAREGIVER    Objective:     Communicated with NURSE AND EPIC CHART REVIEW prior to session. Patient found HOB elevated with peripheral IV upon OT entry to room.    General Precautions:  Standard, fall   Orthopedic Precautions: N/A   Braces: N/A    Respiratory Status: Room air    Occupational Performance    Gait belt applied - Yes    Bed Mobility:   Rolling/Turning to Right with stand by assistance  Scooting anteriorly to EOB to have both feet planted on floor: stand by assistance  Supine to sit from right side of bed with stand by assistance    Functional Mobility/Transfers:  Sit <> Stand Transfer with contact guard assistance with rolling walker  Bed <> Chair Transfer using Step Transfer technique with contact guard assistance with rolling walker  Functional Mobility: PT AMBULATED 25 FEET WITH CGA  AND ROLLING WALKER    Activities of Daily Living:  Upper Body Dressing: minimum assistance  Lower Body Dressing: minimum assistance    Cognitive/Visual Perceptual:  Cognitive/Psychosocial Skills: -     Oriented to: Person, Place, Time, Situation  -     Follows Commands/attention: Follows multistep  commands  -     Communication: clear/fluent  -     Memory: No Deficits noted  -     Safety awareness/insight to disability: impaired    Physical Exam:  Upper Extremity Range of Motion:  -       Right Upper Extremity: WFL  -       Left Upper Extremity: WFL  Upper Extremity Strength:    -       Right Upper Extremity: MMT: 4/5 GROSSLY  -       Left Upper Extremity: MMT: 4/5 GROSSLY   Strength:    -       Right Upper Extremity: MMT: 4/5 GROSSLY  -       Left Upper Extremity: MMT: 4/5 GROSSLY    AMPAC 6 Click ADL:  AMPAC Total Score: 20    Treatment & Education:  Educated patient on importance of increased tolerance to upright position and direct impact on CV endurance and strength. Patient encouraged to sit up in chair, for a minimum of 2 consecutive hours including for all meals.. Encouraged patient to perform AROM TE to BUE throughout the day within all available planes of motion. Re enforced importance of utilizing call light to meet needs in room and not attempt to get up without staff assistance. Patient  verbalized understanding and agreed to comply.           Patient clear to stand pivot transfer with RN/PCT, assist xCGA WITH ROLLING WALKER  .    Patient left up in chair with all lines intact, call button in reach, RN notified, chair alarm on, and CAREGIVER present.    GOALS:   Multidisciplinary Problems       Occupational Therapy Goals          Problem: Occupational Therapy    Goal Priority Disciplines Outcome Interventions   Occupational Therapy Goal     OT, PT/OT     Description: O.T. GOALS WILL BE MET BY 4-21-25  PT WILL TOLERATE 1 SET X 15 REPS B UE ROM EXERCISE  S WITH LE DRESSING  S WITH  WITH TOILET TRANSFERS                       DME Justifications:   Ramy's mobility limitation cannot be sufficiently resolved by the use of a cane. His functional mobility deficit can be sufficiently resolved with the use of a Rolling Walker. Patient's mobility limitation significantly impairs their ability to participate in one of more activities of daily living.  The use of a RW will significantly improve the patient's ability to participate in MRADLS and the patient will use it on regular basis in the home.    History:     Past Medical History:   Diagnosis Date    Atrial fibrillation     CHF (congestive heart failure)     Diabetes mellitus     Hypertension          Past Surgical History:   Procedure Laterality Date    A-V CARDIAC PACEMAKER INSERTION Left 1/25/2024    Procedure: INSERTION, CARDIAC PACEMAKER, DUAL CHAMBER/poss single lead vs His lead;  Surgeon: Anthony Coronado MD;  Location: [facility] CATH LAB;  Service: Cardiology;  Laterality: Left;  Bio/LBBB pacing    CHOLECYSTECTOMY      INSERTION, PACEMAKER, TEMPORARY TRANSVENOUS N/A 1/24/2024    Procedure: Insertion, Pacemaker, Temporary Transvenous;  Surgeon: Renaldo Santacruz MD;  Location: [facility] CATH LAB;  Service: Cardiology;  Laterality: N/A;    NECK SURGERY      TOTAL ELBOW ARTHROPLASTY Right 03/2019       Time Tracking:     OT Date of Treatment:  04/08/25  OT Start Time: 0920  OT Stop Time: 0940  OT Total Time (min): 20 min    Billable Minutes: Evaluation 10 MINUTES and Therapeutic Activity 10 MINUTES    4/8/2025

## 2025-04-11 ENCOUNTER — PATIENT OUTREACH (OUTPATIENT)
Dept: ADMINISTRATIVE | Facility: CLINIC | Age: 80
End: 2025-04-11
Payer: MEDICARE

## 2025-04-11 LAB
BACTERIA BLD CULT: NORMAL
BACTERIA BLD CULT: NORMAL

## 2025-04-11 NOTE — PROGRESS NOTES
C3 nurse spoke with patient Ramy Romo son Sven who is unable to complete the call at this time. Patient son Requested a Callback on Monday. Patient does not have a HOSFU. Non-[facility] PCP.

## 2025-04-15 ENCOUNTER — TELEPHONE (OUTPATIENT)
Dept: INFECTIOUS DISEASES | Facility: CLINIC | Age: 80
End: 2025-04-15
Payer: MEDICARE

## 2025-04-15 NOTE — TELEPHONE ENCOUNTER
"Advised Ernestine that labs have been received and per Dr. Reyes he would like to know rationale behind critical lab value of RBC 2.69. Per Ernestine, it is their policy to contact physician with all "Alerts" resulted. Dr. Reyes informed, no new orders at this time.  "

## 2025-04-15 NOTE — TELEPHONE ENCOUNTER
----- Message from Teena sent at 4/15/2025  1:33 PM CDT -----  Name of Who is Calling: Ernestine [email or web address]   What is the request in detail:Ernestine [email or web address] would like a call back in regards to update that was sent over. Ernestine would like to know if the report for a critical red blood cell was received . Please advise thank you Please advise thank you   Can the clinic reply by [facility]:no  What Number to Call Back if not in [facility]:[phone] (ask for Ernestine)  ----- Message -----  From: Teena Smith  Sent: 4/15/2025   1:39 PM CDT  To: Amy Cheatham    Name of Who is Calling: Ernestine Melboss   What is the request in detail:Ernestine [email or web address] would like a call back in regards to update that was sent over. Ernestine would like to know if the  eport for a critical red blood cell was received . Please advise thank you Please advise thank you   Can the clinic reply by [facility]:no  What Number to Call Back if not in [facility]:[phone] (ask for Ernestine)

## 2025-04-16 ENCOUNTER — TELEPHONE (OUTPATIENT)
Dept: INFECTIOUS DISEASES | Facility: CLINIC | Age: 80
End: 2025-04-16
Payer: MEDICARE

## 2025-04-16 DIAGNOSIS — N20.0 BILATERAL KIDNEY STONES: ICD-10-CM

## 2025-04-16 DIAGNOSIS — N39.0 RECURRENT UTI: Primary | ICD-10-CM

## 2025-04-16 NOTE — TELEPHONE ENCOUNTER
----- Message from Marjorie sent at 4/16/2025 12:10 PM CDT -----  Contact: son  Type:  Test ResultsWho Called: EvyName of Test (Lab/Mammo/Etc): BloodworkDate of Test: 4/14/25Ordering Provider: NnadiWhere the test was performed: [facility]  Would the patient rather a call back or a response via [facility]? Call [facility] Call Back Number: [phone] Information: Son was calling to see if we has received the results from that test? the patient antibiotic was refilled again and he was calling to see why and who.

## 2025-04-16 NOTE — TELEPHONE ENCOUNTER
Advised Sven, patient's son, that per Dr. Reyes, pt EOC date is 4/20/25. Son states that Infusion company will deliver medication later today. Advised son that per Dr. Reyes labs look good and patient is headed in the right direction. Son verbalized understanding.   PAST MEDICAL HISTORY:  Anxiety

## 2025-04-21 ENCOUNTER — TELEPHONE (OUTPATIENT)
Dept: INFECTIOUS DISEASES | Facility: CLINIC | Age: 80
End: 2025-04-21
Payer: MEDICARE

## 2025-04-21 NOTE — TELEPHONE ENCOUNTER
----- Message from Saritha sent at 4/21/2025  9:38 AM CDT -----  Contact: Yeni/[employer]  Type: Request a call backName of Caller: [facility]: [phone] Information:  Yeni is needing to know if you all will remove the pt's pic line at his appt (4/24) or should she schedule appt with the infusion company to have it remove?

## 2025-04-21 NOTE — TELEPHONE ENCOUNTER
----- Message from Kitty sent at 4/21/2025 12:17 PM CDT -----  Contact: Sven  TYPE: Patient Call BackWho called:patient/sonWhat is the request in detail: Needs approval for port to be removed from home health nurse. Please give call back. Thanks  Can the clinic reply by [facility]?   Would the patient rather a call back or a response via [facility] call back number:[phone]

## 2025-04-21 NOTE — TELEPHONE ENCOUNTER
Returned call to Yeni who states that patient's antibiotics are complete as of 4/20. Should patient be scheduled for PICC removal or wait until his appt on 4/24 with Dr. Watkins?

## 2025-04-21 NOTE — TELEPHONE ENCOUNTER
Called and spoke with Sven patient's son. Informed that the nurse was advised to continue line care until patient's appt with Dr. Watkins on Thursday. He expressed understanding.

## 2025-04-23 ENCOUNTER — TELEPHONE (OUTPATIENT)
Dept: INFECTIOUS DISEASES | Facility: CLINIC | Age: 80
End: 2025-04-23
Payer: MEDICARE

## 2025-04-23 NOTE — TELEPHONE ENCOUNTER
Returned call to patient's son. Informed that the labs are not in epic due to the location that the home health agency is bringing the labs to. We advised that they bring them to an [facility] facility but they bring them to what is closer for them. He expressed understanding.

## 2025-04-24 ENCOUNTER — OFFICE VISIT (OUTPATIENT)
Dept: INFECTIOUS DISEASES | Facility: CLINIC | Age: 80
End: 2025-04-24
Payer: MEDICARE

## 2025-04-24 ENCOUNTER — HOSPITAL ENCOUNTER (OUTPATIENT)
Dept: RADIOLOGY | Facility: HOSPITAL | Age: 80
Discharge: HOME | End: 2025-04-24
Attending: STUDENT IN AN ORGANIZED HEALTH CARE EDUCATION/TRAINING PROGRAM
Payer: MEDICARE

## 2025-04-24 VITALS
HEIGHT: 72 IN | SYSTOLIC BLOOD PRESSURE: 116 MMHG | HEART RATE: 60 BPM | WEIGHT: 199 LBS | OXYGEN SATURATION: 98 % | DIASTOLIC BLOOD PRESSURE: 58 MMHG | BODY MASS INDEX: 26.95 KG/M2

## 2025-04-24 DIAGNOSIS — N30.00 ACUTE CYSTITIS WITHOUT HEMATURIA: ICD-10-CM

## 2025-04-24 DIAGNOSIS — R78.81 BACTEREMIA: Primary | ICD-10-CM

## 2025-04-24 DIAGNOSIS — I48.20 CHRONIC ATRIAL FIBRILLATION: Chronic | ICD-10-CM

## 2025-04-24 DIAGNOSIS — N20.0 KIDNEY STONES: ICD-10-CM

## 2025-04-24 DIAGNOSIS — I10 HYPERTENSION, UNSPECIFIED TYPE: ICD-10-CM

## 2025-04-24 DIAGNOSIS — R78.81 BACTEREMIA: ICD-10-CM

## 2025-04-24 DIAGNOSIS — Z79.4 TYPE 2 DIABETES MELLITUS WITHOUT COMPLICATION, WITH LONG-TERM CURRENT USE OF INSULIN: ICD-10-CM

## 2025-04-24 DIAGNOSIS — E11.9 TYPE 2 DIABETES MELLITUS WITHOUT COMPLICATION, WITH LONG-TERM CURRENT USE OF INSULIN: ICD-10-CM

## 2025-04-24 PROCEDURE — 99999 PR PBB SHADOW E&M-EST. PATIENT-LVL IV: CPT | Mod: PBBFAC,,, | Performed by: STUDENT IN AN ORGANIZED HEALTH CARE EDUCATION/TRAINING PROGRAM

## 2025-04-24 NOTE — PROGRESS NOTES
"Infectious Disease Clinic Note    Patient Name: Ramy Romo  YOB: 1945    PRESENTING HISTORY       History of Present Illness:  Mr. Ramy Romo is a 79 y.o. male w/ significant PMHx of DM, HTN, AFIB, stroke, pacemaker, liver cirrhosis and CHF diastolic who presented to the ER for AMS which onset within the past 2 days prior to admission. Patient denies any fever or dysuria however does report chills. Hx of recurrent UTI. In the ED, UA showed 78 WBC.  Patient is started on Rocephin and fluids. Seen by urology. Per their consult note, "Pt does have multiple non-obstructing renal stones and given his persistent Klebsiella UTIs, he may benefit from treatment of those stones once he is over this initial episode of bacteremia. No surgical plans during this hospitalization. Pt already is established with an outside urologist, Dr. Mccracken, and he may follow up with him as an outpatient, or me if he chooses." Blood and urine cx grew Klebsiella aerogenes. Cleared on repeat set 4/6. Discharged on 2 week course of ertapenem.    4/24: Hospital follow up. Has been tolerating ertapenem without incident. Afebrile. Discussed preventive UTI agents including D-Mannose and cranberry daily. Will need stone management with urology. PICC can be removed. Will have it taken out next door at cancer center. Patient and sitter voiced understanding.     Procedure Component Value Units Date/Time   Blood culture [[id number]] (Normal) Collected: 04/06/25 0448   Order Status: Completed Specimen: Blood Updated: 04/11/25 1501    Blood Culture No Growth After 5 Days   Blood culture [[id number]] (Normal) Collected: 04/06/25 0448   Order Status: Completed Specimen: Blood Updated: 04/11/25 1501    Blood Culture No Growth After 5 Days   Influenza A & B by Molecular [[id number]] (Normal) Collected: 04/04/25 1237   Order Status: Completed Specimen: Nasopharyngeal Swab Updated: 04/04/25 1307    INFLUENZA A MOLECULAR Negative    " INFLUENZA B MOLECULAR Negative   Urine culture [[id number]] (Abnormal)  Collected: 04/04/25 1237   Order Status: Completed Specimen: Urine Updated: 04/07/25 1121    Urine Culture >100,000 cfu/ml KLEBSIELLA AEROGENES Abnormal     Comment: No other significant isolate.      Susceptibility     KLEBSIELLA AEROGENES     VAMSI     Ampicillin >16 µg/ml Resistant     Ampicillin/Sulbactam >16/8 µg/ml Resistant     Cefazolin >16 µg/ml Resistant     Cefepime <=2 µg/ml Sensitive     Ciprofloxacin >2 µg/ml Resistant     Ertapenem <=0.5 µg/ml Sensitive     Gentamicin >8 µg/ml Resistant     Levofloxacin 4 µg/ml Resistant     Meropenem <=1 µg/ml Sensitive     Nitrofurantoin >64 µg/ml Resistant     Tobramycin >8 µg/ml Resistant     Trimeth/Sulfa <=2/38 µg/ml Sensitive               Linear View         Blood culture x two cultures. Draw prior to antibiotics. [[id number]] (Abnormal)  Collected: 04/04/25 1132   Order Status: Completed Specimen: Blood from Peripheral, Antecubital, Left Updated: 04/07/25 1149    Blood Culture Positive - Aerobic/Pediatric Bottle Abnormal      KLEBSIELLA AEROGENES Abnormal     Comment: Susceptibility pending       GRAM STAIN Gram Negative Rods Panic     Comment: Anaerobic Bottle Positive  This is an appended report. These results have been appended to a previously preliminary verified report.        Gram Negative Rods Panic     Comment: Aerobic Bottle Positive  This is an appended report. These results have been appended to a previously preliminary verified report.      Susceptibility     KLEBSIELLA AEROGENES     VAMSI     Amikacin >32 µg/ml Resistant     Amox/K Clav'ate >16/8 µg/ml Resistant     Ampicillin >16 µg/ml Resistant     Ampicillin/Sulbactam >16/8 µg/ml Resistant     Cefazolin >16 µg/ml Resistant     Cefepime 8 µg/ml Intermediate     Ceftazidime/Avibactam >16 µg/ml Resistant     Ciprofloxacin >2 µg/ml Resistant     Ertapenem <=0.5 µg/ml Sensitive     Gentamicin >8 µg/ml Resistant     Levofloxacin >4  µg/ml Resistant     Meropenem <=1 µg/ml Sensitive     Meropenem/Vaborbactam <=2 µg/ml Sensitive     Minocycline <=4 µg/ml Sensitive     Tetracycline >8 µg/ml Resistant     Tobramycin >8 µg/ml Resistant     Trimeth/Sulfa <=0.5/9.5 µ... Sensitive               Linear View         Blood culture x two cultures. Draw prior to antibiotics. [[id number]] (Abnormal) Collected: 04/04/25 1132   Order Status: Completed Specimen: Blood from Peripheral, Antecubital, Left Updated: 04/07/25 1154    Blood Culture Positive - Aerobic/Pediatric Bottle Abnormal      KLEBSIELLA AEROGENES Abnormal     GRAM STAIN Gram Negative Rods Panic     Comment: Aerobic Bottle Positive  This is an appended report. These results have been appended to a previously preliminary verified report.      Narrative:     Aerobic Bottle Positive  For susceptibility see accession number [id number]   Rapid Organism ID by PCR (from Blood culture) [[id number]] Collected: 04/04/25 1132   Order Status: Completed Specimen: Blood from Peripheral, Antecubital, Left Updated: 04/07/25 1153    Enterococcus faecalis Not Detected    Enterococcus faecium Not Detected    Listeria monocytogenes Not Detected    Staphylococcus spp. Not Detected    Staphylococcus aureus Not Detected    Staphylococcus epidermidis Not Detected    Staphylococcus lugdunensis Not Detected    Streptococcus spp. Not Detected    Streptococcus agalactiae (Group B) Not Detected    Streptococcus pneumoniae Not Detected    Streptococcus pyogenes (Group A) Not Detected    Acinetobacter calcoaceticus/baumannii complex Not Detected    Bacteroides fragilis Not Detected    Enterobacterales Not Detected    Enterobacter cloacae complex Not Detected    Escherichia coli Not Detected    Klebsiella aerogenes Not Detected    Klebsiella oxytoca Not Detected    Klebsiella pneumoniae group Not Detected    Proteus spp. Not Detected    Salmonella spp. Not Detected    Serratia marcescens Not Detected    Haemophilus  influenzae Not Detected    Neisseria meningitidis Not Detected    Pseudomonas aeruginosa Not Detected    Stenotrophomonas maltophilia Not Detected    Candida albicans Not Detected    Candida auris Not Detected    Candida glabrata Not Detected    Candida krusei Not Detected    Candida parapsilosis Not Detected    Candida tropicalis Not Detected    Cryptococcus neoformans/gattii Not Detected    CTX-M (ESBL ) --    Comment: Note: Antimicrobial resistance can occur via multiple mechanisms. A Not Detected result for antimicrobial resistance gene(s) does not indicate antimicrobial susceptibility. Subculturing is required for species identification and susceptibility testing of   isolates.       IMP (Cabapenemase ) --    Comment: Note: Antimicrobial resistance can occur via multiple mechanisms. A Not Detected result for antimicrobial resistance gene(s) does not indicate antimicrobial susceptibility. Subculturing is required for species identification and susceptibility testing of   isolates.       KPC resistance gene (Carbapenemase ) --    Comment: Note: Antimicrobial resistance can occur via multiple mechanisms. A Not Detected result for antimicrobial resistance gene(s) does not indicate antimicrobial susceptibility. Subculturing is required for species identification and susceptibility testing of   isolates.       mcr-1 --    Comment: Note: Antimicrobial resistance can occur via multiple mechanisms. A Not Detected result for antimicrobial resistance gene(s) does not indicate antimicrobial susceptibility. Subculturing is required for species identification and susceptibility testing of   isolates.       mecA ID --    Comment: Note: Antimicrobial resistance can occur via multiple mechanisms. A Not Detected result for antimicrobial resistance gene(s) does not indicate antimicrobial susceptibility. Subculturing is required for species identification and susceptibility testing of   isolates.        "mecA/C and MREJ (MRSA) gene --    Comment: Note: Antimicrobial resistance can occur via multiple mechanisms. A Not Detected result for antimicrobial resistance gene(s) does not indicate antimicrobial susceptibility. Subculturing is required for species identification and susceptibility testing of   isolates.       NDM (Carbapenemase ) --    Comment: Note: Antimicrobial resistance can occur via multiple mechanisms. A Not Detected result for antimicrobial resistance gene(s) does not indicate antimicrobial susceptibility. Subculturing is required for species identification and susceptibility testing of   isolates.       OXA-48-like (Carbapenemase ) --    Comment: Note: Antimicrobial resistance can occur via multiple mechanisms. A Not Detected result for antimicrobial resistance gene(s) does not indicate antimicrobial susceptibility. Subculturing is required for species identification and susceptibility testing of   isolates.       Ashly/B (VRE gene) --    Comment: Note: Antimicrobial resistance can occur via multiple mechanisms. A Not Detected result for antimicrobial resistance gene(s) does not indicate antimicrobial susceptibility. Subculturing is required for species identification and susceptibility testing of   isolates.       VIM (Carbapenemase ) --    Comment: Note: Antimicrobial resistance can occur via multiple mechanisms. A Not Detected result for antimicrobial resistance gene(s) does not indicate antimicrobial susceptibility. Subculturing is required for species identification and susceptibility testing of   isolates.               "Hospital Course:   Mr. Romo is a 71-year-old male presented with altered mental status secondary to UTI and elevated ammonia level.  Patient was also noted to have a mild elevation in bilirubin.  Discussion held with patient's son at bedside regarding recurrent admissions for urinary tract infection and bacteremia.  Blood cultures returned positive this " "afternoon in the anaerobic bottle but specificity and sensitivities pending.  Infectious Disease has been consulted and echo has been ordered.  Urology has also been consulted as son states that patient has [facility]  Patient's lactic acid on admission was 3.7 with repeat is 1.7.  Patient's renal function has returned to normal and the CT abdomen with and without contrast with liver mass protocol has been ordered.  Final urine cultures and blood cultures from admission has been positive for Klebsiella aerogenes.  Repeat blood cultures done on 04/06/2025 are negative at 36 hours.  Antibiotic has been changed to Merrem based on sensitivities.  Final recommendations received from Infectious Disease.  Patient will be discharged on 14 days of ertapenem.  PICC line placed on 04/08/2025 and home antibiotics set up.  Teaching done at bedside and plan of care discussed with patient and caregiver at bedside.  Patient will need to follow up with his primary care physician as well as hepatology.  He follows with [facility] for his liver disease.  Patient will also need to follow up Dr. Cruz with Urology for resolution of his nonobstructing kidney stones which we will need to be addressed once the bacteremia has cleared.  Patient is stable for discharge at this time."       Review of Systems:  Constitutional: no fever or chills  Eyes: no visual changes  ENT: no nasal congestion or sore throat  Respiratory: no cough or shortness of breath  Cardiovascular: no chest pain  Gastrointestinal: no nausea or vomiting, no abdominal pain, no constipation, no diarrhea  Genitourinary: no hematuria or dysuria  Musculoskeletal: no arthralgias or myalgias  Skin: no rash  Neurological: no headaches, numbness, or paresthesias    The following portions of the patient's history were reviewed and updated as appropriate: allergies, current medications, past family " history, past medical history, past social history, past surgical history, and problem list.    PAST HISTORY:     Immunization History   Administered Date(s) Administered    COVID-19 MRNA, LN-S PF (MODERNA HALF 0.25 ML DOSE) 12/22/2021, 05/11/2022    COVID-19, MRNA, LN-S, PF (MODERNA FULL 0.5 ML DOSE) 02/01/2021, 03/05/2021    COVID-19, mRNA, LNP-S, PF, fouzia-sucrose, 30 mcg/0.3 mL (Pfizer Ages 12+) 02/07/2024    Influenza - Trivalent - Fluzone High Dose - PF (65 years and older) 09/19/2013    Pneumococcal Polysaccharide - 23 Valent 09/16/2015    Tdap 09/25/2015       Past Medical History:   Diagnosis Date    Atrial fibrillation     CHF (congestive heart failure)     Diabetes mellitus     Hypertension        Past Surgical History:   Procedure Laterality Date    A-V CARDIAC PACEMAKER INSERTION Left 1/25/2024    Procedure: INSERTION, CARDIAC PACEMAKER, DUAL CHAMBER/poss single lead vs His lead;  Surgeon: Anthony Coronado MD;  Location: [facility] CATH LAB;  Service: Cardiology;  Laterality: Left;  Bio/LBBB pacing    CHOLECYSTECTOMY      INSERTION, PACEMAKER, TEMPORARY TRANSVENOUS N/A 1/24/2024    Procedure: Insertion, Pacemaker, Temporary Transvenous;  Surgeon: Renaldo Santacruz MD;  Location: [facility] CATH LAB;  Service: Cardiology;  Laterality: N/A;    NECK SURGERY      TOTAL ELBOW ARTHROPLASTY Right 03/2019       Family History   Problem Relation Name Age of Onset    Cataracts Father      Glaucoma Father         Social History     Socioeconomic History    Marital status:    Tobacco Use    Smoking status: Never    Smokeless tobacco: Never   Substance and Sexual Activity    Alcohol use: No     Social Drivers of Health     Financial Resource Strain: Patient Unable To Answer (4/6/2025)    Overall Financial Resource Strain (CARDIA)     Difficulty of Paying Living Expenses: Patient unable to answer   Food Insecurity: Patient Unable To Answer (4/6/2025)    Hunger Vital Sign     Worried About Running Out of Food in the  Last Year: Patient unable to answer     Ran Out of Food in the Last Year: Patient unable to answer   Transportation Needs: Patient Unable To Answer (4/5/2025)    PRAPARE - Transportation     Lack of Transportation (Medical): Patient unable to answer     Lack of Transportation (Non-Medical): Patient unable to answer   Physical Activity: Insufficiently Active (6/30/2021)    Received from [facility] Subsidiaries and Affiliates    Exercise Vital Sign     Days of Exercise per Week: 4 days     Minutes of Exercise per Session: 30 min   Stress: Patient Unable To Answer (4/6/2025)    [employer] - Occupational Stress Questionnaire     Feeling of Stress : Patient unable to answer   Housing Stability: Patient Unable To Answer (4/6/2025)    Housing Stability Vital Sign     Unable to Pay for Housing in the Last Year: Patient unable to answer     Homeless in the Last Year: Patient unable to answer       MEDICATIONS & ALLERGIES:     Current Outpatient Medications on File Prior to Visit   Medication Sig    calcium-vitamin D 250-100 mg-unit per tablet Take 1 tablet by mouth once daily.    ferrous gluconate (FERGON) 324 MG tablet Take 324 mg by mouth daily with breakfast.    fluticasone propionate (FLONASE) 50 mcg/actuation nasal spray 1 spray by Each Nostril route every morning.    furosemide (LASIX) 20 MG tablet Take 20 mg by mouth once daily.    lactulose (CHRONULAC) 10 gram/15 mL solution Take 30 g by mouth 2 (two) times daily.    metoprolol succinate (TOPROL-XL) 25 MG 24 hr tablet Take 0.5 tablets (12.5 mg total) by mouth once daily.    NOVOLOG FLEXPEN U-100 INSULIN 100 unit/mL (3 mL) InPn pen Inject into the skin as needed.    rifAXIMin (XIFAXAN) 550 mg Tab Take 1 tablet (550 mg total) by mouth 2 (two) times daily.    spironolactone (ALDACTONE) 25 MG tablet Take 25 mg by mouth once daily.    tamsulosin (FLOMAX) 0.4 mg Cap Take 0.4 mg by mouth once  daily.     No current facility-administered medications on file prior to visit.       Review of patient's allergies indicates:   Allergen Reactions    Seroquel [quetiapine] Hallucinations     Per son patient had adverse reaction, became agitated and combating     Sulfa (sulfonamide antibiotics) Rash       OBJECTIVE:   Vital Signs:  Vitals:    04/24/25 1013   BP: (!) 116/58   Pulse: 60   SpO2: 98%   Weight: 90.3 kg (199 lb)   Height: 6' (1.829 m)       No results found for this or any previous visit (from the past 24 hours).      Physical Exam:   General:  Well developed, well nourished, no acute distress  HEENT:  Normocephalic, atraumatic  CVS:  RRR, S1 and S2 normal, no murmurs, rubs, gallops  Resp:  Lungs clear to auscultation, no wheezes, rales, rhonchi  GI:  Abdomen soft, non-tender, non-distended, normoactive bowel sounds, no masses  MSK:  No muscle atrophy, peripheral edema, full range of motion  Skin:  No rashes, ulcers, erythema  Psych:  Alert and oriented to person, place, and time    ASSESSMENT:     Klebsiella bacteremia  --Urinary source confirmed with correlated urine culture   --Cleared on blood cx 4/6   --Has completed 14 day course of IV ertapenem  --Labs WNL from infection standpoint  --PICC can be removed  --Follow up with ID as needed    Outpatient Antibiotic Therapy Plan:     Please send referral to [facility].     1) Infection:  Bacteremia     2) Discharge Antibiotics:     Intravenous antibiotics:  IV Ertapenem one gram daily      3) Therapy Duration:  2 weeks      Estimated end date of IV antibiotics: 04/20/25     4) Outpatient Weekly Labs:     Order the following labs to be drawn on Mondays:   CBC  CMP   CPK (when on Daptomycin)  ESR  CRP  -Can pull picc line at end of treatment unless instructed otherwise .   Please send all labs to [facility] .    UTI  --Some prevention methods include using bidet when having BM, avoiding constipation, remaining hydrated up to 3 L water daily,  D-Mannose, cranberry supplement, vitamin C, Hiprex, and bladder training  --Patient to start daily cranberry supplement + D-Mannose  --Educated on importance of daily BM and hydration   --Needs stone management with urology   --Recommend in/out cath moving forward when symptomatic for UTI (this does not include foul odor, discoloration of urine, confusion, or generalized fatigue)   --Per IDSA, In older patients with functional and/or cognitive impairment with bacteriuria and delirium (acute mental status change, confusion) and without local genitourinary symptoms or other systemic signs of infection (eg, fever or hemodynamic instability), we recommend assessment for other causes and careful observation rather than antimicrobial treatment (Clinical Practice Guideline for the Management of Asymptomatic Bacteriuria: 2019 Update by IDSA. Clinical Infectious Diseases, Volume 68, Issue 10, 15 May 2019, Pages z83-y903, https://doi.org/10.1093/janina/xwl5020)     Kidney stones  Will avoid vitamin C. Follow with urology for definitive management.     DM  Continue current medications and follow up with PCP      HTN  Continue current medications and follow up with PCP      Afib  Continue current medications and follow up with PCP      PLAN:     Ramy was seen today for hospital follow up.    Diagnoses and all orders for this visit:    Bacteremia  -     FL PICC Removal; Future    Acute cystitis without hematuria    Type 2 diabetes mellitus without complication, with long-term current use of insulin    Chronic atrial fibrillation    Hypertension, unspecified type    Kidney stones        The total time for evaluation and management services performed on 4/24/25 was greater than 30 minutes.        Delta Watkins, DO   Infectious Diseases

## 2025-04-24 NOTE — DISCHARGE SUMMARY
[facility] (Hospital)  Discharge Note  Short Stay    FL PICC Removal      OUTCOME: Patient tolerated treatment/procedure well without complication and is now ready for discharge.    DISPOSITION: Home or Self Care    FINAL DIAGNOSIS:  <principal problem not specified>    FOLLOWUP: In clinic    DISCHARGE INSTRUCTIONS:  No discharge procedures on file.     TIME SPENT ON DISCHARGE: 15 minutes    PICC removed in its entirety under sterile conditions. Pressure held and dressing applied.

## 2025-04-25 ENCOUNTER — TELEPHONE (OUTPATIENT)
Dept: INFECTIOUS DISEASES | Facility: CLINIC | Age: 80
End: 2025-04-25
Payer: MEDICARE

## 2025-04-25 DIAGNOSIS — R78.81 BACTEREMIA: Primary | ICD-10-CM

## 2025-04-25 NOTE — TELEPHONE ENCOUNTER
Returned call to patient's son. He states that he is concerned and would like to have a blood culture done to make sure infection is is gone. He would also like to know if patient's ammonia level can be checked as well. Please advise

## 2025-04-25 NOTE — TELEPHONE ENCOUNTER
----- Message from [employer] sent at 4/25/2025  8:59 AM CDT -----  Contact: Sven (Son)  ..Type:  Patient Requesting CallWho Called:Sven (Son)Would the patient rather a call back or a response via [facility]? [facility] Call Back Number:[phone] Information: Son called to discuss recent bacteria in blood. Patient just had some follow up questions.

## 2025-04-28 ENCOUNTER — LAB VISIT (OUTPATIENT)
Dept: LAB | Facility: HOSPITAL | Age: 80
End: 2025-04-28
Attending: STUDENT IN AN ORGANIZED HEALTH CARE EDUCATION/TRAINING PROGRAM
Payer: MEDICARE

## 2025-04-28 DIAGNOSIS — R78.81 BACTEREMIA: ICD-10-CM

## 2025-04-28 LAB — AMMONIA PLAS-SCNC: 125 UMOL/L (ref 10–50)

## 2025-04-28 PROCEDURE — 82140 ASSAY OF AMMONIA: CPT

## 2025-04-28 PROCEDURE — 36415 COLL VENOUS BLD VENIPUNCTURE: CPT | Mod: PO

## 2025-04-28 PROCEDURE — 87040 BLOOD CULTURE FOR BACTERIA: CPT | Mod: 91

## 2025-05-03 LAB
BACTERIA BLD CULT: NORMAL
BACTERIA BLD CULT: NORMAL

## 2025-05-14 ENCOUNTER — PATIENT MESSAGE (OUTPATIENT)
Dept: UROLOGY | Facility: CLINIC | Age: 80
End: 2025-05-14

## 2025-05-14 ENCOUNTER — LAB VISIT (OUTPATIENT)
Dept: LAB | Facility: HOSPITAL | Age: 80
End: 2025-05-14
Attending: UROLOGY
Payer: MEDICARE

## 2025-05-14 ENCOUNTER — OFFICE VISIT (OUTPATIENT)
Dept: UROLOGY | Facility: CLINIC | Age: 80
End: 2025-05-14
Payer: MEDICARE

## 2025-05-14 VITALS
HEIGHT: 72 IN | WEIGHT: 199.06 LBS | DIASTOLIC BLOOD PRESSURE: 58 MMHG | SYSTOLIC BLOOD PRESSURE: 123 MMHG | BODY MASS INDEX: 26.96 KG/M2 | TEMPERATURE: 98 F | RESPIRATION RATE: 16 BRPM | HEART RATE: 60 BPM

## 2025-05-14 DIAGNOSIS — N39.0 RECURRENT UTI: Primary | ICD-10-CM

## 2025-05-14 DIAGNOSIS — N20.0 RENAL STONE: ICD-10-CM

## 2025-05-14 DIAGNOSIS — Z01.818 PRE-OP EVALUATION: ICD-10-CM

## 2025-05-14 DIAGNOSIS — Z01.818 PRE-OP TESTING: ICD-10-CM

## 2025-05-14 LAB
ABSOLUTE EOSINOPHIL (OHS): 0.28 K/UL
ABSOLUTE MONOCYTE (OHS): 0.63 K/UL (ref 0.3–1)
ABSOLUTE NEUTROPHIL COUNT (OHS): 3.19 K/UL (ref 1.8–7.7)
ALBUMIN SERPL BCP-MCNC: 2.8 G/DL (ref 3.5–5.2)
ALP SERPL-CCNC: 100 UNIT/L (ref 40–150)
ALT SERPL W/O P-5'-P-CCNC: 16 UNIT/L (ref 10–44)
ANION GAP (OHS): 4 MMOL/L (ref 8–16)
AST SERPL-CCNC: 23 UNIT/L (ref 11–45)
BASOPHILS # BLD AUTO: 0.02 K/UL
BASOPHILS NFR BLD AUTO: 0.4 %
BILIRUB SERPL-MCNC: 0.8 MG/DL (ref 0.1–1)
BILIRUBIN, UA POC OHS: NEGATIVE
BLOOD, UA POC OHS: ABNORMAL
BUN SERPL-MCNC: 30 MG/DL (ref 8–23)
CALCIUM SERPL-MCNC: 10.1 MG/DL (ref 8.7–10.5)
CHLORIDE SERPL-SCNC: 111 MMOL/L (ref 95–110)
CLARITY, UA POC OHS: CLEAR
CO2 SERPL-SCNC: 26 MMOL/L (ref 23–29)
COLOR, UA POC OHS: YELLOW
CREAT SERPL-MCNC: 1.2 MG/DL (ref 0.5–1.4)
ERYTHROCYTE [DISTWIDTH] IN BLOOD BY AUTOMATED COUNT: 13.5 % (ref 11.5–14.5)
GFR SERPLBLD CREATININE-BSD FMLA CKD-EPI: >60 ML/MIN/1.73/M2
GLUCOSE SERPL-MCNC: 83 MG/DL (ref 70–110)
GLUCOSE, UA POC OHS: NEGATIVE
HCT VFR BLD AUTO: 31.7 % (ref 40–54)
HGB BLD-MCNC: 10.4 GM/DL (ref 14–18)
IMM GRANULOCYTES # BLD AUTO: 0.02 K/UL (ref 0–0.04)
IMM GRANULOCYTES NFR BLD AUTO: 0.4 % (ref 0–0.5)
KETONES, UA POC OHS: NEGATIVE
LEUKOCYTES, UA POC OHS: ABNORMAL
LYMPHOCYTES # BLD AUTO: 1.34 K/UL (ref 1–4.8)
MCH RBC QN AUTO: 34 PG (ref 27–31)
MCHC RBC AUTO-ENTMCNC: 32.8 G/DL (ref 32–36)
MCV RBC AUTO: 104 FL (ref 82–98)
NITRITE, UA POC OHS: NEGATIVE
NUCLEATED RBC (/100WBC) (OHS): 0 /100 WBC
PH, UA POC OHS: 6
PLATELET # BLD AUTO: 131 K/UL (ref 150–450)
PMV BLD AUTO: 10.4 FL (ref 9.2–12.9)
POTASSIUM SERPL-SCNC: 4.9 MMOL/L (ref 3.5–5.1)
PROT SERPL-MCNC: 6.1 GM/DL (ref 6–8.4)
PROTEIN, UA POC OHS: NEGATIVE
RBC # BLD AUTO: 3.06 M/UL (ref 4.6–6.2)
RELATIVE EOSINOPHIL (OHS): 5.1 %
RELATIVE LYMPHOCYTE (OHS): 24.5 % (ref 18–48)
RELATIVE MONOCYTE (OHS): 11.5 % (ref 4–15)
RELATIVE NEUTROPHIL (OHS): 58.1 % (ref 38–73)
SODIUM SERPL-SCNC: 141 MMOL/L (ref 136–145)
SPECIFIC GRAVITY, UA POC OHS: 1.02
UROBILINOGEN, UA POC OHS: 0.2
WBC # BLD AUTO: 5.48 K/UL (ref 3.9–12.7)

## 2025-05-14 PROCEDURE — 81003 URINALYSIS AUTO W/O SCOPE: CPT | Mod: QW,S$GLB,, | Performed by: UROLOGY

## 2025-05-14 PROCEDURE — 3288F FALL RISK ASSESSMENT DOCD: CPT | Mod: CPTII,S$GLB,, | Performed by: UROLOGY

## 2025-05-14 PROCEDURE — 36415 COLL VENOUS BLD VENIPUNCTURE: CPT | Mod: PN

## 2025-05-14 PROCEDURE — 85025 COMPLETE CBC W/AUTO DIFF WBC: CPT

## 2025-05-14 PROCEDURE — 1126F AMNT PAIN NOTED NONE PRSNT: CPT | Mod: CPTII,S$GLB,, | Performed by: UROLOGY

## 2025-05-14 PROCEDURE — 99214 OFFICE O/P EST MOD 30 MIN: CPT | Mod: S$GLB,,, | Performed by: UROLOGY

## 2025-05-14 PROCEDURE — 3074F SYST BP LT 130 MM HG: CPT | Mod: CPTII,S$GLB,, | Performed by: UROLOGY

## 2025-05-14 PROCEDURE — 1159F MED LIST DOCD IN RCRD: CPT | Mod: CPTII,S$GLB,, | Performed by: UROLOGY

## 2025-05-14 PROCEDURE — 3078F DIAST BP <80 MM HG: CPT | Mod: CPTII,S$GLB,, | Performed by: UROLOGY

## 2025-05-14 PROCEDURE — 80053 COMPREHEN METABOLIC PANEL: CPT

## 2025-05-14 PROCEDURE — 1100F PTFALLS ASSESS-DOCD GE2>/YR: CPT | Mod: CPTII,S$GLB,, | Performed by: UROLOGY

## 2025-05-14 PROCEDURE — 87186 SC STD MICRODIL/AGAR DIL: CPT | Performed by: UROLOGY

## 2025-05-14 PROCEDURE — 99999 PR PBB SHADOW E&M-EST. PATIENT-LVL V: CPT | Mod: PBBFAC,,, | Performed by: UROLOGY

## 2025-05-14 NOTE — PROGRESS NOTES
Chief Complaint   Patient presents with    Consult     UTI        History of Present Illness:   Ramy Romo is a 79 y.o. male here for evaluation of Consult (UTI)    5/14/25-78yo male presents for evaluation of recurrent UTI. I saw him last month as a consult in the hospital. At that time, he was hospitalized with Klebsiella bacteremia due to UTI. His main presenting symptom is altered mental status, but he does also have liver cirrhosis and elevated ammonia levels, so definite cause of episodes of AMS has been difficult to identify.   Pt's son reports that pt has been to the hospital 6 times with confusion and admitted twice. Pt is accompanied by his aid today and son is on the phone. Sees Dr. Watkins for ID and was started on D-mannose, and it was felt that kidney stones may be contributing to persistent UTIs. Denies abdominal or pelvic pain. No dysuria or gross hematuria. After the last hospitalization, he was treated with a 2 week course of ertapenem.        Review of Systems   Constitutional:  Negative for chills and fever.   Respiratory:  Negative for shortness of breath.    Cardiovascular:  Negative for chest pain.   Gastrointestinal:  Negative for abdominal pain.   All other systems reviewed and are negative.        Past Medical History:   Diagnosis Date    Atrial fibrillation     CHF (congestive heart failure)     Diabetes mellitus     Hypertension        Past Surgical History:   Procedure Laterality Date    A-V CARDIAC PACEMAKER INSERTION Left 1/25/2024    Procedure: INSERTION, CARDIAC PACEMAKER, DUAL CHAMBER/poss single lead vs His lead;  Surgeon: Anthony Coronado MD;  Location: [facility] CATH LAB;  Service: Cardiology;  Laterality: Left;  Bio/LBBB pacing    CHOLECYSTECTOMY      INSERTION, PACEMAKER, TEMPORARY TRANSVENOUS N/A 1/24/2024    Procedure: Insertion, Pacemaker, Temporary Transvenous;  Surgeon: Renaldo Santacruz MD;  Location: [facility] CATH LAB;  Service: Cardiology;  Laterality: N/A;    NECK  SURGERY      TOTAL ELBOW ARTHROPLASTY Right 03/2019       Family History   Problem Relation Name Age of Onset    Cataracts Father      Glaucoma Father         Social History[1]    Current Medications[2]    Review of patient's allergies indicates:   Allergen Reactions    Seroquel [quetiapine] Hallucinations     Per son patient had adverse reaction, became agitated and combating     Sulfa (sulfonamide antibiotics) Rash       Physical Exam  Vitals:    05/14/25 1128   BP: (!) 123/58   Pulse: 60   Resp: 16   Temp: 97.9 °F (36.6 °C)       General: Well-developed, well-nourished in no acute distress  HEENT: Normocephalic, atraumatic, Extraocular movements intact  Neck: supple, trachea midline, no cervical or supraclavicular lymphadenopathy  Respirations: even and unlabored  Back: midline spine, no CVA tenderness  Abdomen: soft, Non-tender, non-distended, no organomegaly or palpable masses, no rebound or guarding  Extremities: atraumatic, moves all equally, no clubbing, cyanosis or edema  Psych: normal affect  Skin: warm and dry, no lesions  Neuro: Alert and oriented, Cranial nerves II-XII grossly intact    Urinalysis  Trace blood, large LE    CT scan completed 4/7/25 personally reviewed and agree with official read:     Results for orders placed or performed during the hospital encounter of 04/04/25 (from the past 2160 hours)   CT Abdomen With Without Contrast    Narrative    EXAM:  CT ABDOMEN WITH WITHOUT CONTRAST    CLINICAL HISTORY: Liver lesion    TECHNIQUE: Routine contrast-enhanced CT protocol of the abdomen and pelvis was performed before and after the intravenous administration of  100 mL of Omni 300 contrast using triple phase protocol.  No oral contrast.    COMPARISON: 04/05/2025    FINDINGS: Heart size is normal.  No pericardial effusion.  Watch device suspected within the left atrial appendage.  Cardiac pacing wires are seen.    Small bilateral pleural effusions and mild atelectasis at the lung bases.    9 mm  hypervascular lesion within segment 7 of the right hepatic lobe.  Lesion retains contrast on delayed images and likely reflects a flash filling hemangioma.  Macro nodular appearance of the liver suggests underlying cirrhosis.  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  Heterogeneous appearance throughout the liver likely reflects perfusion abnormality with hypervascularity and some component of arterial portal shunting.    The spleen,  and adrenal glands are not unusual in contrast-enhanced CT appearance.    The gallbladder and bile ducts are within normal limits.    Evaluation of pancreas is limited.  Several cysts are suspected within the pancreatic body and tail     The kidneys enhance homogeneously.  Bilateral renal cortical thinning and areas of scarring.  Multiple small hypodensities are seen bilaterally consistent with cysts.  Nonobstructing stone lower pole right kidney.  Moderate nonspecific perinephric stranding.  Visualized kidneys and ureters are otherwise unremarkable. No evidence of hydronephrosis.    Cannot exclude mild rugal fold thickening within the stomach or fundus which could reflect portal gastropathy or mild gastritis.  The small bowel, and colon are unremarkable.  No evidence of bowel obstruction or acute inflammatory process. No free fluid, free air, or abscess.    No aortic aneurysm identified.  Moderate atherosclerotic disease.  No pathologically enlarged lymph nodes.  Small fat-containing about hernia.    No acute or suspicious osseous lesion is evident.  Moderate degenerative changes within the thoracolumbar spine.        Impression    Macro nodular appearance of the liver suggests underlying cirrhosis.  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 Recommend hepatology referral.      All CT scans at [this location] are performed using dose modulation techniques as appropriate to a performed exam including the following: automated exposure control; adjustment of the mA and/or kV according to patient size (this includes techniques or standardized protocols for targeted exams where dose is matched to indication / reason for exam; i.e. extremities or head); use of iterative reconstruction technique.    Finalized on: 4/7/2025 5:50 PM By:  Willy Thurman MD  [facility]# [id number]      2025-04-07 17:52:[id number]     [facility]   CT Renal Stone Study Abd Pelvis WO    Narrative    EXAM: CT RENAL STONE STUDY ABD PELVIS WO    CLINICAL HISTORY:  Nephrolithiasis.  Flank pain.    COMPARISON: None available.    TECHNIQUE: Axial CT imaging through the abdomen and pelvis performed without intravenous contrast are submitted for interpretation. Multiplanar reformats were performed and interpreted.      FINDINGS:    Small bilateral pleural effusions.    Nonobstructive bilateral lower pole renal calculi measuring up to 10 mm in the left and 7 mm in the right.  No ureteral calculi.  Mild perinephric fat stranding, symmetric.  1.3 cm low-density lesion arising exophytically from the lateral interpolar region of the kidney likely a complex cyst.    5.5 cm low-density lesion in the right hepatic parenchyma indeterminate.    The spleen and pancreas have a normal non-contrasted appearance.    The adrenal glands are within normal limits.    The gallbladder has been removed.    No free intraperitoneal fluid or air.  The small bowel is normal.  Colonic diverticulosis.No abdominal lymphadenopathy.    Atheromatous calcified plaque involves the abdominal aorta and its branches without evidence of aneurysm.        Pelvis:    The prostate is unremarkable.  No free pelvic fluid or adenopathy.  Small fat-containing umbilical hernia. The urinary bladder is normal.    No significant osseous abnormality  identified.          Impression    Bilateral nonobstructive nephrolithiasis.    Indeterminate low-density lesion in the right hepatic dome measuring 5.5 cm and 1.3 cm low-density lesion in the right kidney.  Recommend dedicated liver mass protocol CT with and without intravenous contrast for further evaluation.    Small bilateral pleural effusions.    All CT scans at [this location] are performed using dose modulation techniques as appropriate to a performed exam including the following: automated exposure control; adjustment of the mA and/or kV according to patient size (this includes techniques or standardized protocols for targeted exams where dose is matched to indication / reason for exam; i.e. extremities or head); use of iterative reconstruction technique.    RADIOLOGIST:  LESLEY Wilson M.D.    Finalized on: 4/5/2025 8:16 PM By:  LESLEY Wilson MD, MD  [facility]# [id number]      2025-04-05 20:18:14.216     [facility]   CT Head Without Contrast    Narrative    EXAMINATION:  CT HEAD WITHOUT CONTRAST    CLINICAL HISTORY:  Mental status change, unknown cause;    TECHNIQUE:  Noncontrast images were obtained    COMPARISON:  CT brain 03/26/2018    FINDINGS:  No intracranial acute hemorrhage or acute focal brain parenchymal abnormality is identified.  Atrophy is present.  Patchy periventricular white matter hypodensity secondary to chronic small vessel ischemic changes.  Chronic encephalomalacia right frontal parietal lobe related to remote intracranial hemorrhage.    Calvarium is intact.  There is opacification of the right maxillary sinus.      Impression    No acute intracranial abnormality.  Chronic intracranial changes.  Opacification of the right maxillary sinus.    All CT scans at this facility use dose modulation, iterative reconstructions, and/or weight base dosing when appropriate to reduce radiation dose to as low as reasonably achievable.      Electronically signed by: Norma Knight  MD  Date:    04/04/2025  Time:    13:04         Lab Results   Component Value Date    PSA 0.6 05/01/2006    PSA 0.7 11/21/2005    PSA 0.7 03/30/2005         Assessment:   1. Recurrent UTI  POCT Urinalysis(Instrument)    Urine Culture High Risk      2. Renal stone  Place in Outpatient    Case Request Operating Room: Ureteroscopic stone extraction with laser lithotripsy and stent    Diet NPO    Place sequential compression device      3. Pre-op evaluation  CBC Auto Differential    Comprehensive Metabolic Panel      4. Pre-op testing  Ambulatory referral/consult to Pre-Admit          Plan:  Recurrent UTI  -     POCT Urinalysis(Instrument)  -     Urine Culture High Risk    Renal stone  -     Place in Outpatient; Standing  -     Case Request Operating Room: Ureteroscopic stone extraction with laser lithotripsy and stent  -     Diet NPO; Standing  -     Place sequential compression device; Standing    Pre-op evaluation  -     CBC Auto Differential; Future; Expected date: 05/14/2025  -     Comprehensive Metabolic Panel; Future; Expected date: 05/14/2025    Pre-op testing  -     Ambulatory referral/consult to Pre-Admit; Future; Expected date: 06/05/2025    Other orders  -     IP VTE LOW RISK PATIENT; Standing      Discussed persistent Klebsiella UTIs. Discussed that these could be associated with his stones, though it is difficult to determine that with certainty currently. Discussed management of stones to eliminate that as a possible source and pt would like to move forward with that. Discussed ESWL vs USE. Because of increased risk of infectious complications, will move forward with Bilateral USE with stent placement. Discussed possible need for pre-stenting. Scheduled for 6/5/25.     Following the visit, urine culture grew Multi-Drug Resistant Klebsiella again, so have a PICC line placed and start Ertapenem 1g daily x 14 days in anticipation of upcoming procedure.                              [1]   Social  History  Tobacco Use    Smoking status: Never    Smokeless tobacco: Never   Substance Use Topics    Alcohol use: No   [2]   Current Outpatient Medications   Medication Sig Dispense Refill    calcium-vitamin D 250-100 mg-unit per tablet Take 1 tablet by mouth once daily.      ferrous gluconate (FERGON) 324 MG tablet Take 324 mg by mouth daily with breakfast.      fluticasone propionate (FLONASE) 50 mcg/actuation nasal spray 1 spray by Each Nostril route every morning.      furosemide (LASIX) 20 MG tablet Take 20 mg by mouth once daily.      lactulose (CHRONULAC) 10 gram/15 mL solution Take 30 g by mouth 2 (two) times daily.      metoprolol succinate (TOPROL-XL) 25 MG 24 hr tablet Take 0.5 tablets (12.5 mg total) by mouth once daily. 15 tablet 0    NOVOLOG FLEXPEN U-100 INSULIN 100 unit/mL (3 mL) InPn pen Inject into the skin as needed.      rifAXIMin (XIFAXAN) 550 mg Tab Take 1 tablet (550 mg total) by mouth 2 (two) times daily.      spironolactone (ALDACTONE) 25 MG tablet Take 25 mg by mouth once daily.      tamsulosin (FLOMAX) 0.4 mg Cap Take 0.4 mg by mouth once daily.       No current facility-administered medications for this visit.

## 2025-05-14 NOTE — H&P (VIEW-ONLY)
Chief Complaint   Patient presents with    Consult     UTI        History of Present Illness:   Ramy Romo is a 79 y.o. male here for evaluation of Consult (UTI)    5/14/25-78yo male presents for evaluation of recurrent UTI. I saw him last month as a consult in the hospital. At that time, he was hospitalized with Klebsiella bacteremia due to UTI. His main presenting symptom is altered mental status, but he does also have liver cirrhosis and elevated ammonia levels, so definite cause of episodes of AMS has been difficult to identify.   Pt's son reports that pt has been to the hospital 6 times with confusion and admitted twice. Pt is accompanied by his aid today and son is on the phone. Sees Dr. Watkins for ID and was started on D-mannose, and it was felt that kidney stones may be contributing to persistent UTIs. Denies abdominal or pelvic pain. No dysuria or gross hematuria. After the last hospitalization, he was treated with a 2 week course of ertapenem.        Review of Systems   Constitutional:  Negative for chills and fever.   Respiratory:  Negative for shortness of breath.    Cardiovascular:  Negative for chest pain.   Gastrointestinal:  Negative for abdominal pain.   All other systems reviewed and are negative.        Past Medical History:   Diagnosis Date    Atrial fibrillation     CHF (congestive heart failure)     Diabetes mellitus     Hypertension        Past Surgical History:   Procedure Laterality Date    A-V CARDIAC PACEMAKER INSERTION Left 1/25/2024    Procedure: INSERTION, CARDIAC PACEMAKER, DUAL CHAMBER/poss single lead vs His lead;  Surgeon: Anthony Coronado MD;  Location: [facility] CATH LAB;  Service: Cardiology;  Laterality: Left;  Bio/LBBB pacing    CHOLECYSTECTOMY      INSERTION, PACEMAKER, TEMPORARY TRANSVENOUS N/A 1/24/2024    Procedure: Insertion, Pacemaker, Temporary Transvenous;  Surgeon: Renaldo Santacruz MD;  Location: [facility] CATH LAB;  Service: Cardiology;  Laterality: N/A;    NECK  SURGERY      TOTAL ELBOW ARTHROPLASTY Right 03/2019       Family History   Problem Relation Name Age of Onset    Cataracts Father      Glaucoma Father         Social History[1]    Current Medications[2]    Review of patient's allergies indicates:   Allergen Reactions    Seroquel [quetiapine] Hallucinations     Per son patient had adverse reaction, became agitated and combating     Sulfa (sulfonamide antibiotics) Rash       Physical Exam  Vitals:    05/14/25 1128   BP: (!) 123/58   Pulse: 60   Resp: 16   Temp: 97.9 °F (36.6 °C)       General: Well-developed, well-nourished in no acute distress  HEENT: Normocephalic, atraumatic, Extraocular movements intact  Neck: supple, trachea midline, no cervical or supraclavicular lymphadenopathy  Respirations: even and unlabored  Back: midline spine, no CVA tenderness  Abdomen: soft, Non-tender, non-distended, no organomegaly or palpable masses, no rebound or guarding  Extremities: atraumatic, moves all equally, no clubbing, cyanosis or edema  Psych: normal affect  Skin: warm and dry, no lesions  Neuro: Alert and oriented, Cranial nerves II-XII grossly intact    Urinalysis  Trace blood, large LE    CT scan completed 4/7/25 personally reviewed and agree with official read:     Results for orders placed or performed during the hospital encounter of 04/04/25 (from the past 2160 hours)   CT Abdomen With Without Contrast    Narrative    EXAM:  CT ABDOMEN WITH WITHOUT CONTRAST    CLINICAL HISTORY: Liver lesion    TECHNIQUE: Routine contrast-enhanced CT protocol of the abdomen and pelvis was performed before and after the intravenous administration of  100 mL of Omni 300 contrast using triple phase protocol.  No oral contrast.    COMPARISON: 04/05/2025    FINDINGS: Heart size is normal.  No pericardial effusion.  Watch device suspected within the left atrial appendage.  Cardiac pacing wires are seen.    Small bilateral pleural effusions and mild atelectasis at the lung bases.    9 mm  hypervascular lesion within segment 7 of the right hepatic lobe.  Lesion retains contrast on delayed images and likely reflects a flash filling hemangioma.  Macro nodular appearance of the liver suggests underlying cirrhosis.  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  Heterogeneous appearance throughout the liver likely reflects perfusion abnormality with hypervascularity and some component of arterial portal shunting.    The spleen,  and adrenal glands are not unusual in contrast-enhanced CT appearance.    The gallbladder and bile ducts are within normal limits.    Evaluation of pancreas is limited.  Several cysts are suspected within the pancreatic body and tail     The kidneys enhance homogeneously.  Bilateral renal cortical thinning and areas of scarring.  Multiple small hypodensities are seen bilaterally consistent with cysts.  Nonobstructing stone lower pole right kidney.  Moderate nonspecific perinephric stranding.  Visualized kidneys and ureters are otherwise unremarkable. No evidence of hydronephrosis.    Cannot exclude mild rugal fold thickening within the stomach or fundus which could reflect portal gastropathy or mild gastritis.  The small bowel, and colon are unremarkable.  No evidence of bowel obstruction or acute inflammatory process. No free fluid, free air, or abscess.    No aortic aneurysm identified.  Moderate atherosclerotic disease.  No pathologically enlarged lymph nodes.  Small fat-containing about hernia.    No acute or suspicious osseous lesion is evident.  Moderate degenerative changes within the thoracolumbar spine.        Impression    Macro nodular appearance of the liver suggests underlying cirrhosis.  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 Recommend hepatology referral.      All CT scans at [this location] are performed using dose modulation techniques as appropriate to a performed exam including the following: automated exposure control; adjustment of the mA and/or kV according to patient size (this includes techniques or standardized protocols for targeted exams where dose is matched to indication / reason for exam; i.e. extremities or head); use of iterative reconstruction technique.    Finalized on: 4/7/2025 5:50 PM By:  Willy Thurman MD  [facility]# [id number]      2025-04-07 17:52:31.172     [facility]   CT Renal Stone Study Abd Pelvis WO    Narrative    EXAM: CT RENAL STONE STUDY ABD PELVIS WO    CLINICAL HISTORY:  Nephrolithiasis.  Flank pain.    COMPARISON: None available.    TECHNIQUE: Axial CT imaging through the abdomen and pelvis performed without intravenous contrast are submitted for interpretation. Multiplanar reformats were performed and interpreted.      FINDINGS:    Small bilateral pleural effusions.    Nonobstructive bilateral lower pole renal calculi measuring up to 10 mm in the left and 7 mm in the right.  No ureteral calculi.  Mild perinephric fat stranding, symmetric.  1.3 cm low-density lesion arising exophytically from the lateral interpolar region of the kidney likely a complex cyst.    5.5 cm low-density lesion in the right hepatic parenchyma indeterminate.    The spleen and pancreas have a normal non-contrasted appearance.    The adrenal glands are within normal limits.    The gallbladder has been removed.    No free intraperitoneal fluid or air.  The small bowel is normal.  Colonic diverticulosis.No abdominal lymphadenopathy.    Atheromatous calcified plaque involves the abdominal aorta and its branches without evidence of aneurysm.        Pelvis:    The prostate is unremarkable.  No free pelvic fluid or adenopathy.  Small fat-containing umbilical hernia. The urinary bladder is normal.    No significant osseous abnormality  identified.          Impression    Bilateral nonobstructive nephrolithiasis.    Indeterminate low-density lesion in the right hepatic dome measuring 5.5 cm and 1.3 cm low-density lesion in the right kidney.  Recommend dedicated liver mass protocol CT with and without intravenous contrast for further evaluation.    Small bilateral pleural effusions.    All CT scans at [this location] are performed using dose modulation techniques as appropriate to a performed exam including the following: automated exposure control; adjustment of the mA and/or kV according to patient size (this includes techniques or standardized protocols for targeted exams where dose is matched to indication / reason for exam; i.e. extremities or head); use of iterative reconstruction technique.    RADIOLOGIST:  LESLEY Wilson M.D.    Finalized on: 4/5/2025 8:16 PM By:  LESLEY Wilson MD, MD  [facility]# [id number]      2025-04-05 20:18:14.216     [facility]   CT Head Without Contrast    Narrative    EXAMINATION:  CT HEAD WITHOUT CONTRAST    CLINICAL HISTORY:  Mental status change, unknown cause;    TECHNIQUE:  Noncontrast images were obtained    COMPARISON:  CT brain 03/26/2018    FINDINGS:  No intracranial acute hemorrhage or acute focal brain parenchymal abnormality is identified.  Atrophy is present.  Patchy periventricular white matter hypodensity secondary to chronic small vessel ischemic changes.  Chronic encephalomalacia right frontal parietal lobe related to remote intracranial hemorrhage.    Calvarium is intact.  There is opacification of the right maxillary sinus.      Impression    No acute intracranial abnormality.  Chronic intracranial changes.  Opacification of the right maxillary sinus.    All CT scans at this facility use dose modulation, iterative reconstructions, and/or weight base dosing when appropriate to reduce radiation dose to as low as reasonably achievable.      Electronically signed by: Norma Knight  MD  Date:    04/04/2025  Time:    13:04         Lab Results   Component Value Date    PSA 0.6 05/01/2006    PSA 0.7 11/21/2005    PSA 0.7 03/30/2005         Assessment:   1. Recurrent UTI  POCT Urinalysis(Instrument)    Urine Culture High Risk      2. Renal stone  Place in Outpatient    Case Request Operating Room: Ureteroscopic stone extraction with laser lithotripsy and stent    Diet NPO    Place sequential compression device      3. Pre-op evaluation  CBC Auto Differential    Comprehensive Metabolic Panel      4. Pre-op testing  Ambulatory referral/consult to Pre-Admit          Plan:  Recurrent UTI  -     POCT Urinalysis(Instrument)  -     Urine Culture High Risk    Renal stone  -     Place in Outpatient; Standing  -     Case Request Operating Room: Ureteroscopic stone extraction with laser lithotripsy and stent  -     Diet NPO; Standing  -     Place sequential compression device; Standing    Pre-op evaluation  -     CBC Auto Differential; Future; Expected date: 05/14/2025  -     Comprehensive Metabolic Panel; Future; Expected date: 05/14/2025    Pre-op testing  -     Ambulatory referral/consult to Pre-Admit; Future; Expected date: 06/05/2025    Other orders  -     IP VTE LOW RISK PATIENT; Standing      Discussed persistent Klebsiella UTIs. Discussed that these could be associated with his stones, though it is difficult to determine that with certainty currently. Discussed management of stones to eliminate that as a possible source and pt would like to move forward with that. Discussed ESWL vs USE. Because of increased risk of infectious complications, will move forward with Bilateral USE with stent placement. Discussed possible need for pre-stenting. Scheduled for 6/5/25.     Following the visit, urine culture grew Multi-Drug Resistant Klebsiella again, so have a PICC line placed and start Ertapenem 1g daily x 14 days in anticipation of upcoming procedure.                              [1]   Social  History  Tobacco Use    Smoking status: Never    Smokeless tobacco: Never   Substance Use Topics    Alcohol use: No   [2]   Current Outpatient Medications   Medication Sig Dispense Refill    calcium-vitamin D 250-100 mg-unit per tablet Take 1 tablet by mouth once daily.      ferrous gluconate (FERGON) 324 MG tablet Take 324 mg by mouth daily with breakfast.      fluticasone propionate (FLONASE) 50 mcg/actuation nasal spray 1 spray by Each Nostril route every morning.      furosemide (LASIX) 20 MG tablet Take 20 mg by mouth once daily.      lactulose (CHRONULAC) 10 gram/15 mL solution Take 30 g by mouth 2 (two) times daily.      metoprolol succinate (TOPROL-XL) 25 MG 24 hr tablet Take 0.5 tablets (12.5 mg total) by mouth once daily. 15 tablet 0    NOVOLOG FLEXPEN U-100 INSULIN 100 unit/mL (3 mL) InPn pen Inject into the skin as needed.      rifAXIMin (XIFAXAN) 550 mg Tab Take 1 tablet (550 mg total) by mouth 2 (two) times daily.      spironolactone (ALDACTONE) 25 MG tablet Take 25 mg by mouth once daily.      tamsulosin (FLOMAX) 0.4 mg Cap Take 0.4 mg by mouth once daily.       No current facility-administered medications for this visit.

## 2025-05-17 LAB — BACTERIA UR CULT: ABNORMAL

## 2025-05-30 ENCOUNTER — TELEPHONE (OUTPATIENT)
Dept: UROLOGY | Facility: CLINIC | Age: 80
End: 2025-05-30
Payer: MEDICARE

## 2025-05-30 NOTE — TELEPHONE ENCOUNTER
Claudette notified that surgery have been moved to 06/12/2025. Patient PICC/Mid line should be placed by Monday. Will notify Dr. Cruz.

## 2025-05-30 NOTE — TELEPHONE ENCOUNTER
Spoke with patient nurse Claudette who was able to provide acceptable patient identifiers prior to start of conversation. Per Claudette Mr. Romo have not received PICC/Mid line placement. [facility]. Per [facility] they received order and tried contacting patient aakash Machuca on yesterday but there was no answer so left voicemail. Also contacted Claudette but there was no answer and stated voicemail box was full for placement. [facility] phone number [phone] given to claudette to contact them reference to scheduling PICC/Mid line. Patient nurse claudette verbalized understanding. Also notified that surgery may have to be moved to a later date/time will verify.

## 2025-05-30 NOTE — TELEPHONE ENCOUNTER
Copied from CRM #[id number]. Topic: General Inquiry - Patient Advice  >> May 30, 2025  8:52 AM Saritha wrote:  Type: Request a call back    Name of Caller: pt's caregiver/Claudette  Best Call Back Number:   Additional Information:  Claudette is calling in rgd to the pt's upcoming surgery on 6/5 and the pt doesn't have his pic line yet and no one has called to advise.  Please call Claudette at [phone]

## 2025-06-03 ENCOUNTER — TELEPHONE (OUTPATIENT)
Dept: UROLOGY | Facility: CLINIC | Age: 80
End: 2025-06-03
Payer: MEDICARE

## 2025-06-06 ENCOUNTER — OFFICE VISIT (OUTPATIENT)
Dept: INTERNAL MEDICINE | Facility: CLINIC | Age: 80
End: 2025-06-06
Payer: MEDICARE

## 2025-06-06 VITALS
DIASTOLIC BLOOD PRESSURE: 69 MMHG | HEART RATE: 60 BPM | OXYGEN SATURATION: 96 % | SYSTOLIC BLOOD PRESSURE: 129 MMHG | TEMPERATURE: 98 F

## 2025-06-06 DIAGNOSIS — I48.20 CHRONIC ATRIAL FIBRILLATION: Chronic | ICD-10-CM

## 2025-06-06 DIAGNOSIS — Z95.818 PRESENCE OF WATCHMAN LEFT ATRIAL APPENDAGE CLOSURE DEVICE: Primary | ICD-10-CM

## 2025-06-06 DIAGNOSIS — Z95.0 PACEMAKER: ICD-10-CM

## 2025-06-06 DIAGNOSIS — K75.81 LIVER CIRRHOSIS SECONDARY TO NASH (NONALCOHOLIC STEATOHEPATITIS): ICD-10-CM

## 2025-06-06 DIAGNOSIS — Z79.4 TYPE 2 DIABETES MELLITUS WITHOUT COMPLICATION, WITH LONG-TERM CURRENT USE OF INSULIN: ICD-10-CM

## 2025-06-06 DIAGNOSIS — R00.1 SYMPTOMATIC BRADYCARDIA: ICD-10-CM

## 2025-06-06 DIAGNOSIS — N39.0 RECURRENT UTI: ICD-10-CM

## 2025-06-06 DIAGNOSIS — Z01.818 PRE-OP TESTING: ICD-10-CM

## 2025-06-06 DIAGNOSIS — E11.9 TYPE 2 DIABETES MELLITUS WITHOUT COMPLICATION, WITH LONG-TERM CURRENT USE OF INSULIN: ICD-10-CM

## 2025-06-06 DIAGNOSIS — Z86.79 HISTORY OF INTRACRANIAL HEMORRHAGE: ICD-10-CM

## 2025-06-06 DIAGNOSIS — N20.0 KIDNEY STONE: ICD-10-CM

## 2025-06-06 DIAGNOSIS — K74.60 LIVER CIRRHOSIS SECONDARY TO NASH (NONALCOHOLIC STEATOHEPATITIS): ICD-10-CM

## 2025-06-06 PROBLEM — G93.41 ENCEPHALOPATHY, METABOLIC: Status: RESOLVED | Noted: 2025-04-04 | Resolved: 2025-06-06

## 2025-06-06 PROBLEM — R78.81 BACTEREMIA: Status: RESOLVED | Noted: 2025-04-05 | Resolved: 2025-06-06

## 2025-06-06 PROCEDURE — 99999 PR PBB SHADOW E&M-EST. PATIENT-LVL III: CPT | Mod: PBBFAC,,, | Performed by: NURSE PRACTITIONER

## 2025-06-06 RX ORDER — LANOLIN ALCOHOL/MO/W.PET/CERES
400 CREAM (GRAM) TOPICAL 2 TIMES DAILY
COMMUNITY

## 2025-06-06 RX ORDER — NAPROXEN SODIUM 220 MG/1
81 TABLET, FILM COATED ORAL DAILY
COMMUNITY

## 2025-06-06 RX ORDER — MAGNESIUM HYDROXIDE 400 MG/5ML
SUSPENSION, ORAL (FINAL DOSE FORM) ORAL DAILY
COMMUNITY

## 2025-06-06 NOTE — PRE-PROCEDURE INSTRUCTIONS
Pre op instructions reviewed with patient during Clinic Visit with Provider.    To confirm, Surgery is scheduled on THURSDAY JUNE 12. We will call you late afternoon the business day prior to surgery with your arrival time.    *Please report to the [facility] Lobby (1st Floor) located off of [facility] (2nd Entrance/Building on the left, in front of the flag pole). Address: [address] [place] [address] [place] [address]        INSTRUCTIONS IMPORTANT!!!  DO NOT Eat, Drink, or Smoke after 12 midnight unless instructed otherwise by your Surgeon. OK to brush teeth, no gum, candy or mints!    Do not eat after 12 midnight, Do not smoke or use chewing tobacco after 12 midnight!  OK to brush teeth, but no gum, candy, or mints!           MORNING OF SURGERY, drink small sip of water with the following medications instructed by Pre-Admit Provider:  Metoprolol, Rifaximin, Tamsulosin, Lactulose     *ADHD Medication: Stop taking 48 hrs prior to surgery, as this can affect the anesthesia used.     Diabetic Patients: If you take diabetic or weight loss medication, Do NOT take morning of surgery unless instructed by Doctor. Metformin to be stopped 24 hrs prior to surgery.     DO NOT take long-acting insulin the evening before surgery. Blood sugars will be checked in pre-op by Nurse.    If you take Ozempic/ Mounjaro / Wegovy / Trulicity / Semaglutide, any weight loss injections OR PHENTERMINE --->>> PLEASE LET US KNOW IMMEDIATELY, as these medications need to be stopped 7 days prior to surgery!    *Patients should HOLD all vitamins, herbal supplements, weight loss medication, aspirin products & NSAIDS 7 days prior to surgery, as these can thin the blood. Ok to take Tylenol.    ____  Avoid Alcoholic beverages 3 days prior to surgery, as it can thin the blood.  ____  NO Acrylic/fake nails or nail polish worn day of surgery (specifically hand/arm & foot surgeries).  ____  NO powder, lotions, deodorants, oils or cream on  body.  ____  Remove all jewelry, piercings, & foreign objects prior to arrival and leave at home.  ____  Remove Dentures, Hearing Aids & Contact Lens prior to surgery.  ____  Bring photo ID and insurance information to hospital (Leave Valuables at Home).  ____  If going home the same day, arrange for a ride home. You will not be able to drive for 24 hrs if Anesthesia was used.   ____  Females (ages 11-60): may need to give a urine sample the morning of surgery; please see Pre op Nurse prior to using the restroom.  ____  Males: Stop ED medications (Viagra, Cialis) 24 hrs prior to surgery.  ____  Wear clean, loose fitting clothing to allow for dressings/ bandages.    Bathing Instructions:    -Shower with anti-bacterial Soap (ex: Hibiclens or Dial) the night before surgery and the morning of.   -Do not use Hibiclens on your face or genitals.   -Apply clean clothes after shower.  -Do not shave your face morning of surgery   -Do not shave your body 3 days prior to surgery unless instructed otherwise by your Surgeon.    [facility]/Ride Policy:  Only 2 adults allowed in pre op/recovery area during your procedure. You MUST HAVE A RIDE HOME from a responsible adult that you know and trust. Medical Transport, Uber or Lyft can ONLY be used if patient has a responsible adult to accompany them during ride home.       *Signs and symptoms of Infection Before or After Surgery:               !!!If you experience any fever, chills, nausea/ vomiting, foul odor/ excessive drainage from surgical site, flu-like symptoms, new wounds or cuts, PLEASE CALL THE SURGEON OFFICE at [phone] or SEND MESSAGE THROUGH [employer] PORTAL!!!     *If you are running late day of surgery, please call the Surgery Dept @ [phone].    *Billing question, please call  [phone]     Thank you,  -[facility] Pre Admit Dept.  [phone]   or [phone]  M-F 7:30 am-4:00 pm (Closed Major Holidays)

## 2025-06-10 ENCOUNTER — EXTERNAL HOME HEALTH (OUTPATIENT)
Dept: HOME HEALTH SERVICES | Facility: HOSPITAL | Age: 80
End: 2025-06-10
Payer: MEDICARE

## 2025-06-11 ENCOUNTER — NURSE TRIAGE (OUTPATIENT)
Dept: ADMINISTRATIVE | Facility: CLINIC | Age: 80
End: 2025-06-11
Payer: MEDICARE

## 2025-06-11 ENCOUNTER — TELEPHONE (OUTPATIENT)
Dept: UROLOGY | Facility: CLINIC | Age: 80
End: 2025-06-11
Payer: MEDICARE

## 2025-06-11 NOTE — TELEPHONE ENCOUNTER
.Outgoing call placed to patient in regards to his procedure tomorrow, notified of son of reason for the call and he verbalized that patient is ready for tomorrow, no questions/concerns, he had stopped his blood thinners and no further assistance needed.

## 2025-06-11 NOTE — PRE-PROCEDURE INSTRUCTIONS
Called and spoke with UMAIR (PATIENT'S SON) about the following:     Please arrive to [facility] ([facility] at 9:45 AM on 6/12/25 for your scheduled procedure.  Address: [address] [place] [address] [place] [address] (2nd Building on left, 1st Floor Lobby)    !!!NO FOOD after midnight! You may have clear liquids up to 3 hrs before your arrival to the Hospital!!!  Clear liquids include Gatorade, water, soda, black coffee or tea (no milk or creamer), and clear juices.  Clear liquids do NOT include anything with pulp or food particles (Chicken broth, ice cream, yogurt, Jello, etc.)    Thank you,  -[facility] Pre [facility]mit [facility].  Mon-Fri 7:30 am - 4 pm [phone]

## 2025-06-12 ENCOUNTER — TELEPHONE (OUTPATIENT)
Dept: UROLOGY | Facility: CLINIC | Age: 80
End: 2025-06-12
Payer: MEDICARE

## 2025-06-12 ENCOUNTER — ANESTHESIA EVENT (OUTPATIENT)
Dept: SURGERY | Facility: HOSPITAL | Age: 80
End: 2025-06-12
Payer: MEDICARE

## 2025-06-12 ENCOUNTER — ANESTHESIA (OUTPATIENT)
Dept: SURGERY | Facility: HOSPITAL | Age: 80
End: 2025-06-12
Payer: MEDICARE

## 2025-06-12 ENCOUNTER — HOSPITAL ENCOUNTER (OUTPATIENT)
Facility: HOSPITAL | Age: 80
Discharge: HOME | End: 2025-06-12
Attending: UROLOGY | Admitting: UROLOGY
Payer: MEDICARE

## 2025-06-12 VITALS
RESPIRATION RATE: 20 BRPM | BODY MASS INDEX: 27.77 KG/M2 | HEART RATE: 60 BPM | HEIGHT: 72 IN | DIASTOLIC BLOOD PRESSURE: 60 MMHG | TEMPERATURE: 97 F | WEIGHT: 205 LBS | SYSTOLIC BLOOD PRESSURE: 131 MMHG | OXYGEN SATURATION: 99 %

## 2025-06-12 DIAGNOSIS — N20.0 RENAL STONES: Primary | ICD-10-CM

## 2025-06-12 DIAGNOSIS — N20.0 RENAL STONE: ICD-10-CM

## 2025-06-12 LAB — POCT GLUCOSE: 97 MG/DL (ref 70–110)

## 2025-06-12 PROCEDURE — 37000009 HC ANESTHESIA EA ADD 15 MINS: Performed by: UROLOGY

## 2025-06-12 PROCEDURE — 36000707: Performed by: UROLOGY

## 2025-06-12 PROCEDURE — 25500020 PHARM REV CODE 255: Performed by: UROLOGY

## 2025-06-12 PROCEDURE — 37000008 HC ANESTHESIA 1ST 15 MINUTES: Performed by: UROLOGY

## 2025-06-12 PROCEDURE — 63600175 PHARM REV CODE 636 W HCPCS: Performed by: NURSE ANESTHETIST, CERTIFIED REGISTERED

## 2025-06-12 PROCEDURE — 88300 SURGICAL PATH GROSS: CPT | Mod: TC | Performed by: UROLOGY

## 2025-06-12 PROCEDURE — 71000015 HC POSTOP RECOV 1ST HR: Performed by: UROLOGY

## 2025-06-12 PROCEDURE — 27200651 HC AIRWAY, LMA: Performed by: ANESTHESIOLOGY

## 2025-06-12 PROCEDURE — 52356 CYSTO/URETERO W/LITHOTRIPSY: CPT | Mod: 50,,, | Performed by: UROLOGY

## 2025-06-12 PROCEDURE — C1769 GUIDE WIRE: HCPCS | Performed by: UROLOGY

## 2025-06-12 PROCEDURE — 36000706: Performed by: UROLOGY

## 2025-06-12 PROCEDURE — C2617 STENT, NON-COR, TEM W/O DEL: HCPCS | Performed by: UROLOGY

## 2025-06-12 PROCEDURE — C1894 INTRO/SHEATH, NON-LASER: HCPCS | Performed by: UROLOGY

## 2025-06-12 PROCEDURE — 88300 SURGICAL PATH GROSS: CPT | Mod: 26,,, | Performed by: STUDENT IN AN ORGANIZED HEALTH CARE EDUCATION/TRAINING PROGRAM

## 2025-06-12 PROCEDURE — 25000003 PHARM REV CODE 250: Performed by: UROLOGY

## 2025-06-12 PROCEDURE — C1758 CATHETER, URETERAL: HCPCS | Performed by: UROLOGY

## 2025-06-12 PROCEDURE — 63600175 PHARM REV CODE 636 W HCPCS: Performed by: UROLOGY

## 2025-06-12 PROCEDURE — C1747 OPTIME ENDOSCOPE, SINGLE, URINARY TRACT: HCPCS | Performed by: UROLOGY

## 2025-06-12 PROCEDURE — 27201423 OPTIME MED/SURG SUP & DEVICES STERILE SUPPLY: Performed by: UROLOGY

## 2025-06-12 PROCEDURE — 71000033 HC RECOVERY, INTIAL HOUR: Performed by: UROLOGY

## 2025-06-12 PROCEDURE — 82365 CALCULUS SPECTROSCOPY: CPT | Performed by: UROLOGY

## 2025-06-12 PROCEDURE — 74420 UROGRAPHY RTRGR +-KUB: CPT | Mod: 26,,, | Performed by: UROLOGY

## 2025-06-12 DEVICE — STENT SET URETERAL 6X28CM: Type: IMPLANTABLE DEVICE | Site: URETER | Status: FUNCTIONAL

## 2025-06-12 RX ORDER — KETOROLAC TROMETHAMINE 10 MG/1
10 TABLET, FILM COATED ORAL EVERY 8 HOURS PRN
Qty: 15 TABLET | Refills: 0 | Status: SHIPPED | OUTPATIENT
Start: 2025-06-12 | End: 2025-06-17

## 2025-06-12 RX ORDER — ONDANSETRON HYDROCHLORIDE 2 MG/ML
4 INJECTION, SOLUTION INTRAVENOUS ONCE AS NEEDED
Status: DISCONTINUED | OUTPATIENT
Start: 2025-06-12 | End: 2025-06-12 | Stop reason: HOSPADM

## 2025-06-12 RX ORDER — OXYCODONE AND ACETAMINOPHEN 5; 325 MG/1; MG/1
1 TABLET ORAL
Status: DISCONTINUED | OUTPATIENT
Start: 2025-06-12 | End: 2025-06-12 | Stop reason: HOSPADM

## 2025-06-12 RX ORDER — LIDOCAINE HYDROCHLORIDE 10 MG/ML
INJECTION, SOLUTION EPIDURAL; INFILTRATION; INTRACAUDAL; PERINEURAL
Status: DISCONTINUED | OUTPATIENT
Start: 2025-06-12 | End: 2025-06-12

## 2025-06-12 RX ORDER — FENTANYL CITRATE 50 UG/ML
25 INJECTION, SOLUTION INTRAMUSCULAR; INTRAVENOUS EVERY 5 MIN PRN
Status: DISCONTINUED | OUTPATIENT
Start: 2025-06-12 | End: 2025-06-12 | Stop reason: HOSPADM

## 2025-06-12 RX ORDER — ONDANSETRON HYDROCHLORIDE 2 MG/ML
4 INJECTION, SOLUTION INTRAVENOUS DAILY PRN
Status: DISCONTINUED | OUTPATIENT
Start: 2025-06-12 | End: 2025-06-12 | Stop reason: HOSPADM

## 2025-06-12 RX ORDER — PHENAZOPYRIDINE HYDROCHLORIDE 200 MG/1
200 TABLET, FILM COATED ORAL 3 TIMES DAILY PRN
Qty: 15 TABLET | Refills: 0 | Status: SHIPPED | OUTPATIENT
Start: 2025-06-12 | End: 2025-06-22

## 2025-06-12 RX ORDER — HYDROMORPHONE HYDROCHLORIDE 2 MG/ML
0.2 INJECTION, SOLUTION INTRAMUSCULAR; INTRAVENOUS; SUBCUTANEOUS EVERY 5 MIN PRN
Status: DISCONTINUED | OUTPATIENT
Start: 2025-06-12 | End: 2025-06-12 | Stop reason: HOSPADM

## 2025-06-12 RX ORDER — ONDANSETRON HYDROCHLORIDE 2 MG/ML
INJECTION, SOLUTION INTRAVENOUS
Status: DISCONTINUED | OUTPATIENT
Start: 2025-06-12 | End: 2025-06-12

## 2025-06-12 RX ORDER — PROPOFOL 10 MG/ML
VIAL (ML) INTRAVENOUS
Status: DISCONTINUED | OUTPATIENT
Start: 2025-06-12 | End: 2025-06-12

## 2025-06-12 RX ORDER — FENTANYL CITRATE 50 UG/ML
INJECTION, SOLUTION INTRAMUSCULAR; INTRAVENOUS
Status: DISCONTINUED | OUTPATIENT
Start: 2025-06-12 | End: 2025-06-12

## 2025-06-12 RX ADMIN — PROPOFOL 70 MG: 10 INJECTION, EMULSION INTRAVENOUS at 12:06

## 2025-06-12 RX ADMIN — PROPOFOL 40 MG: 10 INJECTION, EMULSION INTRAVENOUS at 12:06

## 2025-06-12 RX ADMIN — LIDOCAINE HYDROCHLORIDE 50 MG: 10 SOLUTION INTRAVENOUS at 12:06

## 2025-06-12 RX ADMIN — ONDANSETRON 4 MG: 2 INJECTION INTRAMUSCULAR; INTRAVENOUS at 12:06

## 2025-06-12 RX ADMIN — ERTAPENEM 1 G: 1 INJECTION INTRAMUSCULAR; INTRAVENOUS at 12:06

## 2025-06-12 RX ADMIN — PROPOFOL 30 MG: 10 INJECTION, EMULSION INTRAVENOUS at 12:06

## 2025-06-12 RX ADMIN — SODIUM CHLORIDE, SODIUM LACTATE, POTASSIUM CHLORIDE, AND CALCIUM CHLORIDE: .6; .31; .03; .02 INJECTION, SOLUTION INTRAVENOUS at 02:06

## 2025-06-12 RX ADMIN — FENTANYL CITRATE 100 MCG: 50 INJECTION, SOLUTION INTRAMUSCULAR; INTRAVENOUS at 11:06

## 2025-06-12 RX ADMIN — SODIUM CHLORIDE, SODIUM LACTATE, POTASSIUM CHLORIDE, AND CALCIUM CHLORIDE: .6; .31; .03; .02 INJECTION, SOLUTION INTRAVENOUS at 11:06

## 2025-06-12 NOTE — ANESTHESIA PROCEDURE NOTES
Intubation    Date/Time: 6/12/2025 12:08 PM    Performed by: Alex Arredondo CRNA  Authorized by: Maricarmen Knight MD    Intubation:     Induction:  Intravenous    Intubated:  Postinduction    Mask Ventilation:  Not attempted    Attempts:  1    Attempted By:  CRNA    Difficult Airway Encountered?: No      Complications:  None    Airway Device:  Supraglottic airway/LMA    Airway Device Size:  5.0    Style/Cuff Inflation:  Cuffed (inflated to minimal occlusive pressure)    Secured at:  The lips    Placement Verified By:  Capnometry    Complicating Factors:  None    Findings Post-Intubation:  BS equal bilateral and atraumatic/condition of teeth unchanged

## 2025-06-12 NOTE — OP NOTE
Date of Procedure: 06/12/2025    PREOPERATIVE DIAGNOSIS:    Bilateral renal stones.                              Recurrent UTI                                                                               POSTOPERATIVE DIAGNOSIS:  same              PROCEDURES:                                                                  1.  Bilateral ureteroscopy with laser lithotripsy and stone basket extraction.                          2.  Cystoscopy with placement of Bilateral  double-J ureteral stent.              3.  Bilateral retrograde pyelogram.                                               4.  Fluoro less than 1 hour.                                                 SURGEON:  Desi Cruz M.D.                                          Assistants: None    Specimen: Right and left renal stones for analysis    Prosthetics, Devices, Grafts: None    ANESTHESIA:  General .                                           FINDINGS:  The patient had an approximately 8mm stone in the right kidney and several stones measuring up to 1cm in the left kidney.  Ureteroscopy was possible after placement of an appropriate guidewire. The stone was broken into small pieces. A stone basket was used, and several small fragments were extracted. A 6-[place] x 28 cm double-J ureteral stent was placed on each side. Flouroscopy was used throughout the case for wire and scope guidance, and if applicable to check final stent placement.                                    BLOOD LOSS:  3cc.                                                           BLOOD GIVEN:  None.                                                                                                             DRAINS:  6-[place] x 28 cm bilateral double-J ureteral stent.                      PROCEDURE IN DETAIL:  After proper consents were obtained, the patient was brought to the Operating Room where he was prepped and draped in normal sterile fashion and provided general endotracheal  anesthesia in dorsal lithotomy position.  A 22-Frisian cystoscope was assembled and introduced per urethra and the bladder was rinsed and drained.  A glidewire was used, and the Right UO was cannulated. The  wire was passed up to the renal pelvis under fluoroscopic guidance. The dual lumen ureteral catheter was utilized and retrograde pyelogram was shot, noting that the stone was present in the lower pole.  I then Advanced a motion wire into the kidney. The dual lumen was backloaded. I advanced a 12/14fr ureteral access sheath over the working wire under fluoroscopic guidance. I then advanced the flexible ureteroscope over the wire to the renal pelvis. The stone was encountered.  The laser fiber was brought in and the stone was broken into a large number of small pieces.  Several of these were basket extracted. I continued to laser the stone fragments in the lower pole until the fragments all appeared to be 2mm or less. The ureteroscope was backed down the ureter, noting no remaining ureteral stones. The ureteroscope was then set aside and the dual lumen was utilized to shoot a gentle retrograde pyelogram, noting no contrast extravasation.  A double-J ureteral stent was advanced coaxially over the wire and up to the renal pelvis, and there was an excellent curl on both ends when the wire was withdrawn, using fluoroscopic guidance.     A glidewire was used, and the left UO was cannulated. The  wire was passed up to the renal pelvis under fluoroscopic guidance. The dual lumen ureteral catheter was utilized and retrograde pyelogram was shot, noting that the stone was present in the middle calyx.  A motion wire was Advanced into the kidney. The dual lumen was backloaded. I advanced a 12/14fr ureteral access sheath over the working wire under fluoroscopic guidance. I then advanced the flexible ureteroscope over the wire to the renal pelvis. The stones were encountered.  The laser fiber was brought in and the stone was  broken into a large number of small pieces.  Several of these were basket extracted. I continued to laser the stone fragments in the lower pole until the fragments all appeared to be 2mm or less. The ureteroscope was backed down the ureter, noting no remaining ureteral stones. The ureteroscope was then set aside and the dual lumen was utilized to shoot a gentle retrograde pyelogram, noting no contrast extravasation.  A double-J ureteral stent was advanced coaxially over the wire and up to the renal pelvis, and there was an excellent curl on both ends when the wire was withdrawn, using fluoroscopic guidance.     The bladder was then rinsed and drained and stone fragments collected for pathologic examination.  After the bladder was drained, cystoscope was withdrawn and set aside.      The pt tolerated all of this well, and there were no complications. The patient was awakened and transferred to Recovery in stable condition.

## 2025-06-12 NOTE — TRANSFER OF CARE
Anesthesia Transfer of Care Note    Patient: Ramy Romo    Procedure(s) Performed: Procedure(s) (LRB):  Ureteroscopic stone extraction with laser lithotripsy and stent (Bilateral)    Patient location: PACU    Anesthesia Type: general    Transport from OR: Transported from OR on room air with adequate spontaneous ventilation    Post pain: adequate analgesia    Post assessment: no apparent anesthetic complications and tolerated procedure well    Post vital signs: stable    Level of consciousness: responds to stimulation    Nausea/Vomiting: no nausea/vomiting    Complications: none    Transfer of care protocol was followed      Last vitals: Visit Vitals  BP (!) 106/53 (BP Location: Right arm, Patient Position: Sitting)   Pulse 61   Temp 36.9 °C (98.5 °F) (Tympanic)   Resp 18   Ht 6' (1.829 m)   Wt 93 kg (205 lb 0.4 oz)   SpO2 98%   BMI 27.81 kg/m²

## 2025-06-12 NOTE — ANESTHESIA PREPROCEDURE EVALUATION
06/12/2025  Ramy Romo is a 79 y.o., male.      Pre-op Assessment    I have reviewed the Patient Summary Reports.     I have reviewed the Nursing Notes. I have reviewed the NPO Status.      Review of Systems  Anesthesia Hx:  No problems with previous Anesthesia                Hematology/Oncology:  Hematology Normal   Oncology Normal                                   Cardiovascular:     Hypertension       CHF                                   Renal/:  Chronic Renal Disease                Hepatic/GI:      Liver Disease, Hepatitis              Neurological:  Neurology Normal                                      Endocrine:  Diabetes           Dermatological:  Skin Normal    Psych:  Psychiatric Normal                    Physical Exam  General: Well nourished, Cooperative, Alert and Oriented    Airway:  Mallampati: II / II  Mouth Opening: Normal  TM Distance: Normal  Tongue: Normal  Neck ROM: Normal ROM    Dental:  Intact      Patient Active Problem List   Diagnosis    Diabetes    Chronic atrial fibrillation    Chronic diastolic heart failure    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 Mildly increased wall thickness. There is mild concentric hypertrophy. There is low normal systolic function with a visually estimated ejection fraction of 50 - 55%. There is diastolic dysfunction but grade cannot be determined.    Right Ventricle: The right ventricle has mild enlargement. Wall thickness is normal. Systolic function is borderline low.    Left  Atrium: Severely dilated    Right Atrium: Right atrium is dilated.    Aortic Valve: There is mild aortic valve sclerosis. There is mild annular calcification present.    Mitral Valve: Mildly thickened leaflets. Mildly calcified leaflets. There is mild mitral annular calcification. There is mild regurgitation.    Tricuspid Valve: There is mild regurgitation.    Pulmonary Artery: The estimated pulmonary artery systolic pressure is 28 mmHg.    IVC/SVC: Normal venous pressure at 3 mmHg.        Anesthesia Plan  Type of Anesthesia, risks & benefits discussed:    Anesthesia Type: Gen ETT, MAC, Gen Supraglottic Airway  Intra-op Monitoring Plan: Standard ASA Monitors  Post Op Pain Control Plan: multimodal analgesia  Induction:  IV  Airway Plan: Direct  Informed Consent: Informed consent signed with the Patient and all parties understand the risks and agree with anesthesia plan.  All questions answered.   ASA Score: 3  Day of Surgery Review of History & Physical: H&P Update referred to the surgeon/provider.I have interviewed and examined the patient. I have reviewed the patient's H&P dated: There are no significant changes. H&P completed by Anesthesiologist.    Ready For Surgery From Anesthesia Perspective.     .

## 2025-06-12 NOTE — TELEPHONE ENCOUNTER
.Outgoing call placed to patient's son, who wanted to know about visitors, he was notified that the hospital could provide more info but once patient is finished prep, they may allow a couple of visitors before the procedure if time permits and afterwards once patient is out of recovery he may be discharged unless there are complications but he can get more information at the hospital. He verbalized understanding.        Barbara Alford, RN to [facility] Staff (Selected Message)        6/11/25  7:41 PM  Hello,  Please see triage nurses note.  Thanks   Barbara Alford, MANDEEP      6/11/25  7:41 PM  Note  Son states pt has a sx tomorrow and they are unsure of how many people can be there to visit afterwards.   Reason for Disposition   Question about upcoming scheduled surgery, procedure or test, no triage required, and triager able to answer question    Protocols used: Information Only Call - No Triage-A-            6/11/25  7:31 PM  UMAIR YEN (Emergency Contact) contacted Barbara Alford, RN

## 2025-06-12 NOTE — DISCHARGE SUMMARY
[facility] - Surgery (Hospital)  Discharge Note  Short Stay    Procedure(s) (LRB):  Ureteroscopic stone extraction with laser lithotripsy and stent (Bilateral)      OUTCOME: Patient tolerated treatment/procedure well without complication and is now ready for discharge.    DISPOSITION: Home or Self Care    FINAL DIAGNOSIS:  Renal stones    FOLLOWUP: In clinic    DISCHARGE INSTRUCTIONS:    Discharge Procedure Orders   Diet Adult Regular     Notify your health care provider if you experience any of the following:  temperature >100.4     Notify your health care provider if you experience any of the following:  persistent nausea and vomiting or diarrhea     Notify your health care provider if you experience any of the following:  severe uncontrolled pain     No dressing needed     Activity as tolerated         Clinical Reference Documents Added to Patient Instructions         Document    PHENAZOPYRIDINE, ADULT (ENGLISH)    URETERAL STENT PLACEMENT - DISCHARGE INSTRUCTIONS (ENGLISH)            TIME SPENT ON DISCHARGE: 10 minutes

## 2025-06-12 NOTE — TELEPHONE ENCOUNTER
Son states pt has a sx tomorrow and they are unsure of how many people can be there to visit afterwards.   Reason for Disposition   Question about upcoming scheduled surgery, procedure or test, no triage required, and triager able to answer question    Protocols used: Information Only Call - No Triage-A-

## 2025-06-13 LAB
ESTROGEN SERPL-MCNC: NORMAL PG/ML
ESTROGEN SERPL-MCNC: NORMAL PG/ML
INSULIN SERPL-ACNC: NORMAL U[IU]/ML
INSULIN SERPL-ACNC: NORMAL U[IU]/ML
LAB AP CLINICAL INFORMATION: NORMAL
LAB AP CLINICAL INFORMATION: NORMAL
LAB AP GROSS DESCRIPTION: NORMAL
LAB AP GROSS DESCRIPTION: NORMAL
LAB AP PERFORMING LOCATION(S): NORMAL
LAB AP PERFORMING LOCATION(S): NORMAL
LAB AP REPORT FOOTNOTES: NORMAL
LAB AP REPORT FOOTNOTES: NORMAL

## 2025-06-16 ENCOUNTER — TELEPHONE (OUTPATIENT)
Dept: UROLOGY | Facility: CLINIC | Age: 80
End: 2025-06-16
Payer: MEDICARE

## 2025-06-16 NOTE — TELEPHONE ENCOUNTER
Copied from CRM #[id number]. Topic: General Inquiry - Patient Advice  >> Jun 16, 2025  4:32 PM Marlo wrote:  Type:  Needs Medical Advice    Who Called: Sven(pt son)  Would the patient rather a call back or a response via [facility]? Call back  Best Call Back Number: [phone]  Additional Information: pt son stating dr Montaño informed them that she would be doing a culture and letting pt know if he should continue his antibiotics. Requesting a call back

## 2025-06-17 ENCOUNTER — NURSE TRIAGE (OUTPATIENT)
Dept: ADMINISTRATIVE | Facility: CLINIC | Age: 80
End: 2025-06-17
Payer: MEDICARE

## 2025-06-17 ENCOUNTER — HOSPITAL ENCOUNTER (INPATIENT)
Facility: HOSPITAL | Age: 80
LOS: 3 days | Discharge: HOME HEALTH | DRG: 442 | End: 2025-06-20
Attending: EMERGENCY MEDICINE | Admitting: INTERNAL MEDICINE
Payer: MEDICARE

## 2025-06-17 ENCOUNTER — TELEPHONE (OUTPATIENT)
Dept: UROLOGY | Facility: CLINIC | Age: 80
End: 2025-06-17
Payer: MEDICARE

## 2025-06-17 DIAGNOSIS — R53.1 WEAKNESS: ICD-10-CM

## 2025-06-17 DIAGNOSIS — E87.5 ACUTE HYPERKALEMIA: ICD-10-CM

## 2025-06-17 DIAGNOSIS — N30.01 ACUTE CYSTITIS WITH HEMATURIA: ICD-10-CM

## 2025-06-17 DIAGNOSIS — R60.0 BILATERAL LEG EDEMA: ICD-10-CM

## 2025-06-17 DIAGNOSIS — M79.89 RIGHT LEG SWELLING: ICD-10-CM

## 2025-06-17 DIAGNOSIS — N39.0 CHRONIC UTI: ICD-10-CM

## 2025-06-17 DIAGNOSIS — K76.82 HEPATIC ENCEPHALOPATHY: Primary | ICD-10-CM

## 2025-06-17 DIAGNOSIS — R56.9 SEIZURE-LIKE ACTIVITY: ICD-10-CM

## 2025-06-17 DIAGNOSIS — N28.9 ACUTE RENAL INSUFFICIENCY: ICD-10-CM

## 2025-06-17 PROBLEM — D69.6 THROMBOCYTOPENIA: Status: ACTIVE | Noted: 2025-06-17

## 2025-06-17 PROBLEM — D64.9 ANEMIA: Status: ACTIVE | Noted: 2025-06-17

## 2025-06-17 LAB
ABSOLUTE EOSINOPHIL (OHS): 0.34 K/UL
ABSOLUTE MONOCYTE (OHS): 0.47 K/UL (ref 0.3–1)
ABSOLUTE NEUTROPHIL COUNT (OHS): 3.51 K/UL (ref 1.8–7.7)
ALBUMIN SERPL BCP-MCNC: 2.7 G/DL (ref 3.5–5.2)
ALP SERPL-CCNC: 104 UNIT/L (ref 40–150)
ALT SERPL W/O P-5'-P-CCNC: 14 UNIT/L (ref 10–44)
AMMONIA PLAS-SCNC: 118 UMOL/L (ref 10–50)
ANION GAP (OHS): 11 MMOL/L (ref 8–16)
ANION GAP (OHS): 8 MMOL/L (ref 8–16)
APTT PPP: 28.1 SECONDS (ref 21–32)
AST SERPL-CCNC: 26 UNIT/L (ref 11–45)
BACTERIA #/AREA URNS AUTO: ABNORMAL /HPF
BASOPHILS # BLD AUTO: 0.04 K/UL
BASOPHILS NFR BLD AUTO: 0.7 %
BILIRUB SERPL-MCNC: 0.8 MG/DL (ref 0.1–1)
BILIRUB UR QL STRIP.AUTO: NEGATIVE
BNP SERPL-MCNC: 227 PG/ML (ref 0–99)
BUN SERPL-MCNC: 51 MG/DL (ref 8–23)
BUN SERPL-MCNC: 53 MG/DL (ref 8–23)
CALCIUM SERPL-MCNC: 9.7 MG/DL (ref 8.7–10.5)
CALCIUM SERPL-MCNC: 9.7 MG/DL (ref 8.7–10.5)
CHLORIDE SERPL-SCNC: 116 MMOL/L (ref 95–110)
CHLORIDE SERPL-SCNC: 116 MMOL/L (ref 95–110)
CK SERPL-CCNC: 29 U/L (ref 20–200)
CLARITY UR: ABNORMAL
CO2 SERPL-SCNC: 17 MMOL/L (ref 23–29)
CO2 SERPL-SCNC: 18 MMOL/L (ref 23–29)
COLOR UR AUTO: YELLOW
CREAT SERPL-MCNC: 1.7 MG/DL (ref 0.5–1.4)
CREAT SERPL-MCNC: 1.8 MG/DL (ref 0.5–1.4)
ERYTHROCYTE [DISTWIDTH] IN BLOOD BY AUTOMATED COUNT: 13 % (ref 11.5–14.5)
GFR SERPLBLD CREATININE-BSD FMLA CKD-EPI: 38 ML/MIN/1.73/M2
GFR SERPLBLD CREATININE-BSD FMLA CKD-EPI: 41 ML/MIN/1.73/M2
GLUCOSE SERPL-MCNC: 105 MG/DL (ref 70–110)
GLUCOSE SERPL-MCNC: 106 MG/DL (ref 70–110)
GLUCOSE UR QL STRIP: NEGATIVE
HCT VFR BLD AUTO: 30.5 % (ref 40–54)
HGB BLD-MCNC: 10.2 GM/DL (ref 14–18)
HGB UR QL STRIP: ABNORMAL
HOLD SPECIMEN: NORMAL
HOLD SPECIMEN: NORMAL
HYALINE CASTS UR QL AUTO: 0 /LPF (ref 0–1)
IMM GRANULOCYTES # BLD AUTO: 0.02 K/UL (ref 0–0.04)
IMM GRANULOCYTES NFR BLD AUTO: 0.4 % (ref 0–0.5)
INR PPP: 1.1 (ref 0.8–1.2)
KETONES UR QL STRIP: NEGATIVE
LEUKOCYTE ESTERASE UR QL STRIP: ABNORMAL
LYMPHOCYTES # BLD AUTO: 1.27 K/UL (ref 1–4.8)
MAGNESIUM SERPL-MCNC: 1.6 MG/DL (ref 1.6–2.6)
MCH RBC QN AUTO: 34 PG (ref 27–31)
MCHC RBC AUTO-ENTMCNC: 33.4 G/DL (ref 32–36)
MCV RBC AUTO: 102 FL (ref 82–98)
MICROSCOPIC COMMENT: ABNORMAL
NITRITE UR QL STRIP: NEGATIVE
NUCLEATED RBC (/100WBC) (OHS): 0 /100 WBC
PH UR STRIP: 7 [PH]
PLATELET # BLD AUTO: 123 K/UL (ref 150–450)
PMV BLD AUTO: 9.1 FL (ref 9.2–12.9)
POCT GLUCOSE: 102 MG/DL (ref 70–110)
POTASSIUM SERPL-SCNC: 4.9 MMOL/L (ref 3.5–5.1)
POTASSIUM SERPL-SCNC: 5.9 MMOL/L (ref 3.5–5.1)
PROT SERPL-MCNC: 6.4 GM/DL (ref 6–8.4)
PROT UR QL STRIP: ABNORMAL
PROTHROMBIN TIME: 12.4 SECONDS (ref 9–12.5)
RBC # BLD AUTO: 3 M/UL (ref 4.6–6.2)
RBC #/AREA URNS AUTO: >100 /HPF (ref 0–4)
RELATIVE EOSINOPHIL (OHS): 6 %
RELATIVE LYMPHOCYTE (OHS): 22.5 % (ref 18–48)
RELATIVE MONOCYTE (OHS): 8.3 % (ref 4–15)
RELATIVE NEUTROPHIL (OHS): 62.1 % (ref 38–73)
SODIUM SERPL-SCNC: 142 MMOL/L (ref 136–145)
SODIUM SERPL-SCNC: 144 MMOL/L (ref 136–145)
SP GR UR STRIP: 1.01
TROPONIN I SERPL DL<=0.01 NG/ML-MCNC: 0.01 NG/ML
UROBILINOGEN UR STRIP-ACNC: NEGATIVE EU/DL
WBC # BLD AUTO: 5.65 K/UL (ref 3.9–12.7)
WBC #/AREA URNS AUTO: >100 /HPF (ref 0–5)
WBC CLUMPS UR QL AUTO: ABNORMAL

## 2025-06-17 PROCEDURE — 93005 ELECTROCARDIOGRAM TRACING: CPT

## 2025-06-17 PROCEDURE — 85610 PROTHROMBIN TIME: CPT | Performed by: EMERGENCY MEDICINE

## 2025-06-17 PROCEDURE — 11000001 HC ACUTE MED/SURG PRIVATE ROOM

## 2025-06-17 PROCEDURE — 63600175 PHARM REV CODE 636 W HCPCS: Performed by: EMERGENCY MEDICINE

## 2025-06-17 PROCEDURE — 96375 TX/PRO/DX INJ NEW DRUG ADDON: CPT

## 2025-06-17 PROCEDURE — 25000242 PHARM REV CODE 250 ALT 637 W/ HCPCS

## 2025-06-17 PROCEDURE — 85025 COMPLETE CBC W/AUTO DIFF WBC: CPT | Performed by: EMERGENCY MEDICINE

## 2025-06-17 PROCEDURE — 83735 ASSAY OF MAGNESIUM: CPT | Performed by: EMERGENCY MEDICINE

## 2025-06-17 PROCEDURE — 85730 THROMBOPLASTIN TIME PARTIAL: CPT | Performed by: EMERGENCY MEDICINE

## 2025-06-17 PROCEDURE — 82962 GLUCOSE BLOOD TEST: CPT

## 2025-06-17 PROCEDURE — 25000003 PHARM REV CODE 250: Performed by: NURSE PRACTITIONER

## 2025-06-17 PROCEDURE — 99285 EMERGENCY DEPT VISIT HI MDM: CPT | Mod: 25

## 2025-06-17 PROCEDURE — 87086 URINE CULTURE/COLONY COUNT: CPT | Performed by: EMERGENCY MEDICINE

## 2025-06-17 PROCEDURE — 82140 ASSAY OF AMMONIA: CPT | Performed by: EMERGENCY MEDICINE

## 2025-06-17 PROCEDURE — 81001 URINALYSIS AUTO W/SCOPE: CPT | Performed by: EMERGENCY MEDICINE

## 2025-06-17 PROCEDURE — 84484 ASSAY OF TROPONIN QUANT: CPT | Performed by: EMERGENCY MEDICINE

## 2025-06-17 PROCEDURE — 83880 ASSAY OF NATRIURETIC PEPTIDE: CPT | Performed by: EMERGENCY MEDICINE

## 2025-06-17 PROCEDURE — 63600175 PHARM REV CODE 636 W HCPCS: Performed by: NURSE PRACTITIONER

## 2025-06-17 PROCEDURE — 80053 COMPREHEN METABOLIC PANEL: CPT | Performed by: EMERGENCY MEDICINE

## 2025-06-17 PROCEDURE — 96374 THER/PROPH/DIAG INJ IV PUSH: CPT

## 2025-06-17 PROCEDURE — 21400001 HC TELEMETRY ROOM

## 2025-06-17 PROCEDURE — 93010 ELECTROCARDIOGRAM REPORT: CPT | Mod: ,,, | Performed by: INTERNAL MEDICINE

## 2025-06-17 PROCEDURE — 82550 ASSAY OF CK (CPK): CPT | Performed by: EMERGENCY MEDICINE

## 2025-06-17 RX ORDER — ALBUTEROL SULFATE 0.83 MG/ML
SOLUTION RESPIRATORY (INHALATION)
Status: COMPLETED
Start: 2025-06-17 | End: 2025-06-17

## 2025-06-17 RX ORDER — IBUPROFEN 200 MG
24 TABLET ORAL
Status: DISCONTINUED | OUTPATIENT
Start: 2025-06-17 | End: 2025-06-20 | Stop reason: HOSPADM

## 2025-06-17 RX ORDER — DIPHENHYDRAMINE HYDROCHLORIDE 50 MG/ML
25 INJECTION, SOLUTION INTRAMUSCULAR; INTRAVENOUS
Status: COMPLETED | OUTPATIENT
Start: 2025-06-17 | End: 2025-06-17

## 2025-06-17 RX ORDER — LORAZEPAM 2 MG/ML
0.5 INJECTION INTRAMUSCULAR
Status: COMPLETED | OUTPATIENT
Start: 2025-06-17 | End: 2025-06-17

## 2025-06-17 RX ORDER — ONDANSETRON HYDROCHLORIDE 2 MG/ML
4 INJECTION, SOLUTION INTRAVENOUS EVERY 6 HOURS PRN
Status: DISCONTINUED | OUTPATIENT
Start: 2025-06-17 | End: 2025-06-20 | Stop reason: HOSPADM

## 2025-06-17 RX ORDER — NALOXONE HCL 0.4 MG/ML
0.02 VIAL (ML) INJECTION
Status: DISCONTINUED | OUTPATIENT
Start: 2025-06-17 | End: 2025-06-20 | Stop reason: HOSPADM

## 2025-06-17 RX ORDER — IBUPROFEN 200 MG
16 TABLET ORAL
Status: DISCONTINUED | OUTPATIENT
Start: 2025-06-17 | End: 2025-06-20 | Stop reason: HOSPADM

## 2025-06-17 RX ORDER — FUROSEMIDE 10 MG/ML
40 INJECTION INTRAMUSCULAR; INTRAVENOUS
Status: COMPLETED | OUTPATIENT
Start: 2025-06-17 | End: 2025-06-17

## 2025-06-17 RX ORDER — LACTULOSE 10 G/15ML
200 SOLUTION ORAL; RECTAL 3 TIMES DAILY
Status: DISCONTINUED | OUTPATIENT
Start: 2025-06-17 | End: 2025-06-19

## 2025-06-17 RX ORDER — SODIUM CHLORIDE 0.9 % (FLUSH) 0.9 %
3 SYRINGE (ML) INJECTION EVERY 8 HOURS PRN
Status: DISCONTINUED | OUTPATIENT
Start: 2025-06-17 | End: 2025-06-20 | Stop reason: HOSPADM

## 2025-06-17 RX ORDER — ALBUTEROL SULFATE 0.83 MG/ML
10 SOLUTION RESPIRATORY (INHALATION)
Status: COMPLETED | OUTPATIENT
Start: 2025-06-17 | End: 2025-06-17

## 2025-06-17 RX ORDER — GLUCAGON 1 MG
1 KIT INJECTION
Status: DISCONTINUED | OUTPATIENT
Start: 2025-06-17 | End: 2025-06-20 | Stop reason: HOSPADM

## 2025-06-17 RX ORDER — ERTAPENEM 1 G/1
1 INJECTION, POWDER, LYOPHILIZED, FOR SOLUTION INTRAMUSCULAR; INTRAVENOUS DAILY
Status: ON HOLD | COMMUNITY
Start: 2025-06-09 | End: 2025-06-20 | Stop reason: HOSPADM

## 2025-06-17 RX ORDER — SPIRONOLACTONE 50 MG/1
50 TABLET, FILM COATED ORAL EVERY MORNING
COMMUNITY
Start: 2025-06-03

## 2025-06-17 RX ORDER — LORAZEPAM 2 MG/ML
0.5 INJECTION INTRAMUSCULAR
Status: DISCONTINUED | OUTPATIENT
Start: 2025-06-17 | End: 2025-06-20 | Stop reason: HOSPADM

## 2025-06-17 RX ORDER — CEFEPIME HYDROCHLORIDE 2 G/1
2 INJECTION, POWDER, FOR SOLUTION INTRAVENOUS
Status: DISCONTINUED | OUTPATIENT
Start: 2025-06-17 | End: 2025-06-18

## 2025-06-17 RX ADMIN — FUROSEMIDE 40 MG: 10 INJECTION, SOLUTION INTRAMUSCULAR; INTRAVENOUS at 01:06

## 2025-06-17 RX ADMIN — DIPHENHYDRAMINE HYDROCHLORIDE 25 MG: 50 INJECTION, SOLUTION INTRAMUSCULAR; INTRAVENOUS at 03:06

## 2025-06-17 RX ADMIN — CEFEPIME 2 G: 2 INJECTION, POWDER, FOR SOLUTION INTRAVENOUS at 09:06

## 2025-06-17 RX ADMIN — ALBUTEROL SULFATE 10 MG: 0.83 SOLUTION RESPIRATORY (INHALATION) at 03:06

## 2025-06-17 RX ADMIN — LORAZEPAM 0.5 MG: 2 INJECTION INTRAMUSCULAR; INTRAVENOUS at 04:06

## 2025-06-17 RX ADMIN — ALBUTEROL SULFATE 10 MG: 2.5 SOLUTION RESPIRATORY (INHALATION) at 03:06

## 2025-06-17 RX ADMIN — SODIUM BICARBONATE: 84 INJECTION, SOLUTION INTRAVENOUS at 09:06

## 2025-06-17 RX ADMIN — LACTULOSE 200 G: 10 SOLUTION ORAL at 10:06

## 2025-06-17 NOTE — PHARMACY MED REC
"Admission Medication History     The home medication history was taken by Natalya Belcher.    You may go to "Admission" then "Reconcile Home Medications" tabs to review and/or act upon these items.     The home medication list has been updated by the Pharmacy department.   Please read ALL comments highlighted in yellow.   Please address this information as you see fit.    Feel free to contact us if you have any questions or require assistance.      The medications listed below were removed from the home medication list. Please reorder if appropriate:  Patient reports no longer taking the following medication(s):  Ketorolac 10 gm        Medications listed below were obtained from: Patient/family and Analytic software- [employer]      [facility] REC COMPLETED:         Natalya Belcher  [id number]    Current Outpatient Medications on File Prior to Encounter   Medication Sig Dispense Refill Last Dose/Taking    aspirin 81 MG Chew Take 81 mg by mouth once daily.   6/16/2025    calcium-vitamin D 250-100 mg-unit per tablet Take 1 tablet by mouth once daily.   6/16/2025    ertapenem (INVANZ) 1 gram injection Inject 1 g into the vein once daily.   6/16/2025    ferrous gluconate (FERGON) 324 MG tablet Take 324 mg by mouth daily with breakfast.   6/16/2025    furosemide (LASIX) 20 MG tablet Take 20 mg by mouth daily as needed.   Past Month    lactulose (CHRONULAC) 10 gram/15 mL solution Take 30 g by mouth 2 (two) times daily.   6/16/2025    magnesium oxide (MAG-OX) 400 mg (241.3 mg magnesium) tablet Take 400 mg by mouth 2 (two) times daily.   6/16/2025    metoprolol succinate (TOPROL-XL) 25 MG 24 hr tablet Take 0.5 tablets (12.5 mg total) by mouth once daily. (Patient taking differently: Take 25 mg by mouth once daily.) 15 tablet 0 6/16/2025    phenazopyridine (PYRIDIUM) 200 MG tablet Take 1 tablet (200 mg total) by mouth 3 (three) times daily as needed. 15 tablet 0 6/16/2025    potassium gluconate 595 mg (99 mg) Tab Take by mouth " once daily.   6/16/2025    rifAXIMin (XIFAXAN) 550 mg Tab Take 1 tablet (550 mg total) by mouth 2 (two) times daily.   6/16/2025    spironolactone (ALDACTONE) 50 MG tablet Take 50 mg by mouth every morning.   6/16/2025    tamsulosin (FLOMAX) 0.4 mg Cap Take 0.4 mg by mouth once daily.   6/16/2025    fluticasone propionate (FLONASE) 50 mcg/actuation nasal spray 1 spray by Each Nostril route every morning.   Unknown    NOVOLOG FLEXPEN U-100 INSULIN 100 unit/mL (3 mL) InPn pen Inject into the skin as needed.   Unknown                           .

## 2025-06-17 NOTE — TELEPHONE ENCOUNTER
.Outgoing call placed to patient's son as requested, he was notified Dr. Cruz would like for his father to get a urine culture completed before having his PICC line removed, he verbalized understanding but stated that his dad is currently in the [facility] ED for evaluation after having lower abdominal pain and asked if she can contact the ER to have the do the urine culture. Message sent to Dr. Cruz.

## 2025-06-17 NOTE — ED PROVIDER NOTES
SCRIBE #1 NOTE: I, Lydia Goa, am scribing for, and in the presence of, Elvira Medrano MD. I have scribed the entire note.       History     Chief Complaint   Patient presents with    Leg Swelling     Pt reports this morning he wheeled himself to the restroom; when he attempted to stand, he could support himself due to leg swelling and stiffness. No reported fall or pain. Recent kidney stone stent placed 6/12/25     Review of patient's allergies indicates:   Allergen Reactions    Seroquel [quetiapine] Hallucinations     Per son patient had adverse reaction, became agitated and combating     Sulfa (sulfonamide antibiotics) Rash         History of Present Illness     HPI    6/17/2025, 1:03 PM  History obtained from the patient and medical records      History of Present Illness: Ramy Romo is a 79 y.o. male patient with a PMHx of DM, HTN, A-fib, CHF, cirrhosis, kidney stone, intracranial hemorrhage, thrombocytopenia, and anemia who presents to the Emergency Department for evaluation of leg swelling which began this morning. Pt is a poor historian, but states that while wheeling himself to the bathroom this morning, his legs felt weak and he was unable to move them.  Pt lives at home with a caretaker. He underwent a ureteroscopy with laser lithotripsy performed by Dr. Cruz (Urology) on 6/12, and currently has a PICC line in place. Pt states that he did not take any of his medications this morning.  No mitigating or exacerbating factors reported. Associated sxs include bilateral knee pain and bilateral leg weakness. Patient denies any slurred speech, facial droop, focal weakness, no fever, chills, chest pain or SOB, no abdominal pain, no nausea vomiting.  No prior Tx specified.  No further complaints or concerns at this time.       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     Pacemaker        Past Surgical History:  Past Surgical History:   Procedure Laterality Date    A-V CARDIAC PACEMAKER INSERTION Left 1/25/2024    Procedure: INSERTION, CARDIAC PACEMAKER, DUAL CHAMBER/poss single lead vs His lead;  Surgeon: Anthony Coronado MD;  Location: [facility] CATH LAB;  Service: Cardiology;  Laterality: Left;  Bio/LBBB pacing    CHOLECYSTECTOMY      CYSTOSCOPY WITH URETEROSCOPY, RETROGRADE PYELOGRAPHY, AND INSERTION OF STENT Bilateral 6/12/2025    Procedure: CYSTOSCOPY, WITH RETROGRADE PYELOGRAM AND URETERAL STENT INSERTION;  Surgeon: Desi Cruz MD;  Location: [facility] OR;  Service: Urology;  Laterality: Bilateral;    INSERTION, PACEMAKER, TEMPORARY TRANSVENOUS N/A 1/24/2024    Procedure: Insertion, Pacemaker, Temporary Transvenous;  Surgeon: Renaldo Santacruz MD;  Location: [facility] CATH LAB;  Service: Cardiology;  Laterality: N/A;    NECK SURGERY      TOTAL ELBOW ARTHROPLASTY Right 03/2019    URETEROSCOPIC REMOVAL OF URETERIC CALCULUS Bilateral 6/12/2025    Procedure: REMOVAL, CALCULUS, URETER, URETEROSCOPIC;  Surgeon: Desi Cruz MD;  Location: [facility] OR;  Service: Urology;  Laterality: Bilateral;    URETEROSCOPY, WITH LASER LITHOTRIPSY Bilateral 6/12/2025    Procedure: URETEROSCOPY, WITH LASER LITHOTRIPSY;  Surgeon: Desi Cruz MD;  Location: [facility] OR;  Service: Urology;  Laterality: Bilateral;  with retrograde pyleogram         Family History:  Family History   Problem Relation Name Age of Onset    Dementia Mother      Heart disease Father      Cataracts Father      Glaucoma Father         Social History:  Social History     Tobacco Use    Smoking status: Never    Smokeless tobacco: Never   Substance and Sexual Activity    Alcohol use: No    Drug use: Not on file    Sexual activity: Not on file        Review of Systems     Review of Systems    Constitutional:  Negative for fever.   HENT:  Negative for sore throat.    Respiratory:  Negative for shortness of breath.    Cardiovascular:  Positive for leg swelling. Negative for chest pain.   Gastrointestinal:  Negative for nausea.   Genitourinary:  Negative for dysuria.   Musculoskeletal:  Negative for back pain.        (+) bilateral knee pain   Skin:  Negative for rash.   Neurological:  Positive for weakness (bilateral legs).   Hematological:  Does not bruise/bleed easily.      Physical Exam     Initial Vitals   BP Pulse Resp Temp SpO2   06/17/25 1154 06/17/25 1154 06/17/25 1154 06/17/25 1150 06/17/25 1154   130/67 (!) 56 14 97.7 °F (36.5 °C) 98 %      MAP       --                 Physical Exam  Nursing Notes and Vital Signs Reviewed.  Constitutional: Patient is in no acute distress. Appears older than stated age. Poor historian.  Head: Atraumatic. Normocephalic.  Eyes: PERRL. EOM intact. Conjunctivae are not pale. No scleral icterus.  ENT: Mucous membranes are moist. Oropharynx is clear and symmetric.    Neck: Supple. Full ROM. No lymphadenopathy.  Cardiovascular: Regular rate. Regular rhythm. No murmurs, rubs, or gallops. Distal pulses are 2+ and symmetric.  Pulmonary/Chest: No respiratory distress. Clear to auscultation bilaterally. No wheezing or rales.  Abdominal: Soft and non-distended. There is no tenderness.  No rebound, guarding, or rigidity. Good bowel sounds.  Genitourinary: No CVA tenderness.  Musculoskeletal: Moves all extremities. There is bilateral lower extremity edema, right worse than left. No wounds to legs. Pulses are intact and symmetric. Normal plantar and dorsiflexion. Able to lift both legs off of bed without difficulty.  Skin: Warm and dry.  Neurological:  Alert, awake, and appropriate.  Normal speech.  No acute focal neurological deficits are appreciated.  Psychiatric: Normal affect. Good eye contact. Appropriate in content.     ED Course   Critical Care    Date/Time: 6/17/2025  6:30 PM    Performed by: Elvira Medrano MD  Authorized by: Elvira Medrano MD  Direct patient critical care time: 45 minutes  Additional history critical care time: 8 minutes  Ordering / reviewing critical care time: 7 minutes  Documentation critical care time: 7 minutes  Consulting other physicians critical care time: 6 minutes  Total critical care time (exclusive of procedural time) : 73 minutes  Critical care was necessary to treat or prevent imminent or life-threatening deterioration of the following conditions: CNS failure or compromise, cardiac failure and renal failure (hyperkalemia, seizure like activity chf).  Critical care was time spent personally by me on the following activities: blood draw for specimens, development of treatment plan with patient or surrogate, discussions with consultants, interpretation of cardiac output measurements, examination of patient, ordering and performing treatments and interventions, ordering and review of laboratory studies, ordering and review of radiographic studies, pulse oximetry, re-evaluation of patient's condition and review of old charts.        ED Vital Signs:  Vitals:    06/19/25 0008 06/19/25 0415 06/19/25 0419 06/19/25 0740   BP: (!) 143/66 137/63  (!) 164/69   Pulse: 60 60 (!) 59 60   Resp: 18 18  18   Temp: 98.5 °F (36.9 °C) 98.3 °F (36.8 °C)  97.9 °F (36.6 °C)   TempSrc: Oral Oral  Oral   SpO2: 97% 97%  96%   Weight:       Height:        06/19/25 0802 06/19/25 1453 06/19/25 1628 06/19/25 1910   BP:   (!) 150/67    Pulse: 62 (!) 59 60 (!) 59   Resp:   18    Temp:   97.8 °F (36.6 °C)    TempSrc:   Oral    SpO2:   98%    Weight:       Height:        06/19/25 1922 06/19/25 2000 06/20/25 0031 06/20/25 0400   BP: (!) 122/57  (!) 158/69    Pulse: (!) 59 (!) 58 60 (!) 58   Resp: 18  18    Temp: 98.1 °F (36.7 °C)  98.1 °F (36.7 °C)    TempSrc: Oral  Oral    SpO2: 99%  96%    Weight:       Height:        06/20/25 0426 06/20/25 0734 06/20/25 0743   BP: 139/64  (!)  151/67   Pulse: 60 60 60   Resp: 18  17   Temp: 98.3 °F (36.8 °C)  98.1 °F (36.7 °C)   TempSrc: Oral  Oral   SpO2: 96%  96%   Weight:      Height:          Abnormal Lab Results:  Labs Reviewed   COMPREHENSIVE METABOLIC PANEL - Abnormal       Result Value    Sodium 142      Potassium 5.9 (*)     Chloride 116 (*)     CO2 18 (*)     Glucose 105      BUN 53 (*)     Creatinine 1.8 (*)     Calcium 9.7      Protein Total 6.4      Albumin 2.7 (*)     Bilirubin Total 0.8            AST 26      ALT 14      Anion Gap 8      eGFR 38 (*)    B-TYPE NATRIURETIC PEPTIDE - Abnormal     (*)    CBC WITH DIFFERENTIAL - Abnormal    WBC 5.65      RBC 3.00 (*)     HGB 10.2 (*)     HCT 30.5 (*)      (*)     MCH 34.0 (*)     MCHC 33.4      RDW 13.0      Platelet Count 123 (*)     MPV 9.1 (*)     Nucleated RBC 0      Neut % 62.1      Lymph % 22.5      Mono % 8.3      Eos % 6.0      Basophil % 0.7      Imm Grans % 0.4      Neut # 3.51      Lymph # 1.27      Mono # 0.47      Eos # 0.34      Baso # 0.04      Imm Grans # 0.02     URINALYSIS, REFLEX TO URINE CULTURE - Abnormal    Color, UA Yellow      Appearance, UA Hazy (*)     pH, UA 7.0      Spec Grav UA 1.010      Protein, UA 1+ (*)     Glucose, UA Negative      Ketones, UA Negative      Bilirubin, UA Negative      Blood, UA 3+ (*)     Nitrites, UA Negative      Urobilinogen, UA Negative      Leukocyte Esterase, UA 3+ (*)    URINALYSIS MICROSCOPIC - Abnormal    RBC, UA >100 (*)     WBC, UA >100 (*)     WBC Clumps, UA Few (*)     Bacteria, UA None      Hyaline Casts, UA 0      Microscopic Comment       AMMONIA - Abnormal    Ammonia 118 (*)    BASIC METABOLIC PANEL - Abnormal    Sodium 144      Potassium 4.9      Chloride 116 (*)     CO2 17 (*)     Glucose 106      BUN 51 (*)     Creatinine 1.7 (*)     Calcium 9.7      Anion Gap 11      eGFR 41 (*)    TROPONIN I - Normal    Troponin-I 0.006     MAGNESIUM - Normal    Magnesium  1.6     CK - Normal    CPK 29     PROTIME-INR -  Normal    PT 12.4      INR 1.1     APTT - Normal    PTT 28.1     CBC W/ AUTO DIFFERENTIAL    Narrative:     The following orders were created for panel order CBC auto differential.  Procedure                               Abnormality         Status                     ---------                               -----------         ------                     CBC with Differential[[id number]]       Abnormal            Final result                 Please view results for these tests on the individual orders.   GREY TOP URINE HOLD    Extra Tube Hold for add-ons.     EXTRA TUBES    Narrative:     The following orders were created for panel order EXTRA TUBES.  Procedure                               Abnormality         Status                     ---------                               -----------         ------                     Lavender Top Hold[[id number]]                               Final result                 Please view results for these tests on the individual orders.   LAVENDER TOP HOLD    Extra Tube Hold for add-ons.     POCT GLUCOSE    POCT Glucose 102          All Lab Results:  Results for orders placed or performed during the hospital encounter of 06/17/25   EKG 12-lead    Collection Time: 06/17/25 12:54 PM   Result Value Ref Range    QRS Duration 156 ms    [facility] QTC Calculation 470 ms   Urine culture    Collection Time: 06/17/25  1:06 PM    Specimen: Urine   Result Value Ref Range    Urine Culture No Significant Growth    Comprehensive metabolic panel    Collection Time: 06/17/25  1:06 PM   Result Value Ref Range    Sodium 142 136 - 145 mmol/L    Potassium 5.9 (H) 3.5 - 5.1 mmol/L    Chloride 116 (H) 95 - 110 mmol/L    CO2 18 (L) 23 - 29 mmol/L    Glucose 105 70 - 110 mg/dL    BUN 53 (H) 8 - 23 mg/dL    Creatinine 1.8 (H) 0.5 - 1.4 mg/dL    Calcium 9.7 8.7 - 10.5 mg/dL    Protein Total 6.4 6.0 - 8.4 gm/dL    Albumin 2.7 (L) 3.5 - 5.2 g/dL    Bilirubin Total 0.8 0.1 - 1.0 mg/dL     40 - 150 unit/L    AST  26 11 - 45 unit/L    ALT 14 10 - 44 unit/L    Anion Gap 8 8 - 16 mmol/L    eGFR 38 (L) >60 mL/min/1.73/m2   Troponin I #1    Collection Time: 06/17/25  1:06 PM   Result Value Ref Range    Troponin-I 0.006 <=0.026 ng/mL   BNP    Collection Time: 06/17/25  1:06 PM   Result Value Ref Range     (H) 0 - 99 pg/mL   Magnesium    Collection Time: 06/17/25  1:06 PM   Result Value Ref Range    Magnesium  1.6 1.6 - 2.6 mg/dL   CPK    Collection Time: 06/17/25  1:06 PM   Result Value Ref Range    CPK 29 20 - 200 U/L   CBC with Differential    Collection Time: 06/17/25  1:06 PM   Result Value Ref Range    WBC 5.65 3.90 - 12.70 K/uL    RBC 3.00 (L) 4.60 - 6.20 M/uL    HGB 10.2 (L) 14.0 - 18.0 gm/dL    HCT 30.5 (L) 40.0 - 54.0 %     (H) 82 - 98 fL    MCH 34.0 (H) 27.0 - 31.0 pg    MCHC 33.4 32.0 - 36.0 g/dL    RDW 13.0 11.5 - 14.5 %    Platelet Count 123 (L) 150 - 450 K/uL    MPV 9.1 (L) 9.2 - 12.9 fL    Nucleated RBC 0 <=0 /100 WBC    Neut % 62.1 38 - 73 %    Lymph % 22.5 18 - 48 %    Mono % 8.3 4 - 15 %    Eos % 6.0 <=8 %    Basophil % 0.7 <=1.9 %    Imm Grans % 0.4 0.0 - 0.5 %    Neut # 3.51 1.8 - 7.7 K/uL    Lymph # 1.27 1 - 4.8 K/uL    Mono # 0.47 0.3 - 1 K/uL    Eos # 0.34 <=0.5 K/uL    Baso # 0.04 <=0.2 K/uL    Imm Grans # 0.02 0.00 - 0.04 K/uL   Urinalysis, Reflex to Urine Culture Urine, Clean Catch    Collection Time: 06/17/25  1:06 PM    Specimen: Urine   Result Value Ref Range    Color, UA Yellow Straw, Naila, Yellow, Light-Orange    Appearance, UA Hazy (A) Clear    pH, UA 7.0 5.0 - 8.0    Spec Grav UA 1.010 1.005 - 1.030    Protein, UA 1+ (A) Negative    Glucose, UA Negative Negative    Ketones, UA Negative Negative    Bilirubin, UA Negative Negative    Blood, UA 3+ (A) Negative    Nitrites, UA Negative Negative    Urobilinogen, UA Negative <2.0 EU/dL    Leukocyte Esterase, UA 3+ (A) Negative   GREY TOP URINE HOLD    Collection Time: 06/17/25  1:06 PM   Result Value Ref Range    Extra Tube Hold for  add-ons.    Urinalysis Microscopic    Collection Time: 06/17/25  1:06 PM   Result Value Ref Range    RBC, UA >100 (H) 0 - 4 /HPF    WBC, UA >100 (H) 0 - 5 /HPF    WBC Clumps, UA Few (A) None, Rare    Bacteria, UA None None, Rare, Occasional /HPF    Hyaline Casts, UA 0 0 - 1 /LPF    Microscopic Comment     POCT glucose    Collection Time: 06/17/25  3:26 PM   Result Value Ref Range    POCT Glucose 102 70 - 110 mg/dL   Protime-INR    Collection Time: 06/17/25  4:09 PM   Result Value Ref Range    PT 12.4 9.0 - 12.5 seconds    INR 1.1 0.8 - 1.2   APTT    Collection Time: 06/17/25  4:09 PM   Result Value Ref Range    PTT 28.1 21.0 - 32.0 seconds   Lavender Top Hold    Collection Time: 06/17/25  4:09 PM   Result Value Ref Range    Extra Tube Hold for add-ons.    Ammonia    Collection Time: 06/17/25  4:40 PM   Result Value Ref Range    Ammonia 118 (H) 10 - 50 umol/L   Basic metabolic panel    Collection Time: 06/17/25  5:07 PM   Result Value Ref Range    Sodium 144 136 - 145 mmol/L    Potassium 4.9 3.5 - 5.1 mmol/L    Chloride 116 (H) 95 - 110 mmol/L    CO2 17 (L) 23 - 29 mmol/L    Glucose 106 70 - 110 mg/dL    BUN 51 (H) 8 - 23 mg/dL    Creatinine 1.7 (H) 0.5 - 1.4 mg/dL    Calcium 9.7 8.7 - 10.5 mg/dL    Anion Gap 11 8 - 16 mmol/L    eGFR 41 (L) >60 mL/min/1.73/m2   POCT glucose    Collection Time: 06/18/25  1:38 AM   Result Value Ref Range    POCT Glucose 134 (H) 70 - 110 mg/dL   POCT glucose    Collection Time: 06/18/25  4:59 AM   Result Value Ref Range    POCT Glucose 121 (H) 70 - 110 mg/dL   Ammonia    Collection Time: 06/18/25  6:21 AM   Result Value Ref Range    Ammonia 87 (H) 10 - 50 umol/L   Comprehensive Metabolic Panel (CMP)    Collection Time: 06/18/25  6:22 AM   Result Value Ref Range    Sodium 145 136 - 145 mmol/L    Potassium 4.6 3.5 - 5.1 mmol/L    Chloride 114 (H) 95 - 110 mmol/L    CO2 23 23 - 29 mmol/L    Glucose 125 (H) 70 - 110 mg/dL    BUN 52 (H) 8 - 23 mg/dL    Creatinine 1.8 (H) 0.5 - 1.4 mg/dL     Calcium 9.6 8.7 - 10.5 mg/dL    Protein Total 5.9 (L) 6.0 - 8.4 gm/dL    Albumin 2.5 (L) 3.5 - 5.2 g/dL    Bilirubin Total 1.0 0.1 - 1.0 mg/dL    ALP 99 40 - 150 unit/L    AST 20 11 - 45 unit/L    ALT 14 10 - 44 unit/L    Anion Gap 8 8 - 16 mmol/L    eGFR 38 (L) >60 mL/min/1.73/m2   Magnesium    Collection Time: 06/18/25  6:22 AM   Result Value Ref Range    Magnesium  1.5 (L) 1.6 - 2.6 mg/dL   Phosphorus    Collection Time: 06/18/25  6:22 AM   Result Value Ref Range    Phosphorus Level 4.1 2.7 - 4.5 mg/dL   PT/INR    Collection Time: 06/18/25  6:22 AM   Result Value Ref Range    PT 12.2 9.0 - 12.5 seconds    INR 1.1 0.8 - 1.2   PTT    Collection Time: 06/18/25  6:22 AM   Result Value Ref Range    PTT 30.2 21.0 - 32.0 seconds   CBC with Differential    Collection Time: 06/18/25  6:22 AM   Result Value Ref Range    WBC 6.48 3.90 - 12.70 K/uL    RBC 2.93 (L) 4.60 - 6.20 M/uL    HGB 10.0 (L) 14.0 - 18.0 gm/dL    HCT 30.2 (L) 40.0 - 54.0 %     (H) 82 - 98 fL    MCH 34.1 (H) 27.0 - 31.0 pg    MCHC 33.1 32.0 - 36.0 g/dL    RDW 13.0 11.5 - 14.5 %    Platelet Count 130 (L) 150 - 450 K/uL    MPV 9.6 9.2 - 12.9 fL    Nucleated RBC 0 <=0 /100 WBC    Neut % 66.6 38 - 73 %    Lymph % 17.4 (L) 18 - 48 %    Mono % 8.6 4 - 15 %    Eos % 6.5 <=8 %    Basophil % 0.6 <=1.9 %    Imm Grans % 0.3 0.0 - 0.5 %    Neut # 4.31 1.8 - 7.7 K/uL    Lymph # 1.13 1 - 4.8 K/uL    Mono # 0.56 0.3 - 1 K/uL    Eos # 0.42 <=0.5 K/uL    Baso # 0.04 <=0.2 K/uL    Imm Grans # 0.02 0.00 - 0.04 K/uL   Type & Screen    Collection Time: 06/18/25  6:22 AM   Result Value Ref Range    Specimen Outdate 06/21/2025 23:59     Group & Rh O NEG     Indirect Leslie NEG    POCT glucose    Collection Time: 06/18/25 12:03 PM   Result Value Ref Range    POCT Glucose 98 70 - 110 mg/dL   POCT glucose    Collection Time: 06/18/25  4:12 PM   Result Value Ref Range    POCT Glucose 96 70 - 110 mg/dL   POCT glucose    Collection Time: 06/18/25  8:26 PM   Result Value Ref  Range    POCT Glucose 122 (H) 70 - 110 mg/dL   POCT glucose    Collection Time: 06/19/25  5:48 AM   Result Value Ref Range    POCT Glucose 114 (H) 70 - 110 mg/dL   Comprehensive Metabolic Panel (CMP)    Collection Time: 06/19/25  6:06 AM   Result Value Ref Range    Sodium 144 136 - 145 mmol/L    Potassium 4.0 3.5 - 5.1 mmol/L    Chloride 113 (H) 95 - 110 mmol/L    CO2 24 23 - 29 mmol/L    Glucose 112 (H) 70 - 110 mg/dL    BUN 43 (H) 8 - 23 mg/dL    Creatinine 1.8 (H) 0.5 - 1.4 mg/dL    Calcium 8.7 8.7 - 10.5 mg/dL    Protein Total 5.5 (L) 6.0 - 8.4 gm/dL    Albumin 2.3 (L) 3.5 - 5.2 g/dL    Bilirubin Total 0.9 0.1 - 1.0 mg/dL    ALP 90 40 - 150 unit/L    AST 20 11 - 45 unit/L    ALT 12 10 - 44 unit/L    Anion Gap 7 (L) 8 - 16 mmol/L    eGFR 38 (L) >60 mL/min/1.73/m2   Magnesium    Collection Time: 06/19/25  6:06 AM   Result Value Ref Range    Magnesium  1.2 (L) 1.6 - 2.6 mg/dL   Phosphorus    Collection Time: 06/19/25  6:06 AM   Result Value Ref Range    Phosphorus Level 2.7 2.7 - 4.5 mg/dL   PT/INR    Collection Time: 06/19/25  6:06 AM   Result Value Ref Range    PT 12.7 (H) 9.0 - 12.5 seconds    INR 1.1 0.8 - 1.2   PTT    Collection Time: 06/19/25  6:06 AM   Result Value Ref Range    PTT 31.3 21.0 - 32.0 seconds   CBC with Differential    Collection Time: 06/19/25  6:06 AM   Result Value Ref Range    WBC 5.63 3.90 - 12.70 K/uL    RBC 2.72 (L) 4.60 - 6.20 M/uL    HGB 9.0 (L) 14.0 - 18.0 gm/dL    HCT 27.7 (L) 40.0 - 54.0 %     (H) 82 - 98 fL    MCH 33.1 (H) 27.0 - 31.0 pg    MCHC 32.5 32.0 - 36.0 g/dL    RDW 12.9 11.5 - 14.5 %    Platelet Count 117 (L) 150 - 450 K/uL    MPV 8.9 (L) 9.2 - 12.9 fL    Nucleated RBC 0 <=0 /100 WBC    Neut % 57.7 38 - 73 %    Lymph % 24.0 18 - 48 %    Mono % 10.1 4 - 15 %    Eos % 7.5 <=8 %    Basophil % 0.5 <=1.9 %    Imm Grans % 0.2 0.0 - 0.5 %    Neut # 3.25 1.8 - 7.7 K/uL    Lymph # 1.35 1 - 4.8 K/uL    Mono # 0.57 0.3 - 1 K/uL    Eos # 0.42 <=0.5 K/uL    Baso # 0.03 <=0.2  K/uL    Imm Grans # 0.01 0.00 - 0.04 K/uL   POCT glucose    Collection Time: 06/19/25  1:57 PM   Result Value Ref Range    POCT Glucose 137 (H) 70 - 110 mg/dL   POCT glucose    Collection Time: 06/19/25  7:57 PM   Result Value Ref Range    POCT Glucose 115 (H) 70 - 110 mg/dL   POCT glucose    Collection Time: 06/20/25  6:13 AM   Result Value Ref Range    POCT Glucose 106 70 - 110 mg/dL   Comprehensive Metabolic Panel (CMP)    Collection Time: 06/20/25  7:30 AM   Result Value Ref Range    Sodium 143 136 - 145 mmol/L    Potassium 4.3 3.5 - 5.1 mmol/L    Chloride 113 (H) 95 - 110 mmol/L    CO2 22 (L) 23 - 29 mmol/L    Glucose 99 70 - 110 mg/dL    BUN 40 (H) 8 - 23 mg/dL    Creatinine 1.7 (H) 0.5 - 1.4 mg/dL    Calcium 8.5 (L) 8.7 - 10.5 mg/dL    Protein Total 5.7 (L) 6.0 - 8.4 gm/dL    Albumin 2.3 (L) 3.5 - 5.2 g/dL    Bilirubin Total 0.8 0.1 - 1.0 mg/dL    ALP 94 40 - 150 unit/L    AST 22 11 - 45 unit/L    ALT 12 10 - 44 unit/L    Anion Gap 8 8 - 16 mmol/L    eGFR 41 (L) >60 mL/min/1.73/m2   Magnesium    Collection Time: 06/20/25  7:30 AM   Result Value Ref Range    Magnesium  1.5 (L) 1.6 - 2.6 mg/dL   Ammonia    Collection Time: 06/20/25  7:30 AM   Result Value Ref Range    Ammonia 67 (H) 10 - 50 umol/L       Imaging Results:  Imaging Results              CT Head Without Contrast (Final result)  Result time 06/17/25 16:28:40      Final result by Richard Baker Jr., MD (06/17/25 16:28:40)                   Narrative:    EXAM:  CT HEAD WITHOUT CONTRAST    CLINICAL HISTORY:   Altered mental status    COMPARISON:   04/04/2025    TECHNIQUE: Multiple sequential axial images from base to vertex without intravenous contrast.    FINDINGS:  Study is limited due to motion.  Periventricular white matter changes consistent with small vessel ischemic change.  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  No extra-axial acute fluid collection.  Ventricles and basal cisterns are normal.  No evidence  of hemorrhage, mass effect or midline shift.  No other cerebral or cerebral parenchymal abnormalities.  Calvarium is intact.  Completely opacified right maxillary sinus chronic.  Mastoid air cells are clear.    IMPRESSION:  1.  No evidence of acute intracranial process.      All CT scans at [this location] are performed using dose modulation techniques as appropriate to a performed exam including the following: automated exposure control; adjustment of the mA and/or kV according to patient size (this includes techniques or standardized protocols for targeted exams where dose is matched to indication / reason for exam; i.e. extremities or head); use of iterative reconstruction technique.      Finalized on: 6/17/2025 4:28 PM By:  Richard Baker MD  [facility]# [id number]      2025-06-17 16:30:[id number]     [facility]                                     US Lower Extremity Veins Right (Final result)  Result time 06/17/25 13:27:45      Final result by Willy Thurman MD (06/17/25 13:27:45)                   Impression:      No evidence of deep venous thrombosis in the right lower extremity.      Electronically signed by: Willy Thurman MD  Date:    06/17/2025  Time:    13:27               Narrative:    EXAMINATION:  US LOWER EXTREMITY VEINS RIGHT    CLINICAL HISTORY:  Other specified soft tissue disorders    TECHNIQUE:  Duplex and color flow Doppler evaluation and graded compression of the right lower extremity veins was performed.    COMPARISON:  None    FINDINGS:  Right thigh veins: The common femoral, femoral, popliteal, upper greater saphenous, and deep femoral veins are patent and free of thrombus. The veins are normally compressible and have normal phasic flow and augmentation response.    Right calf veins: The visualized calf veins are patent.    Contralateral CFV: The contralateral (left) common femoral vein is patent and free of thrombus.    Miscellaneous: None                                       X-Ray Chest AP Portable  (Final result)  Result time 06/17/25 12:57:46      Final result by JUSTINA Hunter Sr., MD (06/17/25 12:57:46)                   Impression:      The lungs are clear.      Electronically signed by: Kwame Hunter MD  Date:    06/17/2025  Time:    12:57               Narrative:    EXAMINATION:  XR CHEST AP PORTABLE    CLINICAL HISTORY:  weakness;    COMPARISON:  04/08/2025    FINDINGS:  A cardiac pacemaker remains in place.  The size of the heart is normal. The lungs are clear. There is no pneumothorax.  The costophrenic angles are sharp.  There are surgical clips projected over the right axilla.  There are mild osteoarthritic changes in the left acromioclavicular joint.                                       The EKG was ordered, reviewed, and independently interpreted by the ED provider.  Interpretation time: 12:54  Rate: 60 BPM  Rhythm: wide QRS rhythm  Interpretation: Left bundle branch block. No STEMI.           The Emergency Provider reviewed the vital signs and test results, which are outlined above.     ED Discussion     3:34 PM: Dr. Bergeron called to bedside for right sided facial twitching/spasming. Pt's caregiver is now at bedside and denies any new medications. Patient appears to be awake, alert, noticeable twitching noted to left side of face, ? Tardive dyskinesia, Dr. Medrano will order Benadryl.     4:09 PM: No response to benadryl, CT head negative. Facial twitching has resolved after receiving  0.5mg of Ativan.    6:25 PM: Discussed case with Angelina Gordillo NP ([facility]). Dr. Pinzon agrees with current care and management of pt and accepts admission.   Admitting Service: Hospital Medicine  Admitting Physician: Dr. Pinzon  Admit to: Med/Tele    6:25 PM: Re-evaluated pt.  I have discussed test results, shared treatment plan, and the need for admission with patient/family/caretaker at bedside. Pt and/or family/caretaker express understanding at this time and agree with all information. All  questions answered. Pt/caretaker/family member(s) have no further questions or concerns at this time. Pt is ready for admit.              Medical Decision Making  DDX: 1. Decompensated heart failure 2. DVT 3. Electrolyte dysfunction 4. Decompensation of chronic cirrhosis    ECG chronic LBBB, CXR normal, wbc normal, h/h stable, mild renal insufficiency, potassium 5.9, treated, bmp repeated and hyperkalemia resolved, BNP slightly elevated, trop normal, iv lasix initiated, US negative for DVT, CT head negative for acute process, ammonia 67 but chronically elevated and at baseline, UA reviewed and patient already on IV antibiotics, no concerns for sepsis, hospital med to admit for further management.      Amount and/or Complexity of Data Reviewed  Independent Historian: caregiver  External Data Reviewed: labs, radiology, ECG and notes.  Labs: ordered. Decision-making details documented in ED Course.  Radiology: ordered. Decision-making details documented in ED Course.  ECG/medicine tests: ordered and independent interpretation performed. Decision-making details documented in ED Course.  Discussion of management or test interpretation with external provider(s): 6:25 PM: Discussed case with Angelina Gordillo NP ([facility]). Dr. Pinzon agrees with current care and management of pt and accepts admission.   Admitting Service: Hospital Medicine  Admitting Physician: Dr. Pinzon  Admit to: Med/Tele      Risk  Prescription drug management.  Decision regarding hospitalization.  Diagnosis or treatment significantly limited by social determinants of health.                ED Medication(s):  Medications   furosemide injection 40 mg (40 mg Intravenous Given 6/17/25 1310)   albuterol nebulizer solution 10 mg (10 mg Nebulization Given 6/17/25 1546)   diphenhydrAMINE injection 25 mg (25 mg Intravenous Given 6/17/25 1541)   LORazepam injection 0.5 mg (0.5 mg Intravenous Given 6/17/25 1604)   magnesium sulfate 2g in water 50mL IVPB  (premix) (0 g Intravenous Stopped 6/19/25 1207)   cyanocobalamin injection 1,000 mcg (1,000 mcg Intramuscular Given 6/20/25 1014)       Discharge Medication List as of 6/20/2025 12:22 PM        START taking these medications    Details   cholecalciferol, vitamin D3, 125 mcg (5,000 unit) Tab Take 1 tablet (5,000 Units total) by mouth once daily., Starting Sat 6/21/2025, Normal      folic acid-vit B6-vit B12 2.5-25-2 mg (FOLBIC OR EQUIV) 2.5-25-2 mg Tab Take 1 tablet by mouth once daily., Starting Sat 6/21/2025, Normal      thiamine 100 MG tablet Take 1 tablet (100 mg total) by mouth once daily., Starting Sat 6/21/2025, Normal              Contact information for follow-up providers       Félix Rollins MD. Schedule an appointment as soon as possible for a visit in 3 day(s).    Specialty: Family Medicine  Why: Hospital follow up  Contact information:  [address]  [address]  [facility] [id number] [phone]                       Contact information for after-discharge care       Home Medical Care       [facility] .    Service: Home Health Services  Contact information:  [address]  [facility] [facility] [address] [phone]                                       Scribe Attestation:   Scribe #1: I performed the above scribed service and the documentation accurately describes the services I performed. I attest to the accuracy of the note.     Attending:   Physician Attestation Statement for Scribe #1: I, Elvira Medrano MD, personally performed the services described in this documentation, as scribed by Lydia Gao, in my presence, and it is both accurate and complete.           Clinical Impression       ICD-10-CM ICD-9-CM   1. Hepatic encephalopathy  K76.82 572.2   2. Weakness  R53.1 780.79   3. Right leg swelling  M79.89 729.81   4. Acute renal insufficiency  N28.9 593.9   5. Acute hyperkalemia  E87.5 276.7   6. Bilateral leg edema  R60.0 782.3   7.  Chronic UTI  N39.0 599.0   8. Acute cystitis with hematuria  N30.01 595.0   9. Seizure-like activity  R56.9 780.39       Disposition:   Disposition: Admitted  Condition: Fair        Elvira Medrano MD  06/23/25 1717       Elvira Medrano MD  06/23/25 1718

## 2025-06-17 NOTE — ED NOTES
Pt unable to form a suction with a straw in order to take medications. Provider notified. Per pt's son this happens when he is in the hospital and has had to have rehab for this several times.

## 2025-06-17 NOTE — ED NOTES
Pt's sheets, brief and gown were all changed due urine leaking from the brief. Pt was wiped  with purple wipes and dried off.

## 2025-06-17 NOTE — ED NOTES
Pt's eyes and L-side on his mouth began twitching with quick moderate movements. This is the first time this has happened to the pt and pt does not have a hx of seizures. Provider notified and benadryl ordered. Within 10 mins of receiving IV benadryl pt's left hand and arm started twitching as well. Provider at bedside and ordered Ativan 0.5mg IV. 10 mins after pt received Ativan all twitching stopped but pt was still unable to speak clearly or using correct wording. 5 mins later the pt's L-side of his mouth and face started twitching again and within another 5 mins, both eyes, L-side of mouth and left hand were twitching again. 10 mins after the twitching was gone completely again. Pt returned to his room at 1640 and both eyes were starting to have a light twitch but stopped shortly after.

## 2025-06-17 NOTE — ED NOTES
Per family, pt fall while in the shower on Saturday but did not hit his head, on Sunday he went to [employer] and had lunch with the family and was able to walk around. Tues/Wed pt had stents put in due to kidney stones, and current on IV antibiotics via PICC line to the left arm. Pt is suppose to take his last dose today.

## 2025-06-17 NOTE — TELEPHONE ENCOUNTER
Pts son Sven called to speak to Dr. Cruz's office regarding pts IV antibiotics. Pt currently in the ER per son. Son requesting call back from doctors office.   Reason for Disposition   [1] Caller requests to speak ONLY to PCP AND [2] NON-URGENT question    Protocols used: PCP Call - No Triage-A-

## 2025-06-18 PROBLEM — N30.01 ACUTE CYSTITIS WITH HEMATURIA: Status: ACTIVE | Noted: 2025-06-18

## 2025-06-18 LAB
ABSOLUTE EOSINOPHIL (OHS): 0.42 K/UL
ABSOLUTE MONOCYTE (OHS): 0.56 K/UL (ref 0.3–1)
ABSOLUTE NEUTROPHIL COUNT (OHS): 4.31 K/UL (ref 1.8–7.7)
ALBUMIN SERPL BCP-MCNC: 2.5 G/DL (ref 3.5–5.2)
ALP SERPL-CCNC: 99 UNIT/L (ref 40–150)
ALT SERPL W/O P-5'-P-CCNC: 14 UNIT/L (ref 10–44)
AMMONIA PLAS-SCNC: 87 UMOL/L (ref 10–50)
ANION GAP (OHS): 8 MMOL/L (ref 8–16)
APTT PPP: 30.2 SECONDS (ref 21–32)
AST SERPL-CCNC: 20 UNIT/L (ref 11–45)
BASOPHILS # BLD AUTO: 0.04 K/UL
BASOPHILS NFR BLD AUTO: 0.6 %
BILIRUB SERPL-MCNC: 1 MG/DL (ref 0.1–1)
BUN SERPL-MCNC: 52 MG/DL (ref 8–23)
CALCIUM SERPL-MCNC: 9.6 MG/DL (ref 8.7–10.5)
CHLORIDE SERPL-SCNC: 114 MMOL/L (ref 95–110)
CO2 SERPL-SCNC: 23 MMOL/L (ref 23–29)
CREAT SERPL-MCNC: 1.8 MG/DL (ref 0.5–1.4)
ERYTHROCYTE [DISTWIDTH] IN BLOOD BY AUTOMATED COUNT: 13 % (ref 11.5–14.5)
GFR SERPLBLD CREATININE-BSD FMLA CKD-EPI: 38 ML/MIN/1.73/M2
GLUCOSE SERPL-MCNC: 125 MG/DL (ref 70–110)
HCT VFR BLD AUTO: 30.2 % (ref 40–54)
HGB BLD-MCNC: 10 GM/DL (ref 14–18)
IMM GRANULOCYTES # BLD AUTO: 0.02 K/UL (ref 0–0.04)
IMM GRANULOCYTES NFR BLD AUTO: 0.3 % (ref 0–0.5)
INDIRECT COOMBS: NORMAL
INR PPP: 1.1 (ref 0.8–1.2)
LYMPHOCYTES # BLD AUTO: 1.13 K/UL (ref 1–4.8)
MAGNESIUM SERPL-MCNC: 1.5 MG/DL (ref 1.6–2.6)
MCH RBC QN AUTO: 34.1 PG (ref 27–31)
MCHC RBC AUTO-ENTMCNC: 33.1 G/DL (ref 32–36)
MCV RBC AUTO: 103 FL (ref 82–98)
NUCLEATED RBC (/100WBC) (OHS): 0 /100 WBC
OHS QRS DURATION: 156 MS
OHS QTC CALCULATION: 470 MS
PHOSPHATE SERPL-MCNC: 4.1 MG/DL (ref 2.7–4.5)
PLATELET # BLD AUTO: 130 K/UL (ref 150–450)
PMV BLD AUTO: 9.6 FL (ref 9.2–12.9)
POCT GLUCOSE: 121 MG/DL (ref 70–110)
POCT GLUCOSE: 122 MG/DL (ref 70–110)
POCT GLUCOSE: 134 MG/DL (ref 70–110)
POCT GLUCOSE: 96 MG/DL (ref 70–110)
POCT GLUCOSE: 98 MG/DL (ref 70–110)
POTASSIUM SERPL-SCNC: 4.6 MMOL/L (ref 3.5–5.1)
PROT SERPL-MCNC: 5.9 GM/DL (ref 6–8.4)
PROTHROMBIN TIME: 12.2 SECONDS (ref 9–12.5)
RBC # BLD AUTO: 2.93 M/UL (ref 4.6–6.2)
RELATIVE EOSINOPHIL (OHS): 6.5 %
RELATIVE LYMPHOCYTE (OHS): 17.4 % (ref 18–48)
RELATIVE MONOCYTE (OHS): 8.6 % (ref 4–15)
RELATIVE NEUTROPHIL (OHS): 66.6 % (ref 38–73)
RH BLD: NORMAL
SODIUM SERPL-SCNC: 145 MMOL/L (ref 136–145)
SPECIMEN OUTDATE: NORMAL
WBC # BLD AUTO: 6.48 K/UL (ref 3.9–12.7)

## 2025-06-18 PROCEDURE — 97162 PT EVAL MOD COMPLEX 30 MIN: CPT

## 2025-06-18 PROCEDURE — 86900 BLOOD TYPING SEROLOGIC ABO: CPT | Performed by: NURSE PRACTITIONER

## 2025-06-18 PROCEDURE — 84100 ASSAY OF PHOSPHORUS: CPT | Performed by: NURSE PRACTITIONER

## 2025-06-18 PROCEDURE — 36415 COLL VENOUS BLD VENIPUNCTURE: CPT | Performed by: NURSE PRACTITIONER

## 2025-06-18 PROCEDURE — 97530 THERAPEUTIC ACTIVITIES: CPT

## 2025-06-18 PROCEDURE — 92610 EVALUATE SWALLOWING FUNCTION: CPT | Performed by: SPEECH-LANGUAGE PATHOLOGIST

## 2025-06-18 PROCEDURE — 21400001 HC TELEMETRY ROOM

## 2025-06-18 PROCEDURE — 82140 ASSAY OF AMMONIA: CPT | Performed by: NURSE PRACTITIONER

## 2025-06-18 PROCEDURE — 80053 COMPREHEN METABOLIC PANEL: CPT | Performed by: NURSE PRACTITIONER

## 2025-06-18 PROCEDURE — 97166 OT EVAL MOD COMPLEX 45 MIN: CPT

## 2025-06-18 PROCEDURE — 85730 THROMBOPLASTIN TIME PARTIAL: CPT | Performed by: NURSE PRACTITIONER

## 2025-06-18 PROCEDURE — 85610 PROTHROMBIN TIME: CPT | Performed by: NURSE PRACTITIONER

## 2025-06-18 PROCEDURE — 85025 COMPLETE CBC W/AUTO DIFF WBC: CPT | Performed by: NURSE PRACTITIONER

## 2025-06-18 PROCEDURE — 27000207 HC ISOLATION

## 2025-06-18 PROCEDURE — 25000003 PHARM REV CODE 250: Performed by: NURSE PRACTITIONER

## 2025-06-18 PROCEDURE — 83735 ASSAY OF MAGNESIUM: CPT | Performed by: NURSE PRACTITIONER

## 2025-06-18 PROCEDURE — 63600175 PHARM REV CODE 636 W HCPCS: Performed by: INTERNAL MEDICINE

## 2025-06-18 RX ORDER — CEFEPIME HYDROCHLORIDE 1 G/1
1 INJECTION, POWDER, FOR SOLUTION INTRAMUSCULAR; INTRAVENOUS
Status: DISCONTINUED | OUTPATIENT
Start: 2025-06-18 | End: 2025-06-19

## 2025-06-18 RX ADMIN — CEFEPIME 1 G: 1 INJECTION, POWDER, FOR SOLUTION INTRAMUSCULAR; INTRAVENOUS at 06:06

## 2025-06-18 RX ADMIN — CEFEPIME 1 G: 1 INJECTION, POWDER, FOR SOLUTION INTRAMUSCULAR; INTRAVENOUS at 02:06

## 2025-06-18 RX ADMIN — CEFEPIME 1 G: 1 INJECTION, POWDER, FOR SOLUTION INTRAMUSCULAR; INTRAVENOUS at 08:06

## 2025-06-18 RX ADMIN — SODIUM BICARBONATE: 84 INJECTION, SOLUTION INTRAVENOUS at 12:06

## 2025-06-18 NOTE — HPI
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  Sitter at bedside reports that he fell in the shower on Saturday.  He recently had ESWL and stent placement for kidney stones on 06/12/2025 per Dr. Cruz.  Sitter reports that he has had hematuria since that time.  She reports that he took lactulose twice a day on yesterday, but did not take it today due to feeling ill.  He has chronic arthralgias and gait problem, uses wheelchair to get around the house.  He has no previous history of seizures however today he has had multiple episodes of seizure like twitching in his face, eye twitiching, twitching near mouth, and in arms.  His sitter at bedside shows me a video on her phone and it appears to be seizure activity. on arrival to ED, temp 97.7°, heart rate 56, respirations 14, blood pressure 130/67, 98% SpO2 on room air.  Lab workup significant for hemoglobin 10.2, hematocrit 30.5, platelets 123, normal PT/INR, normal PTT, potassium 5.9, sodium 142, chloride 116, CO2 18, BUN 53, creatinine 1.8, glucose 105, magnesium 1.6, albumin 2.7, normal LFTs, ammonia level 118, , CPK 29, troponin normal, UA with hazy urine, 3+ blood, 3+ leukocyte esterase, negative nitrites, greater than 100 RBC, greater than 100 WBC.  Urine culture is pending.  EKG showed wide QRS rhythm with left bundle-branch block, heart rate 60.  Chest x-ray demonstrated lungs are clear.  There was some swelling noted to right lower extremity and ultrasound was done which showed no evidence of DVT.  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  While in ED, he was given IV Benadryl 25 mg, albuterol nebulizer treatment, Lasix 40 mg IV.  Lokelma was ordered, however he did not take due to concern for swallowing difficulty, he is NPO.  Hospital Medicine was consulted for admission due to hepatic encephalopathy, elevated ammonia level, AMEENA, hyperkalemia, and possible seizure activity as well as acute cystitis with hematuria.

## 2025-06-18 NOTE — ASSESSMENT & PLAN NOTE
MELD-Na score calculated; MELD 3.0: 13 at 6/17/2025  5:07 PM  MELD-Na: 13 at 6/17/2025  5:07 PM  Calculated from:  Serum Creatinine: 1.7 mg/dL at 6/17/2025  5:07 PM  Serum Sodium: 144 mmol/L (Using max of 137 mmol/L) at 6/17/2025  5:07 PM  Total Bilirubin: 0.8 mg/dL (Using min of 1 mg/dL) at 6/17/2025  1:06 PM  Serum Albumin: 2.7 g/dL at 6/17/2025  1:06 PM  INR(ratio): 1.1 at 6/17/2025  4:09 PM  Age at listing (hypothetical): 79 years  Sex: Male at 6/17/2025  5:07 PM    --Continue chronic meds.  Will avoid any hepatotoxic meds, and monitor CBC/CMP/INR for synthetic function.   --NPO at this time, needs swallowing evaluation/NPO  --Lactulose enema ordered

## 2025-06-18 NOTE — SUBJECTIVE & OBJECTIVE
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     Pacemaker        Past Surgical History:   Procedure Laterality Date    A-V CARDIAC PACEMAKER INSERTION Left 1/25/2024    Procedure: INSERTION, CARDIAC PACEMAKER, DUAL CHAMBER/poss single lead vs His lead;  Surgeon: Anthony Coronado MD;  Location: [facility] CATH LAB;  Service: Cardiology;  Laterality: Left;  Bio/LBBB pacing    CHOLECYSTECTOMY      CYSTOSCOPY WITH URETEROSCOPY, RETROGRADE PYELOGRAPHY, AND INSERTION OF STENT Bilateral 6/12/2025    Procedure: CYSTOSCOPY, WITH RETROGRADE PYELOGRAM AND URETERAL STENT INSERTION;  Surgeon: Desi Cruz MD;  Location: [facility] OR;  Service: Urology;  Laterality: Bilateral;    INSERTION, PACEMAKER, TEMPORARY TRANSVENOUS N/A 1/24/2024    Procedure: Insertion, Pacemaker, Temporary Transvenous;  Surgeon: Renaldo Santacruz MD;  Location: [facility] CATH LAB;  Service: Cardiology;  Laterality: N/A;    NECK SURGERY      TOTAL ELBOW ARTHROPLASTY Right 03/2019    URETEROSCOPIC REMOVAL OF URETERIC CALCULUS Bilateral 6/12/2025    Procedure: REMOVAL, CALCULUS, URETER, URETEROSCOPIC;  Surgeon: Desi Cruz MD;  Location: [facility] OR;  Service: Urology;  Laterality: Bilateral;    URETEROSCOPY, WITH LASER LITHOTRIPSY Bilateral 6/12/2025    Procedure: URETEROSCOPY, WITH LASER LITHOTRIPSY;  Surgeon: Desi Cruz MD;  Location: [facility] OR;  Service: Urology;  Laterality: Bilateral;  with retrograde pyleogram       Review of patient's allergies indicates:   Allergen Reactions    Seroquel [quetiapine] Hallucinations     Per son patient had adverse reaction, became agitated and combating     Sulfa (sulfonamide antibiotics) Rash       No current facility-administered medications on file prior to encounter.     Current Outpatient Medications on File Prior to Encounter   Medication Sig    aspirin 81  MG Chew Take 81 mg by mouth once daily.    calcium-vitamin D 250-100 mg-unit per tablet Take 1 tablet by mouth once daily.    ertapenem (INVANZ) 1 gram injection Inject 1 g into the vein once daily.    ferrous gluconate (FERGON) 324 MG tablet Take 324 mg by mouth daily with breakfast.    furosemide (LASIX) 20 MG tablet Take 20 mg by mouth daily as needed.    lactulose (CHRONULAC) 10 gram/15 mL solution Take 30 g by mouth 2 (two) times daily.    magnesium oxide (MAG-OX) 400 mg (241.3 mg magnesium) tablet Take 400 mg by mouth 2 (two) times daily.    metoprolol succinate (TOPROL-XL) 25 MG 24 hr tablet Take 0.5 tablets (12.5 mg total) by mouth once daily. (Patient taking differently: Take 25 mg by mouth once daily.)    phenazopyridine (PYRIDIUM) 200 MG tablet Take 1 tablet (200 mg total) by mouth 3 (three) times daily as needed.    potassium gluconate 595 mg (99 mg) Tab Take by mouth once daily.    rifAXIMin (XIFAXAN) 550 mg Tab Take 1 tablet (550 mg total) by mouth 2 (two) times daily.    spironolactone (ALDACTONE) 50 MG tablet Take 50 mg by mouth every morning.    tamsulosin (FLOMAX) 0.4 mg Cap Take 0.4 mg by mouth once daily.    fluticasone propionate (FLONASE) 50 mcg/actuation nasal spray 1 spray by Each Nostril route every morning.    NOVOLOG FLEXPEN U-100 INSULIN 100 unit/mL (3 mL) InPn pen Inject into the skin as needed.    [DISCONTINUED] ketorolac (TORADOL) 10 mg tablet Take 1 tablet (10 mg total) by mouth every 8 (eight) hours as needed for Pain.    [DISCONTINUED] spironolactone (ALDACTONE) 25 MG tablet Take 25 mg by mouth once daily.     Family History       Problem Relation (Age of Onset)    Cataracts Father    Dementia Mother    Glaucoma Father    Heart disease Father          Tobacco Use    Smoking status: Never    Smokeless tobacco: Never   Substance and Sexual Activity    Alcohol use: No    Drug use: Not on file    Sexual activity: Not on file     Review of Systems   Constitutional:  Negative for chills,  diaphoresis and fever.   HENT: Negative.  Negative for trouble swallowing.    Eyes: Negative.  Negative for visual disturbance.        Eye twitching (intermittently)   Respiratory:  Negative for cough, chest tightness, shortness of breath and wheezing.    Cardiovascular:  Positive for leg swelling. Negative for chest pain and palpitations.   Gastrointestinal: Negative.  Negative for abdominal pain, anal bleeding, blood in stool, diarrhea, nausea and vomiting.   Genitourinary:  Positive for hematuria. Negative for difficulty urinating, dysuria, flank pain, frequency and urgency.        Has been having hematuria since recent lithotripsy/stent placement   Musculoskeletal:  Positive for arthralgias and gait problem.        Knee pain bilateral   Skin: Negative.    Neurological:  Positive for seizures and weakness.        Seizure like twitching in face and arms (multiple episodes)   Psychiatric/Behavioral: Negative.     All other systems reviewed and are negative.    Objective:     Vital Signs (Most Recent):  Temp: 97.6 °F (36.4 °C) (06/17/25 2117)  Pulse: (!) 59 (06/17/25 2117)  Resp: 20 (06/17/25 2117)  BP: (!) 159/70 (06/17/25 2117)  SpO2: 97 % (06/17/25 2117) Vital Signs (24h Range):  Temp:  [97.6 °F (36.4 °C)-97.7 °F (36.5 °C)] 97.6 °F (36.4 °C)  Pulse:  [56-62] 59  Resp:  [14-22] 20  SpO2:  [97 %-100 %] 97 %  BP: (122-181)/(58-81) 159/70     Weight: 88.9 kg (196 lb)  Body mass index is 26.58 kg/m².     Physical Exam  Vitals reviewed.   Constitutional:       General: He is not in acute distress.     Appearance: He is ill-appearing. He is not toxic-appearing or diaphoretic.      Comments: Chronically ill appearing male, appears older than stated age  No distress  Speech soft and slow, but clear   HENT:      Head: Normocephalic.      Nose: Nose normal.      Mouth/Throat:      Mouth: Mucous membranes are moist.      Pharynx: Oropharynx is clear.   Eyes:      Extraocular Movements: Extraocular movements intact.       Pupils: Pupils are equal, round, and reactive to light.   Cardiovascular:      Rate and Rhythm: Regular rhythm. Bradycardia present.      Heart sounds: Normal heart sounds.   Pulmonary:      Effort: Pulmonary effort is normal. No respiratory distress.      Breath sounds: Normal breath sounds. No stridor. No wheezing, rhonchi or rales.      Comments: Diminished at bases bilaterally  Chest:      Chest wall: No tenderness.   Abdominal:      General: Bowel sounds are normal. There is no distension.      Palpations: Abdomen is soft.      Tenderness: There is no abdominal tenderness. There is no right CVA tenderness, left CVA tenderness, guarding or rebound.   Musculoskeletal:         General: No tenderness. Normal range of motion.      Cervical back: Neck supple.      Right lower leg: Edema present.      Left lower leg: Edema present.   Skin:     General: Skin is warm and dry.      Capillary Refill: Capillary refill takes less than 2 seconds.      Findings: Bruising present.      Comments: Bilateral arms with extensive bruising   Neurological:      General: No focal deficit present.      Mental Status: He is alert.      Motor: Weakness present.      Comments: Oriented to person and place, and situation  Generalized weakness  WHITEHEAD 5/5  Video produced by [email or web address] shows -- repetitive twitching left side of face near eye/mouth   No current seizure like activity noted   Psychiatric:         Mood and Affect: Mood normal.         Behavior: Behavior normal.              CRANIAL NERVES     CN III, IV, VI   Pupils are equal, round, and reactive to light.       Significant Labs: All pertinent labs within the past 24 hours have been reviewed.    Bilirubin:   Recent Labs   Lab 06/17/25  1306   BILITOT 0.8     CBC:   Recent Labs   Lab 06/17/25  1306   WBC 5.65   HGB 10.2*   HCT 30.5*   *     CMP:   Recent Labs   Lab 06/17/25  1306 06/17/25  1707    144   K 5.9* 4.9   * 116*   CO2 18* 17*    106   BUN 53* 51*  "  CREATININE 1.8* 1.7*   CALCIUM 9.7 9.7   PROT 6.4  --    ALBUMIN 2.7*  --    BILITOT 0.8  --    ALKPHOS 104  --    AST 26  --    ALT 14  --    ANIONGAP 8 11     Coagulation:   Recent Labs   Lab 06/17/25  1609   INR 1.1   APTT 28.1     Lactic Acid: No results for input(s): "LACTATE" in the last 48 hours.  Lipase: No results for input(s): "LIPASE" in the last 48 hours.  Magnesium:   Recent Labs   Lab 06/17/25  1306   MG 1.6     POCT Glucose:   Recent Labs   Lab 06/17/25  1526   POCTGLUCOSE 102     Troponin:   Recent Labs   Lab 06/17/25  1306   TROPONINI 0.006       CPK 29    Ammonia 118      Urine Studies:   Recent Labs   Lab 06/17/25  1306   COLORU Yellow   APPEARANCEUA Hazy*   PHUR 7.0   SPECGRAV 1.010   PROTEINUA 1+*   GLUCUA Negative   BILIRUBINUA Negative   OCCULTUA 3+*   NITRITE Negative   UROBILINOGEN Negative   LEUKOCYTESUR 3+*   RBCUA >100*   WBCUA >100*   BACTERIA None   HYALINECASTS 0     Urine culture pending    EKG-- wide QRS rhythm with left BBB, HR 60,     Significant Imaging: I have reviewed all pertinent imaging results/findings within the past 24 hours.    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

## 2025-06-18 NOTE — ASSESSMENT & PLAN NOTE
Hx GHOSH cirrhosis  Ammonia 118  Not safe for po currently, needs swallowing evaluation  Lactulose enemas ordered TID with first to be done tonight  Recheck ammonia level in AM

## 2025-06-18 NOTE — PLAN OF CARE
PT EVAL complete. Required MIN A for bed mobility, ambulated 20ft CGA using RW. Recommending low intensity therapy upon d/c.

## 2025-06-18 NOTE — ASSESSMENT & PLAN NOTE
The likely etiology of thrombocytopenia is infection. The patients 3 most recent labs are listed below.  Recent Labs     06/17/25  1306   *     Plan  - Will transfuse if platelet count is <50k (if undergoing surgical procedure or have active bleeding).  - Monitor CBC

## 2025-06-18 NOTE — PT/OT/SLP EVAL
Occupational Therapy   Evaluation    Name: Ramy Romo  MRN: [id number]  Admitting Diagnosis: Hepatic encephalopathy  Recent Surgery: * No surgery found *      Recommendations:     Discharge Recommendations: Low Intensity Therapy  Discharge Equipment Recommendations:  none  Barriers to discharge:       Assessment:     Ramy Romo is a 79 y.o. male with a medical diagnosis of Hepatic encephalopathy.  He presents with the following  deficits affecting function: weakness, impaired functional mobility, decreased safety awareness, gait instability, impaired endurance, pain, impaired balance, impaired self care skills, decreased lower extremity function, impaired sensation, decreased upper extremity function, decreased ROM.      Rehab Prognosis: Good; patient would benefit from acute skilled OT services to address these deficits and reach maximum level of function.       Plan:     Patient to be seen 2 x/week to address the above listed problems via self-care/home management, therapeutic activities, therapeutic exercises  Plan of Care Expires: 07/01/25  Plan of Care Reviewed with: patient    Subjective     Chief Complaint: debility and generalized weakness  Patient/Family Comments/goals: get stronger    Occupational Profile:  Living Environment: lives  with caregiver in 1 story house and ramp to enter  Previous level of function: (I) with adl's  and functional mobility  Roles and Routines:   Equipment Used at Home: walker, rolling, wheelchair, rollator, shower chair, grab bar      Pain/Comfort:  Pain Rating 1: 0/10    Patients cultural, spiritual, Jehovah's witness conflicts given the current situation:      Objective:     Communicated with: nurse oliver  prior to session.  Patient found HOB elevated with telemetry, peripheral IV, bed alarm upon OT entry to room.    General Precautions: Standard, fall, aspiration, contact  Orthopedic Precautions: N/A  Braces: N/A  Respiratory Status: Room air    Occupational  Performance:    Bed Mobility:    Patient completed Rolling/Turning to Left with  minimum assistance  Patient completed Scooting/Bridging with minimum assistance  Patient completed Supine to Sit with minimum assistance    Functional Mobility/Transfers:  Patient completed Sit <> Stand Transfer with minimum assistance  with  rolling walker   Patient completed Bed <> Chair Transfer using Step Transfer technique with contact guard assistance with rolling walker  Functional Mobility: pt ambulated 20 feet with CGA    Activities of Daily Living:  Upper Body Dressing: minimum assistance .    Cognitive/Visual Perceptual:  Cognitive/Psychosocial Skills:     -       Oriented to: Person, Place, Time, and Situation   -       Follows Commands/attention:Follows multistep  commands  -       Communication: clear/fluent  -       Memory: unable to assess  -       Safety awareness/insight to disability: impaired     Physical Exam:  Upper Extremity Range of Motion:     -       Right Upper Extremity: limited  -       Left Upper Extremity: limited  Upper Extremity Strength:    -       Right Upper Extremity: mmt: 2+/5 grossly  -       Left Upper Extremity: mmt: 2+/5 grossly   Strength:    -       Right Upper Extremity: mmt: 3/5 grossly  -       Left Upper Extremity: mmt: 3/5 grossly    AMPAC 6 Click ADL:  AMPAC Total Score: 20    Treatment & Education:  Educated patient on importance of increased tolerance to upright position and direct impact on CV endurance and strength. Patient encouraged to sit up in chair/ EOB, for a minimum of 2 consecutive hours including for all meals.. Encouraged patient to perform AROM TE to BUE throughout the day within all available planes of motion. Pt reported diminished sensation (numbness) on the entire R UE. Re enforced importance of utilizing call light to meet needs in room and not attempt to get up without staff assistance. Patient verbalized understanding and agreed to comply.           Patient left  up in chair with all lines intact    GOALS:   Multidisciplinary Problems       Occupational Therapy Goals          Problem: Occupational Therapy    Goal Priority Disciplines Outcome Interventions   Occupational Therapy Goal     OT, PT/OT     Description: O.T. 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     Pacemaker          Past Surgical History:   Procedure Laterality Date    A-V CARDIAC PACEMAKER INSERTION Left 1/25/2024    Procedure: INSERTION, CARDIAC PACEMAKER, DUAL CHAMBER/poss single lead vs His lead;  Surgeon: Anthony Coronado MD;  Location: [facility] CATH LAB;  Service: Cardiology;  Laterality: Left;  Bio/LBBB pacing    CHOLECYSTECTOMY      CYSTOSCOPY WITH URETEROSCOPY, RETROGRADE PYELOGRAPHY, AND INSERTION OF STENT Bilateral 6/12/2025    Procedure: CYSTOSCOPY, WITH RETROGRADE PYELOGRAM AND URETERAL STENT INSERTION;  Surgeon: Desi Cruz MD;  Location: [facility] OR;  Service: Urology;  Laterality: Bilateral;    INSERTION, PACEMAKER, TEMPORARY TRANSVENOUS N/A 1/24/2024    Procedure: Insertion, Pacemaker, Temporary Transvenous;  Surgeon: Renaldo Santacruz MD;  Location: [facility] CATH LAB;  Service: Cardiology;  Laterality: N/A;    NECK SURGERY      TOTAL ELBOW ARTHROPLASTY Right 03/2019    URETEROSCOPIC REMOVAL OF URETERIC CALCULUS Bilateral 6/12/2025    Procedure: REMOVAL, CALCULUS, URETER, URETEROSCOPIC;  Surgeon: Desi Cruz MD;  Location: [facility] OR;  Service: Urology;  Laterality: Bilateral;    URETEROSCOPY, WITH LASER LITHOTRIPSY Bilateral 6/12/2025    Procedure: URETEROSCOPY, WITH LASER LITHOTRIPSY;  Surgeon: Desi Cruz MD;  Location: [facility] OR;  Service:  Urology;  Laterality: Bilateral;  with retrograde pyleogram       Time Tracking:     OT Date of Treatment: 06/18/25  OT Start Time: 1110  OT Stop Time: 1135  OT Total Time (min): 25 min    Billable Minutes:Evaluation 15 minutes  Therapeutic Activity 10 minutes    6/18/2025

## 2025-06-18 NOTE — H&P
[facility] [address] [place] [address] Medicine  History & Physical    Patient Name: Ramy Romo  MRN: [id number]  Patient Class: IP- Inpatient  Admission Date: 6/17/2025  Attending Physician: Malini Pinzon MD   Primary Care Provider: Félix Rollisn MD         Patient information was obtained from patient, past medical records, ER records, and sitter at bedside.     Subjective:     Principal Problem:Hepatic encephalopathy    Chief Complaint:   Chief Complaint   Patient presents with    Leg Swelling     Pt reports this morning he wheeled himself to the restroom; when he attempted to stand, he could support himself due to leg swelling and stiffness. No reported fall or pain. Recent kidney stone stent placed 6/12/25        HPI:  Patient is a 79-year-old male with past medical history significant for diabetes, hypertension, CHF, kidney stones, and liver cirrhosis secondary to GHOSH, atrial fibrillation no longer on anticoagulation, and pacemaker placement who presented to ED for evaluation of leg swelling, weakness in legs, and  bilateral knee pain.  Sitter at bedside reports that he fell in the shower on Saturday.  He recently had ESWL and stent placement for kidney stones on 06/12/2025 per Dr. Cruz.  Sitter reports that he has had hematuria since that time.  She reports that he took lactulose twice a day on yesterday, but did not take it today due to feeling ill.  He has chronic arthralgias and gait problem, uses wheelchair to get around the house.  He has no previous history of seizures however today he has had multiple episodes of seizure like twitching in his face, eye twitiching, twitching near mouth, and in arms.  His sitter at bedside shows me a video on her phone and it appears to be seizure activity. on arrival to ED, temp 97.7°, heart rate 56, respirations 14, blood pressure 130/67, 98% SpO2 on room air.  Lab workup significant for hemoglobin 10.2, hematocrit 30.5, platelets 123, normal PT/INR,  normal PTT, potassium 5.9, sodium 142, chloride 116, CO2 18, BUN 53, creatinine 1.8, glucose 105, magnesium 1.6, albumin 2.7, normal LFTs, ammonia level 118, , CPK 29, troponin normal, UA with hazy urine, 3+ blood, 3+ leukocyte esterase, negative nitrites, greater than 100 RBC, greater than 100 WBC.  Urine culture is pending.  EKG showed wide QRS rhythm with left bundle-branch block, heart rate 60.  Chest x-ray demonstrated lungs are clear.  There was some swelling noted to right lower extremity and ultrasound was done which showed no evidence of DVT.  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  While in ED, he was given IV Benadryl 25 mg, albuterol nebulizer treatment, Lasix 40 mg IV.  Lokelma was ordered, however he did not take due to concern for swallowing difficulty, he is NPO.  Hospital Medicine was consulted for admission due to hepatic encephalopathy, elevated ammonia level, AMEENA, hyperkalemia, and possible seizure activity as well as acute cystitis with hematuria.    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     Pacemaker        Past Surgical History:   Procedure Laterality Date    A-V CARDIAC PACEMAKER INSERTION Left 1/25/2024    Procedure: INSERTION, CARDIAC PACEMAKER, DUAL CHAMBER/poss single lead vs His lead;  Surgeon: Anthony Coronado MD;  Location: [facility] CATH LAB;  Service: Cardiology;  Laterality: Left;  Bio/LBBB pacing    CHOLECYSTECTOMY      CYSTOSCOPY WITH URETEROSCOPY, RETROGRADE PYELOGRAPHY, AND INSERTION OF STENT Bilateral 6/12/2025    Procedure: CYSTOSCOPY, WITH RETROGRADE PYELOGRAM AND URETERAL STENT INSERTION;  Surgeon: Anthony  Desi BENAVIDEZ MD;  Location: [facility] OR;  Service: Urology;  Laterality: Bilateral;    INSERTION, PACEMAKER, TEMPORARY TRANSVENOUS N/A 1/24/2024    Procedure: Insertion, Pacemaker, Temporary Transvenous;  Surgeon: Renaldo Santacruz MD;  Location: [facility] CATH LAB;  Service: Cardiology;  Laterality: N/A;    NECK SURGERY      TOTAL ELBOW ARTHROPLASTY Right 03/2019    URETEROSCOPIC REMOVAL OF URETERIC CALCULUS Bilateral 6/12/2025    Procedure: REMOVAL, CALCULUS, URETER, URETEROSCOPIC;  Surgeon: Desi Cruz MD;  Location: [facility] OR;  Service: Urology;  Laterality: Bilateral;    URETEROSCOPY, WITH LASER LITHOTRIPSY Bilateral 6/12/2025    Procedure: URETEROSCOPY, WITH LASER LITHOTRIPSY;  Surgeon: Desi Cruz MD;  Location: [facility] OR;  Service: Urology;  Laterality: Bilateral;  with retrograde pyleogram       Review of patient's allergies indicates:   Allergen Reactions    Seroquel [quetiapine] Hallucinations     Per son patient had adverse reaction, became agitated and combating     Sulfa (sulfonamide antibiotics) Rash       No current facility-administered medications on file prior to encounter.     Current Outpatient Medications on File Prior to Encounter   Medication Sig    aspirin 81 MG Chew Take 81 mg by mouth once daily.    calcium-vitamin D 250-100 mg-unit per tablet Take 1 tablet by mouth once daily.    ertapenem (INVANZ) 1 gram injection Inject 1 g into the vein once daily.    ferrous gluconate (FERGON) 324 MG tablet Take 324 mg by mouth daily with breakfast.    furosemide (LASIX) 20 MG tablet Take 20 mg by mouth daily as needed.    lactulose (CHRONULAC) 10 gram/15 mL solution Take 30 g by mouth 2 (two) times daily.    magnesium oxide (MAG-OX) 400 mg (241.3 mg magnesium) tablet Take 400 mg by mouth 2 (two) times daily.    metoprolol succinate (TOPROL-XL) 25 MG 24 hr tablet Take 0.5 tablets (12.5 mg total) by mouth once daily. (Patient taking differently: Take 25 mg by mouth once daily.)    phenazopyridine  (PYRIDIUM) 200 MG tablet Take 1 tablet (200 mg total) by mouth 3 (three) times daily as needed.    potassium gluconate 595 mg (99 mg) Tab Take by mouth once daily.    rifAXIMin (XIFAXAN) 550 mg Tab Take 1 tablet (550 mg total) by mouth 2 (two) times daily.    spironolactone (ALDACTONE) 50 MG tablet Take 50 mg by mouth every morning.    tamsulosin (FLOMAX) 0.4 mg Cap Take 0.4 mg by mouth once daily.    fluticasone propionate (FLONASE) 50 mcg/actuation nasal spray 1 spray by Each Nostril route every morning.    NOVOLOG FLEXPEN U-100 INSULIN 100 unit/mL (3 mL) InPn pen Inject into the skin as needed.    [DISCONTINUED] ketorolac (TORADOL) 10 mg tablet Take 1 tablet (10 mg total) by mouth every 8 (eight) hours as needed for Pain.    [DISCONTINUED] spironolactone (ALDACTONE) 25 MG tablet Take 25 mg by mouth once daily.     Family History       Problem Relation (Age of Onset)    Cataracts Father    Dementia Mother    Glaucoma Father    Heart disease Father          Tobacco Use    Smoking status: Never    Smokeless tobacco: Never   Substance and Sexual Activity    Alcohol use: No    Drug use: Not on file    Sexual activity: Not on file     Review of Systems   Constitutional:  Negative for chills, diaphoresis and fever.   HENT: Negative.  Negative for trouble swallowing.    Eyes: Negative.  Negative for visual disturbance.        Eye twitching (intermittently)   Respiratory:  Negative for cough, chest tightness, shortness of breath and wheezing.    Cardiovascular:  Positive for leg swelling. Negative for chest pain and palpitations.   Gastrointestinal: Negative.  Negative for abdominal pain, anal bleeding, blood in stool, diarrhea, nausea and vomiting.   Genitourinary:  Positive for hematuria. Negative for difficulty urinating, dysuria, flank pain, frequency and urgency.        Has been having hematuria since recent lithotripsy/stent placement   Musculoskeletal:  Positive for arthralgias and gait problem.        Knee pain  bilateral   Skin: Negative.    Neurological:  Positive for seizures and weakness.        Seizure like twitching in face and arms (multiple episodes)   Psychiatric/Behavioral: Negative.     All other systems reviewed and are negative.    Objective:     Vital Signs (Most Recent):  Temp: 97.6 °F (36.4 °C) (06/17/25 2117)  Pulse: (!) 59 (06/17/25 2117)  Resp: 20 (06/17/25 2117)  BP: (!) 159/70 (06/17/25 2117)  SpO2: 97 % (06/17/25 2117) Vital Signs (24h Range):  Temp:  [97.6 °F (36.4 °C)-97.7 °F (36.5 °C)] 97.6 °F (36.4 °C)  Pulse:  [56-62] 59  Resp:  [14-22] 20  SpO2:  [97 %-100 %] 97 %  BP: (122-181)/(58-81) 159/70     Weight: 88.9 kg (196 lb)  Body mass index is 26.58 kg/m².     Physical Exam  Vitals reviewed.   Constitutional:       General: He is not in acute distress.     Appearance: He is ill-appearing. He is not toxic-appearing or diaphoretic.      Comments: Chronically ill appearing male, appears older than stated age  No distress  Speech soft and slow, but clear   HENT:      Head: Normocephalic.      Nose: Nose normal.      Mouth/Throat:      Mouth: Mucous membranes are moist.      Pharynx: Oropharynx is clear.   Eyes:      Extraocular Movements: Extraocular movements intact.      Pupils: Pupils are equal, round, and reactive to light.   Cardiovascular:      Rate and Rhythm: Regular rhythm. Bradycardia present.      Heart sounds: Normal heart sounds.   Pulmonary:      Effort: Pulmonary effort is normal. No respiratory distress.      Breath sounds: Normal breath sounds. No stridor. No wheezing, rhonchi or rales.      Comments: Diminished at bases bilaterally  Chest:      Chest wall: No tenderness.   Abdominal:      General: Bowel sounds are normal. There is no distension.      Palpations: Abdomen is soft.      Tenderness: There is no abdominal tenderness. There is no right CVA tenderness, left CVA tenderness, guarding or rebound.   Musculoskeletal:         General: No tenderness. Normal range of motion.       "Cervical back: Neck supple.      Right lower leg: Edema present.      Left lower leg: Edema present.   Skin:     General: Skin is warm and dry.      Capillary Refill: Capillary refill takes less than 2 seconds.      Findings: Bruising present.      Comments: Bilateral arms with extensive bruising   Neurological:      General: No focal deficit present.      Mental Status: He is alert.      Motor: Weakness present.      Comments: Oriented to person and place, and situation  Generalized weakness  WHITEHEAD 5/5  Video produced by sitter shows -- repetitive twitching left side of face near eye/mouth   No current seizure like activity noted   Psychiatric:         Mood and Affect: Mood normal.         Behavior: Behavior normal.              CRANIAL NERVES     CN III, IV, VI   Pupils are equal, round, and reactive to light.       Significant Labs: All pertinent labs within the past 24 hours have been reviewed.    Bilirubin:   Recent Labs   Lab 06/17/25  1306   BILITOT 0.8     CBC:   Recent Labs   Lab 06/17/25  1306   WBC 5.65   HGB 10.2*   HCT 30.5*   *     CMP:   Recent Labs   Lab 06/17/25  1306 06/17/25  1707    144   K 5.9* 4.9   * 116*   CO2 18* 17*    106   BUN 53* 51*   CREATININE 1.8* 1.7*   CALCIUM 9.7 9.7   PROT 6.4  --    ALBUMIN 2.7*  --    BILITOT 0.8  --    ALKPHOS 104  --    AST 26  --    ALT 14  --    ANIONGAP 8 11     Coagulation:   Recent Labs   Lab 06/17/25  1609   INR 1.1   APTT 28.1     Lactic Acid: No results for input(s): "LACTATE" in the last 48 hours.  Lipase: No results for input(s): "LIPASE" in the last 48 hours.  Magnesium:   Recent Labs   Lab 06/17/25  1306   MG 1.6     POCT Glucose:   Recent Labs   Lab 06/17/25  1526   POCTGLUCOSE 102     Troponin:   Recent Labs   Lab 06/17/25  1306   TROPONINI 0.006       CPK 29    Ammonia 118      Urine Studies:   Recent Labs   Lab 06/17/25  1306   COLORU Yellow   APPEARANCEUA Hazy*   PHUR 7.0   SPECGRAV 1.010   PROTEINUA 1+* "   GLUCUA Negative   BILIRUBINUA Negative   OCCULTUA 3+*   NITRITE Negative   UROBILINOGEN Negative   LEUKOCYTESUR 3+*   RBCUA >100*   WBCUA >100*   BACTERIA None   HYALINECASTS 0     Urine culture pending    EKG-- wide QRS rhythm with left BBB, HR 60,     Significant Imaging: I have reviewed all pertinent imaging results/findings within the past 24 hours.    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 Baseline creatinine is 1.3. Most recent creatinine and eGFR are listed below.  Recent Labs     06/17/25  1306 06/17/25  1707   CREATININE 1.8* 1.7*   EGFRNORACEVR 38* 41*      Plan  - Avoid nephrotoxins and renally dose meds for GFR listed above  - Monitor urine output, serial BMP, and adjust therapy as needed  - Gentle hydration with IV fluids - bicarb fluids ordered since also with metabolic acidosis (was given IV Lasix per ED)  Anemia  Anemia is likely due to acute blood loss which was from hematuria. Most recent hemoglobin and hematocrit are listed below.  Recent Labs     06/17/25  1306   HGB 10.2*   HCT 30.5*     Plan  - Monitor serial CBC: Daily  - Transfuse PRBC if patient becomes hemodynamically unstable, symptomatic or H/H drops below 7/21.  - Patient has not received any PRBC transfusions to date  - Patient's anemia is currently  stable  Hyperkalemia  Hyperkalemia is likely due to AMEENA.The patients most recent potassium results are listed below.  Recent Labs     06/17/25  1306 06/17/25  1707   K 5.9* 4.9     Plan  - Monitor for arrhythmias with EKG and/or continuous telemetry.   - Treat the hyperkalemia with Potassium Binders, Nebulized albuterol sulfate, Furosemide, and Sodium Bicarbonate.   - Monitor potassium: Daily  - The patient's hyperkalemia is improving          Thrombocytopenia  The likely etiology of thrombocytopenia is infection. The patients 3 most recent labs are listed below.  Recent Labs     06/17/25  1306   *     Plan  - Will transfuse if platelet count is <50k (if undergoing surgical procedure or have active bleeding).  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  Will avoid any hepatotoxic meds, and monitor CBC/CMP/INR for synthetic function.   --NPO at this time, needs swallowing evaluation/NPO  --Lactulose enema ordered  Kidney stones  Had ESWL and stents placed per Dr. Cruz on 6/12/25  Has been having mild hematuria since then according to sitter at bedside    Acute cystitis with hematuria  IV  cefepime ordered (after review of previous sensitivities)  Follow up urine culture      VTE Risk Mitigation (From admission, onward)           Ordered     Reason for No Pharmacological VTE Prophylaxis  Once        Comments: Anemia and thrombocytopenia   Question:  Reasons:  Answer:  Physician Provided (leave comment)    06/17/25 1941     IP VTE HIGH RISK PATIENT  Once         06/17/25 1941     Place sequential compression device  Until discontinued         06/17/25 1941                    This patient's case has been reviewed by my supervising physician, Dr. Malini Pinzon.  Supervising physician will provide additional orders and recommendations at his or her discretion.      Blanca Galeano NP  Department of Hospital Medicine  [facility]

## 2025-06-18 NOTE — PT/OT/SLP EVAL
Physical Therapy Evaluation and Treatment    Patient Name: Ramy Romo   MRN: [id number]  Recent Surgery: * No surgery found *      Recommendations:     Discharge Recommendations: Low Intensity Therapy (with 24/7 care from caregiver)   Discharge Equipment Recommendations: none   Barriers to discharge: None    Assessment:     Ramy Romo is a 79 y.o. male admitted with a medical diagnosis of Hepatic encephalopathy. He presents with the following impairments/functional limitations: weakness, impaired endurance, impaired functional mobility, gait instability, impaired balance, decreased safety awareness, decreased lower extremity function, impaired cardiopulmonary response to activity, decreased ROM.    Rehab Prognosis: Good; patient would benefit from acute PT services to address these deficits and reach maximum level of function.    Plan:     During this hospitalization, patient to be seen 3 x/week to address the above listed problems via gait training, therapeutic activities, therapeutic exercises    Plan of Care Expires: 07/02/25    Subjective     Chief Complaint: Pt is motivated to participate  Patient Comments/Goals: none stated  Pain/Comfort:  Pain Rating 1: 0/10    Social History:  Living Environment: Patient lives with their caregiver in a single story home with ramp. Pt has 24/7 care from caregiver.   Prior Level of Function: Prior to admission, patient was modified independent, not driving, and ambulated household distances using rolling walker  Equipment Used at Home: walker, rolling, wheelchair, rollator, shower chair, grab bar  DME owned (not currently used): none  Assistance Upon Discharge: caregiver    Objective:     Communicated with nurse Roman and epic chart review prior to session. Patient found supine with peripheral IV, telemetry, bed alarm upon PT entry to room.    General Precautions: Standard, fall, aspiration, contact, seizure   Orthopedic Precautions: N/A   Braces: N/A   "  Respiratory Status: Room air    Exams:  Cognition: Patient is oriented to Person, Place, Time, Situation  RLE ROM: WFL  RLE Strength: Grossly 4/5  LLE ROM: WFL  LLE Strength: Grossly 4/5  Sensation:    -       Intact  Skin Integrity/Edema:     -       Skin integrity: Visible skin intact    Functional Mobility:  Gait belt applied - Yes  Bed Mobility  Rolling Left: minimum assistance  Scooting: contact guard assistance  Supine to Sit: minimum assistance for LE management and trunk management  Transfers  Sit to Stand: minimum assistance with rolling walker from EOB, CGA from chair with RW  Bed to Chair: contact guard assistance with rolling walker using Step Transfer  Gait  Patient ambulated 20ft with rolling walker and contact guard assistance. Patient demonstrates occasional unsteady gait. No c/o dizziness or SOB, no gross LOB. All lines remained intact throughout ambulation trail.  Balance  Sitting: contact guard assistance  Standing: contact guard assistance    Therapeutic Activities and Exercises:   Pt educated on role of PT in acute care and POC. Educated on importance of OOB activities, activity pacing, and HEP (marching/hip flex, hip abd, heel slides/LAQ, quad sets, ankle pumps) in order to maintain/regain strength. Encouraged to sit up in chair for all meals. Educated on proper use of RW for safety and to reduce risk of falling. Educated on "call don't fall" policy and increased risk of falling due to weakness, instructed to utilize call bell for assistance with all transfers. Pt agreeable to all requests.    AM-PAC 6 CLICK MOBILITY  Total Score:16    Patient left up in chair with all lines intact, call button in reach, chair alarm on, and caregiver present.    GOALS:   Multidisciplinary Problems       Physical Therapy Goals          Problem: Physical Therapy    Goal Priority Disciplines Outcome Interventions   Physical Therapy Goal     PT, PT/OT     Description: Goals to be met by 7/2/25.  1. Pt will " complete bed mobility SPV.  2. Pt will complete sit to stand SPV.  3. Pt will ambulate 150ft SPV using RW.  4. Pt will increase AMPAC score by 2 points to progress functional mobility.                         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     Pacemaker        Past Surgical History:   Procedure Laterality Date    A-V CARDIAC PACEMAKER INSERTION Left 1/25/2024    Procedure: INSERTION, CARDIAC PACEMAKER, DUAL CHAMBER/poss single lead vs His lead;  Surgeon: Anthony Coronado MD;  Location: [facility] CATH LAB;  Service: Cardiology;  Laterality: Left;  Bio/LBBB pacing    CHOLECYSTECTOMY      CYSTOSCOPY WITH URETEROSCOPY, RETROGRADE PYELOGRAPHY, AND INSERTION OF STENT Bilateral 6/12/2025    Procedure: CYSTOSCOPY, WITH RETROGRADE PYELOGRAM AND URETERAL STENT INSERTION;  Surgeon: Desi Cruz MD;  Location: [facility] OR;  Service: Urology;  Laterality: Bilateral;    INSERTION, PACEMAKER, TEMPORARY TRANSVENOUS N/A 1/24/2024    Procedure: Insertion, Pacemaker, Temporary Transvenous;  Surgeon: Renaldo Santacruz MD;  Location: [facility] CATH LAB;  Service: Cardiology;  Laterality: N/A;    NECK SURGERY      TOTAL ELBOW ARTHROPLASTY Right 03/2019    URETEROSCOPIC REMOVAL OF URETERIC CALCULUS Bilateral 6/12/2025    Procedure: REMOVAL, CALCULUS, URETER, URETEROSCOPIC;  Surgeon: Desi Cruz MD;  Location: [facility] OR;  Service: Urology;  Laterality: Bilateral;    URETEROSCOPY, WITH LASER LITHOTRIPSY Bilateral 6/12/2025    Procedure: URETEROSCOPY, WITH LASER LITHOTRIPSY;  Surgeon: Desi Cruz MD;  Location: [facility] OR;  Service: Urology;  Laterality: Bilateral;  with retrograde pyleogram       Time Tracking:     PT Received On: 06/18/25  PT Start Time: 1050  PT Stop Time: 1115  PT  Total Time (min): 25 min     Billable Minutes: Evaluation 15min and Therapeutic Activity 10min    6/18/2025

## 2025-06-18 NOTE — ASSESSMENT & PLAN NOTE
Anemia is likely due to acute blood loss which was from hematuria. Most recent hemoglobin and hematocrit are listed below.  Recent Labs     06/17/25  1306   HGB 10.2*   HCT 30.5*     Plan  - Monitor serial CBC: Daily  - Transfuse PRBC if patient becomes hemodynamically unstable, symptomatic or H/H drops below 7/21.  - Patient has not received any PRBC transfusions to date  - Patient's anemia is currently stable

## 2025-06-18 NOTE — ASSESSMENT & PLAN NOTE
Had ESWL and stents placed per Dr. Cruz on 6/12/25  Has been having mild hematuria since then according to sitter at bedside

## 2025-06-18 NOTE — PLAN OF CARE
[facility] - Med Surg 3  Initial Discharge Assessment       Primary Care Provider: Félix Rollins MD    Admission Diagnosis: Hepatic encephalopathy [K76.82]  Acute hyperkalemia [E87.5]  Weakness [R53.1]  Acute renal insufficiency [N28.9]  Chronic UTI [N39.0]  Bilateral leg edema [R60.0]  Right leg swelling [M79.89]    Admission Date: 6/17/2025  Expected Discharge Date: Per Attending     Transition of Care Barriers: None    Payor: [employer] MANAGED MEDICARE / Plan: [employer] MEDICARE HMO / Product Type: Capitation /     Extended Emergency Contact Information  Primary Emergency Contact: Harris,CLAUDETTE  Mobile Phone: [phone]  Relation: Friend  Preferred language: English   needed? No  Secondary Emergency Contact: DemetriusDonte  Mobile Phone: [phone]  Relation: Son    Discharge Plan A: [id number] [place], [place] - [address] [place] [address] AT [facility] [address] [address] [address] [address]  Phone: [phone] Fax: [phone]    [facility] [address] [place], [place] - [address] [place] [address]  [address] [facility] [address] [place] [address]  Phone: [phone] Fax: [phone]      Initial Assessment (most recent)       Adult Discharge Assessment - 06/18/25 0923          Discharge Assessment    Assessment Type Discharge Planning Assessment     Confirmed/corrected address, phone number and insurance Yes     Confirmed Demographics Correct on Facesheet     Source of Information family     Communicated KAYA with patient/caregiver Date not available/Unable to determine     People in Home alone   has caregivers and family who help majority of day everyday    Do you expect to return to your current living situation? Yes     Do you have help at home or someone to help you manage your care at home? Yes     Who are your caregiver(s) and their phone number(s)? caregiver, Claudette, and family members     Prior to hospitilization cognitive status:  Alert/Oriented     Walking or Climbing Stairs Difficulty yes     Walking or Climbing Stairs ambulation difficulty, requires equipment     Mobility Management RW and wheelchair     Dressing/Bathing Difficulty yes     Dressing/Bathing bathing difficulty, assistance 1 person     Dressing/Bathing Management per sonSven, only requires ADL assistance when feeling ill     Home Accessibility wheelchair accessible     Home Layout Able to live on 1st floor     Equipment Currently Used at Home walker, rolling;wheelchair     Readmission within 30 days? No     Patient currently being followed by outpatient case management? No     Do you currently have service(s) that help you manage your care at home? No     Do you take prescription medications? Yes     Do you have prescription coverage? Yes     Do you have any problems affording any of your prescribed medications? No     Is the patient taking medications as prescribed? yes     Who is going to help you get home at discharge? Family     How do you get to doctors appointments? family or friend will provide     Are you on dialysis? No     Do you take coumadin? No     Discharge Plan A Home Health     DME Needed Upon Discharge  none     Discharge Plan discussed with: Adult children     Transition of Care Barriers None                   Anticipated DC dispo: Home with HH   Prior Level of Function: Modified Independent   People in home:  Caregiver and family for support     Comments:  CM spoke with patient's son, Sven, to introduce role and discuss discharge planning. SonSven, and caregiver, Claudette, will be help at home and can provide transport at time of discharge. CM discharge needs depends on hospital progress. CM will continue following to assist with other needs.     Patient had [facility] for IV abx in the past.

## 2025-06-18 NOTE — ASSESSMENT & PLAN NOTE
AMEENA is likely due to pre-renal azotemia due to dehydration. Baseline creatinine is 1.3. Most recent creatinine and eGFR are listed below.  Recent Labs     06/17/25  1306 06/17/25  1707   CREATININE 1.8* 1.7*   EGFRNORACEVR 38* 41*      Plan  - Avoid nephrotoxins and renally dose meds for GFR listed above  - Monitor urine output, serial BMP, and adjust therapy as needed  - Gentle hydration with IV fluids - bicarb fluids ordered since also with metabolic acidosis (was given IV Lasix per ED)

## 2025-06-18 NOTE — ASSESSMENT & PLAN NOTE
--Twitching episodes noted per ED, improved after small dose of IV Ativan  -- no previous history of seizures  --PRN Ativan ordered  --Seizure precautions  --EEG ordered  --Consider tele-general neurology consult if any further seizure like activity

## 2025-06-18 NOTE — PT/OT/SLP EVAL
Speech Language Pathology Evaluation  Bedside Swallow    Patient Name:  Ramy Romo   MRN:  [id number]  Admitting Diagnosis: Hepatic encephalopathy    Recommendations:                 General Recommendations:  Goals of care discussion (Modified barium swallow study if aggressive care desired vs prior diet despite repetitive aspiration)   Diet recommendations: pending   Aspiration Precautions: Strict aspiration precautions   General Precautions: Standard, aspiration  Communication strategies:  provide increased time to answer    Assessment:     Ramy Romo is a 79 y.o. male with a medical diagnosis of diabetes, hypertension, CHF, kidney stones, and liver cirrhosis secondary to GHOSH, atrial fibrillation no longer on anticoagulation, pacemaker, and history of ICH admitted 6/17 following weakness in his legs and seizure activity. He is well-known to SLP service with at least 6 MBSS since 2018, all of which documenting aspiration and recommending modified diets and/or NPO with alternate nutrition and hydration. Today, he continues to present with clinical signs of pharyngeal dysphagia, likely acute on chronic related to multiple medical comorbidities (history of ICH, reported cervical surgery, reported osteophyte) in the setting of now acute weakness following seizure activity, hospitalization, and hepatic encephalopathy. Patient with long-standing dysphagia history documented noting both silent and audible aspiration. Bedside evaluation today significant for clinical signs of pharyngeal dysphagia, with consistent overt signs of aspiration with thin liquids (IDDSI 0) and pharyngeal inefficiency marked by multiple swallows per bolus across other consistencies. Despite prior diet recommendations, patient continues to have intake of regular diet with thin liquids at home (IDDSI 7/0). At this time, recommend goals of care discussion with family/provider given repetitive dysphagia history and multiple recent  hospitalizations. If patient/family decide on aggressive care, recommend NPO status with repeat Modified Barium Swallow Study (MBSS). If patient/family decide on comfort-based care, recommend soft diet with thin liquids in upright position with emphasis on oral hygiene and safe swallow strategies.    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     Pacemaker        Past Surgical History:   Procedure Laterality Date    A-V CARDIAC PACEMAKER INSERTION Left 1/25/2024    Procedure: INSERTION, CARDIAC PACEMAKER, DUAL CHAMBER/poss single lead vs His lead;  Surgeon: Anthony Coronado MD;  Location: [facility] CATH LAB;  Service: Cardiology;  Laterality: Left;  Bio/LBBB pacing    CHOLECYSTECTOMY      CYSTOSCOPY WITH URETEROSCOPY, RETROGRADE PYELOGRAPHY, AND INSERTION OF STENT Bilateral 6/12/2025    Procedure: CYSTOSCOPY, WITH RETROGRADE PYELOGRAM AND URETERAL STENT INSERTION;  Surgeon: Desi Cruz MD;  Location: [facility] OR;  Service: Urology;  Laterality: Bilateral;    INSERTION, PACEMAKER, TEMPORARY TRANSVENOUS N/A 1/24/2024    Procedure: Insertion, Pacemaker, Temporary Transvenous;  Surgeon: Renaldo Santacruz MD;  Location: [facility] CATH LAB;  Service: Cardiology;  Laterality: N/A;    NECK SURGERY      TOTAL ELBOW ARTHROPLASTY Right 03/2019    URETEROSCOPIC REMOVAL OF URETERIC CALCULUS Bilateral 6/12/2025    Procedure: REMOVAL, CALCULUS, URETER, URETEROSCOPIC;  Surgeon: Desi Cruz MD;  Location: [facility] OR;  Service: Urology;  Laterality: Bilateral;    URETEROSCOPY, WITH LASER LITHOTRIPSY Bilateral 6/12/2025    Procedure: URETEROSCOPY, WITH LASER LITHOTRIPSY;  Surgeon: Desi Cruz MD;  Location: [facility] OR;  Service: Urology;  Laterality: Bilateral;  with retrograde pyleogram       Social History: Patient lives with spouse.    Modified Barium Swallow:  multiple swallow studies (atleast 6) since 2018, recommending various modified diets, most recent in 1/24 recommending NPO with alternate nutrition/hydration    1/28/24 Kanchan Quintero, CCC-SLP:  Oral Preparation/Oral Phase  Decreased base of tongue mobility  Premature spillage to vallecule     Pharyngeal Phase   Thin liquids -Delayed swallowing initiation with penetration before the swallow above the vocal folds. Material does not exit the airway with audible aspiration after the swallow. Vallecula and pyriform sinus residue. Decrease UES opening. Cues for multiple swallows with cough to clear some stasis.      Nectar liquids- Delayed swallow initiation. Penetration before the swallow. Material does not exit the airway leading to silent aspiration after the swallow. Moderate vallecula stasis pooling over open airway to pyriforms. Material still in airway until cues for cough/swallow/cough.     Puree- Delayed swallow. Vallecula fills up before initiation of swallow. Penetration during the swallow. Material does not exit the airway. Moderate/severe vallecula and pyriform sinus residue. Attempted thin liquid wash to clear residue resulting in silent aspiration     Cervical Esophageal Phase  Decreased UES opening    Impressions  Patient demonstrates severe Oropharyngeal  dysphagia characterized by decrease tongue-based mobility, decrease laryngeal elevation, delayed swallow initiation, poor airway protection, and decrease UES opening.  Audible and silent aspiration present. Moderate to severe stasis resulting in high risk of aspiration after the swallow.       Chest X-Rays:   6/17/25, FINDINGS:  A cardiac pacemaker remains in place.  The size of the heart is normal. The lungs are clear. There is no pneumothorax.  The costophrenic angles are sharp.  There are surgical clips projected over the right axilla.  There are mild osteoarthritic changes in the left acromioclavicular joint.     Impression:     The lungs are clear.      Electronically signed by:Kwame Hunter MD  Date:                                            06/17/2025  Time:                                           12:57    Prior diet: most recent MBSS appears to have recommended NPO; however, patient reportedly with intake of regular diet with thin liquids at home (IDDSI 7/0).    Subjective     Patient resting comfortably upon SLP entering room; he denies any difficulty swallowing. However, patient with significant documented dysphagia/aspiration history with multiple MBSS recommending various modified diets and/or NPO.     Pain/Comfort:   No pain reported     Respiratory Status: Room air    Objective:     Oral Musculature Evaluation  Oral Musculature: WFL  Dentition: present and adequate  Mucosal Quality: good  Mandibular Strength and Mobility: WFL  Oral Labial Strength and Mobility: WFL  Lingual Strength and Mobility: WFL  Velar Elevation: WFL  Buccal Strength and Mobility: WFL    Bedside Swallow Eval:   Consistencies Assessed:  Thin liquids    Puree    Solids       Oral Phase:   WFL    Pharyngeal Phase:   Consistent overt signs of aspiration with thin liquids   Multiple swallow per bolus noted for thin liquids, puree, and solid consistencies      Goals:   Multidisciplinary Problems       SLP Goals       Not on file                    Plan:     Patient to be seen:  2 x/week, 3 x/week   Plan of Care expires:     Plan of Care reviewed with:  patient   SLP Follow-Up:  Yes       Discharge recommendations:      Barriers to Discharge:  GOC discussion pending    Time Tracking:     SLP Treatment Date:   06/18/25  Speech Start Time:  0932  Speech Stop Time:  0952     Speech Total Time (min):  20 min    Billable Minutes: Eval Swallow and Oral Function 20 minutes    Jahaira Handy MA, L-SLP, CCC-SLP  Speech Language Pathologist  6/18/2025

## 2025-06-18 NOTE — PROGRESS NOTES
Pharmacist Renal Dose Adjustment Note    Ramy Romo is a 79 y.o. male being treated with the medication cefepime.    Patient Data:    Vital Signs (Most Recent):  Temp: 96.5 °F (35.8 °C) (06/17/25 2326)  Pulse: 60 (06/17/25 2326)  Resp: 18 (06/17/25 2326)  BP: (!) 142/63 (06/17/25 2326)  SpO2: 97 % (06/17/25 2326) Vital Signs (72h Range):  Temp:  [96.5 °F (35.8 °C)-97.7 °F (36.5 °C)]   Pulse:  [56-62]   Resp:  [14-22]   BP: (122-181)/(58-81)   SpO2:  [97 %-100 %]      Recent Labs   Lab 06/17/25  1306 06/17/25  1707   CREATININE 1.8* 1.7*     Serum creatinine: 1.7 mg/dL (H) 06/17/25 1707  Estimated creatinine clearance: 39.3 mL/min (A)    Cefepime 2 g IV every 8 hours will be changed to cefepime 1 g IV every 8 hours for the treatment of UTI with CrCl 30-60 ml/min.    Pharmacist's Name: Darrius Bynum  Pharmacist's Extension: [phone]

## 2025-06-18 NOTE — ASSESSMENT & PLAN NOTE
Hyperkalemia is likely due to AMEENA.The patients most recent potassium results are listed below.  Recent Labs     06/17/25  1306 06/17/25  1707   K 5.9* 4.9     Plan  - Monitor for arrhythmias with EKG and/or continuous telemetry.   - Treat the hyperkalemia with Potassium Binders, Nebulized albuterol sulfate, Furosemide, and Sodium Bicarbonate.   - Monitor potassium: Daily  - The patient's hyperkalemia is improving

## 2025-06-19 PROBLEM — E87.5 HYPERKALEMIA: Status: RESOLVED | Noted: 2025-06-17 | Resolved: 2025-06-19

## 2025-06-19 PROBLEM — R56.9 WITNESSED SEIZURE-LIKE ACTIVITY: Status: RESOLVED | Noted: 2025-06-17 | Resolved: 2025-06-19

## 2025-06-19 LAB
ABSOLUTE EOSINOPHIL (OHS): 0.42 K/UL
ABSOLUTE MONOCYTE (OHS): 0.57 K/UL (ref 0.3–1)
ABSOLUTE NEUTROPHIL COUNT (OHS): 3.25 K/UL (ref 1.8–7.7)
ALBUMIN SERPL BCP-MCNC: 2.3 G/DL (ref 3.5–5.2)
ALP SERPL-CCNC: 90 UNIT/L (ref 40–150)
ALT SERPL W/O P-5'-P-CCNC: 12 UNIT/L (ref 10–44)
ANION GAP (OHS): 7 MMOL/L (ref 8–16)
APTT PPP: 31.3 SECONDS (ref 21–32)
AST SERPL-CCNC: 20 UNIT/L (ref 11–45)
BACTERIA UR CULT: NORMAL
BASOPHILS # BLD AUTO: 0.03 K/UL
BASOPHILS NFR BLD AUTO: 0.5 %
BILIRUB SERPL-MCNC: 0.9 MG/DL (ref 0.1–1)
BUN SERPL-MCNC: 43 MG/DL (ref 8–23)
CALCIUM SERPL-MCNC: 8.7 MG/DL (ref 8.7–10.5)
CHLORIDE SERPL-SCNC: 113 MMOL/L (ref 95–110)
CO2 SERPL-SCNC: 24 MMOL/L (ref 23–29)
CREAT SERPL-MCNC: 1.8 MG/DL (ref 0.5–1.4)
ERYTHROCYTE [DISTWIDTH] IN BLOOD BY AUTOMATED COUNT: 12.9 % (ref 11.5–14.5)
GFR SERPLBLD CREATININE-BSD FMLA CKD-EPI: 38 ML/MIN/1.73/M2
GLUCOSE SERPL-MCNC: 112 MG/DL (ref 70–110)
HCT VFR BLD AUTO: 27.7 % (ref 40–54)
HGB BLD-MCNC: 9 GM/DL (ref 14–18)
IMM GRANULOCYTES # BLD AUTO: 0.01 K/UL (ref 0–0.04)
IMM GRANULOCYTES NFR BLD AUTO: 0.2 % (ref 0–0.5)
INR PPP: 1.1 (ref 0.8–1.2)
LYMPHOCYTES # BLD AUTO: 1.35 K/UL (ref 1–4.8)
MAGNESIUM SERPL-MCNC: 1.2 MG/DL (ref 1.6–2.6)
MCH RBC QN AUTO: 33.1 PG (ref 27–31)
MCHC RBC AUTO-ENTMCNC: 32.5 G/DL (ref 32–36)
MCV RBC AUTO: 102 FL (ref 82–98)
NUCLEATED RBC (/100WBC) (OHS): 0 /100 WBC
PHOSPHATE SERPL-MCNC: 2.7 MG/DL (ref 2.7–4.5)
PLATELET # BLD AUTO: 117 K/UL (ref 150–450)
PMV BLD AUTO: 8.9 FL (ref 9.2–12.9)
POCT GLUCOSE: 114 MG/DL (ref 70–110)
POCT GLUCOSE: 115 MG/DL (ref 70–110)
POCT GLUCOSE: 137 MG/DL (ref 70–110)
POTASSIUM SERPL-SCNC: 4 MMOL/L (ref 3.5–5.1)
PROT SERPL-MCNC: 5.5 GM/DL (ref 6–8.4)
PROTHROMBIN TIME: 12.7 SECONDS (ref 9–12.5)
RBC # BLD AUTO: 2.72 M/UL (ref 4.6–6.2)
RELATIVE EOSINOPHIL (OHS): 7.5 %
RELATIVE LYMPHOCYTE (OHS): 24 % (ref 18–48)
RELATIVE MONOCYTE (OHS): 10.1 % (ref 4–15)
RELATIVE NEUTROPHIL (OHS): 57.7 % (ref 38–73)
SODIUM SERPL-SCNC: 144 MMOL/L (ref 136–145)
WBC # BLD AUTO: 5.63 K/UL (ref 3.9–12.7)

## 2025-06-19 PROCEDURE — 63600175 PHARM REV CODE 636 W HCPCS: Performed by: EMERGENCY MEDICINE

## 2025-06-19 PROCEDURE — 63600175 PHARM REV CODE 636 W HCPCS: Performed by: INTERNAL MEDICINE

## 2025-06-19 PROCEDURE — 27000207 HC ISOLATION

## 2025-06-19 PROCEDURE — 85025 COMPLETE CBC W/AUTO DIFF WBC: CPT | Performed by: NURSE PRACTITIONER

## 2025-06-19 PROCEDURE — 84100 ASSAY OF PHOSPHORUS: CPT | Performed by: NURSE PRACTITIONER

## 2025-06-19 PROCEDURE — 36415 COLL VENOUS BLD VENIPUNCTURE: CPT | Performed by: NURSE PRACTITIONER

## 2025-06-19 PROCEDURE — 97110 THERAPEUTIC EXERCISES: CPT

## 2025-06-19 PROCEDURE — 85730 THROMBOPLASTIN TIME PARTIAL: CPT | Performed by: NURSE PRACTITIONER

## 2025-06-19 PROCEDURE — 83735 ASSAY OF MAGNESIUM: CPT | Performed by: NURSE PRACTITIONER

## 2025-06-19 PROCEDURE — 97530 THERAPEUTIC ACTIVITIES: CPT

## 2025-06-19 PROCEDURE — 97116 GAIT TRAINING THERAPY: CPT

## 2025-06-19 PROCEDURE — G0427 INPT/ED TELECONSULT70: HCPCS | Mod: 95,GC,, | Performed by: INTERNAL MEDICINE

## 2025-06-19 PROCEDURE — 95819 EEG AWAKE AND ASLEEP: CPT | Mod: 26,,, | Performed by: PSYCHIATRY & NEUROLOGY

## 2025-06-19 PROCEDURE — 21400001 HC TELEMETRY ROOM

## 2025-06-19 PROCEDURE — 84075 ASSAY ALKALINE PHOSPHATASE: CPT | Performed by: NURSE PRACTITIONER

## 2025-06-19 PROCEDURE — 95819 EEG AWAKE AND ASLEEP: CPT

## 2025-06-19 PROCEDURE — 25000003 PHARM REV CODE 250: Performed by: NURSE PRACTITIONER

## 2025-06-19 PROCEDURE — 85610 PROTHROMBIN TIME: CPT | Performed by: NURSE PRACTITIONER

## 2025-06-19 PROCEDURE — 25000003 PHARM REV CODE 250: Performed by: EMERGENCY MEDICINE

## 2025-06-19 RX ORDER — LANOLIN ALCOHOL/MO/W.PET/CERES
400 CREAM (GRAM) TOPICAL 2 TIMES DAILY
Status: DISCONTINUED | OUTPATIENT
Start: 2025-06-19 | End: 2025-06-20 | Stop reason: HOSPADM

## 2025-06-19 RX ORDER — CYANOCOBALAMIN 1000 UG/ML
1000 INJECTION, SOLUTION INTRAMUSCULAR; SUBCUTANEOUS DAILY
Status: COMPLETED | OUTPATIENT
Start: 2025-06-19 | End: 2025-06-20

## 2025-06-19 RX ORDER — MAGNESIUM SULFATE HEPTAHYDRATE 40 MG/ML
2 INJECTION, SOLUTION INTRAVENOUS ONCE
Status: COMPLETED | OUTPATIENT
Start: 2025-06-19 | End: 2025-06-19

## 2025-06-19 RX ORDER — THIAMINE HCL 100 MG
100 TABLET ORAL DAILY
Status: DISCONTINUED | OUTPATIENT
Start: 2025-06-19 | End: 2025-06-20 | Stop reason: HOSPADM

## 2025-06-19 RX ORDER — ACETAMINOPHEN 500 MG
5000 TABLET ORAL DAILY
Status: DISCONTINUED | OUTPATIENT
Start: 2025-06-19 | End: 2025-06-20 | Stop reason: HOSPADM

## 2025-06-19 RX ORDER — CEFTRIAXONE 1 G/1
1 INJECTION, POWDER, FOR SOLUTION INTRAMUSCULAR; INTRAVENOUS
Status: DISCONTINUED | OUTPATIENT
Start: 2025-06-19 | End: 2025-06-20 | Stop reason: HOSPADM

## 2025-06-19 RX ADMIN — THIAMINE HCL TAB 100 MG 100 MG: 100 TAB at 05:06

## 2025-06-19 RX ADMIN — Medication 1 TABLET: at 05:06

## 2025-06-19 RX ADMIN — LACTULOSE 15 G: 20 SOLUTION ORAL at 08:06

## 2025-06-19 RX ADMIN — LACTULOSE 15 G: 20 SOLUTION ORAL at 01:06

## 2025-06-19 RX ADMIN — SODIUM BICARBONATE: 84 INJECTION, SOLUTION INTRAVENOUS at 03:06

## 2025-06-19 RX ADMIN — Medication 400 MG: at 09:06

## 2025-06-19 RX ADMIN — CHOLECALCIFEROL TAB 125 MCG (5000 UNIT) 5000 UNITS: 125 TAB at 05:06

## 2025-06-19 RX ADMIN — CYANOCOBALAMIN 1000 MCG: 1000 INJECTION INTRAMUSCULAR; SUBCUTANEOUS at 05:06

## 2025-06-19 RX ADMIN — CEFEPIME 1 G: 1 INJECTION, POWDER, FOR SOLUTION INTRAMUSCULAR; INTRAVENOUS at 01:06

## 2025-06-19 RX ADMIN — CEFEPIME 1 G: 1 INJECTION, POWDER, FOR SOLUTION INTRAMUSCULAR; INTRAVENOUS at 05:06

## 2025-06-19 RX ADMIN — CEFTRIAXONE 1 G: 1 INJECTION, POWDER, FOR SOLUTION INTRAMUSCULAR; INTRAVENOUS at 05:06

## 2025-06-19 RX ADMIN — MAGNESIUM SULFATE IN WATER 2 G: 40 INJECTION, SOLUTION INTRAVENOUS at 10:06

## 2025-06-19 RX ADMIN — LACTULOSE 15 G: 20 SOLUTION ORAL at 09:06

## 2025-06-19 RX ADMIN — THERA TABS 1 TABLET: TAB at 05:06

## 2025-06-19 RX ADMIN — Medication 400 MG: at 08:06

## 2025-06-19 NOTE — PLAN OF CARE
Problem: Adult Inpatient Plan of Care  Goal: Plan of Care Review  Outcome: Progressing  Goal: Patient-Specific Goal (Individualized)  Outcome: Progressing  Goal: Absence of Hospital-Acquired Illness or Injury  Outcome: Progressing  Goal: Optimal Comfort and Wellbeing  Outcome: Progressing  Goal: Readiness for Transition of Care  Outcome: Progressing     Problem: Infection  Goal: Absence of Infection Signs and Symptoms  Outcome: Progressing     Problem: Diabetes Comorbidity  Goal: Blood Glucose Level Within Targeted Range  Outcome: Progressing     Problem: Sepsis/Septic Shock  Goal: Optimal Coping  Outcome: Progressing  Goal: Absence of Bleeding  Outcome: Progressing  Goal: Blood Glucose Level Within Targeted Range  Outcome: Progressing  Goal: Absence of Infection Signs and Symptoms  Outcome: Progressing  Goal: Optimal Nutrition Intake  Outcome: Progressing     Problem: Acute Kidney Injury/Impairment  Goal: Fluid and Electrolyte Balance  Outcome: Progressing  Goal: Improved Oral Intake  Outcome: Progressing  Goal: Effective Renal Function  Outcome: Progressing     Problem: Wound  Goal: Optimal Coping  Outcome: Progressing  Goal: Optimal Functional Ability  Outcome: Progressing  Goal: Absence of Infection Signs and Symptoms  Outcome: Progressing  Goal: Improved Oral Intake  Outcome: Progressing  Goal: Optimal Pain Control and Function  Outcome: Progressing  Goal: Skin Health and Integrity  Outcome: Progressing  Goal: Optimal Wound Healing  Outcome: Progressing     Problem: Skin Injury Risk Increased  Goal: Skin Health and Integrity  Outcome: Progressing     Problem: Fall Injury Risk  Goal: Absence of Fall and Fall-Related Injury  Outcome: Progressing

## 2025-06-19 NOTE — ASSESSMENT & PLAN NOTE
AMEENA is likely due to pre-renal azotemia due to dehydration. Baseline creatinine is 1.3. Most recent creatinine and eGFR are listed below.  Recent Labs     06/17/25  1707 06/18/25  0622 06/19/25  0606   CREATININE 1.7* 1.8* 1.8*   EGFRNORACEVR 41* 38* 38*      Plan  - Avoid nephrotoxins and renally dose meds for GFR listed above  - Monitor urine output, serial BMP, and adjust therapy as needed  - Gentle hydration with IV fluids - bicarb fluids ordered since also with metabolic acidosis (was given IV Lasix per ED)    Cont present care    AMEENA appears at baseline now

## 2025-06-19 NOTE — PROGRESS NOTES
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  Sitter at bedside reports that he fell in the shower on Saturday.  He recently had ESWL and stent placement for kidney stones on 06/12/2025 per Dr. Cruz.  Sitter reports that he has had hematuria since that time.  She reports that he took lactulose twice a day on yesterday, but did not take it today due to feeling ill.  He has chronic arthralgias and gait problem, uses wheelchair to get around the house.  He has no previous history of seizures however today he has had multiple episodes of seizure like twitching in his face, eye twitiching, twitching near mouth, and in arms.  His sitter at bedside shows me a video on her phone and it appears to be seizure activity. on arrival to ED, temp 97.7°, heart rate 56, respirations 14, blood pressure 130/67, 98% SpO2 on room air.  Lab workup significant for hemoglobin 10.2, hematocrit 30.5, platelets 123, normal PT/INR, normal PTT, potassium 5.9, sodium 142, chloride 116, CO2 18, BUN 53, creatinine 1.8, glucose 105, magnesium 1.6, albumin 2.7, normal LFTs, ammonia level 118, , CPK 29, troponin normal, UA with hazy urine, 3+ blood, 3+ leukocyte esterase, negative nitrites, greater than 100 RBC, greater than 100 WBC.  Urine culture is pending.  EKG showed wide QRS rhythm with left bundle-branch  block, heart rate 60.  Chest x-ray demonstrated lungs are clear.  There was some swelling noted to right lower extremity and ultrasound was done which showed no evidence of DVT.  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  While in ED, he was given IV Benadryl 25 mg, albuterol nebulizer treatment, Lasix 40 mg IV.  Lokelma was ordered, however he did not take due to concern for swallowing difficulty, he is NPO.  Hospital Medicine was consulted for admission due to hepatic encephalopathy, elevated ammonia level, AMEENA, hyperkalemia, and possible seizure activity as well as acute cystitis with hematuria.    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  Sitter at bedside reports that he fell in the shower on Saturday.  He recently had ESWL and stent placement for kidney stones on 06/12/2025 per Dr. Cruz.  Sitter reports that he has had hematuria since that time.  She reports that he took lactulose twice a day on yesterday, but did not take it today due to feeling ill.  He has chronic arthralgias and gait problem, uses wheelchair to get around the house.  He has no previous history of seizures however today he has had multiple episodes of seizure like twitching in his face, eye twitiching, twitching near mouth, and in arms.  His sitter at bedside shows me a video on her phone and it appears to be seizure activity. on arrival to ED, temp 97.7°, heart rate 56, respirations 14, blood pressure 130/67, 98% SpO2 on room air.  Lab workup significant for hemoglobin 10.2, hematocrit 30.5, platelets 123,  normal PT/INR, normal PTT, potassium 5.9, sodium 142, chloride 116, CO2 18, BUN 53, creatinine 1.8, glucose 105, magnesium 1.6, albumin 2.7, normal LFTs, ammonia level 118, , CPK 29, troponin normal, UA with hazy urine, 3+ blood, 3+ leukocyte esterase, negative nitrites, greater than 100 RBC, greater than 100 WBC.  Urine culture is pending.  EKG showed wide QRS rhythm with left bundle-branch block, heart rate 60.  Chest x-ray demonstrated lungs are clear.  There was some swelling noted to right lower extremity and ultrasound was done which showed no evidence of DVT.  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  While in ED, he was given IV Benadryl 25 mg, albuterol nebulizer treatment, Lasix 40 mg IV.  Lokelma was ordered, however he did not take due to concern for swallowing difficulty, he is NPO.  Hospital Medicine was consulted for admission due to hepatic encephalopathy, elevated ammonia level, AMEENA, hyperkalemia, and possible seizure activity as well as acute cystitis with hematuria.    6/18- looks and feels better, AAOx3, speech clear, no tremors or asterixis. Good response to the treatment, Ammonia coming down to 80s. He is hungry, wants to eat, started on Cardiac diet. Lactulose continued. VSS afeb, labs improving, Bun/Cr 37/1.8, Ammonia 87. All Cx pending. Cont lactulose and IV abx.     Interval History: looks and feels better, AAOx3, speech clear, no tremors or asterixis. Good response to the treatment, Ammonia coming down to 80s. He is hungry, wants to eat, started on Cardiac diet. Lactulose continued. VSS afeb, labs improving, Bun/Cr 37/1.8, Ammonia 87. All Cx pending. Cont lactulose and IV abx.     Review of Systems   Constitutional:  Positive for activity change and appetite change. Negative  for chills, diaphoresis and fever.   HENT: Negative.  Negative for trouble swallowing.    Eyes: Negative.  Negative for visual disturbance.        Eye twitching (intermittently)   Respiratory:  Negative for cough, chest tightness, shortness of breath and wheezing.    Cardiovascular:  Positive for leg swelling. Negative for chest pain and palpitations.   Gastrointestinal: Negative.  Negative for abdominal pain, anal bleeding, blood in stool, diarrhea, nausea and vomiting.   Genitourinary:  Positive for hematuria. Negative for difficulty urinating, dysuria, flank pain, frequency and urgency.        Has been having hematuria since recent lithotripsy/stent placement   Musculoskeletal:  Positive for arthralgias and gait problem.        Knee pain bilateral   Skin: Negative.    Neurological:  Positive for seizures and weakness.        Seizure like twitching in face and arms (multiple episodes)   Psychiatric/Behavioral: Negative.     All other systems reviewed and are negative.    Objective:     Vital Signs (Most Recent):  Temp: 98.5 °F (36.9 °C) (06/18/25 1951)  Pulse: 60 (06/18/25 1951)  Resp: 18 (06/18/25 1951)  BP: (!) 175/77 (06/18/25 1951)  SpO2: 98 % (06/18/25 1951) Vital Signs (24h Range):  Temp:  [96.5 °F (35.8 °C)-98.5 °F (36.9 °C)] 98.5 °F (36.9 °C)  Pulse:  [58-71] 60  Resp:  [18-20] 18  SpO2:  [95 %-99 %] 98 %  BP: (132-175)/(61-77) 175/77     Weight: 88.9 kg (196 lb)  Body mass index is 26.58 kg/m².    Intake/Output Summary (Last 24 hours) at 6/18/2025 1954  Last data filed at 6/18/2025 1253  Gross per 24 hour   Intake --   Output 1050 ml   Net -1050 ml         Physical Exam  Vitals and nursing note reviewed.   Constitutional:       General: He is not in acute distress.     Appearance: He is ill-appearing. He is not toxic-appearing or diaphoretic.      Comments: Chronically ill appearing male, appears older than stated age  No distress  Speech soft and slow, but clear   HENT:      Head: Normocephalic.       "Nose: Nose normal.      Mouth/Throat:      Mouth: Mucous membranes are moist.      Pharynx: Oropharynx is clear.   Eyes:      Extraocular Movements: Extraocular movements intact.      Pupils: Pupils are equal, round, and reactive to light.   Cardiovascular:      Rate and Rhythm: Regular rhythm. Bradycardia present.      Heart sounds: Normal heart sounds.   Pulmonary:      Effort: Pulmonary effort is normal. No respiratory distress.      Breath sounds: Normal breath sounds. No stridor. No wheezing, rhonchi or rales.      Comments: Diminished at bases bilaterally  Chest:      Chest wall: No tenderness.   Abdominal:      General: Bowel sounds are normal. There is no distension.      Palpations: Abdomen is soft.      Tenderness: There is no abdominal tenderness. There is no right CVA tenderness, left CVA tenderness, guarding or rebound.   Musculoskeletal:         General: No tenderness. Normal range of motion.      Cervical back: Neck supple.      Right lower leg: Edema present.      Left lower leg: Edema present.   Skin:     General: Skin is warm and dry.      Capillary Refill: Capillary refill takes less than 2 seconds.      Findings: Bruising present.      Comments: Bilateral arms with extensive bruising   Neurological:      General: No focal deficit present.      Mental Status: He is alert.      Motor: Weakness present.      Comments: Oriented to person and place, and situation  Generalized weakness  WHITEHEAD 5/5  Video produced by [email or web address] shows -- repetitive twitching left side of face near eye/mouth   No current seizure like activity noted   Psychiatric:         Mood and Affect: Mood normal.         Behavior: Behavior normal.               Significant Labs: All pertinent labs within the past 24 hours have been reviewed.  Blood Culture: No results for input(s): "LABBLOO" in the last 48 hours.  CBC:   Recent Labs   Lab 06/17/25  1306 06/18/25  0622   WBC 5.65 6.48   HGB 10.2* 10.0*   HCT 30.5* 30.2*   * 130* "     CMP:   Recent Labs   Lab 06/17/25  1306 06/17/25  1707 06/18/25  0622    144 145   K 5.9* 4.9 4.6   * 116* 114*   CO2 18* 17* 23    106 125*   BUN 53* 51* 52*   CREATININE 1.8* 1.7* 1.8*   CALCIUM 9.7 9.7 9.6   PROT 6.4  --  5.9*   ALBUMIN 2.7*  --  2.5*   BILITOT 0.8  --  1.0   ALKPHOS 104  --  99   AST 26  --  20   ALT 14  --  14   ANIONGAP 8 11 8     Magnesium:   Recent Labs   Lab 06/17/25  1306 06/18/25  0622   MG 1.6 1.5*     Urine Culture:     Significant Imaging: I have reviewed all pertinent imaging results/findings within the past 24 hours.      Assessment & Plan  Hepatic encephalopathy  Hx GHOSH cirrhosis  Ammonia 118  Not safe for po currently, needs swallowing evaluation  Lactulose enemas ordered TID with first to be done tonight  Recheck ammonia level in AM    Improving, ammonia coning down to 87  Cont Lactulose    AMEENA (acute kidney injury)  AMEENA is likely due to pre-renal azotemia due to dehydration. Baseline creatinine is 1.3. Most recent creatinine and eGFR are listed below.  Recent Labs     06/17/25  1306 06/17/25  1707 06/18/25  0622   CREATININE 1.8* 1.7* 1.8*   EGFRNORACEVR 38* 41* 38*      Plan  - Avoid nephrotoxins and renally dose meds for GFR listed above  - Monitor urine output, serial BMP, and adjust therapy as needed  - Gentle hydration with IV fluids - bicarb fluids ordered since also with metabolic acidosis (was given IV Lasix per ED)    Cont present care  Anemia  Anemia is likely due to acute blood loss which was from hematuria. Most recent hemoglobin and hematocrit are listed below.  Recent Labs     06/17/25  1306 06/18/25  0622   HGB 10.2* 10.0*   HCT 30.5* 30.2*     Plan  - Monitor serial CBC: Daily  - Transfuse PRBC if patient becomes hemodynamically unstable, symptomatic or H/H drops below 7/21.  - Patient has not received any PRBC transfusions to date  - Patient's anemia is currently stable    stable  Hyperkalemia  Hyperkalemia is likely due to AMEENA.The  patients most recent potassium results are listed below.  Recent Labs     06/17/25  1306 06/17/25  1707 06/18/25  0622   K 5.9* 4.9 4.6     Plan  - Monitor for arrhythmias with EKG and/or continuous telemetry.   - Treat the hyperkalemia with Potassium Binders, Nebulized albuterol sulfate, Furosemide, and Sodium Bicarbonate.   - Monitor potassium: Daily  - The patient's hyperkalemia is improving      Much better    Thrombocytopenia  The likely etiology of thrombocytopenia is infection. The patients 3 most recent labs are listed below.  Recent Labs     06/17/25  1306 06/18/25  0622   * 130*     Plan  - Will transfuse if platelet count is <50k (if undergoing surgical procedure or have active bleeding).  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  Will avoid any hepatotoxic meds, and monitor CBC/CMP/INR for synthetic function.   --NPO at this time, needs swallowing evaluation/NPO  --Lactulose enema ordered    Improving  Start cardiac diet  Kidney stones  Had ESWL and stents placed per Dr. rCuz on 6/12/25  Has been having mild hematuria since then according to sitter at  bedside    stable    Acute cystitis with hematuria  IV cefepime ordered (after review of previous sensitivities)  Follow up urine culture    Cont cefepime    VTE Risk Mitigation (From admission, onward)           Ordered     Reason for No Pharmacological VTE Prophylaxis  Once        Comments: Anemia and thrombocytopenia   Question:  Reasons:  Answer:  Physician Provided (leave comment)    06/17/25 1941     IP VTE HIGH RISK PATIENT  Once         06/17/25 1941     Place sequential compression device  Until discontinued         06/17/25 1941                    Discharge Planning   KAYA: 6/21/2025     Code Status: Full Code   Medical Readiness for Discharge Date:   Discharge Plan A: Home Health        Please place Justification for DME      Gracia Jaimes MD  Department of Hospital Medicine   [facility]

## 2025-06-19 NOTE — ASSESSMENT & PLAN NOTE
MELD-Na score calculated; MELD 3.0: 14 at 6/19/2025  6:06 AM  MELD-Na: 13 at 6/19/2025  6:06 AM  Calculated from:  Serum Creatinine: 1.8 mg/dL at 6/19/2025  6:06 AM  Serum Sodium: 144 mmol/L (Using max of 137 mmol/L) at 6/19/2025  6:06 AM  Total Bilirubin: 0.9 mg/dL (Using min of 1 mg/dL) at 6/19/2025  6:06 AM  Serum Albumin: 2.3 g/dL at 6/19/2025  6:06 AM  INR(ratio): 1.1 at 6/19/2025  6:06 AM  Age at listing (hypothetical): 79 years  Sex: Male at 6/19/2025  6:06 AM    --Continue chronic meds.  Will avoid any hepatotoxic meds, and monitor CBC/CMP/INR for synthetic function.   --NPO at this time, needs swallowing evaluation/NPO  --Lactulose enema ordered    Improving  Start cardiac diet    stable

## 2025-06-19 NOTE — ASSESSMENT & PLAN NOTE
Had ESWL and stents placed per Dr. Cruz on 6/12/25  Has been having mild hematuria since then according to sitter at bedside    stable

## 2025-06-19 NOTE — ASSESSMENT & PLAN NOTE
--Twitching episodes noted per ED, improved after small dose of IV Ativan  -- no previous history of seizures  --PRN Ativan ordered  --Seizure precautions  --EEG ordered  --Consider tele-general neurology consult if any further seizure like activity    Likely sec to HE- better with ammonia coming down

## 2025-06-19 NOTE — ASSESSMENT & PLAN NOTE
Hyperkalemia is likely due to AMEENA.The patients most recent potassium results are listed below.  Recent Labs     06/17/25  1707 06/18/25  0622 06/19/25  0606   K 4.9 4.6 4.0     Plan  - Monitor for arrhythmias with EKG and/or continuous telemetry.   - Treat the hyperkalemia with Potassium Binders, Nebulized albuterol sulfate, Furosemide, and Sodium Bicarbonate.   - Monitor potassium: Daily  - The patient's hyperkalemia is improving      Much better

## 2025-06-19 NOTE — ASSESSMENT & PLAN NOTE
MELD-Na score calculated; MELD 3.0: 14 at 6/18/2025  6:22 AM  MELD-Na: 13 at 6/18/2025  6:22 AM  Calculated from:  Serum Creatinine: 1.8 mg/dL at 6/18/2025  6:22 AM  Serum Sodium: 145 mmol/L (Using max of 137 mmol/L) at 6/18/2025  6:22 AM  Total Bilirubin: 1 mg/dL at 6/18/2025  6:22 AM  Serum Albumin: 2.5 g/dL at 6/18/2025  6:22 AM  INR(ratio): 1.1 at 6/18/2025  6:22 AM  Age at listing (hypothetical): 79 years  Sex: Male at 6/18/2025  6:22 AM    --Continue chronic meds.  Will avoid any hepatotoxic meds, and monitor CBC/CMP/INR for synthetic function.   --NPO at this time, needs swallowing evaluation/NPO  --Lactulose enema ordered    Improving  Start cardiac diet

## 2025-06-19 NOTE — ASSESSMENT & PLAN NOTE
Anemia is likely due to acute blood loss which was from hematuria. Most recent hemoglobin and hematocrit are listed below.  Recent Labs     06/17/25  1306 06/18/25  0622   HGB 10.2* 10.0*   HCT 30.5* 30.2*     Plan  - Monitor serial CBC: Daily  - Transfuse PRBC if patient becomes hemodynamically unstable, symptomatic or H/H drops below 7/21.  - Patient has not received any PRBC transfusions to date  - Patient's anemia is currently stable    stable

## 2025-06-19 NOTE — PLAN OF CARE
TX COMPLETED: facilitated bed mobility with min A, transfers CGA, ambulated 10 ft x 2 CGA with RW. Recommend continued PT services.

## 2025-06-19 NOTE — ASSESSMENT & PLAN NOTE
Anemia is likely due to acute blood loss which was from hematuria. Most recent hemoglobin and hematocrit are listed below.  Recent Labs     06/17/25  1306 06/18/25  0622 06/19/25  0606   HGB 10.2* 10.0* 9.0*   HCT 30.5* 30.2* 27.7*     Plan  - Monitor serial CBC: Daily  - Transfuse PRBC if patient becomes hemodynamically unstable, symptomatic or H/H drops below 7/21.  - Patient has not received any PRBC transfusions to date  - Patient's anemia is currently stable    stable

## 2025-06-19 NOTE — PLAN OF CARE
06/19/25 1050   Rounds   Attendance Provider;;Charge nurse;Physical therapist   Discharge Plan A Home Health   Why the patient remains in the hospital Requires continued medical care   Transition of Care Barriers None     Patient has no d/c needs at this time. Sw to follow up, as needed, for d/c planning purposes.

## 2025-06-19 NOTE — PT/OT/SLP PROGRESS
Physical Therapy  Treatment    Ramy Romo   MRN: [id number]   Admitting Diagnosis: Hepatic encephalopathy       PT Start Time: 1108     PT Stop Time: 1133    PT Total Time (min): 25 min       Billable Minutes:  Gait Training 15 and Therapeutic Exercise 10    Treatment Type: Treatment  PT/PTA: PT     Number of PTA visits since last PT visit: 0       General Precautions: Standard, fall, contact  Orthopedic Precautions: N/A  Braces: N/A  Respiratory Status: Room air    Spiritual, Cultural Beliefs, Moravian Practices, Values that Affect Care: no    Subjective:  Communicated with nursing (Wai) and performed chart review via epic system prior to session.  Pt agreeable to PT services    Pain/Comfort  Pain Rating 1: 2/10 (back pain)  Pain Addressed 1: Reposition, Distraction, Nurse notified  Pain Rating Post-Intervention 1: 2/10    Objective:   Patient found with: peripheral IV, telemetry. Pt supine in bed with family at bedside    Functional Mobility:  Bed Mobility:    Supine to sit min A   Tolerated sitting EOB x 3-5 mins with no LOB and SBA for balance    Transfers:   Sit<>stand CGA with RW   Stand pivot CGA with RW    Gait:    Facilitated gait activity 10 ft x 2 CGA with RW, demonstrated slow pace, flexed/crouched posture, wide ADRIENNE, shuffled steps, cues needed for RW management    Balance:   Dynamic stand: FAIR: Needs CONTACT GUARD during gait       Treatment and Education:  Educated pt on benefits of consistent participation in PT services to meet functional goals. Educated pt on seated therex to promote strength, circulation and joint mobility. Exercises included AP, LAQ, marching, hip abd/add (pillow squeezes) x 15 reps. Educated to perform exercises intermittently throughout day to tolerance. Educated pt on importance of sitting OOB to promote endurance and overall activity tolerance. Educated pt on call don't fall policy and use of call button to alert nursing staff of needs (including to assist in/out of  bed). Pt expressed understanding.      AM-PAC 6 CLICK MOBILITY  How much help from another person does this patient currently need?   1 = Unable, Total/Dependent Assistance  2 = A lot, Maximum/Moderate Assistance  3 = A little, Minimum/Contact Guard/Supervision  4 = None, Modified [place]/Independent    Turning over in bed (including adjusting bedclothes, sheets and blankets)?: 3  Sitting down on and standing up from a chair with arms (e.g., wheelchair, bedside commode, etc.): 3  Moving from lying on back to sitting on the side of the bed?: 3  Moving to and from a bed to a chair (including a wheelchair)?: 3  Need to walk in hospital room?: 3  Climbing 3-5 steps with a railing?: 1  Basic Mobility Total Score: 16    AM-PAC Raw Score CMS G-Code Modifier Level of Impairment Assistance   6 % Total / Unable   7 - 9 CM 80 - 100% Maximal Assist   10 - 14 CL 60 - 80% Moderate Assist   15 - 19 CK 40 - 60% Moderate Assist   20 - 22 CJ 20 - 40% Minimal Assist   23 CI 1-20% SBA / CGA   24 CH 0% Independent/ Mod I     Patient left up in chair with all lines intact, call button in reach, chair alarm on, nursing notified, and family present.    Assessment:  Ramy Romo is a 79 y.o. male with a medical diagnosis of Hepatic encephalopathy and presents with deficits in overall mobility and increased risk of falls. Pt was able to tolerate gait activity with RW and CGA, intermittent cues for RW management and safety. Pt will benefit from continued PT services in order to progress toward baseline.    Rehab identified problem list/impairments: weakness, impaired endurance, gait instability, impaired functional mobility, impaired balance, decreased coordination, decreased lower extremity function, decreased safety awareness, impaired cognition, pain    Rehab potential is good.    Activity tolerance: Good    Discharge recommendations: Low Intensity Therapy      Barriers to discharge:      Equipment recommendations:  none     GOALS:   Multidisciplinary Problems       Physical Therapy Goals          Problem: Physical Therapy    Goal Priority Disciplines Outcome Interventions   Physical Therapy Goal     PT, PT/OT Progressing    Description: Goals to be met by 7/2/25.  1. Pt will complete bed mobility SPV.  2. Pt will complete sit to stand SPV.  3. Pt will ambulate 150ft SPV using RW.  4. Pt will increase AMPAC score by 2 points to progress functional mobility.                         DME Justifications:  TBD    PLAN:    Patient to be seen 3 x/week to address the above listed problems via gait training, therapeutic activities, therapeutic exercises, neuromuscular re-education  Plan of Care expires: 07/02/25  Plan of Care reviewed with: patient, spouse         06/19/2025

## 2025-06-19 NOTE — PLAN OF CARE
Ongoing (interventions implemented as appropriate)  Pt is alert and oriented.    VSS  Pt able to make needs known.  Pt remained afebrile throughout this shift.   Pt remained free of falls this shift.   Pt denies pain this shift.  Plan of care reviewed. Patient verbalizes understanding.   Pt is a turn q 2 hours and prn. Frequent weight shifting encouraged.  Patient aflutter rhythm on monitor.   Bed low, side rails up x 2, wheels locked, call light in reach.   Hourly rounding completed.   Will continue to observe.

## 2025-06-19 NOTE — PT/OT/SLP PROGRESS
Occupational Therapy   Treatment    Name: Ramy Romo  MRN: [id number]  Admitting Diagnosis:  Hepatic encephalopathy       Recommendations:     Discharge Recommendations: Low Intensity Therapy  Discharge Equipment Recommendations:  none  Barriers to discharge:  None    Assessment:     Rmay Romo is a 79 y.o. male with a medical diagnosis of Hepatic encephalopathy.  He presents with the following performance deficits affecting function are weakness, impaired self care skills, impaired functional mobility, gait instability, impaired balance, decreased lower extremity function, impaired sensation, decreased ROM, decreased upper extremity function.     Rehab Prognosis:  Good; patient would benefit from acute skilled OT services to address these deficits and reach maximum level of function.       Plan:     Patient to be seen 2 x/week to address the above listed problems via self-care/home management, therapeutic activities, therapeutic exercises  Plan of Care Expires: 07/01/25  Plan of Care Reviewed with: patient    Subjective     Chief Complaint: None stated  Patient/Family Comments/goals: Recovery  Pain/Comfort:  Pain Rating 1: 0/10  Pain Rating Post-Intervention 2: 0/10    Objective:     Communicated with: Nurse Roman and [facility] chart review prior to session.  Patient found up in chair with telemetry, peripheral IV, bed alarm upon OT entry to room.    General Precautions: Standard, fall, aspiration, contact    Orthopedic Precautions:N/A  Braces: N/A  Respiratory Status: Room air     Occupational Performance:     Bed Mobility:    Pt found up in chair      Activities of Daily Living:  Pt and caregiver given towels, wash cloths, mouth wash, warm body cleansing wipes but denied assistance in completing ADLs      [facility] 6 Click ADL: 20    Treatment & Education:  OT role, plan of care, progression of goals, importance of continued OOB activity, ADL/functional transfer and mobility retraining, call don't fall,  safety precautions, fall prevention.  Educated patient on importance of increased tolerance to upright position and direct impact on CV endurance and strength. Patient encouraged to sit up in chair/ EOB, for a minimum of 2 consecutive hours, 3x per day. Encouraged patient to perform AROM TE to BLE and BUE throughout the day within all available planes of motion. Re enforced importance of utilizing call light to meet needs in room and not attempt to get up without staff assistance. Patient and caregiver verbalized understanding and agreed to comply.        Patient left up in chair with all lines intact, call button in reach, chair alarm on, and caregiver present    GOALS:   Multidisciplinary Problems       Occupational Therapy Goals          Problem: Occupational Therapy    Goal Priority Disciplines Outcome Interventions   Occupational Therapy Goal     OT, PT/OT Progressing    Description: O.T. goals to be met by 6-18-25  Pt will tolerate 1 set x 15 reps b ue rom exercise  S with ue dressing  S with le dressing  S with toilet transfers                           Time Tracking:     OT Date of Treatment: 06/19/25  OT Start Time: 1145  OT Stop Time: 1155  OT Total Time (min): 10 min    Billable Minutes:Therapeutic Activity 10    OT/LACEY: LACEY     Number of LACEY visits since last OT visit: 1  OTR/L readily available for conference at the time of the provision of services-  ROJAS Han    6/19/2025

## 2025-06-19 NOTE — CONSULTS
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 Hepatology was consulted for history of cirrhosis. He denies recent hematemesis, coffee ground emesis, melena, hematochezia, jaundice, confusion, LE edema, abdominal distension.He  denies hepatotoxic medication. 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     Pacemaker        Past Surgical History:   Procedure Laterality Date    A-V CARDIAC PACEMAKER INSERTION Left 1/25/2024    Procedure: INSERTION, CARDIAC PACEMAKER, DUAL CHAMBER/poss single lead vs His lead;  Surgeon: Anthony Coronado MD;  Location: [facility] CATH LAB;  Service: Cardiology;  Laterality: Left;  Bio/LBBB pacing    CHOLECYSTECTOMY      CYSTOSCOPY WITH URETEROSCOPY, RETROGRADE PYELOGRAPHY, AND INSERTION OF STENT Bilateral 6/12/2025    Procedure: CYSTOSCOPY, WITH RETROGRADE PYELOGRAM AND URETERAL STENT INSERTION;  Surgeon: Desi Cruz MD;  Location: [facility] OR;  Service: Urology;   Laterality: Bilateral;    INSERTION, PACEMAKER, TEMPORARY TRANSVENOUS N/A 1/24/2024    Procedure: Insertion, Pacemaker, Temporary Transvenous;  Surgeon: Renaldo Santacruz MD;  Location: [facility] CATH LAB;  Service: Cardiology;  Laterality: N/A;    NECK SURGERY      TOTAL ELBOW ARTHROPLASTY Right 03/2019    URETEROSCOPIC REMOVAL OF URETERIC CALCULUS Bilateral 6/12/2025    Procedure: REMOVAL, CALCULUS, URETER, URETEROSCOPIC;  Surgeon: Desi Cruz MD;  Location: [facility] OR;  Service: Urology;  Laterality: Bilateral;    URETEROSCOPY, WITH LASER LITHOTRIPSY Bilateral 6/12/2025    Procedure: URETEROSCOPY, WITH LASER LITHOTRIPSY;  Surgeon: Desi Cruz MD;  Location: [facility] OR;  Service: Urology;  Laterality: Bilateral;  with retrograde pyleogram       Family history of liver disease: No    Review of patient's allergies indicates:   Allergen Reactions    Seroquel [quetiapine] Hallucinations     Per son patient had adverse reaction, became agitated and combating     Sulfa (sulfonamide antibiotics) Rash         Tobacco Use    Smoking status: Never    Smokeless tobacco: Never   Substance and Sexual Activity    Alcohol use: No    Drug use: Not on file    Sexual activity: Not on file       Prescriptions Prior to Admission[1]    Objective:     Vital Signs (Most Recent):  Temp: 97.9 °F (36.6 °C) (06/19/25 0740)  Pulse: 62 (06/19/25 0802)  Resp: 18 (06/19/25 0740)  BP: (!) 164/69 (06/19/25 0740)  SpO2: 96 % (06/19/25 0740) Vital Signs (24h Range):  Temp:  [97.7 °F (36.5 °C)-98.5 °F (36.9 °C)] 97.9 °F (36.6 °C)  Pulse:  [59-71] 62  Resp:  [18] 18  SpO2:  [95 %-99 %] 96 %  BP: (132-175)/(61-77) 164/69     Weight: 88.9 kg (196 lb) (06/17/25 1154)  Body mass index is 26.58 kg/m².    Physical Exam    Vitals and nursing note reviewed.   Constitutional:       General: He is not in acute distress.     Appearance: He is ill-appearing. He is not toxic-appearing or diaphoretic.      Comments: Chronically ill appearing male, appears  older than stated age  No distress  Speech soft and slow, but clear   HENT:      Head: Normocephalic.   Cardiovascular:      Rate and Rhythm: Regular rhythm  Pulmonary:      Effort: Pulmonary effort is normal. No respiratory distress.  Abdominal:      General: There is no distension.   Skin:  Bilateral arms with extensive bruising   Neurological:      General: No focal deficit present.       Significant Labs:  CBC:   Recent Labs   Lab 06/19/25  0606   WBC 5.63   RBC 2.72*   HGB 9.0*   HCT 27.7*   *     CMP:   Recent Labs   Lab 06/19/25  0606   *   CALCIUM 8.7   ALBUMIN 2.3*   PROT 5.5*      K 4.0   CO2 24   *   BUN 43*   CREATININE 1.8*   ALKPHOS 90   ALT 12   AST 20   BILITOT 0.9     Coagulation:   Recent Labs   Lab 06/19/25  0606   INR 1.1   APTT 31.3         Assessment/Plan:     Active Diagnoses:    Diagnosis Date Noted POA    PRINCIPAL PROBLEM:  Hepatic encephalopathy [K76.82] 08/20/2016 Yes    Acute cystitis with hematuria [N30.01] 06/18/2025 Yes    Anemia [D64.9] 06/17/2025 Yes    Hyperkalemia [E87.5] 06/17/2025 Yes    Thrombocytopenia [D69.6] 06/17/2025 Yes    Witnessed seizure-like activity [R56.9] 06/17/2025 Yes    Kidney stones [N20.0] 06/06/2025 Yes    AMEENA (acute kidney injury) [N17.9] 01/23/2024 Yes    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 (nonalcoholic steatohepatitis) [K75.81, K74.60]  Yes      Problems Resolved During this Admission:     Cirrhosis  MASLD related  MELD 3.0: 14 at 6/19/2025  6:06 AM  MELD-Na: 13 at 6/19/2025  6:06 AM  Calculated from:  Serum Creatinine: 1.8 mg/dL at 6/19/2025  6:06 AM  Serum Sodium: 144 mmol/L (Using max of 137 mmol/L) at 6/19/2025  6:06 AM  Total Bilirubin: 0.9 mg/dL (Using min of 1 mg/dL) at 6/19/2025  6:06 AM  Serum Albumin: 2.3 g/dL at 6/19/2025  6:06 AM  INR(ratio): 1.1 at 6/19/2025  6:06 AM  Age at listing (hypothetical): 79 years  Sex: Male at 6/19/2025  6:06 AM   No decompensation  Patient will follow outpatient for cirrhosis care  Avoid  hepatotoxic medications      Rest management as per Primary team    The patient location is: ([place])   The chief complaint leading to consultation is: cirrhosis      Visit type: audiovisual     Face to Face time with patient:20    70 minutes of total time spent on the encounter, which includes face to face time and non-face to face time preparing to see the patient (eg, review of tests), Obtaining and/or reviewing separately obtained history, Documenting clinical information in the electronic or other health record, Independently interpreting results (not separately reported) and communicating results to the patient/family/caregiver, or Care coordination (not separately reported).     Each patient to whom he or she provides medical services by telemedicine is:  (1) informed of the relationship between the physician and patient and the respective role of any other health care provider with respect to management of the patient; and (2) notified that he or she may decline to receive medical services by telemedicine and may withdraw from such care at any time.      Thank you for your consult. I will follow-up with patient. Please contact us if you have any additional questions.    García Chu MD  Hepatology  [facility]         [1]   Medications Prior to Admission   Medication Sig Dispense Refill Last Dose/Taking    aspirin 81 MG Chew Take 81 mg by mouth once daily.   6/16/2025    calcium-vitamin D 250-100 mg-unit per tablet Take 1 tablet by mouth once daily.   6/16/2025    ertapenem (INVANZ) 1 gram injection Inject 1 g into the vein once daily.   6/16/2025    ferrous gluconate (FERGON) 324 MG tablet Take 324 mg by mouth daily with breakfast.   6/16/2025    furosemide (LASIX) 20 MG tablet Take 20 mg by mouth daily as needed.   Past Month    lactulose (CHRONULAC) 10 gram/15 mL solution Take 30 g by mouth 2 (two) times daily.   6/16/2025    magnesium oxide (MAG-OX) 400 mg (241.3 mg magnesium) tablet Take 400  mg by mouth 2 (two) times daily.   6/16/2025    metoprolol succinate (TOPROL-XL) 25 MG 24 hr tablet Take 0.5 tablets (12.5 mg total) by mouth once daily. (Patient taking differently: Take 25 mg by mouth once daily.) 15 tablet 0 6/16/2025    phenazopyridine (PYRIDIUM) 200 MG tablet Take 1 tablet (200 mg total) by mouth 3 (three) times daily as needed. 15 tablet 0 6/16/2025    potassium gluconate 595 mg (99 mg) Tab Take by mouth once daily.   6/16/2025    rifAXIMin (XIFAXAN) 550 mg Tab Take 1 tablet (550 mg total) by mouth 2 (two) times daily.   6/16/2025    spironolactone (ALDACTONE) 50 MG tablet Take 50 mg by mouth every morning.   6/16/2025    tamsulosin (FLOMAX) 0.4 mg Cap Take 0.4 mg by mouth once daily.   6/16/2025    fluticasone propionate (FLONASE) 50 mcg/actuation nasal spray 1 spray by Each Nostril route every morning.   Unknown    NOVOLOG FLEXPEN U-100 INSULIN 100 unit/mL (3 mL) InPn pen Inject into the skin as needed.   Unknown

## 2025-06-19 NOTE — SUBJECTIVE & OBJECTIVE
Interval History: looks and feels better, AAOx3, speech clear, no tremors or asterixis. Good response to the treatment, Ammonia coming down to 80s. He is hungry, wants to eat, started on Cardiac diet. Lactulose continued. VSS afeb, labs improving, Bun/Cr 37/1.8, Ammonia 87. All Cx pending. Cont lactulose and IV abx.     Review of Systems   Constitutional:  Positive for activity change and appetite change. Negative for chills, diaphoresis and fever.   HENT: Negative.  Negative for trouble swallowing.    Eyes: Negative.  Negative for visual disturbance.        Eye twitching (intermittently)   Respiratory:  Negative for cough, chest tightness, shortness of breath and wheezing.    Cardiovascular:  Positive for leg swelling. Negative for chest pain and palpitations.   Gastrointestinal: Negative.  Negative for abdominal pain, anal bleeding, blood in stool, diarrhea, nausea and vomiting.   Genitourinary:  Positive for hematuria. Negative for difficulty urinating, dysuria, flank pain, frequency and urgency.        Has been having hematuria since recent lithotripsy/stent placement   Musculoskeletal:  Positive for arthralgias and gait problem.        Knee pain bilateral   Skin: Negative.    Neurological:  Positive for seizures and weakness.        Seizure like twitching in face and arms (multiple episodes)   Psychiatric/Behavioral: Negative.     All other systems reviewed and are negative.    Objective:     Vital Signs (Most Recent):  Temp: 98.5 °F (36.9 °C) (06/18/25 1951)  Pulse: 60 (06/18/25 1951)  Resp: 18 (06/18/25 1951)  BP: (!) 175/77 (06/18/25 1951)  SpO2: 98 % (06/18/25 1951) Vital Signs (24h Range):  Temp:  [96.5 °F (35.8 °C)-98.5 °F (36.9 °C)] 98.5 °F (36.9 °C)  Pulse:  [58-71] 60  Resp:  [18-20] 18  SpO2:  [95 %-99 %] 98 %  BP: (132-175)/(61-77) 175/77     Weight: 88.9 kg (196 lb)  Body mass index is 26.58 kg/m².    Intake/Output Summary (Last 24 hours) at 6/18/2025 1954  Last data filed at 6/18/2025 1253  Gross  per 24 hour   Intake --   Output 1050 ml   Net -1050 ml         Physical Exam  Vitals and nursing note reviewed.   Constitutional:       General: He is not in acute distress.     Appearance: He is ill-appearing. He is not toxic-appearing or diaphoretic.      Comments: Chronically ill appearing male, appears older than stated age  No distress  Speech soft and slow, but clear   HENT:      Head: Normocephalic.      Nose: Nose normal.      Mouth/Throat:      Mouth: Mucous membranes are moist.      Pharynx: Oropharynx is clear.   Eyes:      Extraocular Movements: Extraocular movements intact.      Pupils: Pupils are equal, round, and reactive to light.   Cardiovascular:      Rate and Rhythm: Regular rhythm. Bradycardia present.      Heart sounds: Normal heart sounds.   Pulmonary:      Effort: Pulmonary effort is normal. No respiratory distress.      Breath sounds: Normal breath sounds. No stridor. No wheezing, rhonchi or rales.      Comments: Diminished at bases bilaterally  Chest:      Chest wall: No tenderness.   Abdominal:      General: Bowel sounds are normal. There is no distension.      Palpations: Abdomen is soft.      Tenderness: There is no abdominal tenderness. There is no right CVA tenderness, left CVA tenderness, guarding or rebound.   Musculoskeletal:         General: No tenderness. Normal range of motion.      Cervical back: Neck supple.      Right lower leg: Edema present.      Left lower leg: Edema present.   Skin:     General: Skin is warm and dry.      Capillary Refill: Capillary refill takes less than 2 seconds.      Findings: Bruising present.      Comments: Bilateral arms with extensive bruising   Neurological:      General: No focal deficit present.      Mental Status: He is alert.      Motor: Weakness present.      Comments: Oriented to person and place, and situation  Generalized weakness  VENTURA 5/5  Video produced by [email or web address] shows -- repetitive twitching left side of face near eye/mouth   No current  "seizure like activity noted   Psychiatric:         Mood and Affect: Mood normal.         Behavior: Behavior normal.               Significant Labs: All pertinent labs within the past 24 hours have been reviewed.  Blood Culture: No results for input(s): "LABBLOO" in the last 48 hours.  CBC:   Recent Labs   Lab 06/17/25  1306 06/18/25  0622   WBC 5.65 6.48   HGB 10.2* 10.0*   HCT 30.5* 30.2*   * 130*     CMP:   Recent Labs   Lab 06/17/25  1306 06/17/25  1707 06/18/25  0622    144 145   K 5.9* 4.9 4.6   * 116* 114*   CO2 18* 17* 23    106 125*   BUN 53* 51* 52*   CREATININE 1.8* 1.7* 1.8*   CALCIUM 9.7 9.7 9.6   PROT 6.4  --  5.9*   ALBUMIN 2.7*  --  2.5*   BILITOT 0.8  --  1.0   ALKPHOS 104  --  99   AST 26  --  20   ALT 14  --  14   ANIONGAP 8 11 8     Magnesium:   Recent Labs   Lab 06/17/25  1306 06/18/25  0622   MG 1.6 1.5*     Urine Culture:     Significant Imaging: I have reviewed all pertinent imaging results/findings within the past 24 hours.  "

## 2025-06-19 NOTE — ASSESSMENT & PLAN NOTE
Hyperkalemia is likely due to AMEENA.The patients most recent potassium results are listed below.  Recent Labs     06/17/25  1306 06/17/25  1707 06/18/25  0622   K 5.9* 4.9 4.6     Plan  - Monitor for arrhythmias with EKG and/or continuous telemetry.   - Treat the hyperkalemia with Potassium Binders, Nebulized albuterol sulfate, Furosemide, and Sodium Bicarbonate.   - Monitor potassium: Daily  - The patient's hyperkalemia is improving      Much better

## 2025-06-19 NOTE — ASSESSMENT & PLAN NOTE
Hx GHOSH cirrhosis  Ammonia 118  Not safe for po currently, needs swallowing evaluation  Lactulose enemas ordered TID with first to be done tonight  Recheck ammonia level in AM    Improving, ammonia coning down to 87  Cont Lactulose

## 2025-06-19 NOTE — PLAN OF CARE
Discussed poc with pt, pt verbalized understanding  Purposeful rounding every 2hours  VS wnl  Cardiac monitoring in use, pt is LITZY, tele monitor # [id number]  Blood glucose monitoring   Fall precautions in place, remains injury free  Pain and nausea under control with PRN meds  IVFs  Accurate I&Os  Abx given as prescribed  Bed locked at lowest position  Call light within reach  Chart check complete  Will cont with POC    Problem: Adult Inpatient Plan of Care  Goal: Plan of Care Review  Outcome: Progressing  Goal: Patient-Specific Goal (Individualized)  Outcome: Progressing  Goal: Absence of Hospital-Acquired Illness or Injury  Outcome: Progressing  Goal: Optimal Comfort and Wellbeing  Outcome: Progressing  Goal: Readiness for Transition of Care  Outcome: Progressing     Problem: Infection  Goal: Absence of Infection Signs and Symptoms  Outcome: Progressing     Problem: Diabetes Comorbidity  Goal: Blood Glucose Level Within Targeted Range  Outcome: Progressing     Problem: Sepsis/Septic Shock  Goal: Optimal Coping  Outcome: Progressing  Goal: Absence of Bleeding  Outcome: Progressing  Goal: Blood Glucose Level Within Targeted Range  Outcome: Progressing  Goal: Absence of Infection Signs and Symptoms  Outcome: Progressing  Goal: Optimal Nutrition Intake  Outcome: Progressing     Problem: Acute Kidney Injury/Impairment  Goal: Fluid and Electrolyte Balance  Outcome: Progressing  Goal: Improved Oral Intake  Outcome: Progressing  Goal: Effective Renal Function  Outcome: Progressing     Problem: Wound  Goal: Optimal Coping  Outcome: Progressing  Goal: Optimal Functional Ability  Outcome: Progressing  Goal: Absence of Infection Signs and Symptoms  Outcome: Progressing  Goal: Improved Oral Intake  Outcome: Progressing  Goal: Optimal Pain Control and Function  Outcome: Progressing  Goal: Skin Health and Integrity  Outcome: Progressing  Goal: Optimal Wound Healing  Outcome: Progressing     Problem: Skin Injury Risk Increased  Goal:  Skin Health and Integrity  Outcome: Progressing     Problem: Fall Injury Risk  Goal: Absence of Fall and Fall-Related Injury  Outcome: Progressing

## 2025-06-19 NOTE — ASSESSMENT & PLAN NOTE
The likely etiology of thrombocytopenia is infection. The patients 3 most recent labs are listed below.  Recent Labs     06/17/25  1306 06/18/25  0622   * 130*     Plan  - Will transfuse if platelet count is <50k (if undergoing surgical procedure or have active bleeding).  - Monitor CBC

## 2025-06-19 NOTE — PLAN OF CARE
Pt found up in chair from PT. Pt educated on OT services and education. Pt and caregiver requested items for ADLs but denied assistance. Pt reminded to continue BUE therex throughout the day.

## 2025-06-19 NOTE — SUBJECTIVE & OBJECTIVE
Interval History: looks and feels much better, stronger, sitting up in chair, eating by himself, AAOx3, speech clear, no asterixis. His caregiver at his side and wants to switch his lactulose enemas to oral. He has been having liquid stools. Mg low- replaced. Getting PT/OT. Wants to go home now but may d/c soon with PT/OT. Started on Inj B12 -plus triple Vits.     Review of Systems   Constitutional:  Positive for activity change. Negative for appetite change, chills, diaphoresis and fever.   HENT: Negative.  Negative for trouble swallowing.    Eyes: Negative.  Negative for visual disturbance.        Eye twitching (intermittently)   Respiratory:  Negative for cough, chest tightness, shortness of breath and wheezing.    Cardiovascular:  Positive for leg swelling. Negative for chest pain and palpitations.   Gastrointestinal: Negative.  Negative for abdominal pain, anal bleeding, blood in stool, diarrhea, nausea and vomiting.   Genitourinary:  Negative for difficulty urinating, dysuria, flank pain, frequency, hematuria and urgency.        Has been having hematuria since recent lithotripsy/stent placement   Musculoskeletal:  Positive for arthralgias and gait problem.        Knee pain bilateral   Skin: Negative.    Neurological:  Positive for seizures and weakness.        Seizure like twitching in face and arms (multiple episodes)   Psychiatric/Behavioral: Negative.     All other systems reviewed and are negative.    Objective:     Vital Signs (Most Recent):  Temp: 97.8 °F (36.6 °C) (06/19/25 1628)  Pulse: 60 (06/19/25 1628)  Resp: 18 (06/19/25 1628)  BP: (!) 150/67 (06/19/25 1628)  SpO2: 98 % (06/19/25 1628) Vital Signs (24h Range):  Temp:  [97.8 °F (36.6 °C)-98.5 °F (36.9 °C)] 97.8 °F (36.6 °C)  Pulse:  [59-65] 60  Resp:  [18] 18  SpO2:  [96 %-98 %] 98 %  BP: (137-175)/(63-77) 150/67     Weight: 88.9 kg (196 lb)  Body mass index is 26.58 kg/m².    Intake/Output Summary (Last 24 hours) at 6/19/202519/2025 1716  Last data filed  at 6/19/2025 1500  Gross per 24 hour   Intake 440 ml   Output 950 ml   Net -510 ml         Physical Exam  Vitals and nursing note reviewed.   Constitutional:       General: He is not in acute distress.     Appearance: He is ill-appearing. He is not toxic-appearing or diaphoretic.      Comments: Chronically ill appearing male, appears older than stated age  No distress  Speech soft and slow, but clear   HENT:      Head: Normocephalic.      Nose: Nose normal.      Mouth/Throat:      Mouth: Mucous membranes are moist.      Pharynx: Oropharynx is clear.   Eyes:      Extraocular Movements: Extraocular movements intact.      Pupils: Pupils are equal, round, and reactive to light.   Cardiovascular:      Rate and Rhythm: Regular rhythm. Bradycardia present.      Heart sounds: Normal heart sounds.   Pulmonary:      Effort: Pulmonary effort is normal. No respiratory distress.      Breath sounds: Normal breath sounds. No stridor. No wheezing, rhonchi or rales.      Comments: Diminished at bases bilaterally  Chest:      Chest wall: No tenderness.   Abdominal:      General: Bowel sounds are normal. There is no distension.      Palpations: Abdomen is soft.      Tenderness: There is no abdominal tenderness. There is no right CVA tenderness, left CVA tenderness, guarding or rebound.   Musculoskeletal:         General: No tenderness. Normal range of motion.      Cervical back: Neck supple.      Right lower leg: No edema.      Left lower leg: No edema.   Skin:     General: Skin is warm and dry.      Capillary Refill: Capillary refill takes less than 2 seconds.      Findings: Bruising present.      Comments: Bilateral arms with extensive bruising   Neurological:      General: No focal deficit present.      Mental Status: He is alert.      Motor: Weakness present.      Comments: Oriented to person and place, and situation  Generalized weakness  VENTURA 5/5  Video produced by [email or web address] shows -- repetitive twitching left side of face near  "eye/mouth   No current seizure like activity noted   Psychiatric:         Mood and Affect: Mood normal.         Behavior: Behavior normal.               Significant Labs: All pertinent labs within the past 24 hours have been reviewed.  Blood Culture: No results for input(s): "LABBLOO" in the last 48 hours.  CBC:   Recent Labs   Lab 06/18/25 0622 06/19/25  0606   WBC 6.48 5.63   HGB 10.0* 9.0*   HCT 30.2* 27.7*   * 117*     CMP:   Recent Labs   Lab 06/18/25 0622 06/19/25  0606    144   K 4.6 4.0   * 113*   CO2 23 24   * 112*   BUN 52* 43*   CREATININE 1.8* 1.8*   CALCIUM 9.6 8.7   PROT 5.9* 5.5*   ALBUMIN 2.5* 2.3*   BILITOT 1.0 0.9   ALKPHOS 99 90   AST 20 20   ALT 14 12   ANIONGAP 8 7*     Coagulation:   Recent Labs   Lab 06/19/25  0606   INR 1.1   APTT 31.3     Magnesium:   Recent Labs   Lab 06/18/25 0622 06/19/25  0606   MG 1.5* 1.2*       Significant Imaging: I have reviewed all pertinent imaging results/findings within the past 24 hours.  "

## 2025-06-19 NOTE — ASSESSMENT & PLAN NOTE
The likely etiology of thrombocytopenia is infection. The patients 3 most recent labs are listed below.  Recent Labs     06/17/25  1306 06/18/25  0622 06/19/25  0606   * 130* 117*     Plan  - Will transfuse if platelet count is <50k (if undergoing surgical procedure or have active bleeding).  - Monitor CBC    Chronic and mild and stable

## 2025-06-19 NOTE — ASSESSMENT & PLAN NOTE
IV cefepime ordered (after review of previous sensitivities)  Follow up urine culture    Cont cefepime    Treated, switch to oral Abx

## 2025-06-19 NOTE — ASSESSMENT & PLAN NOTE
Hx GHOSH cirrhosis  Ammonia 118  Not safe for po currently, needs swallowing evaluation  Lactulose enemas ordered TID with first to be done tonight  Recheck ammonia level in AM    Improving, ammonia coning down to 87  Cont Lactulose    Much better, change lactulose to PO now  Check ammonia in am

## 2025-06-19 NOTE — ASSESSMENT & PLAN NOTE
AMEENA is likely due to pre-renal azotemia due to dehydration. Baseline creatinine is 1.3. Most recent creatinine and eGFR are listed below.  Recent Labs     06/17/25  1306 06/17/25  1707 06/18/25  0622   CREATININE 1.8* 1.7* 1.8*   EGFRNORACEVR 38* 41* 38*      Plan  - Avoid nephrotoxins and renally dose meds for GFR listed above  - Monitor urine output, serial BMP, and adjust therapy as needed  - Gentle hydration with IV fluids - bicarb fluids ordered since also with metabolic acidosis (was given IV Lasix per ED)    Cont present care

## 2025-06-19 NOTE — PROCEDURES
Date of service  06/19/2025    Introduction  Electroencephalographic (EEG) recording is performed with electrodes placed according to the International 10-20 placement system. Thirty two (32) channels of digital signal (sampling rate of 512/sec) including T1 and T2 was simultaneously recorded from the scalp and may include EKG, EMG, and/or eye monitors. Recording band pass was 0.1 to 512 Hz. Digital video recording of the patient is simultaneously recorded with the EEG. The patient is instructed to report clinical symptoms which may occur during the recording session. EEG and video recording is stored and archived in digital format. Activation procedures which include photic stimulation, hyperventilation and instructing patients to perform simple tasks are done in selected patients.    The EEG is displayed on a monitor screen and can be reviewed using different montages. Computer assisted analysis is employed to detect spike and electrographic seizure activity. The entire record is submitted for computer analysis. The entire recording is visually reviewed and, the times identified by computer analysis as being spikes or seizures are reviewed again.     Compressed spectral analysis (CSA) is also performed on the activity recorded from each individual channel. This is displayed as a power display of frequencies from 0 to 30 Hz over time. The CSA is reviewed looking for asymmetries in power between homologous areas of the scalp and then compared with the original EEG recording.     Findings  The short-term video EEG recording during wakefulness contains 5-7 Hz theta slowing with up to 7 Hz activity over the posterior head regions.                    No abnormal activity occurred during photic stimulation. Hyperventilation was not performed.     During the recording, the patient fell asleep spontaneously.  No abnormal activity occurred during sleep or at the time of arousal.    The EKG channel shows irregular  rhythm.    Interpretation  The short-term video EEG shows mild diffuse nonspecific slowing of the background, indicative of encephalopathy but nonspecific for etiology.  No potentially epileptiform activity was seen.

## 2025-06-19 NOTE — PROGRESS NOTES
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  Sitter at bedside reports that he fell in the shower on Saturday.  He recently had ESWL and stent placement for kidney stones on 06/12/2025 per Dr. Cruz.  Sitter reports that he has had hematuria since that time.  She reports that he took lactulose twice a day on yesterday, but did not take it today due to feeling ill.  He has chronic arthralgias and gait problem, uses wheelchair to get around the house.  He has no previous history of seizures however today he has had multiple episodes of seizure like twitching in his face, eye twitiching, twitching near mouth, and in arms.  His sitter at bedside shows me a video on her phone and it appears to be seizure activity. on arrival to ED, temp 97.7°, heart rate 56, respirations 14, blood pressure 130/67, 98% SpO2 on room air.  Lab workup significant for hemoglobin 10.2, hematocrit 30.5, platelets 123, normal PT/INR, normal PTT, potassium 5.9, sodium 142, chloride 116, CO2 18, BUN 53, creatinine 1.8, glucose 105, magnesium 1.6, albumin 2.7, normal LFTs, ammonia level 118, , CPK 29, troponin normal, UA with hazy urine, 3+ blood, 3+ leukocyte esterase, negative nitrites, greater than 100 RBC, greater than 100 WBC.  Urine culture is pending.  EKG showed wide QRS rhythm with left bundle-branch  block, heart rate 60.  Chest x-ray demonstrated lungs are clear.  There was some swelling noted to right lower extremity and ultrasound was done which showed no evidence of DVT.  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  While in ED, he was given IV Benadryl 25 mg, albuterol nebulizer treatment, Lasix 40 mg IV.  Lokelma was ordered, however he did not take due to concern for swallowing difficulty, he is NPO.  Hospital Medicine was consulted for admission due to hepatic encephalopathy, elevated ammonia level, AMEENA, hyperkalemia, and possible seizure activity as well as acute cystitis with hematuria.    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  Sitter at bedside reports that he fell in the shower on Saturday.  He recently had ESWL and stent placement for kidney stones on 06/12/2025 per Dr. Cruz.  Sitter reports that he has had hematuria since that time.  She reports that he took lactulose twice a day on yesterday, but did not take it today due to feeling ill.  He has chronic arthralgias and gait problem, uses wheelchair to get around the house.  He has no previous history of seizures however today he has had multiple episodes of seizure like twitching in his face, eye twitiching, twitching near mouth, and in arms.  His sitter at bedside shows me a video on her phone and it appears to be seizure activity. on arrival to ED, temp 97.7°, heart rate 56, respirations 14, blood pressure 130/67, 98% SpO2 on room air.  Lab workup significant for hemoglobin 10.2, hematocrit 30.5, platelets 123,  normal PT/INR, normal PTT, potassium 5.9, sodium 142, chloride 116, CO2 18, BUN 53, creatinine 1.8, glucose 105, magnesium 1.6, albumin 2.7, normal LFTs, ammonia level 118, , CPK 29, troponin normal, UA with hazy urine, 3+ blood, 3+ leukocyte esterase, negative nitrites, greater than 100 RBC, greater than 100 WBC.  Urine culture is pending.  EKG showed wide QRS rhythm with left bundle-branch block, heart rate 60.  Chest x-ray demonstrated lungs are clear.  There was some swelling noted to right lower extremity and ultrasound was done which showed no evidence of DVT.  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  While in ED, he was given IV Benadryl 25 mg, albuterol nebulizer treatment, Lasix 40 mg IV.  Lokelma was ordered, however he did not take due to concern for swallowing difficulty, he is NPO.  Hospital Medicine was consulted for admission due to hepatic encephalopathy, elevated ammonia level, AMEENA, hyperkalemia, and possible seizure activity as well as acute cystitis with hematuria.    6/18- looks and feels better, AAOx3, speech clear, no tremors or asterixis. Good response to the treatment, Ammonia coming down to 80s. He is hungry, wants to eat, started on Cardiac diet. Lactulose continued. VSS afeb, labs improving, Bun/Cr 37/1.8, Ammonia 87. All Cx pending. Cont lactulose and IV abx.     6/19- looks and feels much better, stronger, sitting up in chair, eating by himself, AAOx3, speech clear, no asterixis. His caregiver at his side and wants to switch his lactulose enemas to oral. He has been having liquid stools. Mg low- replaced. Getting PT/OT. Wants to go home now but may d/c soon with PT/OT. Started on Inj B12 -plus triple Vits.     Interval History: looks and feels much better, stronger,  sitting up in chair, eating by himself, AAOx3, speech clear, no asterixis. His caregiver at his side and wants to switch his lactulose enemas to oral. He has been having liquid stools. Mg low- replaced. Getting PT/OT. Wants to go home now but may d/c soon with PT/OT. Started on Inj B12 -plus triple Vits.     Review of Systems   Constitutional:  Positive for activity change. Negative for appetite change, chills, diaphoresis and fever.   HENT: Negative.  Negative for trouble swallowing.    Eyes: Negative.  Negative for visual disturbance.        Eye twitching (intermittently)   Respiratory:  Negative for cough, chest tightness, shortness of breath and wheezing.    Cardiovascular:  Positive for leg swelling. Negative for chest pain and palpitations.   Gastrointestinal: Negative.  Negative for abdominal pain, anal bleeding, blood in stool, diarrhea, nausea and vomiting.   Genitourinary:  Negative for difficulty urinating, dysuria, flank pain, frequency, hematuria and urgency.        Has been having hematuria since recent lithotripsy/stent placement   Musculoskeletal:  Positive for arthralgias and gait problem.        Knee pain bilateral   Skin: Negative.    Neurological:  Positive for seizures and weakness.        Seizure like twitching in face and arms (multiple episodes)   Psychiatric/Behavioral: Negative.     All other systems reviewed and are negative.    Objective:     Vital Signs (Most Recent):  Temp: 97.8 °F (36.6 °C) (06/19/25 1628)  Pulse: 60 (06/19/25 1628)  Resp: 18 (06/19/25 1628)  BP: (!) 150/67 (06/19/25 1628)  SpO2: 98 % (06/19/25 1628) Vital Signs (24h Range):  Temp:  [97.8 °F (36.6 °C)-98.5 °F (36.9 °C)] 97.8 °F (36.6 °C)  Pulse:  [59-65] 60  Resp:  [18] 18  SpO2:  [96 %-98 %] 98 %  BP: (137-175)/(63-77) 150/67     Weight: 88.9 kg (196 lb)  Body mass index is 26.58 kg/m².    Intake/Output Summary (Last 24 hours) at 6/19/2025 1716  Last data filed at 6/19/2025 1500  Gross per 24 hour   Intake 440 ml    Output 950 ml   Net -510 ml         Physical Exam  Vitals and nursing note reviewed.   Constitutional:       General: He is not in acute distress.     Appearance: He is ill-appearing. He is not toxic-appearing or diaphoretic.      Comments: Chronically ill appearing male, appears older than stated age  No distress  Speech soft and slow, but clear   HENT:      Head: Normocephalic.      Nose: Nose normal.      Mouth/Throat:      Mouth: Mucous membranes are moist.      Pharynx: Oropharynx is clear.   Eyes:      Extraocular Movements: Extraocular movements intact.      Pupils: Pupils are equal, round, and reactive to light.   Cardiovascular:      Rate and Rhythm: Regular rhythm. Bradycardia present.      Heart sounds: Normal heart sounds.   Pulmonary:      Effort: Pulmonary effort is normal. No respiratory distress.      Breath sounds: Normal breath sounds. No stridor. No wheezing, rhonchi or rales.      Comments: Diminished at bases bilaterally  Chest:      Chest wall: No tenderness.   Abdominal:      General: Bowel sounds are normal. There is no distension.      Palpations: Abdomen is soft.      Tenderness: There is no abdominal tenderness. There is no right CVA tenderness, left CVA tenderness, guarding or rebound.   Musculoskeletal:         General: No tenderness. Normal range of motion.      Cervical back: Neck supple.      Right lower leg: No edema.      Left lower leg: No edema.   Skin:     General: Skin is warm and dry.      Capillary Refill: Capillary refill takes less than 2 seconds.      Findings: Bruising present.      Comments: Bilateral arms with extensive bruising   Neurological:      General: No focal deficit present.      Mental Status: He is alert.      Motor: Weakness present.      Comments: Oriented to person and place, and situation  Generalized weakness  VENTURA 5/5  Video produced by sitter shows -- repetitive twitching left side of face near eye/mouth   No current seizure like activity noted  "  Psychiatric:         Mood and Affect: Mood normal.         Behavior: Behavior normal.               Significant Labs: All pertinent labs within the past 24 hours have been reviewed.  Blood Culture: No results for input(s): "LABBLOO" in the last 48 hours.  CBC:   Recent Labs   Lab 06/18/25 0622 06/19/25  0606   WBC 6.48 5.63   HGB 10.0* 9.0*   HCT 30.2* 27.7*   * 117*     CMP:   Recent Labs   Lab 06/18/25 0622 06/19/25  0606    144   K 4.6 4.0   * 113*   CO2 23 24   * 112*   BUN 52* 43*   CREATININE 1.8* 1.8*   CALCIUM 9.6 8.7   PROT 5.9* 5.5*   ALBUMIN 2.5* 2.3*   BILITOT 1.0 0.9   ALKPHOS 99 90   AST 20 20   ALT 14 12   ANIONGAP 8 7*     Coagulation:   Recent Labs   Lab 06/19/25 0606   INR 1.1   APTT 31.3     Magnesium:   Recent Labs   Lab 06/18/25 0622 06/19/25  0606   MG 1.5* 1.2*       Significant Imaging: I have reviewed all pertinent imaging results/findings within the past 24 hours.      Assessment & Plan  Hepatic encephalopathy  Hx GHOSH cirrhosis  Ammonia 118  Not safe for po currently, needs swallowing evaluation  Lactulose enemas ordered TID with first to be done tonight  Recheck ammonia level in AM    Improving, ammonia coning down to 87  Cont Lactulose    Much better, change lactulose to PO now  Check ammonia in am    AMEENA (acute kidney injury) on CKD 3  AMEENA is likely due to pre-renal azotemia due to dehydration. Baseline creatinine is 1.3. Most recent creatinine and eGFR are listed below.  Recent Labs     06/17/25  1707 06/18/25 0622 06/19/25  0606   CREATININE 1.7* 1.8* 1.8*   EGFRNORACEVR 41* 38* 38*      Plan  - Avoid nephrotoxins and renally dose meds for GFR listed above  - Monitor urine output, serial BMP, and adjust therapy as needed  - Gentle hydration with IV fluids - bicarb fluids ordered since also with metabolic acidosis (was given IV Lasix per ED)    Cont present care    AMEENA appears at baseline now  Anemia  Anemia is likely due to acute blood loss which was " from hematuria. Most recent hemoglobin and hematocrit are listed below.  Recent Labs     06/17/25  1306 06/18/25  0622 06/19/25  0606   HGB 10.2* 10.0* 9.0*   HCT 30.5* 30.2* 27.7*     Plan  - Monitor serial CBC: Daily  - Transfuse PRBC if patient becomes hemodynamically unstable, symptomatic or H/H drops below 7/21.  - Patient has not received any PRBC transfusions to date  - Patient's anemia is currently stable    stable  Hyperkalemia (Resolved: 6/19/2025)  Hyperkalemia is likely due to AMEENA.The patients most recent potassium results are listed below.  Recent Labs     06/17/25  1707 06/18/25  0622 06/19/25  0606   K 4.9 4.6 4.0     Plan  - Monitor for arrhythmias with EKG and/or continuous telemetry.   - Treat the hyperkalemia with Potassium Binders, Nebulized albuterol sulfate, Furosemide, and Sodium Bicarbonate.   - Monitor potassium: Daily  - The patient's hyperkalemia is improving      Much better    Thrombocytopenia  The likely etiology of thrombocytopenia is infection. The patients 3 most recent labs are listed below.  Recent Labs     06/17/25  1306 06/18/25  0622 06/19/25  0606   * 130* 117*     Plan  - Will transfuse if platelet count is <50k (if undergoing surgical procedure or have active bleeding).  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  Will avoid any hepatotoxic meds, and monitor CBC/CMP/INR for synthetic function.   --NPO at  this time, needs swallowing evaluation/NPO  --Lactulose enema ordered    Improving  Start cardiac diet    stable  Kidney stones  Had ESWL and stents placed per Dr. Cruz on 6/12/25  Has been having mild hematuria since then according to sitter at bedside    stable    Acute cystitis with hematuria  IV cefepime ordered (after review of previous sensitivities)  Follow up urine culture    Cont cefepime    Treated, switch to oral Abx    VTE Risk Mitigation (From admission, onward)           Ordered     Reason for No Pharmacological VTE Prophylaxis  Once        Comments: Anemia and thrombocytopenia   Question:  Reasons:  Answer:  Physician Provided (leave comment)    06/17/25 1941     IP VTE HIGH RISK PATIENT  Once         06/17/25 1941     Place sequential compression device  Until discontinued         06/17/25 1941                    Discharge Planning   KAYA: 6/22/2025     Code Status: Full Code   Patient is Medically Not Yet Ready for discharge.  Medical Readiness for Discharge Date:   Discharge Plan A: Home Health          SDOH Screening:  The patient was screened for utility difficulties, food insecurity, transport difficulties, housing insecurity, and interpersonal safety and there were no concerns identified this admission.      Please place Justification for DME    Gracia Jaimes MD  Department of Hospital Medicine   [facility]

## 2025-06-19 NOTE — ASSESSMENT & PLAN NOTE
IV cefepime ordered (after review of previous sensitivities)  Follow up urine culture    Cont cefepime

## 2025-06-19 NOTE — HOSPITAL COURSE
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  Sitter at bedside reports that he fell in the shower on Saturday.  He recently had ESWL and stent placement for kidney stones on 06/12/2025 per Dr. Cruz.  Sitter reports that he has had hematuria since that time.  She reports that he took lactulose twice a day on yesterday, but did not take it today due to feeling ill.  He has chronic arthralgias and gait problem, uses wheelchair to get around the house.  He has no previous history of seizures however today he has had multiple episodes of seizure like twitching in his face, eye twitiching, twitching near mouth, and in arms.  His sitter at bedside shows me a video on her phone and it appears to be seizure activity. on arrival to ED, temp 97.7°, heart rate 56, respirations 14, blood pressure 130/67, 98% SpO2 on room air.  Lab workup significant for hemoglobin 10.2, hematocrit 30.5, platelets 123, normal PT/INR, normal PTT, potassium 5.9, sodium 142, chloride 116, CO2 18, BUN 53, creatinine 1.8, glucose 105, magnesium 1.6, albumin 2.7, normal LFTs, ammonia level 118, , CPK 29, troponin normal, UA with hazy urine, 3+ blood, 3+ leukocyte esterase, negative nitrites, greater than 100 RBC, greater than 100 WBC.  Urine culture is pending.  EKG showed wide QRS rhythm with left bundle-branch block, heart rate 60.  Chest x-ray demonstrated lungs are clear.  There was some swelling noted to right lower extremity and ultrasound was done which showed no evidence of DVT.  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  While in  ED, he was given IV Benadryl 25 mg, albuterol nebulizer treatment, Lasix 40 mg IV.  Lokelma was ordered, however he did not take due to concern for swallowing difficulty, he is NPO.  Hospital Medicine was consulted for admission due to hepatic encephalopathy, elevated ammonia level, AMEENA, hyperkalemia, and possible seizure activity as well as acute cystitis with hematuria.    6/18- looks and feels better, AAOx3, speech clear, no tremors or asterixis. Good response to the treatment, Ammonia coming down to 80s. He is hungry, wants to eat, started on Cardiac diet. Lactulose continued. VSS afeb, labs improving, Bun/Cr 37/1.8, Ammonia 87. All Cx pending. Cont lactulose and IV abx.     6/19- looks and feels much better, stronger, sitting up in chair, eating by himself, AAOx3, speech clear, no asterixis. His caregiver at his side and wants to switch his lactulose enemas to oral. He has been having liquid stools. Mg low- replaced. Getting PT/OT. Wants to go home now but may d/c soon with PT/OT. Started on Inj B12 -plus triple Vits.     6/20- looks and feels much better, sitting up comfortably in chair on RA, eating food. AAOx4, speech clear, his care giver at his side dn they are ready to go home now. VSS Afeb, labs show stable Cr at 1.7, HCO3 improved to 22 and Ammonia down to 67. His Micro K tab has been d/stephani. He did well with PT/OT.   HH has been arranged. He is eating drinking well. He was seen and examined and deemed stable to discharge home with  and continue Lactulose and rifaximin as before. He will f/u with his outside GI/Hepatology at [facility] next week.

## 2025-06-20 VITALS
SYSTOLIC BLOOD PRESSURE: 151 MMHG | HEIGHT: 72 IN | TEMPERATURE: 98 F | RESPIRATION RATE: 17 BRPM | BODY MASS INDEX: 26.55 KG/M2 | DIASTOLIC BLOOD PRESSURE: 67 MMHG | OXYGEN SATURATION: 96 % | WEIGHT: 196 LBS | HEART RATE: 60 BPM

## 2025-06-20 PROBLEM — N30.01 ACUTE CYSTITIS WITH HEMATURIA: Status: RESOLVED | Noted: 2025-06-18 | Resolved: 2025-06-20

## 2025-06-20 PROBLEM — N17.9 AKI (ACUTE KIDNEY INJURY): Status: RESOLVED | Noted: 2024-01-23 | Resolved: 2025-06-20

## 2025-06-20 LAB
ALBUMIN SERPL BCP-MCNC: 2.3 G/DL (ref 3.5–5.2)
ALP SERPL-CCNC: 94 UNIT/L (ref 40–150)
ALT SERPL W/O P-5'-P-CCNC: 12 UNIT/L (ref 10–44)
AMMONIA PLAS-SCNC: 67 UMOL/L (ref 10–50)
ANION GAP (OHS): 8 MMOL/L (ref 8–16)
AST SERPL-CCNC: 22 UNIT/L (ref 11–45)
BILIRUB SERPL-MCNC: 0.8 MG/DL (ref 0.1–1)
BUN SERPL-MCNC: 40 MG/DL (ref 8–23)
CALCIUM SERPL-MCNC: 8.5 MG/DL (ref 8.7–10.5)
CHLORIDE SERPL-SCNC: 113 MMOL/L (ref 95–110)
CO2 SERPL-SCNC: 22 MMOL/L (ref 23–29)
CREAT SERPL-MCNC: 1.7 MG/DL (ref 0.5–1.4)
GFR SERPLBLD CREATININE-BSD FMLA CKD-EPI: 41 ML/MIN/1.73/M2
GLUCOSE SERPL-MCNC: 99 MG/DL (ref 70–110)
MAGNESIUM SERPL-MCNC: 1.5 MG/DL (ref 1.6–2.6)
POCT GLUCOSE: 106 MG/DL (ref 70–110)
POTASSIUM SERPL-SCNC: 4.3 MMOL/L (ref 3.5–5.1)
PROT SERPL-MCNC: 5.7 GM/DL (ref 6–8.4)
SODIUM SERPL-SCNC: 143 MMOL/L (ref 136–145)

## 2025-06-20 PROCEDURE — 80053 COMPREHEN METABOLIC PANEL: CPT | Performed by: NURSE PRACTITIONER

## 2025-06-20 PROCEDURE — 83735 ASSAY OF MAGNESIUM: CPT | Performed by: NURSE PRACTITIONER

## 2025-06-20 PROCEDURE — 36415 COLL VENOUS BLD VENIPUNCTURE: CPT | Performed by: EMERGENCY MEDICINE

## 2025-06-20 PROCEDURE — 63600175 PHARM REV CODE 636 W HCPCS: Performed by: EMERGENCY MEDICINE

## 2025-06-20 PROCEDURE — 82140 ASSAY OF AMMONIA: CPT | Performed by: EMERGENCY MEDICINE

## 2025-06-20 PROCEDURE — 25000003 PHARM REV CODE 250: Performed by: EMERGENCY MEDICINE

## 2025-06-20 RX ORDER — LANOLIN ALCOHOL/MO/W.PET/CERES
100 CREAM (GRAM) TOPICAL DAILY
Qty: 60 TABLET | Refills: 0 | Status: SHIPPED | OUTPATIENT
Start: 2025-06-21

## 2025-06-20 RX ORDER — ACETAMINOPHEN 500 MG
5000 TABLET ORAL DAILY
Qty: 60 TABLET | Refills: 0 | Status: SHIPPED | OUTPATIENT
Start: 2025-06-21

## 2025-06-20 RX ADMIN — CHOLECALCIFEROL TAB 125 MCG (5000 UNIT) 5000 UNITS: 125 TAB at 10:06

## 2025-06-20 RX ADMIN — THERA TABS 1 TABLET: TAB at 10:06

## 2025-06-20 RX ADMIN — CYANOCOBALAMIN 1000 MCG: 1000 INJECTION INTRAMUSCULAR; SUBCUTANEOUS at 10:06

## 2025-06-20 RX ADMIN — Medication 1 TABLET: at 10:06

## 2025-06-20 RX ADMIN — LACTULOSE 15 G: 20 SOLUTION ORAL at 10:06

## 2025-06-20 RX ADMIN — Medication 400 MG: at 10:06

## 2025-06-20 RX ADMIN — THIAMINE HCL TAB 100 MG 100 MG: 100 TAB at 10:06

## 2025-06-20 NOTE — PLAN OF CARE
[facility]  Discharge Final Note    Primary Care Provider: Félix Rollins MD    Expected Discharge Date: 6/20/2025    Final Discharge Note (most recent)       Final Note - 06/20/25 1151          Final Note    Assessment Type Final Discharge Note     Anticipated Discharge Disposition Home-Health Care [facility]     Hospital Resources/Appts/Education Provided Post-Acute resouces added to AVS        Post-Acute Status    Post-Acute Authorization Home Health     Home Health Status Set-up Complete/Auth obtained     Discharge Delays None known at this time                   Contact Info       Félix Rollins MD   Specialty: Family Medicine   Relationship: PCP - General    [facility]  [address] [place] [address]  [address]  [facility] [address]   Phone: [phone]       Next Steps: Schedule an appointment as soon as possible for a visit in 3 day(s)    Instructions: Hospital follow up          Sw sent referral to Stephanie ; per family request. Pt had [facility] in the past and son, Sven, wished to resume at d/c.     Patient has no d/c needs at this time. Sw to follow up, as needed, for d/c planning purposes.

## 2025-06-21 NOTE — ASSESSMENT & PLAN NOTE
Anemia is likely due to acute blood loss which was from hematuria. Most recent hemoglobin and hematocrit are listed below.  Recent Labs     06/19/25  0606   HGB 9.0*   HCT 27.7*     Plan  - Monitor serial CBC: Daily  - Transfuse PRBC if patient becomes hemodynamically unstable, symptomatic or H/H drops below 7/21.  - Patient has not received any PRBC transfusions to date  - Patient's anemia is currently stable    stable

## 2025-06-21 NOTE — ASSESSMENT & PLAN NOTE
The likely etiology of thrombocytopenia is infection. The patients 3 most recent labs are listed below.  Recent Labs     06/19/25  0606   *     Plan  - Will transfuse if platelet count is <50k (if undergoing surgical procedure or have active bleeding).  - Monitor CBC    Chronic and mild and stable

## 2025-06-21 NOTE — ASSESSMENT & PLAN NOTE
MELD-Na score calculated; MELD 3.0: 14 at 6/20/2025  7:30 AM  MELD-Na: 13 at 6/20/2025  7:30 AM  Calculated from:  Serum Creatinine: 1.7 mg/dL at 6/20/2025  7:30 AM  Serum Sodium: 143 mmol/L (Using max of 137 mmol/L) at 6/20/2025  7:30 AM  Total Bilirubin: 0.8 mg/dL (Using min of 1 mg/dL) at 6/20/2025  7:30 AM  Serum Albumin: 2.3 g/dL at 6/20/2025  7:30 AM  INR(ratio): 1.1 at 6/19/2025  6:06 AM  Age at listing (hypothetical): 79 years  Sex: Male at 6/20/2025  7:30 AM    --Continue chronic meds.  Will avoid any hepatotoxic meds, and monitor CBC/CMP/INR for synthetic function.   --NPO at this time, needs swallowing evaluation/NPO  --Lactulose enema ordered    Improving  Start cardiac diet    stable

## 2025-06-21 NOTE — DISCHARGE SUMMARY
[facility]  [facility]  Discharge Summary      Patient Name: Ramy Romo  MRN: [id number]  [facility]: [id number]  Patient Class: IP- Inpatient  Admission Date: 6/17/2025  Hospital Length of Stay: 3 days  Discharge Date and Time: 6/20/2025  1:02 PM  Attending Physician: No att. 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  Sitter at bedside reports that he fell in the shower on Saturday.  He recently had ESWL and stent placement for kidney stones on 06/12/2025 per Dr. Cruz.  Sitter reports that he has had hematuria since that time.  She reports that he took lactulose twice a day on yesterday, but did not take it today due to feeling ill.  He has chronic arthralgias and gait problem, uses wheelchair to get around the house.  He has no previous history of seizures however today he has had multiple episodes of seizure like twitching in his face, eye twitiching, twitching near mouth, and in arms.  His sitter at bedside shows me a video on her phone and it appears to be seizure activity. on arrival to ED, temp 97.7°, heart rate 56, respirations 14, blood pressure 130/67, 98% SpO2 on room air.  Lab workup significant for hemoglobin 10.2, hematocrit 30.5, platelets 123, normal PT/INR, normal PTT, potassium 5.9, sodium 142, chloride 116, CO2 18, BUN 53, creatinine 1.8, glucose 105, magnesium 1.6, albumin 2.7, normal LFTs, ammonia level 118, , CPK 29, troponin normal, UA with hazy urine, 3+ blood, 3+ leukocyte esterase, negative nitrites, greater than 100  RBC, greater than 100 WBC.  Urine culture is pending.  EKG showed wide QRS rhythm with left bundle-branch block, heart rate 60.  Chest x-ray demonstrated lungs are clear.  There was some swelling noted to right lower extremity and ultrasound was done which showed no evidence of DVT.  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  While in ED, he was given IV Benadryl 25 mg, albuterol nebulizer treatment, Lasix 40 mg IV.  Lokelma was ordered, however he did not take due to concern for swallowing difficulty, he is NPO.  Hospital Medicine was consulted for admission due to hepatic encephalopathy, elevated ammonia level, AMEENA, hyperkalemia, and possible seizure activity as well as acute cystitis with hematuria.    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  Sitter at bedside reports that he fell in the shower on Saturday.  He recently had ESWL and stent placement for kidney stones on 06/12/2025 per Dr. Cruz.  Sitter reports that he has had hematuria since that time.  She reports that he took lactulose twice a day on yesterday, but did not take it today due to feeling ill.  He has chronic arthralgias and gait problem, uses wheelchair to get around the house.  He has no previous history of seizures however today he has had multiple episodes of seizure like twitching in his face, eye twitiching, twitching near mouth, and in arms.  His sitter at bedside shows me a video on her phone and it appears to be seizure activity. on arrival to ED, temp 97.7°, heart rate 56, respirations  14, blood pressure 130/67, 98% SpO2 on room air.  Lab workup significant for hemoglobin 10.2, hematocrit 30.5, platelets 123, normal PT/INR, normal PTT, potassium 5.9, sodium 142, chloride 116, CO2 18, BUN 53, creatinine 1.8, glucose 105, magnesium 1.6, albumin 2.7, normal LFTs, ammonia level 118, , CPK 29, troponin normal, UA with hazy urine, 3+ blood, 3+ leukocyte esterase, negative nitrites, greater than 100 RBC, greater than 100 WBC.  Urine culture is pending.  EKG showed wide QRS rhythm with left bundle-branch block, heart rate 60.  Chest x-ray demonstrated lungs are clear.  There was some swelling noted to right lower extremity and ultrasound was done which showed no evidence of DVT.  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  While in ED, he was given IV Benadryl 25 mg, albuterol nebulizer treatment, Lasix 40 mg IV.  Lokelma was ordered, however he did not take due to concern for swallowing difficulty, he is NPO.  Hospital Medicine was consulted for admission due to hepatic encephalopathy, elevated ammonia level, AMEENA, hyperkalemia, and possible seizure activity as well as acute cystitis with hematuria.    6/18- looks and feels better, AAOx3, speech clear, no tremors or asterixis. Good response to the treatment, Ammonia coming down to 80s. He is hungry, wants to eat, started on Cardiac diet. Lactulose continued. VSS afeb, labs improving, Bun/Cr 37/1.8, Ammonia 87. All Cx pending. Cont lactulose and IV abx.     6/19- looks and feels much better, stronger, sitting up in chair, eating by himself, AAOx3, speech clear, no asterixis. His caregiver at his side and wants to switch his lactulose enemas to oral. He has been having liquid stools. Mg low- replaced. Getting PT/OT. Wants to go home now but may  d/c soon with PT/OT. Started on Inj B12 -plus triple Vits.     6/20- looks and feels much better, sitting up comfortably in chair on RA, eating food. AAOx4, speech clear, his care giver at his side dn they are ready to go home now. VSS Afeb, labs show stable cr at 1.7, HCO3 improved to 22 and Ammonia down to 67. His Micro K tab has been d/stephani. He did well with PT/OT. HH has been arranged. He is eating drinking well. He was seen and examined and deemed stable to discharge home with HH and continue Lactulose as before. He will f/u with his outside GI/Hepatology at [facility] next week.     Goals of Care Treatment Preferences:  Code Status: Full Code         Consults:   Consults (From admission, onward)          Status Ordering Provider     Inpatient Consult to Telemedicine - Hepatology  Once        Provider:  García Chu MD    Completed NAFISA PORTILLO     Case Management/  Once        Provider:  (Not yet assigned)    Completed FRED LAWS            Assessment & Plan  Anemia  Anemia is likely due to acute blood loss which was from hematuria. Most recent hemoglobin and hematocrit are listed below.  Recent Labs     06/19/25  0606   HGB 9.0*   HCT 27.7*     Plan  - Monitor serial CBC: Daily  - Transfuse PRBC if patient becomes hemodynamically unstable, symptomatic or H/H drops below 7/21.  - Patient has not received any PRBC transfusions to date  - Patient's anemia is currently stable    stable  Thrombocytopenia  The likely etiology of thrombocytopenia is infection. The patients 3 most recent labs are listed below.  Recent Labs     06/19/25  0606   *     Plan  - Will transfuse if platelet count is <50k (if undergoing surgical procedure or have active bleeding).  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  Will avoid any hepatotoxic meds, and monitor CBC/CMP/INR for synthetic function.   --NPO at this time, needs swallowing evaluation/NPO  --Lactulose enema ordered    Improving  Start cardiac diet    stable  Kidney stones  Had ESWL and stents placed per Dr. 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 (nonalcoholic steatohepatitis) [K75.81, K74.60]  Yes      Problems Resolved During this Admission:    Diagnosis Date Noted Date Resolved POA    PRINCIPAL PROBLEM:  Hepatic encephalopathy [K76.82] 08/20/2016 06/20/2025 Yes    Acute cystitis with hematuria [N30.01] 06/18/2025 06/20/2025 Yes    Hyperkalemia [E87.5] 06/17/2025 06/19/2025 Yes    Witnessed seizure-like activity [R56.9] 06/17/2025 06/19/2025 Yes    AMEENA (acute kidney injury) on CKD 3 [N17.9] 01/23/2024 06/20/2025 Yes       Discharged Condition: stable    Disposition: Home-Health Care [facility]    Follow Up:   Contact information for follow-up providers       Félix Rollins MD. Schedule an appointment as soon as possible for a visit in 3 day(s).    Specialty: Family Medicine  Why: Hospital follow up  Contact information:  [address] [facility]  [address] [place] [address]  [phone]                       Contact information for after-discharge care       Home Medical Care       [facility] .     Service: Home Health Services  Contact information:  [address]  [facility] [address]  [phone]                                 Patient Instructions:      Ambulatory referral/consult to Home Health   Standing Status: Future   Referral Priority: Routine Referral Type: Home Health   Referral Reason: Specialty Services Required   Requested Specialty: Home Health Services   Number of Visits Requested: 1     Diet Cardiac     Diet diabetic     Activity as tolerated       Significant Diagnostic Studies: Labs: BMP:   Recent Labs   Lab 06/20/25  0730   GLU 99      K 4.3   *   CO2 22*   BUN 40*   CREATININE 1.7*   CALCIUM 8.5*   MG 1.5*     Ammonia 67     and All labs within the past 24 hours have been reviewed  Radiology:   Imaging Results              CT Head Without Contrast (Final result)  Result time 06/17/25 16:28:40      Final result by Richard Baker Jr., MD (06/17/25 16:28:40)                   Narrative:    EXAM:  CT HEAD WITHOUT CONTRAST    CLINICAL HISTORY:   Altered mental status    COMPARISON:   04/04/2025    TECHNIQUE: Multiple sequential axial images from base to vertex without intravenous contrast.    FINDINGS:  Study is limited due to motion.  Periventricular white matter changes consistent with small vessel ischemic change.  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  No extra-axial acute fluid collection.  Ventricles and basal cisterns are normal.  No evidence of hemorrhage, mass effect or midline shift.  No other cerebral or cerebral parenchymal abnormalities.  Calvarium is intact.  Completely opacified right maxillary sinus chronic.  Mastoid air cells are clear.    IMPRESSION:  1.  No evidence of acute intracranial process.      All CT scans at [this location] are performed using dose modulation techniques as appropriate to a performed exam including the following: automated exposure control; adjustment of the mA and/or  kV according to patient size (this includes techniques or standardized protocols for targeted exams where dose is matched to indication / reason for exam; i.e. extremities or head); use of iterative reconstruction technique.      Finalized on: 6/17/2025 4:28 PM By:  Richard Baker MD  [facility]# [id number]      2025-06-17 16:30:[id number]     [facility]                                     US Lower Extremity Veins Right (Final result)  Result time 06/17/25 13:27:45      Final result by Willy Thurman MD (06/17/25 13:27:45)                   Impression:      No evidence of deep venous thrombosis in the right lower extremity.      Electronically signed by: Willy Thurman MD  Date:    06/17/2025  Time:    13:27               Narrative:    EXAMINATION:  US LOWER EXTREMITY VEINS RIGHT    CLINICAL HISTORY:  Other specified soft tissue disorders    TECHNIQUE:  Duplex and color flow Doppler evaluation and graded compression of the right lower extremity veins was performed.    COMPARISON:  None    FINDINGS:  Right thigh veins: The common femoral, femoral, popliteal, upper greater saphenous, and deep femoral veins are patent and free of thrombus. The veins are normally compressible and have normal phasic flow and augmentation response.    Right calf veins: The visualized calf veins are patent.    Contralateral CFV: The contralateral (left) common femoral vein is patent and free of thrombus.    Miscellaneous: None                                       X-Ray Chest AP Portable (Final result)  Result time 06/17/25 12:57:46      Final result by JUSTINA Hunter Sr., MD (06/17/25 12:57:46)                   Impression:      The lungs are clear.      Electronically signed by: Kwame Hunter MD  Date:    06/17/2025  Time:    12:57               Narrative:    EXAMINATION:  XR CHEST AP PORTABLE    CLINICAL HISTORY:  weakness;    COMPARISON:  04/08/2025    FINDINGS:  A cardiac pacemaker remains in place.  The size of the heart is normal. The lungs  are clear. There is no pneumothorax.  The costophrenic angles are sharp.  There are surgical clips projected over the right axilla.  There are mild osteoarthritic changes in the left acromioclavicular joint.                                       Pending Diagnostic Studies:       None           Medications:  Reconciled Home Medications:      Medication List        START taking these medications      cholecalciferol (vitamin D3) 125 mcg (5,000 unit) Tab  Take 1 tablet (5,000 Units total) by mouth once daily.     thiamine 100 MG tablet  Take 1 tablet (100 mg total) by mouth once daily.     WESTAB MAX 2.5-25-2 mg Tab  Generic drug: folic acid-vit B6-vit B12 2.5-25-2 mg  Take 1 tablet by mouth once daily.            CHANGE how you take these medications      metoprolol succinate 25 MG 24 hr tablet  Commonly known as: TOPROL-XL  Take 0.5 tablets (12.5 mg total) by mouth once daily.  What changed: how much to take            CONTINUE taking these medications      aspirin 81 MG Chew  Take 81 mg by mouth once daily.     ferrous gluconate 324 MG tablet  Commonly known as: FERGON  Take 324 mg by mouth daily with breakfast.     fluticasone propionate 50 mcg/actuation nasal spray  Commonly known as: FLONASE  1 spray by Each Nostril route every morning.     furosemide 20 MG tablet  Commonly known as: LASIX  Take 20 mg by mouth daily as needed.     lactulose 10 gram/15 mL solution  Commonly known as: CHRONULAC  Take 30 g by mouth 2 (two) times daily.     magnesium oxide 400 mg (241.3 mg magnesium) tablet  Commonly known as: MAG-OX  Take 400 mg by mouth 2 (two) times daily.     NovoLOG Flexpen U-100 Insulin 100 unit/mL (3 mL) Inpn pen  Generic drug: insulin aspart U-100  Inject into the skin as needed.     phenazopyridine 200 MG tablet  Commonly known as: PYRIDIUM  Take 1 tablet (200 mg total) by mouth 3 (three) times daily as needed.     rifAXIMin 550 mg Tab  Commonly known as: XIFAXAN  Take 1 tablet (550 mg total) by mouth 2  (two) times daily.     spironolactone 50 MG tablet  Commonly known as: ALDACTONE  Take 50 mg by mouth every morning.     tamsulosin 0.4 mg Cap  Commonly known as: FLOMAX  Take 0.4 mg by mouth once daily.            STOP taking these medications      calcium-vitamin D 250 mg-2.5 mcg (100 unit) per tablet     ertapenem 1 gram injection  Commonly known as: INVANZ     potassium gluconate 595 mg (99 mg) Tab              Indwelling Lines/Drains at time of discharge:   Lines/Drains/Airways       None                       Time spent on the discharge of patient: 45 minutes         Gracia Jaimes MD  Department of Hospital Medicine  [facility]

## 2025-06-23 ENCOUNTER — PATIENT MESSAGE (OUTPATIENT)
Dept: UROLOGY | Facility: CLINIC | Age: 80
End: 2025-06-23
Payer: MEDICARE

## 2025-06-23 NOTE — ANESTHESIA POSTPROCEDURE EVALUATION
Anesthesia Post Evaluation    Patient: Ramy Romo    Procedure(s) Performed: Procedure(s) (LRB):  URETEROSCOPY, WITH LASER LITHOTRIPSY (Bilateral)  CYSTOSCOPY, WITH RETROGRADE PYELOGRAM AND URETERAL STENT INSERTION (Bilateral)  REMOVAL, CALCULUS, URETER, URETEROSCOPIC (Bilateral)    Final Anesthesia Type: general      Patient location during evaluation: PACU  Patient participation: Yes- Able to Participate  Level of consciousness: awake and alert, oriented and awake  Post-procedure vital signs: reviewed and stable  Pain management: adequate  Airway patency: patent    PONV status at discharge: No PONV  Anesthetic complications: no      Cardiovascular status: blood pressure returned to baseline  Respiratory status: unassisted  Hydration status: euvolemic  Follow-up not needed.              Vitals Value Taken Time   /60 06/12/25 14:30   Temp 36.1 °C (97 °F) 06/12/25 14:05   Pulse 60 06/12/25 14:35   Resp 20 06/12/25 14:35   SpO2 99 % 06/12/25 14:35         Event Time   Out of Recovery 14:35:20         Pain/Tonya Score: No data recorded

## 2025-06-26 ENCOUNTER — TELEPHONE (OUTPATIENT)
Dept: UROLOGY | Facility: CLINIC | Age: 80
End: 2025-06-26
Payer: MEDICARE

## 2025-06-26 ENCOUNTER — RESULTS FOLLOW-UP (OUTPATIENT)
Dept: UROLOGY | Facility: HOSPITAL | Age: 80
End: 2025-06-26

## 2025-06-26 NOTE — TELEPHONE ENCOUNTER
----- Message from Desi Cruz MD sent at 6/26/2025 11:03 AM CDT -----  Please check in with the pt. He was supposed to have a urine culture and then picc line removal. I think he got admitted to [facility] and I'm not sure where they are with that. Please make sure his PICC   was removed. We will also need to get him in for cysto/stent removal asap.   ----- Message -----  From: Lab, Background User  Sent: 6/13/2025   8:18 AM CDT  To: Desi Cruz MD

## 2025-06-26 NOTE — TELEPHONE ENCOUNTER
Spoke with patient wife who was able to provide acceptable patient identifiers prior to start of conversation. Patient wife states patient is currently in ICU and not responding at [facility]. PICC line have been removed. Patient unable to schedule cystoscopy stent removal. Will notify Dr. Cruz reference to information.

## 2025-06-29 ENCOUNTER — TELEPHONE (OUTPATIENT)
Dept: ADMINISTRATIVE | Facility: CLINIC | Age: 80
End: 2025-06-29
Payer: MEDICARE

## 2025-06-29 ENCOUNTER — PATIENT OUTREACH (OUTPATIENT)
Dept: ADMINISTRATIVE | Facility: OTHER | Age: 80
End: 2025-06-29
Payer: MEDICARE

## 2025-06-29 NOTE — PROGRESS NOTES
CHW - Initial Contact    This Community Health Worker updated the Social Determinant of Health questionnaire with caregiver via telephone today.    Pt identified barriers of most importance are: has issues utility bill payments.   Referrals to community agencies completed with patient/caregiver consent outside of [facility] include: yes: [email or web address].  Referrals were put through [facility] - no: none at this time.  Support and Services: No support & services have been documented.  Other information discussed the patient needs / wants help with: Spoke to patient's son/caregiver, states pt is currently admitted into hospital and has issues with utility bill payments. Will follow up in one week to check status of referrals and pt's future needs.    Follow up required: yes.  No future outreach task assigned

## 2025-06-29 NOTE — PROGRESS NOTES
CHW - Outreach Attempt    Community Health Worker left a voicemail message for 1st attempt to contact patient regardinst missed initial outreach visit attempt call.

## 2025-06-29 NOTE — PROGRESS NOTES
CHW - Initial Contact    This Community Health Worker updated and verified the Social Determinant of Health questionnaire with son/caregiver  via telephone today.    Pt identified barriers of most importance are: has issues with utility bill payments.   Referrals to community agencies completed with patient/caregiver consent outside of [facility] include: yes: [email or web address].  Referrals were put through [facility] - no: none at this time.  Support and Services: has no support at this time.  Other information discussed the patient needs / wants help with: Spoke to patient's son/caregiver, states pt is currently admitted into hospital and has issues with utility bill payments. Will follow up in one week to check status of referrals and pt's future needs.    Follow up required: yes.  Follow-up Outreach - Due: 7/5/2025     Sent to pharm,  LVM for patient

## 2025-07-17 ENCOUNTER — DOCUMENT SCAN (OUTPATIENT)
Dept: HOME HEALTH SERVICES | Facility: HOSPITAL | Age: 80
End: 2025-07-17
Payer: MEDICARE

## 2025-08-21 ENCOUNTER — EXTERNAL HOME HEALTH (OUTPATIENT)
Dept: HOME HEALTH SERVICES | Facility: HOSPITAL | Age: 80
End: 2025-08-21
Payer: MEDICARE

## (undated) DEVICE — SUT 4/0 18IN COATED VICRYL

## (undated) DEVICE — PACK HEART CATH BR

## (undated) DEVICE — SHEATH SAFE ULTRA 9FR

## (undated) DEVICE — PAD GROUNDING DISPER ELECTRODE

## (undated) DEVICE — COVER LIGHT HANDLE 80/CA

## (undated) DEVICE — GUIDE WIRE MOTION .035 X 150CM

## (undated) DEVICE — IRRIGATION SET Y-TYPE TUR/BLAD

## (undated) DEVICE — KIT SELECTRA ACCESSORY

## (undated) DEVICE — GOWN POLY REINF X-LONG XL

## (undated) DEVICE — SYR ONLY LUER LOCK 20CC

## (undated) DEVICE — DRAPE T CYSTOSCOPY STERILE

## (undated) DEVICE — CONTAINER SPECIMEN OR STER 4OZ

## (undated) DEVICE — CATH URETERAL DUAL LUMEN 10FR

## (undated) DEVICE — BOWL STERILE LARGE 32OZ

## (undated) DEVICE — PACK PACER PERMANENT OMC

## (undated) DEVICE — CATH TEMP PACER 5.0FR

## (undated) DEVICE — SHEATH INTRODUCER 6FR 11CM

## (undated) DEVICE — FIBER QUANTA OPT SDT 272UM

## (undated) DEVICE — WIRE GUIDE TEFLON 3CM .035 145

## (undated) DEVICE — WIRE GUID STAND STR .038X150CM

## (undated) DEVICE — GOWN POLY REINF BRTH SLV XL

## (undated) DEVICE — SYR BULB 60CC IRRIGATION

## (undated) DEVICE — EXTRACTOR TIPLESS 2.4FRX1115CM

## (undated) DEVICE — GLOVE SIGNATURE ESSNTL LTX 6

## (undated) DEVICE — PAD DEFIB CADENCE ADULT R2

## (undated) DEVICE — SUT VICRYL 2-0 CT-2 VCP269H

## (undated) DEVICE — TRAY SKIN SCRUB WET 4 COMPART

## (undated) DEVICE — UROVIEW 2600/2800

## (undated) DEVICE — SHEATH FLEXOR URET 10.7FRX35CM

## (undated) DEVICE — SPONGE COTTON TRAY 4X4IN

## (undated) DEVICE — SCALPEL SZ 10 RETRACTABLE

## (undated) DEVICE — NDL PERCUTANEOUS 21G 7CM VASC

## (undated) DEVICE — OMNIPAQUE 300MG 150ML VIAL

## (undated) DEVICE — OMNIPAQUE CONTRAST 300MG/50ML

## (undated) DEVICE — GUIDEWIRE WHOLEY HI TORQ 175CM

## (undated) DEVICE — CANISTER SUCTION JUMBO 12L

## (undated) DEVICE — PENCIL SMOKE EVAC ROCKER 70MM

## (undated) DEVICE — SOL IRRI STRL WATER 1000ML

## (undated) DEVICE — DRESSING AQUACEL AG ADV 3.5X6

## (undated) DEVICE — SOL NACL IRR 3000ML

## (undated) DEVICE — ADHESIVE DERMABOND ADVANCED

## (undated) DEVICE — SHEATH SELECTRA 65MM 42CM

## (undated) DEVICE — SUT SILK 2-0 PS 18IN BLACK

## (undated) DEVICE — SAFESHEATH II 8FR 13CM

## (undated) DEVICE — SUT 3-0 VICRYL / SH (J416)

## (undated) DEVICE — ADHESIVE MASTISOL VIAL 48/BX

## (undated) DEVICE — URETEROSCOPE LITHOVUE REVERSE

## (undated) DEVICE — TOWEL OR DISP STRL BLUE 4/PK